# Patient Record
Sex: FEMALE | Race: WHITE | NOT HISPANIC OR LATINO | Employment: FULL TIME | URBAN - METROPOLITAN AREA
[De-identification: names, ages, dates, MRNs, and addresses within clinical notes are randomized per-mention and may not be internally consistent; named-entity substitution may affect disease eponyms.]

---

## 2021-07-26 NOTE — PROGRESS NOTES
"New Right Knee      Patient: Babs Felton        YOB: 1960    Medical Record Number: [id number]        Chief Complaints: right knee pain      History of Present Illness: This is a 60-year-old female who presents complaining of right knee pain she states she tripped in April landing right onto the anterior aspect of her right knee she had bruising initially that is better still has some pain anteriorly.  Symptoms are moderate intermittent aching worse with activity prolonged sitting stairs somewhat better with rest past medical history is unremarkable        Allergies: Not on File    Medications:   Home Medications:  No current outpatient medications on file prior to visit.     No current facility-administered medications on file prior to visit.     Current Medications:  Scheduled Meds:  Continuous Infusions:No current facility-administered medications for this visit.    PRN Meds:.    History reviewed. No pertinent past medical history.   No past surgical history on file.     Social History     Occupational History   • Not on file   Tobacco Use   • Smoking status: Not on file   Substance and Sexual Activity   • Alcohol use: Not on file   • Drug use: Not on file   • Sexual activity: Not on file      Social History     Social History Narrative   • Not on file      History reviewed. No pertinent family history.          Review of Systems: 14 point review of systems remarkable for the right knee pain only the remainder negative per the patient    Review of Systems      Physical Exam: 60 y.o. female  General Appearance:    Alert, cooperative, in no acute distress                   Vitals:    07/27/21 1347   Temp: 97.2 °F (36.2 °C)   TempSrc: Temporal   Weight: 104 kg (230 lb)   Height: 175.3 cm (69\")      Patient is alert and read ×3 no acute distress appears her above-listed at height weight and age.  Affect is normal respiratory rate is normal unlabored. Heart rate regular rate rhythm, sclera, dentition " and hearing are normal for the purpose of this exam.        Ortho Exam  Physical exam of the right knee reveals no effusion, no erythema.  The patient has no palpable tenderness along the medial joint line, no tenderness about the lateral joint line.  Patient does have crepitus with patellofemoral range of motion.  They also have subjective symptoms anteriorly during exam.  The patient has a negative bounce home, negative Koko and a stable ligamentous exam.  Quad tone is reasonable and symmetric.  There are no overlying skin changes no lymphedema no lymphadenopathy.  There is good hip range of motion which is full symmetric and asymptomatic and a normal ankle exam.  Hamstrings and IT band are tight bilaterally.      Large Joint Arthrocentesis: R knee  Date/Time: 7/27/2021 2:12 PM  Consent given by: patient  Site marked: site marked  Timeout: Immediately prior to procedure a time out was called to verify the correct patient, procedure, equipment, support staff and site/side marked as required   Supporting Documentation  Indications: pain   Procedure Details  Location: knee - R knee  Preparation: Patient was prepped and draped in the usual sterile fashion  Needle size: 22 G  Approach: anteromedial  Medications administered: 80 mg methylPREDNISolone acetate 80 MG/ML; 4 mL lidocaine PF 1% 1 %  Patient tolerance: patient tolerated the procedure well with no immediate complications                   Radiology:   AP, Lateral and merchant views of the right knee  were ordered/reviewed to evauateknee pain.  I have no comparative films these show some mild patellofemoral OA no obvious acute pathology no obvious fracture  Imaging Results (Most Recent)     Procedure Component Value Units Date/Time    XR Knee 3 View Right [[id number]] Resulted: 07/27/21 1344     Updated: 07/27/21 1344    Impression:      Ordering physician's impression is located in the Encounter Note dated 07/27/21. X-ray performed in the DR room.           Assessment/Plan:    Right knee pain following a fall seems to be more patellofemoral today.  Meniscal pathology has to remain in my differential plan is to proceed with an injection as a diagnostic and therapeutic tool she fails to improve we will pursue other means of testing such as an MRI.  I will also start her on some naproxen with strict precautions for short period of time of 1 to 2 weeks.  See her back in 4 weeks                        .

## 2021-07-27 ENCOUNTER — OFFICE VISIT (OUTPATIENT)
Dept: ORTHOPEDIC SURGERY | Facility: CLINIC | Age: 61
End: 2021-07-27

## 2021-07-27 VITALS — TEMPERATURE: 97.2 F | WEIGHT: 230 LBS | BODY MASS INDEX: 34.07 KG/M2 | HEIGHT: 69 IN

## 2021-07-27 DIAGNOSIS — M17.10 PATELLOFEMORAL ARTHRITIS: ICD-10-CM

## 2021-07-27 DIAGNOSIS — R52 PAIN: Primary | ICD-10-CM

## 2021-07-27 PROCEDURE — 73562 X-RAY EXAM OF KNEE 3: CPT | Performed by: ORTHOPAEDIC SURGERY

## 2021-07-27 PROCEDURE — 20610 DRAIN/INJ JOINT/BURSA W/O US: CPT | Performed by: ORTHOPAEDIC SURGERY

## 2021-07-27 PROCEDURE — 99204 OFFICE O/P NEW MOD 45 MIN: CPT | Performed by: ORTHOPAEDIC SURGERY

## 2021-07-27 RX ADMIN — LIDOCAINE HYDROCHLORIDE 4 ML: 10 INJECTION, SOLUTION EPIDURAL; INFILTRATION; INTRACAUDAL; PERINEURAL at 14:12

## 2021-07-27 RX ADMIN — METHYLPREDNISOLONE ACETATE 80 MG: 80 INJECTION, SUSPENSION INTRA-ARTICULAR; INTRALESIONAL; INTRAMUSCULAR; SOFT TISSUE at 14:12

## 2021-07-29 ENCOUNTER — TELEPHONE (OUTPATIENT)
Dept: ORTHOPEDIC SURGERY | Facility: CLINIC | Age: 61
End: 2021-07-29

## 2021-07-29 RX ORDER — NAPROXEN 500 MG/1
500 TABLET ORAL 2 TIMES DAILY
Qty: 60 TABLET | Refills: 0 | Status: SHIPPED | OUTPATIENT
Start: 2021-07-29 | End: 2022-05-27

## 2021-07-29 RX ORDER — METHYLPREDNISOLONE ACETATE 80 MG/ML
80 INJECTION, SUSPENSION INTRA-ARTICULAR; INTRALESIONAL; INTRAMUSCULAR; SOFT TISSUE
Status: COMPLETED | OUTPATIENT
Start: 2021-07-27 | End: 2021-07-27

## 2021-07-29 RX ORDER — LIDOCAINE HYDROCHLORIDE 10 MG/ML
4 INJECTION, SOLUTION EPIDURAL; INFILTRATION; INTRACAUDAL; PERINEURAL
Status: COMPLETED | OUTPATIENT
Start: 2021-07-27 | End: 2021-07-27

## 2021-08-04 ENCOUNTER — TELEPHONE (OUTPATIENT)
Dept: ORTHOPEDIC SURGERY | Facility: CLINIC | Age: 61
End: 2021-08-04

## 2021-08-04 RX ORDER — NAPROXEN 500 MG/1
500 TABLET ORAL 2 TIMES DAILY
Qty: 60 TABLET | Refills: 0 | Status: SHIPPED | OUTPATIENT
Start: 2021-08-04 | End: 2021-09-03

## 2021-08-04 NOTE — TELEPHONE ENCOUNTER
Caller: JENNIFER DIAZ    Relationship: SELF    Best call back number:     Medication needed:   Requested Prescriptions      No prescriptions requested or ordered in this encounter   NAPROXEN 500MG    When do you need the refill by:ASAP    What additional details did the patient provide when requesting the medication: THE PATIENT STATES THE PHARMACY DOES NOT HAVE HER PRESCRIPTION/ THEY NEVER RECEIVED IT.     TRIED TO WARM TRANSFER AND COULDN'T GET THROUGH      What is the patient's preferred pharmacy:    [facility] [address] AT [facility] - [phone] [facility] [phone]   [phone]

## 2021-08-04 NOTE — TELEPHONE ENCOUNTER
I did send it in and I went back and looked I do not know why they did not receive it.  I sent it in again

## 2021-09-02 ENCOUNTER — OFFICE VISIT (OUTPATIENT)
Dept: ORTHOPEDIC SURGERY | Facility: CLINIC | Age: 61
End: 2021-09-02

## 2021-09-02 VITALS — HEIGHT: 69 IN | TEMPERATURE: 96.8 F | WEIGHT: 232.2 LBS | BODY MASS INDEX: 34.39 KG/M2

## 2021-09-02 DIAGNOSIS — S83.241D TEAR OF MEDIAL MENISCUS OF RIGHT KNEE, CURRENT, UNSPECIFIED TEAR TYPE, SUBSEQUENT ENCOUNTER: Primary | ICD-10-CM

## 2021-09-02 PROCEDURE — 99213 OFFICE O/P EST LOW 20 MIN: CPT | Performed by: ORTHOPAEDIC SURGERY

## 2021-09-03 RX ORDER — NAPROXEN 500 MG/1
TABLET ORAL
Qty: 60 TABLET | Refills: 0 | Status: SHIPPED | OUTPATIENT
Start: 2021-09-03 | End: 2022-05-27

## 2021-09-10 ENCOUNTER — TELEPHONE (OUTPATIENT)
Dept: ORTHOPEDIC SURGERY | Facility: CLINIC | Age: 61
End: 2021-09-10

## 2021-09-10 NOTE — TELEPHONE ENCOUNTER
Caller: Babs Felton    Relationship: Self    Best call back number: [phone]    What orders are you requesting (i.e. lab or imaging): MRI  In what timeframe would the patient need to come in: HER APPT WAS LAST Thursday THEY STILL NEED THE ORDER    Where will you receive your lab/imaging services: [facility]    Additional notes: N/A - SHE WILL LIKE CONFIRMATION THAT IT HAS BEEN DONE

## 2021-09-20 ENCOUNTER — OFFICE VISIT (OUTPATIENT)
Dept: ORTHOPEDIC SURGERY | Facility: CLINIC | Age: 61
End: 2021-09-20

## 2021-09-20 VITALS — WEIGHT: 229 LBS | HEIGHT: 69 IN | TEMPERATURE: 97.1 F | BODY MASS INDEX: 33.92 KG/M2

## 2021-09-20 DIAGNOSIS — M17.10 PATELLOFEMORAL ARTHRITIS: Primary | ICD-10-CM

## 2021-09-20 PROCEDURE — 99213 OFFICE O/P EST LOW 20 MIN: CPT | Performed by: ORTHOPAEDIC SURGERY

## 2021-09-20 NOTE — PROGRESS NOTES
"Right Knee MRI Follow Up      Patient: Babs Felton        YOB: 1960            Chief Complaints: Right  Knee pain      History of Present Illness: The patient is here follow-up of an MRI of the knee MRI the right knee demonstrates degenerative changes at the patellofemoral joint and some subchondral cystic formation.  I do not see any meniscal pathology I think would be amenable to arthroscopy      Physical Exam: 60 y.o. female  General Appearance:    Alert, cooperative, in no acute distress                   Vitals:    09/20/21 1654   Temp: 97.1 °F (36.2 °C)   Weight: 104 kg (229 lb)   Height: 175.3 cm (69\")   PainSc:   3        Patient is alert and read ×3 no acute distress appears her above-listed at height weight and age.  Affect is normal respiratory rate is normal unlabored. Heart rate regular rate rhythm, sclera, dentition and hearing are normal for the purpose of this exam.      Ortho Exam Physical exam of the right knee reveals no effusion, no erythema.  The patient has no palpable tenderness along the medial joint line, no tenderness about the lateral joint line.  Patient does have crepitus with patellofemoral range of motion.  They also have subjective symptoms anteriorly during exam.  The patient has a negative bounce home, negative Koko and a stable ligamentous exam.  Quad tone is reasonable and symmetric.  There are no overlying skin changes no lymphedema no lymphadenopathy.  There is good hip range of motion which is full symmetric and asymptomatic and a normal ankle exam.  Hamstrings and IT band are tight bilaterally.          MRI Results: MRIs as above have reviewed the films myself and agree with the findings    Procedures      Assessment/Plan: Right knee pain I really do not think there is anything surgical to do here I think steroid injection she did that previously did not have a ton of relief we talked about options occluding gel injection platelet injections we will try " to get approval for gel in the future if she wants

## 2022-05-02 ENCOUNTER — TELEPHONE (OUTPATIENT)
Dept: PEDIATRICS | Facility: OTHER | Age: 62
End: 2022-05-02

## 2022-05-02 NOTE — TELEPHONE ENCOUNTER
Attempted to reach out to pt to get her scheduled. [facility] advising to give office a call so we could get her scheduled. Dates she has the options to do are 5/25 or 5/27 both being at 130pm.     Hub please advised what date pt would like.

## 2022-05-02 NOTE — TELEPHONE ENCOUNTER
Caller: Babs Felton    Relationship to patient: Self    Best call back number: [phone]    Chief complaint: WELLNESS VISIT    Type of visit: NEW PATIENT     Requested date: WHENEVER IS AVAILABLE     Additional notes: PATIENT STATED THAT DR JUDY SORIANO WITH [facility] IN [place] REFERRED PATIENT TO DR DAMIAN. PLEASE ADVISE IF PATIENT CAN BECOME ONE OF HER NEW PATIENTS.

## 2022-05-02 NOTE — TELEPHONE ENCOUNTER
Yes, I can see her as a new patient.  Please find a 1:30 pm appt on a Wednesday or Friday to work her in. thanks

## 2022-05-27 ENCOUNTER — OFFICE VISIT (OUTPATIENT)
Dept: FAMILY MEDICINE CLINIC | Facility: CLINIC | Age: 62
End: 2022-05-27

## 2022-05-27 VITALS
DIASTOLIC BLOOD PRESSURE: 80 MMHG | OXYGEN SATURATION: 97 % | WEIGHT: 245.9 LBS | SYSTOLIC BLOOD PRESSURE: 140 MMHG | TEMPERATURE: 97.1 F | HEART RATE: 69 BPM | BODY MASS INDEX: 36.42 KG/M2 | HEIGHT: 69 IN

## 2022-05-27 DIAGNOSIS — Z12.31 ENCOUNTER FOR SCREENING MAMMOGRAM FOR BREAST CANCER: ICD-10-CM

## 2022-05-27 DIAGNOSIS — E55.9 VITAMIN D DEFICIENCY: ICD-10-CM

## 2022-05-27 DIAGNOSIS — Z86.2 HISTORY OF ANEMIA: ICD-10-CM

## 2022-05-27 DIAGNOSIS — Z86.39 HISTORY OF HYPERLIPIDEMIA: ICD-10-CM

## 2022-05-27 DIAGNOSIS — R19.7 BLOODY DIARRHEA: ICD-10-CM

## 2022-05-27 DIAGNOSIS — R19.7 DIARRHEA, UNSPECIFIED TYPE: Primary | ICD-10-CM

## 2022-05-27 PROCEDURE — 99214 OFFICE O/P EST MOD 30 MIN: CPT | Performed by: INTERNAL MEDICINE

## 2022-05-27 RX ORDER — ONDANSETRON 4 MG/1
4 TABLET, FILM COATED ORAL
COMMUNITY
End: 2022-10-17

## 2022-05-27 RX ORDER — DICYCLOMINE HCL 20 MG
20 TABLET ORAL EVERY 6 HOURS PRN
COMMUNITY
End: 2023-03-22 | Stop reason: SDUPTHER

## 2022-05-27 NOTE — PROGRESS NOTES
"Chief Complaint  Establish Care (Pt would like to have a wellness check) and GI Problem (Pt is having digestive issues)    Subjective          Babs Felton presents to [facility]  History of Present Illness  Here as a new patient to me referred by Dr. Willie Bowman from the  in [place].  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  She is on staff at [employer].  Has been under a lot of stress at work and has noticed some issues with her colon.  Isn't exercising much b/c she hurt her right knee last year with a fall.  She has gained weight.    Sister with breast cancer history.    Pain, gas and diarrhea in colon for a few days and then will resolve.  Worse with stress and this has been the way she has been all her life.  In February, an episode started with diarrhea (water 4-6x/day) and some spasms related to it and has persisted for 3 months.  Eating makes it worse.  Stopped drinking coffee and maybe a little improvement.  Has noticed dark tarry stools which were constant in feb/march.  It has improved.  No weight loss.  Appetite is normal but eating flares her diarrhea, especially veggies.   + nocturnal sx's and has to have BM's. No epigastric pain .  Was taking advil prn for her knee but only for a day once a week.  No recent travel.  Has tried probiotic but not regularly.    Objective   Vital Signs:  /80   Pulse 69   Temp 97.1 °F (36.2 °C)   Ht 175.3 cm (69\")   Wt 112 kg (245 lb 14.4 oz)   SpO2 97%   BMI 36.31 kg/m²         Physical Exam  Vitals and nursing note reviewed.   Constitutional:       Appearance: Normal appearance. She is well-developed.   HENT:      Head: Normocephalic and atraumatic.      Right Ear: External ear normal.      Left Ear: External ear normal.   Eyes:      Extraocular Movements: Extraocular movements intact.      Conjunctiva/sclera: Conjunctivae normal.   Cardiovascular:      Rate and Rhythm: Normal rate and regular rhythm.      Heart sounds: " Normal heart sounds.      Comments: No bruits  Pulmonary:      Effort: Pulmonary effort is normal. No respiratory distress.      Breath sounds: Normal breath sounds. No wheezing or rales.   Abdominal:      General: Bowel sounds are normal. There is no distension.      Palpations: Abdomen is soft. There is no mass.      Tenderness: There is no abdominal tenderness.   Musculoskeletal:      Cervical back: Neck supple.   Lymphadenopathy:      Cervical: No cervical adenopathy.   Skin:     General: Skin is warm.   Neurological:      General: No focal deficit present.      Mental Status: She is alert and oriented to person, place, and time.   Psychiatric:         Behavior: Behavior normal.         Thought Content: Thought content normal.         Judgment: Judgment normal.        Result Review :                 Assessment and Plan    Diagnoses and all orders for this visit:    1. Diarrhea, unspecified type (Primary)  -     Ambulatory Referral to Gastroenterology  -     CBC & Differential  -     Comprehensive Metabolic Panel  -     T4, Free  -     TSH  -     Clostridium Difficile Toxin, PCR - Stool, Per Rectum  -     Ova & Parasite Examination - Stool, Per Rectum  -     Stool Culture With Yersinia - Stool, Per Rectum  -     Campylobacter Culture, Stool - Stool, Per Rectum    2. Bloody diarrhea  -     Ambulatory Referral to Gastroenterology  -     CBC & Differential  -     Comprehensive Metabolic Panel  -     Clostridium Difficile Toxin, PCR - Stool, Per Rectum  -     Ova & Parasite Examination - Stool, Per Rectum  -     Stool Culture With Yersinia - Stool, Per Rectum  -     Campylobacter Culture, Stool - Stool, Per Rectum    3. History of hyperlipidemia  -     Lipid Panel With LDL / HDL Ratio    4. Vitamin D deficiency  -     Vitamin D 25 Hydroxy    5. History of anemia  -     CBC & Differential  -     Folate  -     Ferritin  -     Iron  -     Vitamin B12    6. Encounter for screening mammogram for breast cancer  -     Mammo  Screening Bilateral With CAD; Future             Follow Up   No follow-ups on file.  Patient was given instructions and counseling regarding her condition or for health maintenance advice. Please see specific information pulled into the AVS if appropriate.     With melena and h/o blood in stool and diarrhea for a few months, she needs labs and stool studies and GI referral.  I have also asked her to start a daily probiotic and benefiber in small dosages and titrate upward as tolerates to try and bulk stools.  Concerning for colitis with the blood  No UGI sx's despite have what sounds like melena.  She is due for many preventative health studies and vaccinations.  She declines at present but will reschedule for a CPE and consider then.    H/o HL--check labs.  She prefers dietary restrictions prior to statin therapy  H/o VDD --check D3 level.   Screening MMG was ordered.  Pap recommended with her gyn.

## 2022-05-28 LAB
25(OH)D3+25(OH)D2 SERPL-MCNC: 21.2 NG/ML (ref 30–100)
ALBUMIN SERPL-MCNC: 4.6 G/DL (ref 3.8–4.8)
ALBUMIN/GLOB SERPL: 2.1 {RATIO} (ref 1.2–2.2)
ALP SERPL-CCNC: 86 IU/L (ref 44–121)
ALT SERPL-CCNC: 19 IU/L (ref 0–32)
AST SERPL-CCNC: 21 IU/L (ref 0–40)
BASOPHILS # BLD AUTO: 0 X10E3/UL (ref 0–0.2)
BASOPHILS NFR BLD AUTO: 1 %
BILIRUB SERPL-MCNC: 0.4 MG/DL (ref 0–1.2)
BUN SERPL-MCNC: 10 MG/DL (ref 8–27)
BUN/CREAT SERPL: 13 (ref 12–28)
CALCIUM SERPL-MCNC: 9.7 MG/DL (ref 8.7–10.3)
CHLORIDE SERPL-SCNC: 104 MMOL/L (ref 96–106)
CHOLEST SERPL-MCNC: 255 MG/DL (ref 100–199)
CO2 SERPL-SCNC: 22 MMOL/L (ref 20–29)
CREAT SERPL-MCNC: 0.78 MG/DL (ref 0.57–1)
EGFRCR SERPLBLD CKD-EPI 2021: 86 ML/MIN/1.73
EOSINOPHIL # BLD AUTO: 0.4 X10E3/UL (ref 0–0.4)
EOSINOPHIL NFR BLD AUTO: 5 %
ERYTHROCYTE [DISTWIDTH] IN BLOOD BY AUTOMATED COUNT: 13 % (ref 11.7–15.4)
FERRITIN SERPL-MCNC: 31 NG/ML (ref 15–150)
FOLATE SERPL-MCNC: 12.3 NG/ML
GLOBULIN SER CALC-MCNC: 2.2 G/DL (ref 1.5–4.5)
GLUCOSE SERPL-MCNC: 85 MG/DL (ref 65–99)
HCT VFR BLD AUTO: 40 % (ref 34–46.6)
HDLC SERPL-MCNC: 56 MG/DL
HGB BLD-MCNC: 13.4 G/DL (ref 11.1–15.9)
IMM GRANULOCYTES # BLD AUTO: 0 X10E3/UL (ref 0–0.1)
IMM GRANULOCYTES NFR BLD AUTO: 0 %
IRON SERPL-MCNC: 61 UG/DL (ref 27–139)
LDLC SERPL CALC-MCNC: 177 MG/DL (ref 0–99)
LDLC/HDLC SERPL: 3.2 RATIO (ref 0–3.2)
LYMPHOCYTES # BLD AUTO: 2.6 X10E3/UL (ref 0.7–3.1)
LYMPHOCYTES NFR BLD AUTO: 32 %
MCH RBC QN AUTO: 28.7 PG (ref 26.6–33)
MCHC RBC AUTO-ENTMCNC: 33.5 G/DL (ref 31.5–35.7)
MCV RBC AUTO: 86 FL (ref 79–97)
MONOCYTES # BLD AUTO: 0.4 X10E3/UL (ref 0.1–0.9)
MONOCYTES NFR BLD AUTO: 5 %
NEUTROPHILS # BLD AUTO: 4.6 X10E3/UL (ref 1.4–7)
NEUTROPHILS NFR BLD AUTO: 57 %
PLATELET # BLD AUTO: 303 X10E3/UL (ref 150–450)
POTASSIUM SERPL-SCNC: 4.4 MMOL/L (ref 3.5–5.2)
PROT SERPL-MCNC: 6.8 G/DL (ref 6–8.5)
RBC # BLD AUTO: 4.67 X10E6/UL (ref 3.77–5.28)
SODIUM SERPL-SCNC: 142 MMOL/L (ref 134–144)
T4 FREE SERPL-MCNC: 1.13 NG/DL (ref 0.82–1.77)
TRIGL SERPL-MCNC: 121 MG/DL (ref 0–149)
TSH SERPL DL<=0.005 MIU/L-ACNC: 2.23 UIU/ML (ref 0.45–4.5)
VIT B12 SERPL-MCNC: 389 PG/ML (ref 232–1245)
VLDLC SERPL CALC-MCNC: 22 MG/DL (ref 5–40)
WBC # BLD AUTO: 8 X10E3/UL (ref 3.4–10.8)

## 2022-06-14 ENCOUNTER — HOSPITAL ENCOUNTER (OUTPATIENT)
Dept: MAMMOGRAPHY | Facility: HOSPITAL | Age: 62
Discharge: HOME | End: 2022-06-14
Admitting: INTERNAL MEDICINE

## 2022-06-14 DIAGNOSIS — Z12.31 ENCOUNTER FOR SCREENING MAMMOGRAM FOR BREAST CANCER: ICD-10-CM

## 2022-06-14 PROCEDURE — 77067 SCR MAMMO BI INCL CAD: CPT

## 2022-06-14 PROCEDURE — 77063 BREAST TOMOSYNTHESIS BI: CPT

## 2022-06-30 ENCOUNTER — OFFICE VISIT (OUTPATIENT)
Dept: GASTROENTEROLOGY | Facility: CLINIC | Age: 62
End: 2022-06-30

## 2022-06-30 ENCOUNTER — PREP FOR SURGERY (OUTPATIENT)
Dept: SURGERY | Facility: SURGERY CENTER | Age: 62
End: 2022-06-30

## 2022-06-30 VITALS
OXYGEN SATURATION: 97 % | HEART RATE: 76 BPM | SYSTOLIC BLOOD PRESSURE: 142 MMHG | WEIGHT: 240 LBS | BODY MASS INDEX: 35.55 KG/M2 | DIASTOLIC BLOOD PRESSURE: 82 MMHG | HEIGHT: 69 IN | TEMPERATURE: 97.1 F

## 2022-06-30 DIAGNOSIS — R14.0 BLOATING: ICD-10-CM

## 2022-06-30 DIAGNOSIS — K21.9 GASTROESOPHAGEAL REFLUX DISEASE WITHOUT ESOPHAGITIS: ICD-10-CM

## 2022-06-30 DIAGNOSIS — R19.7 BLOODY DIARRHEA: ICD-10-CM

## 2022-06-30 DIAGNOSIS — R10.9 ABDOMINAL CRAMPING: ICD-10-CM

## 2022-06-30 DIAGNOSIS — R19.7 DIARRHEA, UNSPECIFIED TYPE: Primary | ICD-10-CM

## 2022-06-30 DIAGNOSIS — K58.0 IRRITABLE BOWEL SYNDROME WITH DIARRHEA: ICD-10-CM

## 2022-06-30 PROCEDURE — 99204 OFFICE O/P NEW MOD 45 MIN: CPT | Performed by: INTERNAL MEDICINE

## 2022-06-30 RX ORDER — SODIUM CHLORIDE, SODIUM LACTATE, POTASSIUM CHLORIDE, CALCIUM CHLORIDE 600; 310; 30; 20 MG/100ML; MG/100ML; MG/100ML; MG/100ML
30 INJECTION, SOLUTION INTRAVENOUS CONTINUOUS PRN
Status: CANCELLED | OUTPATIENT
Start: 2022-06-30

## 2022-06-30 RX ORDER — SODIUM CHLORIDE 0.9 % (FLUSH) 0.9 %
10 SYRINGE (ML) INJECTION AS NEEDED
Status: CANCELLED | OUTPATIENT
Start: 2022-06-30

## 2022-06-30 RX ORDER — SODIUM CHLORIDE 0.9 % (FLUSH) 0.9 %
3 SYRINGE (ML) INJECTION EVERY 12 HOURS SCHEDULED
Status: CANCELLED | OUTPATIENT
Start: 2022-06-30

## 2022-06-30 RX ORDER — VALACYCLOVIR HYDROCHLORIDE 1 G/1
TABLET, FILM COATED ORAL
COMMUNITY
Start: 2022-06-20 | End: 2022-10-12

## 2022-06-30 NOTE — PROGRESS NOTES
"Chief Complaint   Patient presents with   • Diarrhea           History of Present Illness  Patient today for consultation regarding new and unexplained diarrhea with abdominal cramping and gas and bloating.    She has a history of heartburn and nausea which she taking Zofran which has resolved nausea.     Reports prior history of environmental stress induced diarrhea with post-prandial fecal urgency most notably after consuming vegetables.     Approximately seven weeks ago, she had a subsequent episode of increased stool frequency that is watery in consistency and initially had visible blood in the stool without mucous.     Hematochezia resolved until over the past few days and it returned.  States that she was prescribed bentyl 10 mg po as needed with notable improvement in symptoms.     States that during periods of fasting diarrhea resolves until she consumes food.     Denies unintentional weight loss.     Family history of liver cancer in father.  Denies colon cancer or colon polyps      Result Review :       Vitamin B12 (05/27/2022 14:19)  Iron (05/27/2022 14:19)  Comprehensive Metabolic Panel (05/27/2022 14:19)  Progress Notes by Araceli Rose MD (05/27/2022 13:30)      Vital Signs:   /82   Pulse 76   Temp 97.1 °F (36.2 °C)   Ht 175.3 cm (69\")   Wt 109 kg (240 lb)   SpO2 97%   BMI 35.44 kg/m²     Body mass index is 35.44 kg/m².     Physical Exam  Vitals reviewed.   Constitutional:       Appearance: Normal appearance.   Abdominal:      General: Bowel sounds are normal. There is no distension.      Palpations: Abdomen is soft. Abdomen is not rigid. There is no mass or pulsatile mass.      Tenderness: There is no abdominal tenderness. There is no guarding or rebound.           Assessment and Plan    Diagnoses and all orders for this visit:    1. Diarrhea, unspecified type (Primary)    2. Bloating    3. Abdominal cramping    4. Irritable bowel syndrome with diarrhea  -     riFAXIMin (Xifaxan) 550 " MG tablet; Take 1 tablet by mouth 3 (Three) Times a Day for 14 days.  Dispense: 42 tablet; Refill: 2         BRIEF SUMMARY  Patient here for consultation regarding diarrhea with defecation urgency and bloating consistent with IBS-D.  She has had some relief with Bentyl.  We will add Xifaxan.  She is also due for colonoscopy for screening we will check this and the biopsies rule microscopic colitis.  We will also perform an EGD to evaluate reflux episodes and to biopsy her small bowel rule out celiac disease.    I have reviewed and confirmed the accuracy of the HPI and Assessment and Plan as documented by the YAMILEX Hu        Follow Up   Return if symptoms worsen or fail to improve, for at procedure.    Patient Instructions   Continue taking Bentyl for abdominal cramping as needed     1.We will prescribe you a course of Xifaxan for IBS-D. You will take 1 tab 3 x daily x 14 days.     For GERD:    Follow antireflux precautions:    1. Avoiding eating within 3 to 4 hours of bedtime.    2. Avoid foods that can trigger symptoms which may include:  a. citrus fruits  b. spicy foods,  c. Tomatoes  d. Red sauces  e.  Chocolate  f. coffee/tea  g. caffeinated or carbonated beverages  h. Alcohol    Schedule EGD and colonoscopy, orders placed     Further recommendations to be made pending results of the above work-up and clinical course.

## 2022-06-30 NOTE — PATIENT INSTRUCTIONS
Continue taking Bentyl for abdominal cramping as needed     1.We will prescribe you a course of Xifaxan for IBS-D. You will take 1 tab 3 x daily x 14 days.     For GERD:    Follow antireflux precautions:    Avoiding eating within 3 to 4 hours of bedtime.    Avoid foods that can trigger symptoms which may include:  citrus fruits  spicy foods,  Tomatoes  Red sauces   Chocolate  coffee/tea  caffeinated or carbonated beverages  Alcohol    Schedule EGD and colonoscopy, orders placed     Further recommendations to be made pending results of the above work-up and clinical course.

## 2022-07-01 PROBLEM — R10.9 ABDOMINAL CRAMPING: Status: ACTIVE | Noted: 2022-07-01

## 2022-07-01 PROBLEM — K21.9 GASTROESOPHAGEAL REFLUX DISEASE WITHOUT ESOPHAGITIS: Status: ACTIVE | Noted: 2022-07-01

## 2022-07-01 PROBLEM — R14.0 BLOATING: Status: ACTIVE | Noted: 2022-07-01

## 2022-07-01 PROBLEM — R19.7 BLOODY DIARRHEA: Status: ACTIVE | Noted: 2022-07-01

## 2022-07-01 PROBLEM — R19.7 DIARRHEA: Status: ACTIVE | Noted: 2022-07-01

## 2022-08-30 ENCOUNTER — APPOINTMENT (OUTPATIENT)
Dept: LAB | Facility: SURGERY CENTER | Age: 62
End: 2022-08-30

## 2022-09-01 ENCOUNTER — ANESTHESIA EVENT (OUTPATIENT)
Dept: SURGERY | Facility: SURGERY CENTER | Age: 62
End: 2022-09-01

## 2022-09-01 ENCOUNTER — ANESTHESIA (OUTPATIENT)
Dept: SURGERY | Facility: SURGERY CENTER | Age: 62
End: 2022-09-01

## 2022-09-01 ENCOUNTER — HOSPITAL ENCOUNTER (OUTPATIENT)
Facility: SURGERY CENTER | Age: 62
Setting detail: HOSPITAL OUTPATIENT SURGERY
Discharge: HOME | End: 2022-09-01
Attending: INTERNAL MEDICINE | Admitting: INTERNAL MEDICINE

## 2022-09-01 VITALS
HEART RATE: 76 BPM | WEIGHT: 244 LBS | SYSTOLIC BLOOD PRESSURE: 144 MMHG | HEIGHT: 70 IN | DIASTOLIC BLOOD PRESSURE: 90 MMHG | OXYGEN SATURATION: 96 % | RESPIRATION RATE: 20 BRPM | TEMPERATURE: 97.3 F | BODY MASS INDEX: 34.93 KG/M2

## 2022-09-01 DIAGNOSIS — R19.7 BLOODY DIARRHEA: ICD-10-CM

## 2022-09-01 DIAGNOSIS — R10.9 ABDOMINAL CRAMPING: ICD-10-CM

## 2022-09-01 DIAGNOSIS — K21.9 GASTROESOPHAGEAL REFLUX DISEASE WITHOUT ESOPHAGITIS: ICD-10-CM

## 2022-09-01 DIAGNOSIS — R19.7 DIARRHEA, UNSPECIFIED TYPE: ICD-10-CM

## 2022-09-01 DIAGNOSIS — R14.0 BLOATING: ICD-10-CM

## 2022-09-01 PROCEDURE — 43239 EGD BIOPSY SINGLE/MULTIPLE: CPT | Performed by: INTERNAL MEDICINE

## 2022-09-01 PROCEDURE — 88305 TISSUE EXAM BY PATHOLOGIST: CPT | Performed by: INTERNAL MEDICINE

## 2022-09-01 PROCEDURE — 45385 COLONOSCOPY W/LESION REMOVAL: CPT | Performed by: INTERNAL MEDICINE

## 2022-09-01 PROCEDURE — 25010000002 PROPOFOL 10 MG/ML EMULSION: Performed by: ANESTHESIOLOGY

## 2022-09-01 PROCEDURE — 45380 COLONOSCOPY AND BIOPSY: CPT | Performed by: INTERNAL MEDICINE

## 2022-09-01 RX ORDER — SODIUM CHLORIDE 0.9 % (FLUSH) 0.9 %
3 SYRINGE (ML) INJECTION EVERY 12 HOURS SCHEDULED
Status: DISCONTINUED | OUTPATIENT
Start: 2022-09-01 | End: 2022-09-01 | Stop reason: HOSPADM

## 2022-09-01 RX ORDER — PROPOFOL 10 MG/ML
VIAL (ML) INTRAVENOUS AS NEEDED
Status: DISCONTINUED | OUTPATIENT
Start: 2022-09-01 | End: 2022-09-01 | Stop reason: SURG

## 2022-09-01 RX ORDER — LIDOCAINE HYDROCHLORIDE 10 MG/ML
0.5 INJECTION, SOLUTION INFILTRATION; PERINEURAL ONCE AS NEEDED
Status: COMPLETED | OUTPATIENT
Start: 2022-09-01 | End: 2022-09-01

## 2022-09-01 RX ORDER — SODIUM CHLORIDE 0.9 % (FLUSH) 0.9 %
10 SYRINGE (ML) INJECTION AS NEEDED
Status: DISCONTINUED | OUTPATIENT
Start: 2022-09-01 | End: 2022-09-01 | Stop reason: HOSPADM

## 2022-09-01 RX ORDER — SODIUM CHLORIDE, SODIUM LACTATE, POTASSIUM CHLORIDE, CALCIUM CHLORIDE 600; 310; 30; 20 MG/100ML; MG/100ML; MG/100ML; MG/100ML
30 INJECTION, SOLUTION INTRAVENOUS CONTINUOUS PRN
Status: DISCONTINUED | OUTPATIENT
Start: 2022-09-01 | End: 2022-09-01 | Stop reason: HOSPADM

## 2022-09-01 RX ORDER — PROPOFOL 10 MG/ML
VIAL (ML) INTRAVENOUS CONTINUOUS PRN
Status: DISCONTINUED | OUTPATIENT
Start: 2022-09-01 | End: 2022-09-01 | Stop reason: SURG

## 2022-09-01 RX ORDER — MAGNESIUM HYDROXIDE 1200 MG/15ML
LIQUID ORAL AS NEEDED
Status: DISCONTINUED | OUTPATIENT
Start: 2022-09-01 | End: 2022-09-01 | Stop reason: HOSPADM

## 2022-09-01 RX ORDER — SODIUM CHLORIDE, SODIUM LACTATE, POTASSIUM CHLORIDE, CALCIUM CHLORIDE 600; 310; 30; 20 MG/100ML; MG/100ML; MG/100ML; MG/100ML
INJECTION, SOLUTION INTRAVENOUS CONTINUOUS PRN
Status: DISCONTINUED | OUTPATIENT
Start: 2022-09-01 | End: 2022-09-01 | Stop reason: SURG

## 2022-09-01 RX ADMIN — LIDOCAINE HYDROCHLORIDE 0.5 ML: 10 INJECTION, SOLUTION EPIDURAL; INFILTRATION; INTRACAUDAL; PERINEURAL at 08:26

## 2022-09-01 RX ADMIN — SODIUM CHLORIDE, SODIUM LACTATE, POTASSIUM CHLORIDE, AND CALCIUM CHLORIDE: .6; .31; .03; .02 INJECTION, SOLUTION INTRAVENOUS at 08:59

## 2022-09-01 RX ADMIN — PROPOFOL INJECTABLE EMULSION 150 MG: 10 INJECTION, EMULSION INTRAVENOUS at 08:59

## 2022-09-01 RX ADMIN — Medication 200 MCG/KG/MIN: at 08:59

## 2022-09-01 RX ADMIN — SODIUM CHLORIDE, POTASSIUM CHLORIDE, SODIUM LACTATE AND CALCIUM CHLORIDE 30 ML/HR: 600; 310; 30; 20 INJECTION, SOLUTION INTRAVENOUS at 08:26

## 2022-09-01 NOTE — H&P
No chief complaint on file.      HPI  Patient today for an EGD due to GERD and bloating and epigastric pain to rule out celiac disease and diarrhea and a colonoscopy for screening as well as history of diarrhea throughout microscopic colitis.         Problem List:    Patient Active Problem List   Diagnosis   • Diarrhea   • Bloody diarrhea   • Abdominal cramping   • Bloating   • Gastroesophageal reflux disease without esophagitis       Medical History:    Past Medical History:   Diagnosis Date   • Chronic back pain    • Headache    • Neck pain         Social History:    Social History     Socioeconomic History   • Marital status: Single   Tobacco Use   • Smoking status: Never Smoker   • Smokeless tobacco: Never Used   Vaping Use   • Vaping Use: Never used   Substance and Sexual Activity   • Alcohol use: Yes     Comment: rarely   • Drug use: Never   • Sexual activity: Never       Family History:   Family History   Problem Relation Age of Onset   • Colon polyps Mother    • Asthma Mother    • COPD Mother    • Lung disease Mother    • Migraines Mother    • Colon polyps Father    • Heart disease Father    • Diabetes Father    • Kidney disease Father    • Angina Father    • Liver cancer Father    • Hepatitis Father    • Colon polyps Sister    • Breast cancer Sister    • Colon polyps Sister    • Colon cancer Neg Hx    • Crohn's disease Neg Hx    • Irritable bowel syndrome Neg Hx    • Ulcerative colitis Neg Hx        Surgical History:   Past Surgical History:   Procedure Laterality Date   • COLONOSCOPY  1986   • EYE SURGERY Right    • KNEE SURGERY Left    • SHOULDER SURGERY Left    • WISDOM TOOTH EXTRACTION Bilateral        No current facility-administered medications for this encounter.    Allergies:   Allergies   Allergen Reactions   • Levofloxacin Swelling   • Sulfa Antibiotics Rash        The following portions of the patient's history were reviewed by me and updated as appropriate: review of systems, allergies, current  medications, past family history, past medical history, past social history, past surgical history and problem list.    There were no vitals filed for this visit.    PHYSICAL EXAM:    CONSTITUTIONAL:  today's vital signs reviewed by me  GASTROINTESTINAL: abdomen is soft nontender nondistended with normal active bowel sounds, no masses are appreciated    Assessment/ Plan  We will proceed with EGD and colonoscopy.    Risks and benefits as well as alternatives to endoscopic evaluation were explained to the patient and they voiced understanding and wish to proceed.  These risks include but are not limited to the risk of bleeding, perforation, adverse reaction to sedation, and missed lesions.  The patient was given the opportunity to ask questions prior to the endoscopic procedure.

## 2022-09-01 NOTE — ANESTHESIA PREPROCEDURE EVALUATION
Anesthesia Evaluation     NPO Solid Status: > 6 hours  NPO Liquid Status: > 6 hours           Airway   Mallampati: II  TM distance: >3 FB  Neck ROM: full  Dental - normal exam     Pulmonary - negative pulmonary ROS   Cardiovascular - negative cardio ROS        Neuro/Psych  (+) headaches,    GI/Hepatic/Renal/Endo    (+)  GERD well controlled,      Musculoskeletal     (+) neck pain,   Abdominal    Substance History      OB/GYN          Other   arthritis (C-spine limited rotation),                    Anesthesia Plan    ASA 2     MAC       Anesthetic plan, risks, benefits, and alternatives have been provided, discussed and informed consent has been obtained with: patient.        CODE STATUS:

## 2022-09-01 NOTE — ANESTHESIA POSTPROCEDURE EVALUATION
Patient: Babs Felton    Procedure Summary     Date: 09/01/22 Room / Location: [facility] / [facility]    Anesthesia Start: 0855 Anesthesia Stop: 0941    Procedures:       ESOPHAGOGASTRODUODENOSCOPY WITH BIOPSY (N/A )      COLONOSCOPY WITH POLYPECTOMY (N/A ) Diagnosis:       Diarrhea, unspecified type      Bloody diarrhea      Abdominal cramping      Bloating      Gastroesophageal reflux disease without esophagitis      (Diarrhea, unspecified type [R19.7])      (Bloody diarrhea [R19.7])      (Abdominal cramping [R10.9])      (Bloating [R14.0])      (Gastroesophageal reflux disease without esophagitis [K21.9])    Surgeons: Dinesh Pérez MD Provider: Sumit Lauren MD    Anesthesia Type: MAC ASA Status: 2          Anesthesia Type: MAC    Vitals  Vitals Value Taken Time   /90 09/01/22 0954   Temp 36.3 °C (97.3 °F) 09/01/22 0940   Pulse 76 09/01/22 0954   Resp 20 09/01/22 0954   SpO2 96 % 09/01/22 0954           Post Anesthesia Care and Evaluation    Level of consciousness: awake  Pain management: satisfactory to patient    Airway patency: patent  Anesthetic complications: No anesthetic complications  PONV Status: controlled  Cardiovascular status: acceptable  Respiratory status: acceptable  Hydration status: acceptable

## 2022-09-05 LAB
LAB AP CASE REPORT: NORMAL
LAB AP CLINICAL INFORMATION: NORMAL
LAB AP DIAGNOSIS COMMENT: NORMAL
LAB AP INTRADEPARTMENTAL CONSULT: NORMAL
PATH REPORT.FINAL DX SPEC: NORMAL
PATH REPORT.GROSS SPEC: NORMAL

## 2022-09-06 DIAGNOSIS — C80.1 ADENOCARCINOMA: Primary | ICD-10-CM

## 2022-09-06 DIAGNOSIS — D36.9 MULTIPLE ADENOMATOUS POLYPS: ICD-10-CM

## 2022-09-07 DIAGNOSIS — C80.1 ADENOCARCINOMA: Primary | ICD-10-CM

## 2022-09-08 ENCOUNTER — LAB (OUTPATIENT)
Dept: LAB | Facility: HOSPITAL | Age: 62
End: 2022-09-08

## 2022-09-08 PROCEDURE — 82378 CARCINOEMBRYONIC ANTIGEN: CPT | Performed by: INTERNAL MEDICINE

## 2022-09-13 ENCOUNTER — HOSPITAL ENCOUNTER (OUTPATIENT)
Dept: CT IMAGING | Facility: HOSPITAL | Age: 62
Discharge: HOME | End: 2022-09-13
Admitting: INTERNAL MEDICINE

## 2022-09-13 DIAGNOSIS — D36.9 MULTIPLE ADENOMATOUS POLYPS: ICD-10-CM

## 2022-09-13 DIAGNOSIS — C80.1 ADENOCARCINOMA: ICD-10-CM

## 2022-09-13 PROCEDURE — 0 DIATRIZOATE MEGLUMINE & SODIUM PER 1 ML: Performed by: INTERNAL MEDICINE

## 2022-09-13 PROCEDURE — 74177 CT ABD & PELVIS W/CONTRAST: CPT

## 2022-09-13 PROCEDURE — 71260 CT THORAX DX C+: CPT

## 2022-09-13 PROCEDURE — 25010000002 IOPAMIDOL 61 % SOLUTION: Performed by: INTERNAL MEDICINE

## 2022-09-13 PROCEDURE — 82565 ASSAY OF CREATININE: CPT

## 2022-09-13 RX ADMIN — IOPAMIDOL 85 ML: 612 INJECTION, SOLUTION INTRAVENOUS at 14:42

## 2022-09-13 RX ADMIN — DIATRIZOATE MEGLUMINE AND DIATRIZOATE SODIUM 30 ML: 660; 100 LIQUID ORAL; RECTAL at 13:30

## 2022-09-16 ENCOUNTER — OFFICE VISIT (OUTPATIENT)
Dept: SURGERY | Facility: CLINIC | Age: 62
End: 2022-09-16

## 2022-09-16 VITALS
HEART RATE: 79 BPM | OXYGEN SATURATION: 98 % | WEIGHT: 247 LBS | DIASTOLIC BLOOD PRESSURE: 96 MMHG | SYSTOLIC BLOOD PRESSURE: 150 MMHG | HEIGHT: 69 IN | BODY MASS INDEX: 36.58 KG/M2

## 2022-09-16 DIAGNOSIS — C20 RECTAL CANCER: Primary | ICD-10-CM

## 2022-09-16 PROCEDURE — 99204 OFFICE O/P NEW MOD 45 MIN: CPT | Performed by: COLON & RECTAL SURGERY

## 2022-09-16 NOTE — PROGRESS NOTES
Babs Felton is a 61 y.o. female who is seen as a consult at the request of Dinesh Mendez MD for Rectal Cancer.      HPI:    The patient states she is struggling with fatigue, although according to her last laboratory studies from 05/2022, she was not anemic at that time. She adds she has not researched treatment options as she wanted to understand her pathology reports and imaging first. The patient inquires about the high-grade dysplasia on her report as well as the calcified granulomas noted in the lung. She believes she previously had 2 tubular adenomas removed.     The patient is a  who sings frequently requiring deep inspiration and she also plays the keyboard. She wonders how surgical intervention and recovery will affect these activities.     The patient states, per doctor Vitaliy, she does not have a disc between L5 and S1 which causes issues when lying supine. She reports concern over this regarding surgery and recovery.     The patient reports a history of maternal lung resection of one-half of her lung secondary to possible empyema in 1964. She notes her mother continued with life-long pulmonary issues including COPD.     The patient inquires about obtaining an influenza vaccine.     Past Medical History:   Diagnosis Date   • Abdominal cramping 07/01/2022   • Anemia    • Bloating 07/01/2022   • Bloody diarrhea 07/01/2022   • Chronic back pain    • Colon polyps     FOLLOWED BY DR. DINESH MENDEZ   • Depression    • Gastric polyps     FOLLOWED BY DR. DINESH MENDEZ   • GERD (gastroesophageal reflux disease)    • Hearing loss    • Hiatal hernia 09/2021    3 CM, FOLLOWED BY DR. DINESH MENDEZ   • Hyperlipidemia    • IBS (irritable bowel syndrome)    • Migraines    • Patellofemoral arthritis 07/2021   • PNA (pneumonia)    • Rectal cancer (HCC) 09/2022    INVASIVE MODERATELY DIFFERENTIATED ADENOCARCINOMA ARISING FROM TRADITIONAL SERRATED ADENOMA WITH HGD   • Sciatica 09/2015   •  "Tear of medial meniscus of knee 07/2021    RIGHT KNEE   • Vitamin D deficiency        Past Surgical History:   Procedure Laterality Date   • COLONOSCOPY N/A 1986   • COLONOSCOPY W/ POLYPECTOMY N/A 09/01/2022    3 MM TUBULAR ADENOMA POLYP IN ASCENDING, 6 MM TUBULAR ADENOMA POLYP IN CECUM, 30 MM MASS IN RECTUM, PATH:INVASIVE MODERATELY DIFFERENTIATED ADENOCARCINOMA ARISING FROM TRADITIONAL SERRATED ADENOMA WITH HGD, HYPERTROPHIED ANAL PAPILLA, DR. BEVERLY MENDEZ AT [facility]   • ENDOSCOPY N/A 09/01/2022    MULTIPLE BENIGN GASTRIC POLYPS, 2 SQUAMOUS PAPILLAS IN ESOPHAGUS, 3 CM HIATAL HERNIA,   • EXTERNAL EAR SURGERY  1966    MULTIPLE \"LANCES\", DR. ACE IN [facility]   • EYE SURGERY Right 1967    DR. CASTANEDA IN [facility]   • KNEE ARTHRODESIS Right 07/27/2021    RIGHT KNEE ARTHROCENTESIS, DR. ALBERT GILMAN   • KNEE SURGERY Left 1985    DR. PEREZ AT [place]   • SHOULDER MANIPULATION Left 01/07/2013    FOR FROZEN SHOULDER, DR. ALBERT GILMAN AT [facility]   • WISDOM TOOTH EXTRACTION Bilateral        Social History:   reports that she has never smoked. She has never used smokeless tobacco. She reports current alcohol use. She reports that she does not use drugs.      Marriage status: Single    Family History   Problem Relation Age of Onset   • Colon polyps Mother    • Asthma Mother    • COPD Mother    • Lung disease Mother    • Migraines Mother    • Cancer Father    • Colon polyps Father    • Heart disease Father    • Diabetes Father    • Kidney disease Father    • Angina Father    • Liver cancer Father    • Hepatitis Father    • Cancer Sister    • Arthritis Sister    • Colon polyps Sister    • Breast cancer Sister    • Diabetes Sister    • Colon polyps Sister    • Colon cancer Neg Hx    • Crohn's disease Neg Hx    • Irritable bowel syndrome Neg Hx    • Ulcerative colitis Neg Hx          Current Outpatient Medications:   •  dicyclomine (BENTYL) 20 MG tablet, Take 20 mg by mouth Every 6 (Six) Hours., " Disp: , Rfl:   •  ELDERBERRY PO, Take 2 doses by mouth Daily., Disp: , Rfl:   •  ondansetron (ZOFRAN) 4 MG tablet, Take 4 mg by mouth., Disp: , Rfl:   •  valACYclovir (VALTREX) 1000 MG tablet, , Disp: , Rfl:     Allergy  Levofloxacin and Sulfa antibiotics    Review of Systems   Constitutional: Positive for malaise/fatigue. Negative for decreased appetite and weight gain.   HENT: Negative for congestion, hearing loss and hoarse voice.    Eyes: Negative for blurred vision, discharge and visual disturbance.   Cardiovascular: Negative for chest pain, cyanosis and leg swelling.   Respiratory: Negative for cough, shortness of breath, sleep disturbances due to breathing and snoring.    Endocrine: Negative for cold intolerance and heat intolerance.   Hematologic/Lymphatic: Bruises/bleeds easily.   Skin: Negative for itching, poor wound healing and skin cancer.   Musculoskeletal: Positive for back pain and stiffness. Negative for arthritis, joint pain and joint swelling.   Gastrointestinal: Positive for constipation, diarrhea, hematochezia and nausea. Negative for abdominal pain, change in bowel habit and bowel incontinence.   Genitourinary: Negative for bladder incontinence, dysuria and hematuria.   Neurological: Negative for brief paralysis, excessive daytime sleepiness, dizziness, focal weakness, headaches, light-headedness and weakness.   Psychiatric/Behavioral: Negative for altered mental status and hallucinations. The patient does not have insomnia.    Allergic/Immunologic: Negative for HIV exposure and persistent infections.   All other systems reviewed and are negative.      Vitals:    09/16/22 1324   BP: 150/96   Pulse: 79   SpO2: 98%     Body mass index is 36.48 kg/m².    Physical Exam  Exam conducted with a chaperone present.   Constitutional:       General: She is not in acute distress.     Appearance: She is well-developed.   HENT:      Head: Normocephalic and atraumatic.      Nose: Nose normal.   Eyes:       Conjunctiva/sclera: Conjunctivae normal.      Pupils: Pupils are equal, round, and reactive to light.   Neck:      Trachea: No tracheal deviation.   Pulmonary:      Effort: Pulmonary effort is normal. No respiratory distress.      Breath sounds: Normal breath sounds.   Abdominal:      General: Bowel sounds are normal. There is no distension.      Palpations: Abdomen is soft.   Musculoskeletal:         General: No deformity. Normal range of motion.      Cervical back: Normal range of motion.   Skin:     General: Skin is warm and dry.   Neurological:      Mental Status: She is alert and oriented to person, place, and time.      Cranial Nerves: No cranial nerve deficit.      Coordination: Coordination normal.      Gait: Gait normal.   Psychiatric:         Behavior: Behavior normal.         Judgment: Judgment normal.           Review of Medical Record:  The patient underwent a colonoscopy on 09/01/2022, and was found to have a polyp mass 13 cm from the anal verge. It was biopsied and came back adenocarcinoma. She underwent a CT scan on 09/13/2022. CT imaging was reviewed along with the report. The rectal mass is not visualized and there are very small perirectal lymph nodes.    Assessment:  1. Rectal cancer (HCC)        Plan:  Pathology and treatment options for her serrated adenoma with high-grade dysplasia was discussed with the patient. It was discussed that middle and lower rectal cancers can be treated with radiation if they have a few favorable characteristics. If the cancer is above this area, radiation is not indicated. There is less recurrence of cancer if it is treated with radiation. It was explained if the thickness of the cancer perforates the wall of the rectum, radiation and chemotherapy will be considered. Lymph nodes will also be considered if they are large. However, the patient's lymph nodes appear to be small. According to her CAT scan, her tumor has not metastasized. Staging was discussed and this  will be determined via flexible sigmoidoscopy and ultrasound. An MRI scan may be considered if necessary. A surgical procedure of a low anterior resection, performed laparoscopically, was discussed in detail with the patient including the risks, goals, and recovery. She is aware that this procedure requires a resection of 1 inch inferior and 5 inches superior to the adenoma, and this would include the lymph nodes as well which would result in approximately 8 inches being removed. A temporary ileostomy may be placed if necessary for approximately 6 weeks. She is assured that one can live without a colon. This procedure may increase the frequency and urgency of bowel movements secondary to a reduced holding station. Radiation and chemotherapy were discussed in the event that she would require these treatments. She is advised to undergo more frequent colonoscopies secondary to her history. Recovery will take approximately 4 weeks with restrictions on lifting over 20 pounds during that time. Driving restrictions will be in place for the first 2 weeks. Enhanced recovery system will be utilized consisting of Tylenol, Celebrex or ibuprofen, and gabapentin. At the time of surgery, a transabdominal muscular block will be utilized for local pain management which lasts in duration for approximately 2 to 3 days. All questions queried were answered in the office today. The patient is aware and in agreement with the plan, and she will schedule the flexible sigmoidoscopy and ultrasound. She is advised to perform 2 enemas prior to the procedure.     Transcribed from ambient dictation for Jennifer Amaya MD by ROSSY LYONS.  09/16/22   15:32 EDT    Patient verbalized consent to the visit recording.  I have personally performed the services described in this document as transcribed by the above individual, and it is both accurate and complete.  Jennifer Amaya MD  9/16/2022  16:25 EDT

## 2022-09-16 NOTE — H&P (VIEW-ONLY)
Babs Felton is a 61 y.o. female who is seen as a consult at the request of Dinesh Mendez MD for Rectal Cancer.      HPI:    The patient states she is struggling with fatigue, although according to her last laboratory studies from 05/2022, she was not anemic at that time. She adds she has not researched treatment options as she wanted to understand her pathology reports and imaging first. The patient inquires about the high-grade dysplasia on her report as well as the calcified granulomas noted in the lung. She believes she previously had 2 tubular adenomas removed.     The patient is a  who sings frequently requiring deep inspiration and she also plays the keyboard. She wonders how surgical intervention and recovery will affect these activities.     The patient states, per doctor Vitaliy, she does not have a disc between L5 and S1 which causes issues when lying supine. She reports concern over this regarding surgery and recovery.     The patient reports a history of maternal lung resection of one-half of her lung secondary to possible empyema in 1964. She notes her mother continued with life-long pulmonary issues including COPD.     The patient inquires about obtaining an influenza vaccine.     Past Medical History:   Diagnosis Date   • Abdominal cramping 07/01/2022   • Anemia    • Bloating 07/01/2022   • Bloody diarrhea 07/01/2022   • Chronic back pain    • Colon polyps     FOLLOWED BY DR. DINESH MENDEZ   • Depression    • Gastric polyps     FOLLOWED BY DR. DINESH MENDEZ   • GERD (gastroesophageal reflux disease)    • Hearing loss    • Hiatal hernia 09/2021    3 CM, FOLLOWED BY DR. DINESH MENDEZ   • Hyperlipidemia    • IBS (irritable bowel syndrome)    • Migraines    • Patellofemoral arthritis 07/2021   • PNA (pneumonia)    • Rectal cancer (HCC) 09/2022    INVASIVE MODERATELY DIFFERENTIATED ADENOCARCINOMA ARISING FROM TRADITIONAL SERRATED ADENOMA WITH HGD   • Sciatica 09/2015   •  "Tear of medial meniscus of knee 07/2021    RIGHT KNEE   • Vitamin D deficiency        Past Surgical History:   Procedure Laterality Date   • COLONOSCOPY N/A 1986   • COLONOSCOPY W/ POLYPECTOMY N/A 09/01/2022    3 MM TUBULAR ADENOMA POLYP IN ASCENDING, 6 MM TUBULAR ADENOMA POLYP IN CECUM, 30 MM MASS IN RECTUM, PATH:INVASIVE MODERATELY DIFFERENTIATED ADENOCARCINOMA ARISING FROM TRADITIONAL SERRATED ADENOMA WITH HGD, HYPERTROPHIED ANAL PAPILLA, DR. BEVERLY MENDEZ AT [facility]   • ENDOSCOPY N/A 09/01/2022    MULTIPLE BENIGN GASTRIC POLYPS, 2 SQUAMOUS PAPILLAS IN ESOPHAGUS, 3 CM HIATAL HERNIA,   • EXTERNAL EAR SURGERY  1966    MULTIPLE \"LANCES\", DR. ACE IN [facility]   • EYE SURGERY Right 1967    DR. CASTANEDA IN [facility]   • KNEE ARTHRODESIS Right 07/27/2021    RIGHT KNEE ARTHROCENTESIS, DR. ALBERT GILMAN   • KNEE SURGERY Left 1985    DR. PEREZ AT [place]   • SHOULDER MANIPULATION Left 01/07/2013    FOR FROZEN SHOULDER, DR. ALBERT GILMAN AT [facility]   • WISDOM TOOTH EXTRACTION Bilateral        Social History:   reports that she has never smoked. She has never used smokeless tobacco. She reports current alcohol use. She reports that she does not use drugs.      Marriage status: Single    Family History   Problem Relation Age of Onset   • Colon polyps Mother    • Asthma Mother    • COPD Mother    • Lung disease Mother    • Migraines Mother    • Cancer Father    • Colon polyps Father    • Heart disease Father    • Diabetes Father    • Kidney disease Father    • Angina Father    • Liver cancer Father    • Hepatitis Father    • Cancer Sister    • Arthritis Sister    • Colon polyps Sister    • Breast cancer Sister    • Diabetes Sister    • Colon polyps Sister    • Colon cancer Neg Hx    • Crohn's disease Neg Hx    • Irritable bowel syndrome Neg Hx    • Ulcerative colitis Neg Hx          Current Outpatient Medications:   •  dicyclomine (BENTYL) 20 MG tablet, Take 20 mg by mouth Every 6 (Six) Hours., " Disp: , Rfl:   •  ELDERBERRY PO, Take 2 doses by mouth Daily., Disp: , Rfl:   •  ondansetron (ZOFRAN) 4 MG tablet, Take 4 mg by mouth., Disp: , Rfl:   •  valACYclovir (VALTREX) 1000 MG tablet, , Disp: , Rfl:     Allergy  Levofloxacin and Sulfa antibiotics    Review of Systems   Constitutional: Positive for malaise/fatigue. Negative for decreased appetite and weight gain.   HENT: Negative for congestion, hearing loss and hoarse voice.    Eyes: Negative for blurred vision, discharge and visual disturbance.   Cardiovascular: Negative for chest pain, cyanosis and leg swelling.   Respiratory: Negative for cough, shortness of breath, sleep disturbances due to breathing and snoring.    Endocrine: Negative for cold intolerance and heat intolerance.   Hematologic/Lymphatic: Bruises/bleeds easily.   Skin: Negative for itching, poor wound healing and skin cancer.   Musculoskeletal: Positive for back pain and stiffness. Negative for arthritis, joint pain and joint swelling.   Gastrointestinal: Positive for constipation, diarrhea, hematochezia and nausea. Negative for abdominal pain, change in bowel habit and bowel incontinence.   Genitourinary: Negative for bladder incontinence, dysuria and hematuria.   Neurological: Negative for brief paralysis, excessive daytime sleepiness, dizziness, focal weakness, headaches, light-headedness and weakness.   Psychiatric/Behavioral: Negative for altered mental status and hallucinations. The patient does not have insomnia.    Allergic/Immunologic: Negative for HIV exposure and persistent infections.   All other systems reviewed and are negative.      Vitals:    09/16/22 1324   BP: 150/96   Pulse: 79   SpO2: 98%     Body mass index is 36.48 kg/m².    Physical Exam  Exam conducted with a chaperone present.   Constitutional:       General: She is not in acute distress.     Appearance: She is well-developed.   HENT:      Head: Normocephalic and atraumatic.      Nose: Nose normal.   Eyes:       Conjunctiva/sclera: Conjunctivae normal.      Pupils: Pupils are equal, round, and reactive to light.   Neck:      Trachea: No tracheal deviation.   Pulmonary:      Effort: Pulmonary effort is normal. No respiratory distress.      Breath sounds: Normal breath sounds.   Abdominal:      General: Bowel sounds are normal. There is no distension.      Palpations: Abdomen is soft.   Musculoskeletal:         General: No deformity. Normal range of motion.      Cervical back: Normal range of motion.   Skin:     General: Skin is warm and dry.   Neurological:      Mental Status: She is alert and oriented to person, place, and time.      Cranial Nerves: No cranial nerve deficit.      Coordination: Coordination normal.      Gait: Gait normal.   Psychiatric:         Behavior: Behavior normal.         Judgment: Judgment normal.           Review of Medical Record:  The patient underwent a colonoscopy on 09/01/2022, and was found to have a polyp mass 13 cm from the anal verge. It was biopsied and came back adenocarcinoma. She underwent a CT scan on 09/13/2022. CT imaging was reviewed along with the report. The rectal mass is not visualized and there are very small perirectal lymph nodes.    Assessment:  1. Rectal cancer (HCC)        Plan:  Pathology and treatment options for her serrated adenoma with high-grade dysplasia was discussed with the patient. It was discussed that middle and lower rectal cancers can be treated with radiation if they have a few favorable characteristics. If the cancer is above this area, radiation is not indicated. There is less recurrence of cancer if it is treated with radiation. It was explained if the thickness of the cancer perforates the wall of the rectum, radiation and chemotherapy will be considered. Lymph nodes will also be considered if they are large. However, the patient's lymph nodes appear to be small. According to her CAT scan, her tumor has not metastasized. Staging was discussed and this  will be determined via flexible sigmoidoscopy and ultrasound. An MRI scan may be considered if necessary. A surgical procedure of a low anterior resection, performed laparoscopically, was discussed in detail with the patient including the risks, goals, and recovery. She is aware that this procedure requires a resection of 1 inch inferior and 5 inches superior to the adenoma, and this would include the lymph nodes as well which would result in approximately 8 inches being removed. A temporary ileostomy may be placed if necessary for approximately 6 weeks. She is assured that one can live without a colon. This procedure may increase the frequency and urgency of bowel movements secondary to a reduced holding station. Radiation and chemotherapy were discussed in the event that she would require these treatments. She is advised to undergo more frequent colonoscopies secondary to her history. Recovery will take approximately 4 weeks with restrictions on lifting over 20 pounds during that time. Driving restrictions will be in place for the first 2 weeks. Enhanced recovery system will be utilized consisting of Tylenol, Celebrex or ibuprofen, and gabapentin. At the time of surgery, a transabdominal muscular block will be utilized for local pain management which lasts in duration for approximately 2 to 3 days. All questions queried were answered in the office today. The patient is aware and in agreement with the plan, and she will schedule the flexible sigmoidoscopy and ultrasound. She is advised to perform 2 enemas prior to the procedure.     Transcribed from ambient dictation for Jennifer Amaya MD by ROSSY LYONS.  09/16/22   15:32 EDT    Patient verbalized consent to the visit recording.  I have personally performed the services described in this document as transcribed by the above individual, and it is both accurate and complete.  Jennifer Amaya MD  9/16/2022  16:25 EDT

## 2022-09-20 LAB — CREAT BLDA-MCNC: 0.6 MG/DL (ref 0.6–1.3)

## 2022-09-21 ENCOUNTER — ANESTHESIA EVENT (OUTPATIENT)
Dept: GASTROENTEROLOGY | Facility: HOSPITAL | Age: 62
End: 2022-09-21

## 2022-09-21 ENCOUNTER — ANESTHESIA (OUTPATIENT)
Dept: GASTROENTEROLOGY | Facility: HOSPITAL | Age: 62
End: 2022-09-21

## 2022-09-21 ENCOUNTER — HOSPITAL ENCOUNTER (OUTPATIENT)
Facility: HOSPITAL | Age: 62
Setting detail: HOSPITAL OUTPATIENT SURGERY
Discharge: HOME | End: 2022-09-21
Attending: COLON & RECTAL SURGERY | Admitting: COLON & RECTAL SURGERY

## 2022-09-21 VITALS
BODY MASS INDEX: 36.02 KG/M2 | RESPIRATION RATE: 16 BRPM | DIASTOLIC BLOOD PRESSURE: 79 MMHG | OXYGEN SATURATION: 96 % | WEIGHT: 243.2 LBS | TEMPERATURE: 97.8 F | SYSTOLIC BLOOD PRESSURE: 126 MMHG | HEIGHT: 69 IN | HEART RATE: 65 BPM

## 2022-09-21 DIAGNOSIS — C20 RECTAL CANCER: Primary | ICD-10-CM

## 2022-09-21 PROCEDURE — 76872 US TRANSRECTAL: CPT | Performed by: COLON & RECTAL SURGERY

## 2022-09-21 PROCEDURE — 45335 SIGMOIDOSCOPY W/SUBMUC INJ: CPT | Performed by: COLON & RECTAL SURGERY

## 2022-09-21 PROCEDURE — 25010000002 PROPOFOL 10 MG/ML EMULSION: Performed by: ANESTHESIOLOGY

## 2022-09-21 RX ORDER — SODIUM CHLORIDE, SODIUM LACTATE, POTASSIUM CHLORIDE, CALCIUM CHLORIDE 600; 310; 30; 20 MG/100ML; MG/100ML; MG/100ML; MG/100ML
30 INJECTION, SOLUTION INTRAVENOUS CONTINUOUS PRN
Status: DISCONTINUED | OUTPATIENT
Start: 2022-09-21 | End: 2022-09-21 | Stop reason: HOSPADM

## 2022-09-21 RX ORDER — PROPOFOL 10 MG/ML
VIAL (ML) INTRAVENOUS AS NEEDED
Status: DISCONTINUED | OUTPATIENT
Start: 2022-09-21 | End: 2022-09-21 | Stop reason: SURG

## 2022-09-21 RX ORDER — LIDOCAINE HYDROCHLORIDE 20 MG/ML
INJECTION, SOLUTION INFILTRATION; PERINEURAL AS NEEDED
Status: DISCONTINUED | OUTPATIENT
Start: 2022-09-21 | End: 2022-09-21 | Stop reason: SURG

## 2022-09-21 RX ADMIN — LIDOCAINE HYDROCHLORIDE 50 MG: 20 INJECTION, SOLUTION INFILTRATION; PERINEURAL at 07:07

## 2022-09-21 RX ADMIN — SODIUM CHLORIDE, POTASSIUM CHLORIDE, SODIUM LACTATE AND CALCIUM CHLORIDE 30 ML/HR: 600; 310; 30; 20 INJECTION, SOLUTION INTRAVENOUS at 06:48

## 2022-09-21 RX ADMIN — PROPOFOL 200 MCG/KG/MIN: 10 INJECTION, EMULSION INTRAVENOUS at 07:07

## 2022-09-21 RX ADMIN — PROPOFOL 200 MG: 10 INJECTION, EMULSION INTRAVENOUS at 07:07

## 2022-09-21 NOTE — DISCHARGE INSTRUCTIONS
For the next 24 hours patient needs to be with a responsible adult.    For 24 hours DO NOT drive, operate machinery, appliances, drink alcohol, make important decisions or sign legal documents.    Start with a light or bland diet if you are feeling sick to your stomach otherwise advance to regular diet as tolerated.    Follow recommendations on procedure report if provided by your doctor.    Call Dr Amaya for problems [phone].    Problems may include but not limited to: large amounts of bleeding, trouble breathing, repeated vomiting, severe unrelieved pain, fever or chills.

## 2022-09-21 NOTE — ANESTHESIA PREPROCEDURE EVALUATION
Anesthesia Evaluation     Patient summary reviewed and Nursing notes reviewed   NPO Solid Status: > 8 hours  NPO Liquid Status: > 6 hours           Airway   Mallampati: II  TM distance: >3 FB  Neck ROM: full  No difficulty expected  Dental - normal exam     Pulmonary - normal exam   Cardiovascular - normal exam    (+) hyperlipidemia,       Neuro/Psych  (+) headaches, numbness, psychiatric history Depression,      ROS Comment: Migraines  GI/Hepatic/Renal/Endo    (+) obesity,  hiatal hernia, GERD,      Musculoskeletal     (+) back pain, chronic pain,   Abdominal   (+) obese,    Substance History      OB/GYN          Other   arthritis,    history of cancer      Other Comment: Hx rectal CA                Anesthesia Plan    ASA 2     MAC     intravenous induction     Anesthetic plan, risks, benefits, and alternatives have been provided, discussed and informed consent has been obtained with: patient.        CODE STATUS:

## 2022-09-21 NOTE — OP NOTE
09/21/22      Pre-and postoperative diagnosis: rectal cancer    Surgeon: Jennifer Amaya MD    Indication:  Patient is a 61 y.o. female who comes for rectal cancer staging.     Procedure: Endorectal ultrasound    Patient was in the left lateral decubitus position with copious K-Y jelly endorectal ultrasound wand was placed in the anal canal.Tumor identified in left posterior position at 11 cm Balloon was inflated . Multiple images were obtained.  Tumor is into but not through the muscularis propria making it a T2.  Lymph nodes were identified but subcentimeter .      T 2 N 0 Rectal cancer at  11 cm

## 2022-09-21 NOTE — ANESTHESIA POSTPROCEDURE EVALUATION
Patient: Babs Felton    Procedure Summary     Date: 09/21/22 Room / Location: [facility] ENDOSCOPY 9 /  [facility]    Anesthesia Start: 0702 Anesthesia Stop: 0731    Procedures:       ULTRASOUND TRANSRECTAL (N/A Rectum)      FLEXIBLE SIGMOIDOSCOPY WITH 10 ml TATTOO INK INJECTION (N/A ) Diagnosis:       Rectal cancer (HCC)      (Rectal cancer (HCC) [C20])    Surgeons: Jennifer Amaya MD Provider: Gabe Brand MD    Anesthesia Type: MAC ASA Status: 2          Anesthesia Type: MAC    Vitals  No vitals data found for the desired time range.          Post Anesthesia Care and Evaluation    Patient location during evaluation: PHASE II  Patient participation: complete - patient participated  Level of consciousness: awake and alert  Pain management: adequate    Airway patency: patent  Anesthetic complications: No anesthetic complications  PONV Status: none  Cardiovascular status: acceptable and hemodynamically stable  Respiratory status: acceptable, nonlabored ventilation and spontaneous ventilation  Hydration status: acceptable

## 2022-09-26 ENCOUNTER — HOSPITAL ENCOUNTER (OUTPATIENT)
Dept: MRI IMAGING | Facility: HOSPITAL | Age: 62
Discharge: HOME | End: 2022-09-26
Admitting: PHYSICIAN ASSISTANT

## 2022-09-26 ENCOUNTER — APPOINTMENT (OUTPATIENT)
Dept: MRI IMAGING | Facility: HOSPITAL | Age: 62
End: 2022-09-26

## 2022-09-26 DIAGNOSIS — C20 RECTAL CANCER: ICD-10-CM

## 2022-09-26 PROCEDURE — 72195 MRI PELVIS W/O DYE: CPT

## 2022-09-29 ENCOUNTER — TELEPHONE (OUTPATIENT)
Dept: SURGERY | Facility: CLINIC | Age: 62
End: 2022-09-29

## 2022-09-29 NOTE — TELEPHONE ENCOUNTER
Patient requesting a call back with MRI result. Told her Dr. Amaya is out of office today returning tomorrow Friday.

## 2022-09-29 NOTE — TELEPHONE ENCOUNTER
Caller: JENNIFER DIAZ    Relationship to patient: SELF    Best call back number: [phone]    Patient is needing: STATUS UPDATE ON MRI 9.26.22. SHE IS LOOKING FOR A PHONE CALL TO GET FURTHER REPORT ON FINDINGS, NEXT STEPS FOR DX, AND SHE WANTS TO GET THINGS IN ORDER WITH WORK AND SUCH.

## 2022-10-03 DIAGNOSIS — C20 RECTAL CANCER: Primary | ICD-10-CM

## 2022-10-04 NOTE — PROGRESS NOTES
[facility] Radiation Oncology   Consult    Chief Complaint  Rectal cancer      Diagnosis: Adenocarcinoma of the rectum    Overall Stage IIA    cT3: Per rectal MRI  cN 0: Per rectal MRI  cM0: Per CT chest abdomen pelvis      Pacemaker: no  Prior History of Radiation: no  Contraindications to Radiation: no  Patient Requires Pregnancy Test: No, patient is female and >55 years and/or has undergone hysterectomy    HPI:    Babs Felton underwent imaging work-up and EGD and colonoscopy for GERD and epigastric pain as a rule out for celiac disease.  She was ultimately found to have a high rectal mass which is biopsy-proven invasive moderately differentiated adenocarcinoma.  MRI pelvis demonstrated an approximately 4 cm mass 9 cm proximal to the anorectal junction.  No pathologic lymph nodes were noted on MRI.  CT chest abdomen pelvis did not demonstrate findings concerning for metastatic disease.  She has been referred by Dr. Amaya to discuss the role of radiation therapy.  She is also been referred to medical oncology, however this has not been scheduled yet    Imaging:    CT chest abdomen pelvis 9/13/2022    IMPRESSION:  1.  Rectal neoplasm not well visualized on this exam, please refer to  the prior colonoscopy for further detail and further evaluation with MRI  or PET/CT could be considered if clinically indicated.  2.  Calcified and noncalcified sub-6 mm pulmonary nodules likely the  sequela of prior granulomatous disease however, given history recommend  6 month surveillance.  3.  Scattered colonic diverticulosis.  4.  Mildly prominent but not pathologically enlarged pelvic nodes  measuring up to 4 mm.  5.  Please see above for additional findings.      MRI pelvis 9/26/2022    IMPRESSION:  High rectal mass with effacement of the muscularis propria  along the right wall and extension beyond the muscularis propria at the  posterior wall, T3 lesion. There is no evidence for involvement of the  mesorectal fascia. There  are no pathologically enlarged nodes, but the  high mesorectal nodes are indeterminate.      Pathology:      Colonoscopy pathology 9/1/2022    Final Diagnosis   1. Small Bowel, Biopsy:                A. Small bowel mucosa with a normal villous architecture and no significant histopathologic changes.     2. Stomach, Polyp, Biopsy:                A. Fundic gland polyps.               B. Negative for dysplasia.     3. Esophagus, Mid, Biopsy:                A. Squamous papilloma.     4. Esophagus, Proximal, Biopsy:                A. Squamous papilloma with increased intraepithelial eosinophils (see comment).               B. Separate fragment of gastric oxyntic mucosa with no significant histopathologic changes.     5. Cecum, Polyp, Polypectomy:               A. Tubular adenoma.     6. Ascending Colon, Polyp, Polypectomy:               A. Tubular adenoma.     7. Right Colon, Random, Biopsy:   A. Colonic mucosa with no significant histopathologic changes.               B. No histologic evidence of microscopic colitis.      8. Left Colon, Random, Biopsy:   A. Colonic mucosa with no significant histopathologic changes.               B. No histologic evidence of microscopic colitis.      9. Rectum, Polyp, Polypectomy:               A. INVASIVE MODERATELY DIFFERENTIATED ADENOCARCINOMA ARISING OUT OF A       TRADITIONAL SERRATED ADENOMA WITH HIGH GRADE DYSPLASIA.         Labs:    Lab Results   Component Value Date    CREATININE 0.60 09/13/2022       Lab Results   Component Value Date    CEA 3.51 09/08/2022               Problem List:  Patient Active Problem List   Diagnosis   • Diarrhea   • Bloody diarrhea   • Abdominal cramping   • Bloating   • Gastroesophageal reflux disease without esophagitis   • Rectal cancer (HCC)          Medications:  Current Outpatient Medications on File Prior to Visit   Medication Sig Dispense Refill   • dicyclomine (BENTYL) 20 MG tablet Take 20 mg by mouth Every 6 (Six) Hours.     • ELDERBERRY PO  "Take 2 doses by mouth Daily.     • ondansetron (ZOFRAN) 4 MG tablet Take 4 mg by mouth.     • valACYclovir (VALTREX) 1000 MG tablet        No current facility-administered medications on file prior to visit.          Allergies:  Allergies   Allergen Reactions   • Levofloxacin Swelling   • Sulfa Antibiotics Rash         Family History:  The patient has no family history of conditions which would be contraindications to radiation therapy      Social History:  Never Smoker    Distance From Clinic: <30 minutes    Patient has someone who can assist with transportation: yes      Review of Systems:    Review of Systems   Constitutional: Positive for fatigue.   Gastrointestinal: Positive for blood in stool, diarrhea and nausea.   Musculoskeletal: Positive for arthralgias and back pain.   All other systems reviewed and are negative.        Vital Signs:  /80   Pulse 76   Resp 20   Wt 111 kg (244 lb 6.4 oz)   SpO2 96%   BMI 36.09 kg/m²   Estimated body mass index is 36.09 kg/m² as calculated from the following:    Height as of 9/21/22: 175.3 cm (69\").    Weight as of this encounter: 111 kg (244 lb 6.4 oz).  Pain Score    10/05/22 0925   PainSc: 0-No pain         ECOG: Fully active, able to carry on all pre-disease performance without restriction = 0    Physical Exam  Vitals reviewed.   Constitutional:       General: She is not in acute distress.     Appearance: Normal appearance.   HENT:      Head: Normocephalic and atraumatic.   Eyes:      Extraocular Movements: Extraocular movements intact.      Pupils: Pupils are equal, round, and reactive to light.   Pulmonary:      Effort: Pulmonary effort is normal.      Breath sounds: Normal breath sounds.   Abdominal:      General: Abdomen is flat.      Palpations: Abdomen is soft.      Comments: SAUL: Patient deferred   Musculoskeletal:      Cervical back: Normal range of motion and neck supple.   Skin:     General: Skin is warm and dry.   Neurological:      General: No " focal deficit present.      Mental Status: She is alert and oriented to person, place, and time.   Psychiatric:         Mood and Affect: Mood normal.         Behavior: Behavior normal.            Result Review :  The following data was reviewed by: Jacques Freeman MD on 10/05/2022:  Labs: Last Creatinine   Data reviewed: Radiologic studies CT CAP, MRI Pelvis            Diagnoses and all orders for this visit:    1. Rectal cancer (HCC)        Assessment:    The patient is a 61-year-old female with recently diagnosed adenocarcinoma of the rectum.  MRI has demonstrated T3N0 disease.  She has been referred by Dr. Amaya to discuss the role of neoadjuvant radiation therapy.  She has also been referred to medical oncology, however she does not have an appointment scheduled at this time.    I discussed the risks, benefits, side effects, and logistics of radiation treatment planning and delivery for neoadjuvant therapy to the rectum.  I anticipate that she will be a good candidate for short course radiation therapy, but will finalize radiation decisions once I have discussed with her surgeon and medical oncologist.  We have given her the number for the medical oncology group so she can schedule her appointment    I answered all the patient's questions to her satisfaction.  At the end of our discussion the patient wished to proceed with radiation treatment planning.  Consents were signed.  Plan for CT simulation tomorrow morning.    Plan:    -Plan for neoadjuvant radiation therapy for T3N0 rectal adenocarcinoma  - CT simulation tomorrow morning  - We will coordinate with her surgeon and medical oncologist with regards to final decisions for her neoadjuvant therapy regimen       I spent 70 minutes caring for Babs on this date of service. This time includes time spent by me in the following activities:preparing for the visit, reviewing tests, obtaining and/or reviewing a separately obtained history, documenting information in  the medical record, independently interpreting results and communicating that information with the patient/family/caregiver and care coordination  Follow Up   No follow-ups on file.  Patient was given instructions and counseling regarding her condition or for health maintenance advice. Please see specific information pulled into the AVS if appropriate.     Jacques Freeman MD

## 2022-10-05 ENCOUNTER — APPOINTMENT (OUTPATIENT)
Dept: RADIATION ONCOLOGY | Facility: HOSPITAL | Age: 62
End: 2022-10-05

## 2022-10-05 ENCOUNTER — CONSULT (OUTPATIENT)
Dept: RADIATION ONCOLOGY | Facility: HOSPITAL | Age: 62
End: 2022-10-05

## 2022-10-05 VITALS
WEIGHT: 244.4 LBS | RESPIRATION RATE: 20 BRPM | BODY MASS INDEX: 36.09 KG/M2 | OXYGEN SATURATION: 96 % | DIASTOLIC BLOOD PRESSURE: 80 MMHG | HEART RATE: 76 BPM | SYSTOLIC BLOOD PRESSURE: 125 MMHG

## 2022-10-05 DIAGNOSIS — C20 RECTAL CANCER: ICD-10-CM

## 2022-10-05 PROCEDURE — G0463 HOSPITAL OUTPT CLINIC VISIT: HCPCS | Performed by: STUDENT IN AN ORGANIZED HEALTH CARE EDUCATION/TRAINING PROGRAM

## 2022-10-05 PROCEDURE — 99205 OFFICE O/P NEW HI 60 MIN: CPT | Performed by: STUDENT IN AN ORGANIZED HEALTH CARE EDUCATION/TRAINING PROGRAM

## 2022-10-06 PROCEDURE — 77334 RADIATION TREATMENT AID(S): CPT | Performed by: STUDENT IN AN ORGANIZED HEALTH CARE EDUCATION/TRAINING PROGRAM

## 2022-10-06 PROCEDURE — 77263 THER RADIOLOGY TX PLNG CPLX: CPT | Performed by: STUDENT IN AN ORGANIZED HEALTH CARE EDUCATION/TRAINING PROGRAM

## 2022-10-11 PROCEDURE — 77301 RADIOTHERAPY DOSE PLAN IMRT: CPT | Performed by: STUDENT IN AN ORGANIZED HEALTH CARE EDUCATION/TRAINING PROGRAM

## 2022-10-11 PROCEDURE — 77300 RADIATION THERAPY DOSE PLAN: CPT | Performed by: STUDENT IN AN ORGANIZED HEALTH CARE EDUCATION/TRAINING PROGRAM

## 2022-10-11 PROCEDURE — 77338 DESIGN MLC DEVICE FOR IMRT: CPT | Performed by: STUDENT IN AN ORGANIZED HEALTH CARE EDUCATION/TRAINING PROGRAM

## 2022-10-12 ENCOUNTER — LAB (OUTPATIENT)
Dept: LAB | Facility: HOSPITAL | Age: 62
End: 2022-10-12

## 2022-10-12 ENCOUNTER — CONSULT (OUTPATIENT)
Dept: ONCOLOGY | Facility: CLINIC | Age: 62
End: 2022-10-12

## 2022-10-12 VITALS
TEMPERATURE: 96.9 F | BODY MASS INDEX: 36.66 KG/M2 | WEIGHT: 247.5 LBS | HEART RATE: 77 BPM | DIASTOLIC BLOOD PRESSURE: 90 MMHG | OXYGEN SATURATION: 96 % | RESPIRATION RATE: 16 BRPM | SYSTOLIC BLOOD PRESSURE: 166 MMHG | HEIGHT: 69 IN

## 2022-10-12 DIAGNOSIS — C20 RECTAL CANCER: Primary | ICD-10-CM

## 2022-10-12 LAB
BASOPHILS # BLD AUTO: 0.04 10*3/MM3 (ref 0–0.2)
BASOPHILS NFR BLD AUTO: 0.5 % (ref 0–1.5)
DEPRECATED RDW RBC AUTO: 39.8 FL (ref 37–54)
EOSINOPHIL # BLD AUTO: 0.34 10*3/MM3 (ref 0–0.4)
EOSINOPHIL NFR BLD AUTO: 4.2 % (ref 0.3–6.2)
ERYTHROCYTE [DISTWIDTH] IN BLOOD BY AUTOMATED COUNT: 13.3 % (ref 12.3–15.4)
HCT VFR BLD AUTO: 36.2 % (ref 34–46.6)
HGB BLD-MCNC: 12.2 G/DL (ref 12–15.9)
IMM GRANULOCYTES # BLD AUTO: 0.03 10*3/MM3 (ref 0–0.05)
IMM GRANULOCYTES NFR BLD AUTO: 0.4 % (ref 0–0.5)
LYMPHOCYTES # BLD AUTO: 2.14 10*3/MM3 (ref 0.7–3.1)
LYMPHOCYTES NFR BLD AUTO: 26.4 % (ref 19.6–45.3)
MCH RBC QN AUTO: 27.8 PG (ref 26.6–33)
MCHC RBC AUTO-ENTMCNC: 33.7 G/DL (ref 31.5–35.7)
MCV RBC AUTO: 82.5 FL (ref 79–97)
MONOCYTES # BLD AUTO: 0.39 10*3/MM3 (ref 0.1–0.9)
MONOCYTES NFR BLD AUTO: 4.8 % (ref 5–12)
NEUTROPHILS NFR BLD AUTO: 5.16 10*3/MM3 (ref 1.7–7)
NEUTROPHILS NFR BLD AUTO: 63.7 % (ref 42.7–76)
NRBC BLD AUTO-RTO: 0 /100 WBC (ref 0–0.2)
PLATELET # BLD AUTO: 260 10*3/MM3 (ref 140–450)
PMV BLD AUTO: 9.9 FL (ref 6–12)
RBC # BLD AUTO: 4.39 10*6/MM3 (ref 3.77–5.28)
WBC NRBC COR # BLD: 8.1 10*3/MM3 (ref 3.4–10.8)

## 2022-10-12 PROCEDURE — 85025 COMPLETE CBC W/AUTO DIFF WBC: CPT

## 2022-10-12 PROCEDURE — 99205 OFFICE O/P NEW HI 60 MIN: CPT | Performed by: INTERNAL MEDICINE

## 2022-10-12 PROCEDURE — 36415 COLL VENOUS BLD VENIPUNCTURE: CPT

## 2022-10-12 NOTE — PROGRESS NOTES
[facility]    CONSULTING IN BLOOD DISORDERS & CANCER      REASON FOR CONSULTATION/CHIEF COMPLAINT:     Evaluation and management for rectal adenocarcinoma                             REQUESTING PHYSICIAN: Jennifer Amaya MD  RECORDS OBTAINED:  Records of the patients history including those from the electronic medical record were reviewed and summarized in detail.    HISTORY OF PRESENT ILLNESS:    The patient is a 61 y.o. year old female with medical history significant for migraines, depression/anxiety, and IBS had presented with epigastric discomfort in September 2022.      An EGD and colonoscopy performed by SHOAIB Esteves on 9/1/2022 demonstrated a 3 cm mass in the proximal rectum around 13 cm from the anal verge.  The polyp was multilobulated and somewhat fixed to the underlying tissue.  Friable with contact bleeding.  Other smaller polyps were found in the cecum and ascending colon which were removed.  The EGD revealed mucosal nodule in the esophagus and multiple gastric polyps.  No evidence of bleeding.     The pathology from the rectal mass came back as invasive moderately differentiated adenocarcinoma arising out of traditional serrated adenoma with high-grade dysplasia.    Patient was subsequently referred to Dr. Amaya, colorectal surgery who performed a flexible sigmoidoscopy on 9/21/2022, revealing an infiltrative nonobstructing mass in the mid rectum.  The mass was partially circumferential involving one third of the lumen circumference.     9/13/2022: CT C/A/P with contrast noted rectal neoplasm not well visualized on this exam.  Calcified and noncalcified sub-6mm pulmonary nodules likely the sequela of prior granulomatous disease.  Scattered colonic diverticulosis.  Mildly prominent, but not pathologically enlarged pelvic nodes measuring up to 4 mm.    9/26/2022: MRI pelvis showed high rectal mass with effacement of the muscularis propria along the right wall and extended beyond the muscularis  "propria at the posterior wall, T3 lesion. There is no evidence for involvement of the mesorectal fascia. There are no pathologically enlarged nodes.     Patient has been seen by , rad-onc & being planned for neoadjuvant radiation. Patient has come to this clinic to discuss systemic therapy options.       Past Medical History:   Diagnosis Date   • Abdominal cramping 07/01/2022   • Anemia    • Bloating 07/01/2022   • Bloody diarrhea 07/01/2022   • Chronic back pain    • Colon polyps     FOLLOWED BY DR. BEVERLY MENDEZ   • Depression    • Gastric polyps     FOLLOWED BY DR. BEVERLY MENDEZ   • GERD (gastroesophageal reflux disease)    • Hearing loss    • Hiatal hernia 09/2021    3 CM, FOLLOWED BY DR. BEVERLY MENDEZ   • Hyperlipidemia    • IBS (irritable bowel syndrome)    • Migraines    • Patellofemoral arthritis 07/2021   • PNA (pneumonia)    • Rectal cancer (HCC) 09/2022    INVASIVE MODERATELY DIFFERENTIATED ADENOCARCINOMA ARISING FROM TRADITIONAL SERRATED ADENOMA WITH HGD   • Sciatica 09/2015   • Tear of medial meniscus of knee 07/2021    RIGHT KNEE   • Vitamin D deficiency      Past Surgical History:   Procedure Laterality Date   • COLONOSCOPY N/A 1986   • COLONOSCOPY W/ POLYPECTOMY N/A 09/01/2022    3 MM TUBULAR ADENOMA POLYP IN ASCENDING, 6 MM TUBULAR ADENOMA POLYP IN CECUM, 30 MM MASS IN RECTUM, PATH:INVASIVE MODERATELY DIFFERENTIATED ADENOCARCINOMA ARISING FROM TRADITIONAL SERRATED ADENOMA WITH HGD, HYPERTROPHIED ANAL PAPILLA, DR. BEVERLY MENDEZ AT [facility]   • ENDOSCOPY N/A 09/01/2022    MULTIPLE BENIGN GASTRIC POLYPS, 2 SQUAMOUS PAPILLAS IN ESOPHAGUS, 3 CM HIATAL HERNIA,   • EXTERNAL EAR SURGERY  1966    MULTIPLE \"LANCES\", DR. ACE IN [facility]   • EYE SURGERY Right 1967    DR. CASTANEDA IN [facility]   • KNEE ARTHRODESIS Right 07/27/2021    RIGHT KNEE ARTHROCENTESIS, DR. ALBERT GILMAN   • KNEE SURGERY Left 1985    DR. PEREZ AT [place]   • SHOULDER MANIPULATION Left " 01/07/2013    FOR FROZEN SHOULDER, DR. ALBERT GILMAN AT [facility]   • SIGMOIDOSCOPY N/A 09/21/2022    AN INFILTRATIVE NON OBSTRUCTING MASS IN MID RECTUM, AREA TATTOOED, DR. JENNIFER OJEDA AT [facility]   • TRANSRECTAL ULTRASOUND N/A 09/21/2022    Procedure: ULTRASOUND TRANSRECTAL;  Surgeon: Jennifer Ojeda MD;  Location: [facility] ENDOS[facility];  Service: General;  Laterality: N/A;   • WISDOM TOOTH EXTRACTION Bilateral        MEDICATIONS    Current Outpatient Medications:   •  dicyclomine (BENTYL) 20 MG tablet, Take 20 mg by mouth Every 6 (Six) Hours., Disp: , Rfl:   •  ELDERBERRY PO, Take 2 doses by mouth Daily., Disp: , Rfl:   •  ondansetron (ZOFRAN) 4 MG tablet, Take 4 mg by mouth., Disp: , Rfl:     ALLERGIES:     Allergies   Allergen Reactions   • Levofloxacin Swelling   • Sulfa Antibiotics Rash       SOCIAL HISTORY:       Social History     Socioeconomic History   • Marital status: Single   Tobacco Use   • Smoking status: Never   • Smokeless tobacco: Never   Vaping Use   • Vaping Use: Never used   Substance and Sexual Activity   • Alcohol use: Yes     Comment: rarely   • Drug use: Never   • Sexual activity: Never         FAMILY HISTORY:  Family History   Problem Relation Age of Onset   • Colon polyps Mother    • Asthma Mother    • COPD Mother    • Lung disease Mother    • Migraines Mother    • Cancer Father    • Colon polyps Father    • Heart disease Father    • Diabetes Father    • Kidney disease Father    • Angina Father    • Liver cancer Father    • Hepatitis Father    • Cancer Sister    • Arthritis Sister    • Colon polyps Sister    • Breast cancer Sister    • Diabetes Sister    • Colon polyps Sister    • Colon cancer Neg Hx    • Crohn's disease Neg Hx    • Irritable bowel syndrome Neg Hx    • Ulcerative colitis Neg Hx        REVIEW OF SYSTEMS:  Constitutional: [No fevers, chills, sweats]  Eye: [No recent visual problems]  ENMT: [No ear pain, nasal congestion, sore throat]  Respiratory: [No shortness of breath,  "cough]  Cardiovascular: [No Chest pain, palpitations, syncope]  Gastrointestinal: [No nausea, vomiting, diarrhea]  Genitourinary: [No hematuria]  Hema/Lymph: [Negative for bruising tendency, swollen lymph glands]  Endocrine: [Negative for excessive thirst, excessive hunger]  Musculoskeletal: [Denies any musculoskeletal pain or swelling]  Integumentary: [No rash, pruritus, abrasions]  Neurologic: [ No weakness or numbness, Alert & oriented X 4]         Vitals:    10/12/22 1400   BP: 166/90   Pulse: 77   Resp: 16   Temp: 96.9 °F (36.1 °C)   TempSrc: Infrared   SpO2: 96%   Weight: 112 kg (247 lb 8 oz)   Height: 175 cm (68.9\")  Comment: New ht   PainSc: 0-No pain     Current Status 10/12/2022   ECOG score 0      PHYSICAL EXAM:    CONSTITUTIONAL:  Vital signs reviewed.  No distress, looks comfortable.  EYES:  Conjunctiva and lids unremarkable.   EARS,NOSE,MOUTH,THROAT:  Ears and nose appear unremarkable.  Lips, teeth, gums appear unremarkable.  RESPIRATORY:  Normal respiratory effort.  Lungs clear to auscultation bilaterally.  CARDIOVASCULAR:  Normal S1, S2.  No murmurs rubs or gallops.  No significant lower extremity edema.  GASTROINTESTINAL: Abdomen appears unremarkable.  Nontender.  No hepatomegaly.  No splenomegaly.  LYMPHATIC:  No cervical, supraclavicular lymphadenopathy.  NEURO: cranial nerves 2-12 grossly intact.  No focal deficits.  Appears to have equal strength all 4 extremities.  MUSCULOSKELETAL:  Unremarkable digits/nails.  No cyanosis or clubbing.  No apparent joint deformities.  SKIN:  Warm.  No rashes.  PSYCHIATRIC:  Normal judgment and insight.  Normal mood and affect.     RECENT LABS:        Lab on 10/12/2022   Component Date Value Ref Range Status   • WBC 10/12/2022 8.10  3.40 - 10.80 10*3/mm3 Final   • RBC 10/12/2022 4.39  3.77 - 5.28 10*6/mm3 Final   • Hemoglobin 10/12/2022 12.2  12.0 - 15.9 g/dL Final   • Hematocrit 10/12/2022 36.2  34.0 - 46.6 % Final   • MCV 10/12/2022 82.5  79.0 - 97.0 fL Final "   • MCH 10/12/2022 27.8  26.6 - 33.0 pg Final   • MCHC 10/12/2022 33.7  31.5 - 35.7 g/dL Final   • RDW 10/12/2022 13.3  12.3 - 15.4 % Final   • RDW-SD 10/12/2022 39.8  37.0 - 54.0 fl Final   • MPV 10/12/2022 9.9  6.0 - 12.0 fL Final   • Platelets 10/12/2022 260  140 - 450 10*3/mm3 Final   • Neutrophil % 10/12/2022 63.7  42.7 - 76.0 % Final   • Lymphocyte % 10/12/2022 26.4  19.6 - 45.3 % Final   • Monocyte % 10/12/2022 4.8 (L)  5.0 - 12.0 % Final   • Eosinophil % 10/12/2022 4.2  0.3 - 6.2 % Final   • Basophil % 10/12/2022 0.5  0.0 - 1.5 % Final   • Immature Grans % 10/12/2022 0.4  0.0 - 0.5 % Final   • Neutrophils, Absolute 10/12/2022 5.16  1.70 - 7.00 10*3/mm3 Final   • Lymphocytes, Absolute 10/12/2022 2.14  0.70 - 3.10 10*3/mm3 Final   • Monocytes, Absolute 10/12/2022 0.39  0.10 - 0.90 10*3/mm3 Final   • Eosinophils, Absolute 10/12/2022 0.34  0.00 - 0.40 10*3/mm3 Final   • Basophils, Absolute 10/12/2022 0.04  0.00 - 0.20 10*3/mm3 Final   • Immature Grans, Absolute 10/12/2022 0.03  0.00 - 0.05 10*3/mm3 Final   • nRBC 10/12/2022 0.0  0.0 - 0.2 /100 WBC Final         ASSESSMENT:   is a pleasant 60 y/o WF with medical history significant for migraines, depression/anxiety, and IBS comes for rectal adenocarcinoma evaluation & management.     # Rectal adenocarcinoma, T3N0M0, Stage IIA:  · Diagnosed on a c-scope performed for gastric discomfort in September 2022.   · The flex sig noted a an infiltrative nonobstructing mass in the mid rectum.  The mass was partially circumferential involving one third of the lumen circumference. CT c/a/p negative for mets. MRI pelvis most consistent with T3N0 disease, with no evidence of involvement of mesorectal fascia.  · I reviewed various management strategies for localized rectal cancer with chemotherapy, radiation and surgical resection.  Patient met with Dr. Amaya, colorectal surgery and is being tentatively planned for low anterior resection after neoadjuvant chemotherapy  and radiation.  She is being planned for short course RT by Dr. Freeman, radiation oncology.   · I reviewed the role and rationale of systemic therapy and localized rectal adenocarcinoma.  Informed patient that the SOC for such patients is 4-months of chemotherapy with FOLFOX/CAPOX. Total neoadjuvant therapy is preferred in these cases.   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 She expressed interest in alternative treatment options. I reviewed the recent data about IOs in dMMR patients, although informed her this is not the current SOC.   · Patient is agreeable to proceed with FOLFOX with tentative plan to dose-reduce or stop oxaliplatin if develops neuropathy.    · Patient is planned to start RT next week. Will tentatively plan to start FOLFOX , 2 weeks (11/7/22) after completion of RT.   · Patient is agreeable.      PLAN:   - Localized rectal adenoCA. Plan for FOLFOX x 4 months. Start 2 weeks after RT  - Refer to surgery for port.  - Check Guardant 360.    - F/u in 2 weeks for labs & cycle 1 FOLFOX in 2 weeks or sooner if needed    Orders Placed This Encounter   Procedures   • Ambulatory Referral to General Surgery     Referral Priority:   Routine     Referral Type:   Consultation     Referral Reason:   Specialty Services Required     Requested Specialty:   General Surgery     Number of Visits Requested:   1   Total time spent during this patient encounter is 65 minutes. The total time spent with the patient includes at least one or more of the following: preparing to see the patient by reviewing of tests, prior notes or other relevant information, performing appropriate independent examination & evaluation, counseling, ordering of medications, tests or procedures, communicating with other healthcare professionals, when appropriate to coordinate care, documenting clinic information in the electronic medical records or other health records,  independently interpreting results of tests and communicating the results to the patient/family or caregiver.

## 2022-10-14 NOTE — PROGRESS NOTES
[facility] Hematology/Oncology Treatment Plan Summary    Name: Babs Felton  [facility]# [id number]  MD: Dr. Weber    Diagnosis:     ICD-10-CM ICD-9-CM   1. Rectal cancer (HCC)  C20 154.1     Goal of treatment: disease control    Treatment Medication(s):   1. 5-FU  2. Leucovorin  3. Oxaliplatin    Frequency: every 14 days    Number of cycles: 12    Starting on: 11/2/2022     Repeat after TBD cycles: CT Scan    Items for home use: Senokot-S (for constipation), Imodium AD (for diarrhea) and Thermometer    Rx written for: [x] Nausea    [] Pre-Treatment   ondansetron 8 mg by mouth every 8 hours as needed for nausea    Notes:     Next Steps: PORT     Completing Provider: YAMILEX Renteria           Date/time: 10/17/2022      Please note: You will be seen by a provider frequently with your treatment plan. This plan may change depending on many factors, if so, this will be discussed with you by your physician.  Last update 03/2022.

## 2022-10-17 ENCOUNTER — TREATMENT (OUTPATIENT)
Dept: RADIATION ONCOLOGY | Facility: HOSPITAL | Age: 62
End: 2022-10-17

## 2022-10-17 ENCOUNTER — APPOINTMENT (OUTPATIENT)
Dept: LAB | Facility: HOSPITAL | Age: 62
End: 2022-10-17

## 2022-10-17 ENCOUNTER — OFFICE VISIT (OUTPATIENT)
Dept: ONCOLOGY | Facility: CLINIC | Age: 62
End: 2022-10-17

## 2022-10-17 ENCOUNTER — RADIATION ONCOLOGY WEEKLY ASSESSMENT (OUTPATIENT)
Dept: RADIATION ONCOLOGY | Facility: HOSPITAL | Age: 62
End: 2022-10-17

## 2022-10-17 VITALS — WEIGHT: 247.6 LBS | BODY MASS INDEX: 36.67 KG/M2

## 2022-10-17 VITALS
HEART RATE: 91 BPM | HEIGHT: 69 IN | WEIGHT: 245.7 LBS | OXYGEN SATURATION: 96 % | DIASTOLIC BLOOD PRESSURE: 84 MMHG | BODY MASS INDEX: 36.39 KG/M2 | TEMPERATURE: 97.3 F | SYSTOLIC BLOOD PRESSURE: 129 MMHG

## 2022-10-17 DIAGNOSIS — C20 RECTAL CANCER: Primary | ICD-10-CM

## 2022-10-17 LAB
RAD ONC ARIA COURSE ID: NORMAL
RAD ONC ARIA COURSE INTENT: NORMAL
RAD ONC ARIA COURSE LAST TREATMENT DATE: NORMAL
RAD ONC ARIA COURSE START DATE: NORMAL
RAD ONC ARIA COURSE TREATMENT ELAPSED DAYS: 0
RAD ONC ARIA FIRST TREATMENT DATE: NORMAL
RAD ONC ARIA PLAN FRACTIONS TREATED TO DATE: 1
RAD ONC ARIA PLAN ID: NORMAL
RAD ONC ARIA PLAN PRESCRIBED DOSE PER FRACTION: 5 GY
RAD ONC ARIA PLAN PRIMARY REFERENCE POINT: NORMAL
RAD ONC ARIA PLAN TOTAL FRACTIONS PRESCRIBED: 5
RAD ONC ARIA PLAN TOTAL PRESCRIBED DOSE: 2500 CGY
RAD ONC ARIA REFERENCE POINT DOSAGE GIVEN TO DATE: 5 GY
RAD ONC ARIA REFERENCE POINT DOSAGE GIVEN TO DATE: 5.09 GY
RAD ONC ARIA REFERENCE POINT ID: NORMAL
RAD ONC ARIA REFERENCE POINT ID: NORMAL
RAD ONC ARIA REFERENCE POINT SESSION DOSAGE GIVEN: 5 GY
RAD ONC ARIA REFERENCE POINT SESSION DOSAGE GIVEN: 5.09 GY

## 2022-10-17 PROCEDURE — 99215 OFFICE O/P EST HI 40 MIN: CPT | Performed by: NURSE PRACTITIONER

## 2022-10-17 PROCEDURE — 77386: CPT | Performed by: STUDENT IN AN ORGANIZED HEALTH CARE EDUCATION/TRAINING PROGRAM

## 2022-10-17 PROCEDURE — 77427 RADIATION TX MANAGEMENT X5: CPT | Performed by: STUDENT IN AN ORGANIZED HEALTH CARE EDUCATION/TRAINING PROGRAM

## 2022-10-17 PROCEDURE — 77014 CHG CT GUIDANCE RADIATION THERAPY FLDS PLACEMENT: CPT | Performed by: STUDENT IN AN ORGANIZED HEALTH CARE EDUCATION/TRAINING PROGRAM

## 2022-10-17 RX ORDER — LIDOCAINE AND PRILOCAINE 25; 25 MG/G; MG/G
1 CREAM TOPICAL AS NEEDED
Qty: 5 G | Refills: 1 | Status: SHIPPED | OUTPATIENT
Start: 2022-10-17 | End: 2023-03-07

## 2022-10-17 RX ORDER — ONDANSETRON HYDROCHLORIDE 8 MG/1
8 TABLET, FILM COATED ORAL 3 TIMES DAILY PRN
Qty: 30 TABLET | Refills: 5 | Status: SHIPPED | OUTPATIENT
Start: 2022-10-17

## 2022-10-17 NOTE — PROGRESS NOTES
TREATMENT  PREPARATION    Babs Felton  [id number]  1960    Chief Complaint: Treatment preparation and needs assessment    History of present illness:  Babs Felton is a 61 y.o. year old female who is here today for treatment preparation and needs assessment.  The patient has been diagnosed with   Encounter Diagnosis   Name Primary?   • Rectal cancer (HCC) Yes    and is scheduled to begin treatment with:     Oncology History:    Oncology/Hematology History   Rectal cancer (HCC)   9/16/2022 Initial Diagnosis    Rectal cancer (HCC)     10/24/2022 -  Chemotherapy    OP COLON mFOLFOX6 OXALIplatin / Leucovorin / Fluorouracil         The current medication list and allergy list were reviewed and reconciled.     Past Medical History, Past Surgical History, Social History, Family History have been reviewed and are without significant changes except as mentioned.    Physical Exam:    Vitals:    10/17/22 1424   BP: 129/84   Pulse: 91   Temp: 97.3 °F (36.3 °C)   SpO2: 96%     Vitals:    10/17/22 1424   PainSc:   1   PainLoc: Generalized        ECOG score: 0         Physical Exam  Vitals reviewed.   HENT:      Head: Normocephalic.      Nose: Nose normal.      Mouth/Throat:      Mouth: Mucous membranes are moist.      Pharynx: Oropharynx is clear.   Eyes:      Conjunctiva/sclera: Conjunctivae normal.      Pupils: Pupils are equal, round, and reactive to light.   Cardiovascular:      Heart sounds: Normal heart sounds.   Pulmonary:      Effort: Pulmonary effort is normal.      Breath sounds: Normal breath sounds.   Abdominal:      General: Bowel sounds are normal.   Musculoskeletal:      Cervical back: Normal range of motion.   Skin:     General: Skin is warm and dry.      Findings: No rash.   Neurological:      General: No focal deficit present.      Mental Status: She is alert and oriented to person, place, and time. Mental status is at baseline.      Motor: No weakness.   Psychiatric:         Mood and Affect: Mood  normal.         Behavior: Behavior normal.           NEEDS ASSESSMENTS    Genetics  The patient's new diagnosis and family history have been reviewed for genetic counseling needs. A genetic referral is not recommended.     Psychosocial and Barriers to care  The patient has completed a PHQ-9 Depression Screening and the Distress Thermometer (DT) today.  PHQ-9 results show PHQ-2 Total Score: 0 PHQ-9 Total Score: PHQ-9 Total Score: 0     The patient scored their distress today as Distress Level: 1 on a scale of 0-10 with 0 being no distress and 10 being extreme distress. Problems marked by the patient as being an issue for them within the last week include   .      Results were reviewed along with psychosocial resources offered by our cancer center.  Our Supportive Oncology team will be flagged for a score of 4 or above, and a same day call will be made for a score of 9 or 10.  A mental health referral is offered at that time. Patients who score less than 4 have been educated on our support services and can be referred to our  upon request.  The patient will not be referred to our .       Nutrition  The patient has completed the malnutrition screening today. They scored Malnutrition Screening Tool  Have you recently lost weight without trying?  If yes, how much weight have you lost?: 0--> No  Have you been eating poorly because of a decreased appetite?: 0--> No  MST score: 0   with a score of 0-1 meaning not at risk in a score of 2 or greater meaning at risk.  Patients with a score of 3 or higher will be referred to our oncology dietitian for support. Patients beginning at risk treatment regimens or who have dietary concerns will also be referred to our oncology dietitian. The patient has already been referred.    Functional Assessment  Persons who are age 70 or greater will be screened for qualification of a comprehensive geriatric assessment by our survivorship nurse practitioner.  Older  adults with cancer face unique challenges. These may include an increased risk of drug reactions, financial burdens, and caregiver stress. The patient scored G8 Questionnaire  Has food intake declined over the past 3 months due to loss of appetite, digestive problems, chewing or swallowing difficulties?: Moderate decrease in food intake  Weight loss during the last 3 months: No weight loss  Mobility: Goes out  Neuropsychological Problems: No psychological problems  Body Mass Index (BMI (weight in kg) / (height in m2)): BMI 23 and > 23  Takes more than 3 medications a day: Yes, takes 1 or 2 prescription drugs per day  In comparison with other people of the same age, how does the patient consider his/her health status?: Better  Age: < 80  Total Score: 16 . Patients scoring 14 or lower will referred for an older adult functional assessment with the survivorship advanced practice registered nurse to ensure all needed support is provided as patients plan for their treatments. NOT APPLICABLE    Intravenous Access Assessment  The patient and I discussed planned intravenous chemo/biotherapy as well as other IV treatments that are often needed throughout the course of treatment. These may include, but are not limited to blood transfusions, antibiotics, and IV hydration. Discussed that depending on selected treatment and vein assessment, patient may require venous access device (VAD) which could include but not limited to a Mediport or PICC line. Risks and benefits of VADs reviewed. The patient will be treated via Port.    Reproductive/Sexual Activity   People should avoid becoming pregnant and should not get a partner pregnant while undergoing chemo/biotherapy.  People of childbearing age should use effective contraception during active therapy. The best recommendation for all people is to use a barrier method for a minimum of 1 week after the last infusion of chemo/biotherapy to prevent your partner being exposed to  "byproducts from treatment medications in bodily fluids. Effective contraception should be discussed with your oncology team to make sure it is safe to take based on your diagnosis. Possible options include oral contraceptives, barrier methods. Chemo/biotherapy can change your ability to reproduce children in the future.  There are options for fertility preservation. The patient will not be referred.    Advanced Care Planning  Advance Care Planning   The patient and I discussed advanced care planning, \"Conversations that Matter\".   This service is offered for development of advance directives with a certified ACP facilitator.  The patient does have an up-to-date advanced directive. This document is on file with our office. The patient is not interested in an appointment with one of our facilitators to create or update their advanced directives.     Smoking cessation  Tobacco Use: Low Risk    • Smoking Tobacco Use: Never   • Smokeless Tobacco Use: Never   • Passive Exposure: Not on file       Patient and I discussed their tobacco use history. Referral will not be made for smoking cessation.      Palliative Care  When appropriate, the patient and I discussed the availability palliative care services and when appropriate Hospice care. Palliative care is not the same as Hospice care which was explained to the patient.  The patient is not interested in additional information from our  on these services.     Survivorship   When appropriate, we discussed that we will refer the patient to survivorship clinic to discuss next steps following completion of planned treatment.  Reviewed this visit will include assessment of your physical, psychological, functional, and spiritual needs as a survivor and the need at attend this visit when scheduled.    TREATMENT EDUCATION    Today I met with the patient to discuss the chemo/biotherapy regimen recommended for treatment of Rectal cancer (HCC)  - ondansetron (ZOFRAN) 8 MG " tablet  .  The patient was given explanation of treatment premed side effects including office policy that prohibits patients to drive if sedating medications are administered, MD explanation given regarding benefits, side effects, toxicities and goals of treatment.  The patient received a Chemotherapy/Biotherapy Plan Summary including diagnosis and explanation of specific treatment plan.    SIDE EFFECTS:  Common side effects were discussed with the patient and/or significant other.  Discussion included where applicable hair loss/discoloration, anemia/fatigue, infection/chills/fever, appetite, bleeding risk/precautions, constipation, diarrhea, mouth sores, taste alteration, loss of appetite, nausea/vomiting, peripheral neuropathy, skin/nail changes, rash, muscle aches/weakness, photosensitivity, weight gain/loss, hearing loss, dizziness, menopausal symptoms, menstrual irregularity, sterility, high blood pressure, heart damage, liver damage, lung damage, kidney damage, DVT/PE risk, fluid retention, pleural/pericardial effusion, somnolence, electrolyte/LFT imbalance, vein exercises and/or the possible need for vascular access/port placement.  The patient was advised that although uncommon, leakage of an infused medication from the vein or venous access device may lead to skin breakdown and/or other tissue damage.  The patient was advised that he/she may have pain, bleeding, and/or bruising from the insertion of a needle in their vein or venous access device (port).  The patient was further advised that, in spite of proper technique, infection with redness and irritation may rarely occur at the site where the needle was inserted.  The patient was advised that if complications occur, additional medical treatment is available.  Finally, where applicable we have reviewed rare but potential immune mediated side effects including shortness of breath, cough, chest pain (pneumonitis), abdominal pain, diarrhea (colitis),  thyroiditis (hypothyroid or hyperthyroid), hepatitis and liver dysfunction, nephritis and renal dysfunction.    Discussion also included side effects specific to drugs in the treatment plan, specifically:    Treatment Plans     Name Type Plan Dates Plan Provider         Active    OP COLON mFOLFOX6 OXALIplatin / Leucovorin / Fluorouracil ONCOLOGY TREATMENT  10/23/2022 - Present Tristan Weber MD                    Questions answered and additional information discussed on topics including:  Anemia, Thrombocytopenia, Neutropenia, Nutrition and appetite changes, Constipation, Diarrhea, Nausea & vomiting and Mouth sores       Assessment and Plan:    Diagnoses and all orders for this visit:    1. Rectal cancer (HCC) (Primary)  -     ondansetron (ZOFRAN) 8 MG tablet; Take 1 tablet by mouth 3 (Three) Times a Day As Needed for Nausea or Vomiting.  Dispense: 30 tablet; Refill: 5    Other orders  -     lidocaine-prilocaine (EMLA) 2.5-2.5 % cream; Apply 1 application topically to the appropriate area as directed As Needed for Mild Pain. Apply 30 minutes prior to port access  Dispense: 5 g; Refill: 1      No orders of the defined types were placed in this encounter.        1. The patient and I have reviewed their diagnosis and scheduled treatment plan. Needs assessment was completed where applicable including genetics, psychosocial needs, barriers to care, VAD evaluation, advanced care planning, survivorship, and palliative care services where indicated. Referrals have been ordered as appropriate based upon evaluation today and patient desires.   2. Chemo/biotherapy teaching was completed today and consent obtained. See separate documentation for further details.  3. Adequate time was given to answer questions.  Patient made aware of their care team members and contact information if they have questions or problems throughout the treatment course.  4. Discussion held and written information provided describing frequency of  office visits and ongoing monitoring throughout the treatment plan.     5. Reviewed with patient any prescribed medication sent to pharmacy.  Education provided regarding proper storage, safe handling, and proper disposal of unused medication.  6. Proper handling of body fluids and waste discussed and written information provided.  7. If appropriate, patient had pretreatment labs drawn today.    Learning assessment completed at initial patient encounter. See separate flowsheet. Chemo/biotherapy education comprehension assessed at today's visit.    I spent 45 minutes caring for Babs on this date of service. This time includes time spent by me in the following activities: preparing for the visit, reviewing tests, obtaining and/or reviewing a separately obtained history, performing a medically appropriate examination and/or evaluation, counseling and educating the patient/family/caregiver, documenting information in the medical record and care coordination.     Nora Shah, APRN   10/17/22

## 2022-10-18 ENCOUNTER — TREATMENT (OUTPATIENT)
Dept: RADIATION ONCOLOGY | Facility: HOSPITAL | Age: 62
End: 2022-10-18

## 2022-10-18 DIAGNOSIS — C20 RECTAL CANCER: Primary | ICD-10-CM

## 2022-10-18 LAB
RAD ONC ARIA COURSE ID: NORMAL
RAD ONC ARIA COURSE INTENT: NORMAL
RAD ONC ARIA COURSE LAST TREATMENT DATE: NORMAL
RAD ONC ARIA COURSE START DATE: NORMAL
RAD ONC ARIA COURSE TREATMENT ELAPSED DAYS: 1
RAD ONC ARIA FIRST TREATMENT DATE: NORMAL
RAD ONC ARIA PLAN FRACTIONS TREATED TO DATE: 2
RAD ONC ARIA PLAN ID: NORMAL
RAD ONC ARIA PLAN PRESCRIBED DOSE PER FRACTION: 5 GY
RAD ONC ARIA PLAN PRIMARY REFERENCE POINT: NORMAL
RAD ONC ARIA PLAN TOTAL FRACTIONS PRESCRIBED: 5
RAD ONC ARIA PLAN TOTAL PRESCRIBED DOSE: 2500 CGY
RAD ONC ARIA REFERENCE POINT DOSAGE GIVEN TO DATE: 10 GY
RAD ONC ARIA REFERENCE POINT DOSAGE GIVEN TO DATE: 10.19 GY
RAD ONC ARIA REFERENCE POINT ID: NORMAL
RAD ONC ARIA REFERENCE POINT ID: NORMAL
RAD ONC ARIA REFERENCE POINT SESSION DOSAGE GIVEN: 5 GY
RAD ONC ARIA REFERENCE POINT SESSION DOSAGE GIVEN: 5.09 GY

## 2022-10-18 PROCEDURE — 77014 CHG CT GUIDANCE RADIATION THERAPY FLDS PLACEMENT: CPT | Performed by: STUDENT IN AN ORGANIZED HEALTH CARE EDUCATION/TRAINING PROGRAM

## 2022-10-18 PROCEDURE — 77386: CPT | Performed by: STUDENT IN AN ORGANIZED HEALTH CARE EDUCATION/TRAINING PROGRAM

## 2022-10-19 ENCOUNTER — TREATMENT (OUTPATIENT)
Dept: RADIATION ONCOLOGY | Facility: HOSPITAL | Age: 62
End: 2022-10-19

## 2022-10-19 LAB
RAD ONC ARIA COURSE ID: NORMAL
RAD ONC ARIA COURSE INTENT: NORMAL
RAD ONC ARIA COURSE LAST TREATMENT DATE: NORMAL
RAD ONC ARIA COURSE START DATE: NORMAL
RAD ONC ARIA COURSE TREATMENT ELAPSED DAYS: 2
RAD ONC ARIA FIRST TREATMENT DATE: NORMAL
RAD ONC ARIA PLAN FRACTIONS TREATED TO DATE: 3
RAD ONC ARIA PLAN ID: NORMAL
RAD ONC ARIA PLAN PRESCRIBED DOSE PER FRACTION: 5 GY
RAD ONC ARIA PLAN PRIMARY REFERENCE POINT: NORMAL
RAD ONC ARIA PLAN TOTAL FRACTIONS PRESCRIBED: 5
RAD ONC ARIA PLAN TOTAL PRESCRIBED DOSE: 2500 CGY
RAD ONC ARIA REFERENCE POINT DOSAGE GIVEN TO DATE: 15 GY
RAD ONC ARIA REFERENCE POINT DOSAGE GIVEN TO DATE: 15.28 GY
RAD ONC ARIA REFERENCE POINT ID: NORMAL
RAD ONC ARIA REFERENCE POINT ID: NORMAL
RAD ONC ARIA REFERENCE POINT SESSION DOSAGE GIVEN: 5 GY
RAD ONC ARIA REFERENCE POINT SESSION DOSAGE GIVEN: 5.09 GY

## 2022-10-19 PROCEDURE — 77336 RADIATION PHYSICS CONSULT: CPT | Performed by: STUDENT IN AN ORGANIZED HEALTH CARE EDUCATION/TRAINING PROGRAM

## 2022-10-19 PROCEDURE — 77386: CPT | Performed by: STUDENT IN AN ORGANIZED HEALTH CARE EDUCATION/TRAINING PROGRAM

## 2022-10-19 PROCEDURE — 77014 CHG CT GUIDANCE RADIATION THERAPY FLDS PLACEMENT: CPT | Performed by: STUDENT IN AN ORGANIZED HEALTH CARE EDUCATION/TRAINING PROGRAM

## 2022-10-20 ENCOUNTER — RADIATION ONCOLOGY WEEKLY ASSESSMENT (OUTPATIENT)
Dept: RADIATION ONCOLOGY | Facility: HOSPITAL | Age: 62
End: 2022-10-20

## 2022-10-20 ENCOUNTER — TREATMENT (OUTPATIENT)
Dept: RADIATION ONCOLOGY | Facility: HOSPITAL | Age: 62
End: 2022-10-20

## 2022-10-20 VITALS
SYSTOLIC BLOOD PRESSURE: 149 MMHG | OXYGEN SATURATION: 98 % | WEIGHT: 244 LBS | BODY MASS INDEX: 36.14 KG/M2 | DIASTOLIC BLOOD PRESSURE: 88 MMHG | HEART RATE: 78 BPM

## 2022-10-20 DIAGNOSIS — C20 RECTAL CANCER: Primary | ICD-10-CM

## 2022-10-20 LAB
RAD ONC ARIA COURSE ID: NORMAL
RAD ONC ARIA COURSE INTENT: NORMAL
RAD ONC ARIA COURSE LAST TREATMENT DATE: NORMAL
RAD ONC ARIA COURSE START DATE: NORMAL
RAD ONC ARIA COURSE TREATMENT ELAPSED DAYS: 3
RAD ONC ARIA FIRST TREATMENT DATE: NORMAL
RAD ONC ARIA PLAN FRACTIONS TREATED TO DATE: 4
RAD ONC ARIA PLAN ID: NORMAL
RAD ONC ARIA PLAN PRESCRIBED DOSE PER FRACTION: 5 GY
RAD ONC ARIA PLAN PRIMARY REFERENCE POINT: NORMAL
RAD ONC ARIA PLAN TOTAL FRACTIONS PRESCRIBED: 5
RAD ONC ARIA PLAN TOTAL PRESCRIBED DOSE: 2500 CGY
RAD ONC ARIA REFERENCE POINT DOSAGE GIVEN TO DATE: 20 GY
RAD ONC ARIA REFERENCE POINT DOSAGE GIVEN TO DATE: 20.37 GY
RAD ONC ARIA REFERENCE POINT ID: NORMAL
RAD ONC ARIA REFERENCE POINT ID: NORMAL
RAD ONC ARIA REFERENCE POINT SESSION DOSAGE GIVEN: 5 GY
RAD ONC ARIA REFERENCE POINT SESSION DOSAGE GIVEN: 5.09 GY

## 2022-10-20 PROCEDURE — FACE2FACE: Performed by: STUDENT IN AN ORGANIZED HEALTH CARE EDUCATION/TRAINING PROGRAM

## 2022-10-20 PROCEDURE — 77014 CHG CT GUIDANCE RADIATION THERAPY FLDS PLACEMENT: CPT | Performed by: STUDENT IN AN ORGANIZED HEALTH CARE EDUCATION/TRAINING PROGRAM

## 2022-10-20 PROCEDURE — 77386: CPT | Performed by: STUDENT IN AN ORGANIZED HEALTH CARE EDUCATION/TRAINING PROGRAM

## 2022-10-20 NOTE — PROGRESS NOTES
Radiation Oncology  On-Treatment Note      Patient: Babs Felton    MRN: [id number]    Attending Physician: Jacques Freeman MD    Diagnosis:     ICD-10-CM ICD-9-CM   1. Rectal cancer (HCC)  C20 154.1       Radiation Therapy Visit:  Continue radiation therapy, Dosimetry plan remains acceptable, Films reviewed and remains acceptable, Pain assessed, Pain management planned, Radiation dose schedule reviewed and remains acceptable, Radiation technique remains acceptable and Symptoms within expected range    Radiation Treatments     Active   Plans   Rectum   Most recent treatment: Dose planned: 500 cGy (fraction 4 on 10/20/2022)   Total: Dose planned: 2,500 cGy (5 fractions)   Elapsed Days: 3      Reference Points   RXPT Rectum   Most recent treatment: Dose given: 500 cGy (on 10/20/2022)   Total: Dose given: 2,000 cGy   Elapsed Days: 3      Rectum calc   Most recent treatment: Dose given: 509 cGy (on 10/20/2022)   Total: Dose given: 2,037 cGy   Elapsed Days: 3                    Physical Examination:  Vitals: Blood pressure 149/88, pulse 78, weight 111 kg (244 lb), SpO2 98 %, not currently breastfeeding.  Vitals:    10/20/22 0924   BP: 149/88   Pulse: 78   SpO2: 98%   Weight: 111 kg (244 lb)     Pain Score    10/20/22 0924   PainSc:   5       Ambulatory and capable of all selfcare but unable to carry out any work activities; up and about more than 50% of waking hours = 2    We examined the relevant areas: yes  Findings are within the expected range for this stage of treatment: yes  -------------------------------------------------------------------------------------------------------------------    ACTION ITEMS:  Patient tolerating treatment well and as expected for this stage in their treatment and Continue radiation therapy as planned    The patient presented after treatment today to discuss recent development of substantial bilateral sacroiliac pain.  The patient has a history of back injury in the past, however she  reports that this pain is different.  She has taken some Tylenol with no relief.    I discussed the symptoms in detail with the patient.  At this time I do not think that the symptoms are explained by the radiation itself, however it may be possible that positioning for radiation therapy is aggravated her back.  Regardless, I recommended using anti-inflammatory NSAIDs with Tylenol.  The patient has muscle relaxants at home, which is fine for her to take as well as long as she has a .  We will continue to monitor.  The patient expressed understanding of this treatment plan.    Estimated Completion Date: 10/21/2022      Jacques Freeman MD  Radiation Oncology

## 2022-10-21 ENCOUNTER — TREATMENT (OUTPATIENT)
Dept: RADIATION ONCOLOGY | Facility: HOSPITAL | Age: 62
End: 2022-10-21

## 2022-10-21 DIAGNOSIS — C20 RECTAL CANCER: Primary | ICD-10-CM

## 2022-10-21 LAB
RAD ONC ARIA COURSE ID: NORMAL
RAD ONC ARIA COURSE INTENT: NORMAL
RAD ONC ARIA COURSE LAST TREATMENT DATE: NORMAL
RAD ONC ARIA COURSE START DATE: NORMAL
RAD ONC ARIA COURSE TREATMENT ELAPSED DAYS: 4
RAD ONC ARIA FIRST TREATMENT DATE: NORMAL
RAD ONC ARIA PLAN FRACTIONS TREATED TO DATE: 5
RAD ONC ARIA PLAN ID: NORMAL
RAD ONC ARIA PLAN PRESCRIBED DOSE PER FRACTION: 5 GY
RAD ONC ARIA PLAN PRIMARY REFERENCE POINT: NORMAL
RAD ONC ARIA PLAN TOTAL FRACTIONS PRESCRIBED: 5
RAD ONC ARIA PLAN TOTAL PRESCRIBED DOSE: 2500 CGY
RAD ONC ARIA REFERENCE POINT DOSAGE GIVEN TO DATE: 25 GY
RAD ONC ARIA REFERENCE POINT DOSAGE GIVEN TO DATE: 25.47 GY
RAD ONC ARIA REFERENCE POINT ID: NORMAL
RAD ONC ARIA REFERENCE POINT ID: NORMAL
RAD ONC ARIA REFERENCE POINT SESSION DOSAGE GIVEN: 5 GY
RAD ONC ARIA REFERENCE POINT SESSION DOSAGE GIVEN: 5.09 GY

## 2022-10-21 PROCEDURE — 77386: CPT | Performed by: STUDENT IN AN ORGANIZED HEALTH CARE EDUCATION/TRAINING PROGRAM

## 2022-10-21 PROCEDURE — 77014 CHG CT GUIDANCE RADIATION THERAPY FLDS PLACEMENT: CPT | Performed by: STUDENT IN AN ORGANIZED HEALTH CARE EDUCATION/TRAINING PROGRAM

## 2022-10-24 ENCOUNTER — OFFICE VISIT (OUTPATIENT)
Dept: SURGERY | Facility: CLINIC | Age: 62
End: 2022-10-24

## 2022-10-24 VITALS — HEIGHT: 70 IN | BODY MASS INDEX: 34.65 KG/M2 | WEIGHT: 242 LBS

## 2022-10-24 DIAGNOSIS — C20 RECTAL CANCER: Primary | ICD-10-CM

## 2022-10-24 LAB — REF LAB TEST METHOD: NORMAL

## 2022-10-24 PROCEDURE — 99203 OFFICE O/P NEW LOW 30 MIN: CPT | Performed by: SURGERY

## 2022-10-24 RX ORDER — SODIUM CHLORIDE 0.9 % (FLUSH) 0.9 %
10 SYRINGE (ML) INJECTION AS NEEDED
Status: CANCELLED | OUTPATIENT
Start: 2022-10-24

## 2022-10-24 RX ORDER — SODIUM CHLORIDE 0.9 % (FLUSH) 0.9 %
10 SYRINGE (ML) INJECTION EVERY 12 HOURS SCHEDULED
Status: CANCELLED | OUTPATIENT
Start: 2022-10-24

## 2022-10-24 NOTE — PROGRESS NOTES
Cc: Rectal cancer    History of presenting illness:   This is a very nice 61-year-old female, patient of Dr. Amaya's recently diagnosed with rectal cancer.  She has been undergoing radiation there are plans for chemotherapy and as such she was referred for port placement.  She is overall feeling well these days.  She denies any significant rectal bleeding at this time.  She has never had a port before and denies any prior history of DVT.  She is right-handed.  She is living plan the [facility].    Past Medical History: Rectal cancer,-year-old bowel disease, gastroesophageal reflux disease    Past Surgical History: Significant for recent colonoscopy with biopsy followed by transrectal ultrasound.  She denies any other abdominal surgical history.  She has had a few orthopedic procedures over the years.    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    Family History: Negative for known colorectal cancer    Review of Systems:  Constitutional: Negative for fever, chills, change in weight  Neck: no swollen glands or dysphagia or odynophagia  Respiratory: negative for SOB, cough, hemoptysis or wheezing  Cardiovascular: negative for chest pain, palpitations or peripheral edema  Gastrointestinal: Positive for rectal bleeding, currently improved, denies constipation, nausea or vomiting      Physical Exam:  BMI: 35.2  General: alert and oriented, appropriate, no acute distress  Eyes: No scleral icterus, extraocular movements are intact  Neck: Supple without lymphadenopathy or thyromegaly, trachea is in the midline  Respiratory: There is good bilateral chest expansion, no use of accessory muscles is noted  Cardiovascular: No jugular venous distention or peripheral edema is seen  Gastrointestinal: Soft and benign, no mass or hernia    Laboratory data: Recent CEA in the normal range at 3.5    Imaging data: Recent MRI of  the pelvis reviewed and demonstrates a probable T3 lesion without pathologically enlarged lymph node      Assessment and plan:   -Rectal cancer, undergoing radiation and plans for neoadjuvant chemotherapy prior to eventual low anterior resection.  -Risks associated with port placement discussed with patient, noted to include, but not be limited to, bleeding, infection, pneumothorax, increased risk of DVT and possible need for replacement or device failure.  Patient expressed understanding and willingness to proceed.      Leonel Bridges MD, FACS  General, Minimally Invasive and Endoscopic Surgery  [facility] [address], [address]  [place], [place], [address]  P: [phone]  F: [phone]

## 2022-10-26 ENCOUNTER — APPOINTMENT (OUTPATIENT)
Dept: GENERAL RADIOLOGY | Facility: HOSPITAL | Age: 62
End: 2022-10-26

## 2022-10-26 ENCOUNTER — ANESTHESIA (OUTPATIENT)
Dept: PERIOP | Facility: HOSPITAL | Age: 62
End: 2022-10-26

## 2022-10-26 ENCOUNTER — ANESTHESIA EVENT (OUTPATIENT)
Dept: PERIOP | Facility: HOSPITAL | Age: 62
End: 2022-10-26

## 2022-10-26 ENCOUNTER — HOSPITAL ENCOUNTER (OUTPATIENT)
Facility: HOSPITAL | Age: 62
Setting detail: HOSPITAL OUTPATIENT SURGERY
Discharge: HOME | End: 2022-10-26
Attending: SURGERY | Admitting: SURGERY

## 2022-10-26 VITALS
HEART RATE: 74 BPM | BODY MASS INDEX: 35.74 KG/M2 | SYSTOLIC BLOOD PRESSURE: 142 MMHG | RESPIRATION RATE: 16 BRPM | HEIGHT: 69 IN | DIASTOLIC BLOOD PRESSURE: 90 MMHG | TEMPERATURE: 97.9 F | OXYGEN SATURATION: 100 %

## 2022-10-26 DIAGNOSIS — C20 RECTAL CANCER: ICD-10-CM

## 2022-10-26 LAB
ANION GAP SERPL CALCULATED.3IONS-SCNC: 10.5 MMOL/L (ref 5–15)
BUN SERPL-MCNC: 8 MG/DL (ref 8–23)
BUN/CREAT SERPL: 13.3 (ref 7–25)
CALCIUM SPEC-SCNC: 9 MG/DL (ref 8.6–10.5)
CHLORIDE SERPL-SCNC: 103 MMOL/L (ref 98–107)
CO2 SERPL-SCNC: 23.5 MMOL/L (ref 22–29)
CREAT SERPL-MCNC: 0.6 MG/DL (ref 0.57–1)
EGFRCR SERPLBLD CKD-EPI 2021: 101.6 ML/MIN/1.73
GLUCOSE SERPL-MCNC: 89 MG/DL (ref 65–99)
POTASSIUM SERPL-SCNC: 3.7 MMOL/L (ref 3.5–5.2)
SODIUM SERPL-SCNC: 137 MMOL/L (ref 136–145)

## 2022-10-26 PROCEDURE — 25010000002 CEFAZOLIN IN DEXTROSE 2-4 GM/100ML-% SOLUTION: Performed by: SURGERY

## 2022-10-26 PROCEDURE — 25010000002 PROPOFOL 10 MG/ML EMULSION: Performed by: ANESTHESIOLOGY

## 2022-10-26 PROCEDURE — 25010000002 CEFOXITIN PER 1 G: Performed by: ANESTHESIOLOGY

## 2022-10-26 PROCEDURE — 77001 FLUOROGUIDE FOR VEIN DEVICE: CPT | Performed by: SURGERY

## 2022-10-26 PROCEDURE — 76000 FLUOROSCOPY <1 HR PHYS/QHP: CPT

## 2022-10-26 PROCEDURE — 25010000002 FENTANYL CITRATE (PF) 100 MCG/2ML SOLUTION: Performed by: ANESTHESIOLOGY

## 2022-10-26 PROCEDURE — 36561 INSERT TUNNELED CV CATH: CPT | Performed by: SURGERY

## 2022-10-26 PROCEDURE — 80048 BASIC METABOLIC PNL TOTAL CA: CPT | Performed by: SURGERY

## 2022-10-26 PROCEDURE — 25010000002 PROPOFOL 500 MG/50ML EMULSION: Performed by: ANESTHESIOLOGY

## 2022-10-26 PROCEDURE — 25010000002 MIDAZOLAM PER 1 MG: Performed by: ANESTHESIOLOGY

## 2022-10-26 PROCEDURE — C1788 PORT, INDWELLING, IMP: HCPCS | Performed by: SURGERY

## 2022-10-26 DEVICE — PRT INTRO VASC/INTERV VORTEX FILL/HL DETACH/POLYURET/CATH 8F: Type: IMPLANTABLE DEVICE | Site: INTERNAL JUGULAR | Status: FUNCTIONAL

## 2022-10-26 RX ORDER — SODIUM CHLORIDE 0.9 % (FLUSH) 0.9 %
10 SYRINGE (ML) INJECTION AS NEEDED
Status: DISCONTINUED | OUTPATIENT
Start: 2022-10-26 | End: 2022-10-26 | Stop reason: HOSPADM

## 2022-10-26 RX ORDER — ACETAMINOPHEN 325 MG/1
650 TABLET ORAL ONCE AS NEEDED
Status: DISCONTINUED | OUTPATIENT
Start: 2022-10-26 | End: 2022-10-26 | Stop reason: HOSPADM

## 2022-10-26 RX ORDER — TRAMADOL HYDROCHLORIDE 50 MG/1
50 TABLET ORAL EVERY 6 HOURS PRN
Qty: 15 TABLET | Refills: 0 | Status: SHIPPED | OUTPATIENT
Start: 2022-10-26 | End: 2023-01-31 | Stop reason: SDUPTHER

## 2022-10-26 RX ORDER — HYDRALAZINE HYDROCHLORIDE 20 MG/ML
5 INJECTION INTRAMUSCULAR; INTRAVENOUS
Status: DISCONTINUED | OUTPATIENT
Start: 2022-10-26 | End: 2022-10-26 | Stop reason: HOSPADM

## 2022-10-26 RX ORDER — SODIUM CHLORIDE 0.9 % (FLUSH) 0.9 %
3 SYRINGE (ML) INJECTION EVERY 12 HOURS SCHEDULED
Status: DISCONTINUED | OUTPATIENT
Start: 2022-10-26 | End: 2022-10-26 | Stop reason: HOSPADM

## 2022-10-26 RX ORDER — HYDROMORPHONE HYDROCHLORIDE 1 MG/ML
0.25 INJECTION, SOLUTION INTRAMUSCULAR; INTRAVENOUS; SUBCUTANEOUS
Status: DISCONTINUED | OUTPATIENT
Start: 2022-10-26 | End: 2022-10-26 | Stop reason: HOSPADM

## 2022-10-26 RX ORDER — ONDANSETRON 2 MG/ML
4 INJECTION INTRAMUSCULAR; INTRAVENOUS ONCE AS NEEDED
Status: DISCONTINUED | OUTPATIENT
Start: 2022-10-26 | End: 2022-10-26 | Stop reason: HOSPADM

## 2022-10-26 RX ORDER — CEFOXITIN 2 G/1
INJECTION, POWDER, FOR SOLUTION INTRAVENOUS AS NEEDED
Status: DISCONTINUED | OUTPATIENT
Start: 2022-10-26 | End: 2022-10-26 | Stop reason: SURG

## 2022-10-26 RX ORDER — SODIUM CHLORIDE 0.9 % (FLUSH) 0.9 %
3-10 SYRINGE (ML) INJECTION AS NEEDED
Status: DISCONTINUED | OUTPATIENT
Start: 2022-10-26 | End: 2022-10-26 | Stop reason: HOSPADM

## 2022-10-26 RX ORDER — FENTANYL CITRATE 50 UG/ML
50 INJECTION, SOLUTION INTRAMUSCULAR; INTRAVENOUS
Status: DISCONTINUED | OUTPATIENT
Start: 2022-10-26 | End: 2022-10-26 | Stop reason: HOSPADM

## 2022-10-26 RX ORDER — SODIUM CHLORIDE 0.9 % (FLUSH) 0.9 %
10 SYRINGE (ML) INJECTION EVERY 12 HOURS SCHEDULED
Status: DISCONTINUED | OUTPATIENT
Start: 2022-10-26 | End: 2022-10-26 | Stop reason: HOSPADM

## 2022-10-26 RX ORDER — SODIUM CHLORIDE, SODIUM LACTATE, POTASSIUM CHLORIDE, CALCIUM CHLORIDE 600; 310; 30; 20 MG/100ML; MG/100ML; MG/100ML; MG/100ML
9 INJECTION, SOLUTION INTRAVENOUS CONTINUOUS
Status: DISCONTINUED | OUTPATIENT
Start: 2022-10-26 | End: 2022-10-26 | Stop reason: HOSPADM

## 2022-10-26 RX ORDER — ACETAMINOPHEN 650 MG/1
650 SUPPOSITORY RECTAL ONCE AS NEEDED
Status: DISCONTINUED | OUTPATIENT
Start: 2022-10-26 | End: 2022-10-26 | Stop reason: HOSPADM

## 2022-10-26 RX ORDER — HYDROCODONE BITARTRATE AND ACETAMINOPHEN 5; 325 MG/1; MG/1
1 TABLET ORAL ONCE AS NEEDED
Status: DISCONTINUED | OUTPATIENT
Start: 2022-10-26 | End: 2022-10-26 | Stop reason: HOSPADM

## 2022-10-26 RX ORDER — LABETALOL HYDROCHLORIDE 5 MG/ML
5 INJECTION, SOLUTION INTRAVENOUS
Status: DISCONTINUED | OUTPATIENT
Start: 2022-10-26 | End: 2022-10-26 | Stop reason: HOSPADM

## 2022-10-26 RX ORDER — SODIUM CHLORIDE, SODIUM LACTATE, POTASSIUM CHLORIDE, CALCIUM CHLORIDE 600; 310; 30; 20 MG/100ML; MG/100ML; MG/100ML; MG/100ML
INJECTION, SOLUTION INTRAVENOUS CONTINUOUS PRN
Status: DISCONTINUED | OUTPATIENT
Start: 2022-10-26 | End: 2022-10-26 | Stop reason: SURG

## 2022-10-26 RX ORDER — PROPOFOL 10 MG/ML
INJECTION, EMULSION INTRAVENOUS CONTINUOUS PRN
Status: DISCONTINUED | OUTPATIENT
Start: 2022-10-26 | End: 2022-10-26 | Stop reason: SURG

## 2022-10-26 RX ORDER — LIDOCAINE HYDROCHLORIDE 10 MG/ML
0.5 INJECTION, SOLUTION EPIDURAL; INFILTRATION; INTRACAUDAL; PERINEURAL ONCE AS NEEDED
Status: DISCONTINUED | OUTPATIENT
Start: 2022-10-26 | End: 2022-10-26 | Stop reason: HOSPADM

## 2022-10-26 RX ORDER — PROPOFOL 10 MG/ML
VIAL (ML) INTRAVENOUS AS NEEDED
Status: DISCONTINUED | OUTPATIENT
Start: 2022-10-26 | End: 2022-10-26 | Stop reason: SURG

## 2022-10-26 RX ORDER — IBUPROFEN 600 MG/1
600 TABLET ORAL ONCE AS NEEDED
Status: DISCONTINUED | OUTPATIENT
Start: 2022-10-26 | End: 2022-10-26 | Stop reason: HOSPADM

## 2022-10-26 RX ORDER — MIDAZOLAM HYDROCHLORIDE 1 MG/ML
1 INJECTION INTRAMUSCULAR; INTRAVENOUS
Status: DISCONTINUED | OUTPATIENT
Start: 2022-10-26 | End: 2022-10-26 | Stop reason: HOSPADM

## 2022-10-26 RX ORDER — FAMOTIDINE 10 MG/ML
20 INJECTION, SOLUTION INTRAVENOUS ONCE
Status: COMPLETED | OUTPATIENT
Start: 2022-10-26 | End: 2022-10-26

## 2022-10-26 RX ORDER — MAGNESIUM HYDROXIDE 1200 MG/15ML
LIQUID ORAL AS NEEDED
Status: DISCONTINUED | OUTPATIENT
Start: 2022-10-26 | End: 2022-10-26 | Stop reason: HOSPADM

## 2022-10-26 RX ORDER — CEFAZOLIN SODIUM 2 G/100ML
2 INJECTION, SOLUTION INTRAVENOUS ONCE
Status: COMPLETED | OUTPATIENT
Start: 2022-10-26 | End: 2022-10-26

## 2022-10-26 RX ORDER — FENTANYL CITRATE 50 UG/ML
INJECTION, SOLUTION INTRAMUSCULAR; INTRAVENOUS AS NEEDED
Status: DISCONTINUED | OUTPATIENT
Start: 2022-10-26 | End: 2022-10-26 | Stop reason: SURG

## 2022-10-26 RX ADMIN — SODIUM CHLORIDE, POTASSIUM CHLORIDE, SODIUM LACTATE AND CALCIUM CHLORIDE: 600; 310; 30; 20 INJECTION, SOLUTION INTRAVENOUS at 13:12

## 2022-10-26 RX ADMIN — PROPOFOL 120 MCG/KG/MIN: 10 INJECTION, EMULSION INTRAVENOUS at 13:15

## 2022-10-26 RX ADMIN — CEFAZOLIN SODIUM 2 G: 2 INJECTION, SOLUTION INTRAVENOUS at 12:57

## 2022-10-26 RX ADMIN — FAMOTIDINE 20 MG: 10 INJECTION INTRAVENOUS at 12:09

## 2022-10-26 RX ADMIN — CEFOXITIN SODIUM 2 G: 2 POWDER, FOR SOLUTION INTRAVENOUS at 13:13

## 2022-10-26 RX ADMIN — SODIUM CHLORIDE, POTASSIUM CHLORIDE, SODIUM LACTATE AND CALCIUM CHLORIDE 9 ML/HR: 600; 310; 30; 20 INJECTION, SOLUTION INTRAVENOUS at 12:09

## 2022-10-26 RX ADMIN — MIDAZOLAM 1 MG: 1 INJECTION, SOLUTION INTRAMUSCULAR; INTRAVENOUS at 12:09

## 2022-10-26 RX ADMIN — PROPOFOL 50 MG: 10 INJECTION, EMULSION INTRAVENOUS at 13:25

## 2022-10-26 RX ADMIN — FENTANYL CITRATE 25 MCG: 50 INJECTION, SOLUTION INTRAMUSCULAR; INTRAVENOUS at 13:17

## 2022-10-26 RX ADMIN — PROPOFOL 50 MG: 10 INJECTION, EMULSION INTRAVENOUS at 13:36

## 2022-10-26 NOTE — ANESTHESIA POSTPROCEDURE EVALUATION
Patient: Babs Felton    Procedure Summary     Date: 10/26/22 Room / Location: [facility] OR  / [facility] MAIN OR    Anesthesia Start: 1306 Anesthesia Stop: 1359    Procedure: INSERTION OF PORTACATH (Left) Diagnosis:       Rectal cancer (HCC)      (Rectal cancer (HCC) [C20])    Surgeons: Leonel Bridges MD Provider: Sumit Lauren MD    Anesthesia Type: general ASA Status: 2          Anesthesia Type: general    Vitals  Vitals Value Taken Time   /79 10/26/22 1415   Temp 36.6 °C (97.9 °F) 10/26/22 1355   Pulse 73 10/26/22 1426   Resp 16 10/26/22 1415   SpO2 98 % 10/26/22 1426   Vitals shown include unvalidated device data.        Post Anesthesia Care and Evaluation    Patient location during evaluation: PACU  Patient participation: complete - patient participated  Level of consciousness: awake and alert  Pain management: adequate    Airway patency: patent  Anesthetic complications: No anesthetic complications    Cardiovascular status: acceptable  Respiratory status: acceptable  Hydration status: acceptable    Comments: --------------------            10/26/22               1415     --------------------   BP:       137/79     Pulse:      72       Resp:       16       Temp:                SpO2:      98%      --------------------

## 2022-10-26 NOTE — ANESTHESIA PREPROCEDURE EVALUATION
Anesthesia Evaluation     Patient summary reviewed and Nursing notes reviewed   history of anesthetic complications: PONV  NPO Solid Status: > 8 hours  NPO Liquid Status: > 2 hours           Airway   Mallampati: II  TM distance: >3 FB  Neck ROM: full  Dental - normal exam     Pulmonary - normal exam   Cardiovascular - normal exam    (+) hyperlipidemia,       Neuro/Psych  (+) headaches, numbness, psychiatric history Depression,      ROS Comment: Migraines  GI/Hepatic/Renal/Endo    (+) obesity,  hiatal hernia, GERD,      Musculoskeletal     (+) back pain, chronic pain,   Abdominal   (+) obese,    Substance History      OB/GYN          Other   arthritis,    history of cancer active      Other Comment: Hx rectal CA                    Anesthesia Plan    ASA 2     MAC     (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        CODE STATUS:

## 2022-10-27 NOTE — PROGRESS NOTES
Radiation Treatment Summary Note      Patient Name: Babs Felton  : 1960    Attending Provider: Jacques Freeman MD    Diagnosis:     ICD-10-CM ICD-9-CM   1. Rectal cancer (HCC)  C20 154.1       Radiation Start Date: 10/17/2022    Radiation Completion Date: 10/22/2022      Prescription:     Site: Rectum  Laterality: N/A  Total Dose: 2500cGy  Dose per Fraction: 500cGy  Total Fractions: 5  Daily or BID: Daily  Modality: Photon  Technique: IMRT/VMAT/Rapid Arc  Bolus: No      Final Delivered Dose Deviated From Initially Prescribed Dose: No    Concurrent Chemotherapy: No    Patient Tolerated Treatment Without Unexpected Side Effects/Complications: Yes    ECOG: Fully active, able to carry on all pre-disease performance without restriction = 0    Pain Management Plan: None Indicated/PRN OTC    Follow-Up Plan: 6 months    Imaging Ordered for Follow-Up: None/NA        Jacques Freeman MD

## 2022-10-28 LAB
RAD ONC ARIA COURSE END DATE: NORMAL
RAD ONC ARIA COURSE ID: NORMAL
RAD ONC ARIA COURSE INTENT: NORMAL
RAD ONC ARIA COURSE LAST TREATMENT DATE: NORMAL
RAD ONC ARIA COURSE START DATE: NORMAL
RAD ONC ARIA COURSE TREATMENT ELAPSED DAYS: 4
RAD ONC ARIA FIRST TREATMENT DATE: NORMAL
RAD ONC ARIA PLAN FRACTIONS TREATED TO DATE: 5
RAD ONC ARIA PLAN ID: NORMAL
RAD ONC ARIA PLAN NAME: NORMAL
RAD ONC ARIA PLAN PRESCRIBED DOSE PER FRACTION: 5 GY
RAD ONC ARIA PLAN PRIMARY REFERENCE POINT: NORMAL
RAD ONC ARIA PLAN TOTAL FRACTIONS PRESCRIBED: 5
RAD ONC ARIA PLAN TOTAL PRESCRIBED DOSE: 2500 CGY
RAD ONC ARIA REFERENCE POINT DOSAGE GIVEN TO DATE: 25 GY
RAD ONC ARIA REFERENCE POINT DOSAGE GIVEN TO DATE: 25.47 GY
RAD ONC ARIA REFERENCE POINT ID: NORMAL
RAD ONC ARIA REFERENCE POINT ID: NORMAL

## 2022-10-31 ENCOUNTER — NURSE NAVIGATOR (OUTPATIENT)
Dept: OTHER | Facility: HOSPITAL | Age: 62
End: 2022-10-31

## 2022-10-31 NOTE — PROGRESS NOTES
Nurse Initial Navigator Assessment: Received referral from Dr. Freeman to see patient for education and resources. Call placed to patient for initial telephone visit. Introduced self and explained role. Patient has rectal adenocarcinoma. She was seen by Dr. Amaya/colorectal surgery who performed a flexible sigmoidoscopy on 9/21/2022 revealing a rectal mass. Patient has completed neoadjuvant XRT (x5 fractions). She has met with Dr. Weber to discuss treatment options. Plan for neoadjuvant FOLFOX x 4 months (every 2 weeks x12 cycles) with eventual low anterior resection. States her first dose of chemotherapy is this Wednesday. Patient able to verbalize understanding of diagnosis and treatment. No additional concerns voiced at this time.    Patient is single. Says she has a good support system consisting of a friend who is an oncology nurse. Denies difficulty affording medications or transportation issues. States she is independent with ADL’s. Patient is alert & oriented x4.  Mood and affect congruent. Says appetite is fair. Complains of “massive” diarrhea. Was taking Bentyl with no relief. Says she will try Immodium. C/O sleep disturbance related to diarrhea. States she is not feeling well at this time and requested assessment to be completed at later date. No psychosocial concerns or additional supportive oncology needs identified. Will continue to follow and make referrals if needed.    Medical/Psychiatric History   Cancer Diagnosis: rectal adenocarcinoma  Current treatment: neoadjuvant FOLFOX x 4 months (every 2 weeks x12 cycles) starting 11/2/22. Completed XRT x5 fractions.  Psychiatric History: N/A  Current Medication and Prescriber: N/A  Past Medications: N/A  Currently seeing Mental Health Professional: No  Medication/Counseling Interest? No  Social History  Support Community: friends  Substance Use: Drinks alcohol occasionally. Denies drug and tobacco use.   Family History:  Psychiatric: Unknown  Impactful cancer  experience: N/A    Plan of Care:  [facility] Patient states she does have an advanced directive and copy on file. Mailed navigator letter and online cancer resources. No additional needs noted at this time. Encouraged patient to call with any questions or concerns. Will continue to follow.....Katie Sanchez RN, Oncology Nurse Navigator     ACUITY SCALE: 1

## 2022-11-01 PROBLEM — Z45.2 ENCOUNTER FOR FITTING AND ADJUSTMENT OF VASCULAR CATHETER: Status: ACTIVE | Noted: 2022-11-01

## 2022-11-01 RX ORDER — SODIUM CHLORIDE 0.9 % (FLUSH) 0.9 %
10 SYRINGE (ML) INJECTION AS NEEDED
Status: CANCELLED | OUTPATIENT
Start: 2022-11-01

## 2022-11-01 RX ORDER — HEPARIN SODIUM (PORCINE) LOCK FLUSH IV SOLN 100 UNIT/ML 100 UNIT/ML
500 SOLUTION INTRAVENOUS AS NEEDED
Status: CANCELLED | OUTPATIENT
Start: 2022-11-01

## 2022-11-02 ENCOUNTER — CLINICAL SUPPORT (OUTPATIENT)
Dept: OTHER | Facility: HOSPITAL | Age: 62
End: 2022-11-02

## 2022-11-02 ENCOUNTER — INFUSION (OUTPATIENT)
Dept: ONCOLOGY | Facility: HOSPITAL | Age: 62
End: 2022-11-02

## 2022-11-02 ENCOUNTER — OFFICE VISIT (OUTPATIENT)
Dept: ONCOLOGY | Facility: CLINIC | Age: 62
End: 2022-11-02

## 2022-11-02 VITALS — HEIGHT: 69 IN | BODY MASS INDEX: 35.34 KG/M2

## 2022-11-02 VITALS
TEMPERATURE: 97.1 F | RESPIRATION RATE: 16 BRPM | HEIGHT: 69 IN | SYSTOLIC BLOOD PRESSURE: 133 MMHG | HEART RATE: 73 BPM | OXYGEN SATURATION: 98 % | BODY MASS INDEX: 35.46 KG/M2 | DIASTOLIC BLOOD PRESSURE: 85 MMHG | WEIGHT: 239.4 LBS

## 2022-11-02 DIAGNOSIS — C20 RECTAL CANCER: Primary | ICD-10-CM

## 2022-11-02 DIAGNOSIS — C20 RECTAL CANCER: ICD-10-CM

## 2022-11-02 LAB
ALBUMIN SERPL-MCNC: 4.2 G/DL (ref 3.5–5.2)
ALBUMIN/GLOB SERPL: 1.5 G/DL (ref 1.1–2.4)
ALP SERPL-CCNC: 67 U/L (ref 38–116)
ALT SERPL W P-5'-P-CCNC: 15 U/L (ref 0–33)
ANION GAP SERPL CALCULATED.3IONS-SCNC: 11.2 MMOL/L (ref 5–15)
AST SERPL-CCNC: 17 U/L (ref 0–32)
BASOPHILS # BLD AUTO: 0.02 10*3/MM3 (ref 0–0.2)
BASOPHILS NFR BLD AUTO: 0.4 % (ref 0–1.5)
BILIRUB SERPL-MCNC: 0.4 MG/DL (ref 0.2–1.2)
BUN SERPL-MCNC: 8 MG/DL (ref 6–20)
BUN/CREAT SERPL: 12.7 (ref 7.3–30)
CALCIUM SPEC-SCNC: 9.5 MG/DL (ref 8.5–10.2)
CHLORIDE SERPL-SCNC: 106 MMOL/L (ref 98–107)
CO2 SERPL-SCNC: 23.8 MMOL/L (ref 22–29)
CREAT SERPL-MCNC: 0.63 MG/DL (ref 0.6–1.1)
DEPRECATED RDW RBC AUTO: 42.5 FL (ref 37–54)
EGFRCR SERPLBLD CKD-EPI 2021: 100.4 ML/MIN/1.73
EOSINOPHIL # BLD AUTO: 0.34 10*3/MM3 (ref 0–0.4)
EOSINOPHIL NFR BLD AUTO: 7.5 % (ref 0.3–6.2)
ERYTHROCYTE [DISTWIDTH] IN BLOOD BY AUTOMATED COUNT: 13.8 % (ref 12.3–15.4)
GLOBULIN UR ELPH-MCNC: 2.8 GM/DL (ref 1.8–3.5)
GLUCOSE SERPL-MCNC: 90 MG/DL (ref 74–124)
HCT VFR BLD AUTO: 36.5 % (ref 34–46.6)
HGB BLD-MCNC: 11.7 G/DL (ref 12–15.9)
IMM GRANULOCYTES # BLD AUTO: 0.01 10*3/MM3 (ref 0–0.05)
IMM GRANULOCYTES NFR BLD AUTO: 0.2 % (ref 0–0.5)
LYMPHOCYTES # BLD AUTO: 0.94 10*3/MM3 (ref 0.7–3.1)
LYMPHOCYTES NFR BLD AUTO: 20.8 % (ref 19.6–45.3)
MCH RBC QN AUTO: 27.5 PG (ref 26.6–33)
MCHC RBC AUTO-ENTMCNC: 32.1 G/DL (ref 31.5–35.7)
MCV RBC AUTO: 85.9 FL (ref 79–97)
MONOCYTES # BLD AUTO: 0.33 10*3/MM3 (ref 0.1–0.9)
MONOCYTES NFR BLD AUTO: 7.3 % (ref 5–12)
NEUTROPHILS NFR BLD AUTO: 2.89 10*3/MM3 (ref 1.7–7)
NEUTROPHILS NFR BLD AUTO: 63.8 % (ref 42.7–76)
NRBC BLD AUTO-RTO: 0 /100 WBC (ref 0–0.2)
PLATELET # BLD AUTO: 163 10*3/MM3 (ref 140–450)
PMV BLD AUTO: 9.3 FL (ref 6–12)
POTASSIUM SERPL-SCNC: 4 MMOL/L (ref 3.5–4.7)
PROT SERPL-MCNC: 7 G/DL (ref 6.3–8)
RBC # BLD AUTO: 4.25 10*6/MM3 (ref 3.77–5.28)
SODIUM SERPL-SCNC: 141 MMOL/L (ref 134–145)
WBC NRBC COR # BLD: 4.53 10*3/MM3 (ref 3.4–10.8)

## 2022-11-02 PROCEDURE — 96416 CHEMO PROLONG INFUSE W/PUMP: CPT

## 2022-11-02 PROCEDURE — G0498 CHEMO EXTEND IV INFUS W/PUMP: HCPCS

## 2022-11-02 PROCEDURE — 99215 OFFICE O/P EST HI 40 MIN: CPT | Performed by: INTERNAL MEDICINE

## 2022-11-02 PROCEDURE — 80053 COMPREHEN METABOLIC PANEL: CPT

## 2022-11-02 PROCEDURE — 96375 TX/PRO/DX INJ NEW DRUG ADDON: CPT

## 2022-11-02 PROCEDURE — 85025 COMPLETE CBC W/AUTO DIFF WBC: CPT

## 2022-11-02 PROCEDURE — 25010000002 FLUOROURACIL PER 500 MG: Performed by: INTERNAL MEDICINE

## 2022-11-02 PROCEDURE — 25010000002 OXALIPLATIN PER 0.5 MG: Performed by: INTERNAL MEDICINE

## 2022-11-02 PROCEDURE — 25010000002 LEUCOVORIN CALCIUM PER 50 MG: Performed by: INTERNAL MEDICINE

## 2022-11-02 PROCEDURE — 96368 THER/DIAG CONCURRENT INF: CPT

## 2022-11-02 PROCEDURE — 96367 TX/PROPH/DG ADDL SEQ IV INF: CPT

## 2022-11-02 PROCEDURE — 96413 CHEMO IV INFUSION 1 HR: CPT

## 2022-11-02 PROCEDURE — 96411 CHEMO IV PUSH ADDL DRUG: CPT

## 2022-11-02 PROCEDURE — 25010000002 FOSAPREPITANT PER 1 MG: Performed by: INTERNAL MEDICINE

## 2022-11-02 PROCEDURE — 25010000002 DEXAMETHASONE SODIUM PHOSPHATE 100 MG/10ML SOLUTION: Performed by: INTERNAL MEDICINE

## 2022-11-02 PROCEDURE — 96415 CHEMO IV INFUSION ADDL HR: CPT

## 2022-11-02 PROCEDURE — 25010000002 PALONOSETRON PER 25 MCG: Performed by: INTERNAL MEDICINE

## 2022-11-02 RX ORDER — DEXTROSE MONOHYDRATE 50 MG/ML
250 INJECTION, SOLUTION INTRAVENOUS ONCE
Status: CANCELLED | OUTPATIENT
Start: 2022-11-02

## 2022-11-02 RX ORDER — PALONOSETRON 0.05 MG/ML
0.25 INJECTION, SOLUTION INTRAVENOUS ONCE
Status: COMPLETED | OUTPATIENT
Start: 2022-11-02 | End: 2022-11-02

## 2022-11-02 RX ORDER — FLUOROURACIL 50 MG/ML
400 INJECTION, SOLUTION INTRAVENOUS ONCE
Status: CANCELLED | OUTPATIENT
Start: 2022-11-02

## 2022-11-02 RX ORDER — DEXTROSE MONOHYDRATE 50 MG/ML
250 INJECTION, SOLUTION INTRAVENOUS ONCE
Status: COMPLETED | OUTPATIENT
Start: 2022-11-02 | End: 2022-11-02

## 2022-11-02 RX ORDER — PALONOSETRON 0.05 MG/ML
0.25 INJECTION, SOLUTION INTRAVENOUS ONCE
Status: CANCELLED | OUTPATIENT
Start: 2022-11-02

## 2022-11-02 RX ORDER — LOPERAMIDE HYDROCHLORIDE 2 MG/1
2 CAPSULE ORAL AS NEEDED
COMMUNITY

## 2022-11-02 RX ORDER — DIPHENHYDRAMINE HYDROCHLORIDE 50 MG/ML
50 INJECTION INTRAMUSCULAR; INTRAVENOUS AS NEEDED
Status: CANCELLED | OUTPATIENT
Start: 2022-11-02

## 2022-11-02 RX ORDER — FLUOROURACIL 50 MG/ML
400 INJECTION, SOLUTION INTRAVENOUS ONCE
Status: COMPLETED | OUTPATIENT
Start: 2022-11-02 | End: 2022-11-02

## 2022-11-02 RX ORDER — FAMOTIDINE 10 MG/ML
20 INJECTION, SOLUTION INTRAVENOUS AS NEEDED
Status: CANCELLED | OUTPATIENT
Start: 2022-11-02

## 2022-11-02 RX ADMIN — LEUCOVORIN CALCIUM 900 MG: 350 INJECTION, POWDER, LYOPHILIZED, FOR SUSPENSION INTRAMUSCULAR; INTRAVENOUS at 12:25

## 2022-11-02 RX ADMIN — SODIUM CHLORIDE 100 ML: 9 INJECTION, SOLUTION INTRAVENOUS at 11:30

## 2022-11-02 RX ADMIN — PALONOSETRON 0.25 MG: 0.05 INJECTION, SOLUTION INTRAVENOUS at 11:27

## 2022-11-02 RX ADMIN — FLUOROURACIL 900 MG: 50 INJECTION, SOLUTION INTRAVENOUS at 14:27

## 2022-11-02 RX ADMIN — FLUOROURACIL 5420 MG: 50 INJECTION, SOLUTION INTRAVENOUS at 14:28

## 2022-11-02 RX ADMIN — DEXTROSE MONOHYDRATE 190 MG: 5 INJECTION, SOLUTION INTRAVENOUS at 12:25

## 2022-11-02 RX ADMIN — DEXAMETHASONE SODIUM PHOSPHATE 12 MG: 10 INJECTION, SOLUTION INTRAMUSCULAR; INTRAVENOUS at 12:03

## 2022-11-02 RX ADMIN — DEXTROSE MONOHYDRATE 250 ML: 50 INJECTION, SOLUTION INTRAVENOUS at 11:27

## 2022-11-02 NOTE — PROGRESS NOTES
"Outpatient Oncology Nutrition  Assessment/PES    Patient Name:  Babs Felton  YOB: 1960  MRN: [id number]    Assessment Date:  11/2/2022    Comments:  Met patient today in infusion. Beginning FOLFOX for rectal ca. Completed radiation treatment x 5 10/17-10/22. Having diarrhea which is impacting po intake.   Utilizing imodium. Plans to increase imodium and add lomotil.   Reviewed tips for diarrhea including foods too include and foods to avoid.   The patient is interested in participating in the Enterade study and signed consent.   Will follow up next cycle and provide enterade if indicated. Let patient know that if diarrhea worsens with chemotherapy we can provide her with the Enterade earlier.   Encouraged intake and hydration and gave a copy of the ACS nutrition booklet with other handouts on diarrhea and oxaliplatin.      General Info     Row Name 11/02/22 1355       Today's Session    Person(s) attending today's session Patient       General Information    Oncology patient? yes  rectal cancer       Oncology Specific Assessment    Type of treatment Chemotherapy;Radiation    Frequency of treatment completed radiation x 5 treatments. Starts FOLFOX today, every two weeks x 12 , then surgery    Reported symptoms Diarrhea                   Home Nutrition Report     Row Name 11/02/22 1350          Home Nutrition Report    Typical Intake (Food/Fluid/EN/PN) has not been eating much due to diarrhea-- trying to follow a low residue diet     Factors Affecting Nutritional Intake altered gastrointestinal function                Estimated/Assessed Needs - Anthropometrics     Row Name 11/02/22 1356          Anthropometrics    Height 175.3 cm (69.02\")     Weight for Calculation 108 kg (239 lb)        Estimated/Assessed Needs    Additional Documentation KCAL/KG (Group);Protein Requirements (Group);Fluid Requirements (Group)        KCAL/KG    KCAL/KG 20 Kcal/Kg (kcal)     20 Kcal/Kg (kcal) 2168.2        Protein " Requirements    Weight Used For Protein Calculations 108 kg (239 lb)     Est Protein Requirement Amount (gms/kg) 0.8 gm protein     Estimated Protein Requirements (gms/day) 86.73        Fluid Requirements    Fluid Requirements (mL/day) 2100     Estimated Fluid Requirement Method RDA Method     RDA Method (mL) 2100                Labs/Tests/Procedures/Meds     Row Name 11/02/22 1350          Labs/Procedures/Meds    Lab Results Reviewed reviewed        Diagnostic Tests/Procedures    Diagnostic Test/Procedure Reviewed reviewed        Medications    Pertinent Medications Reviewed reviewed     Pertinent Medications Comments bentyl, elderberry, imodium, zofran                   Problem/Interventions:   Problem 1     Row Name 11/02/22 1359          Nutrition Diagnoses Problem 1    Problem 1 Altered GI Function     Etiology (related to) Medical Diagnosis;Factors Affecting Nutrition     Oncology Rectal cancer     Reported/Observed By Patient     Appetite Poor Due to GI Factor     Reported GI Symptoms Diarrhea     Signs/Symptoms (evidenced by) Demonstrated Information Deficit                        Intervention Goal     Row Name 11/02/22 1351          Intervention Goal    General Maintain nutrition;Provide information regarding MNT for treatment/condition;Reduce/improve symptoms;Meet nutritional needs for age/condition;Disease management/therapy     PO Tolerate PO;Increase intake     Weight No significant weight loss                    Education/Evaluation     Row Name 11/02/22 3963          Education    Education Provided education regarding;Education topics;Advised regarding habits/behavior     Provided education regarding Avoidance of associated complications;Avoidance/improvement of symptoms     Education Topics --  nutrition during cancer treatment and tips for diarrhea, and oxaliplatin     Advised Regarding Habits/Behavior Food choices;Eating pattern        Monitor/Evaluation    Education Follow-up Other (comment)                  Electronically signed by:  Yanni Ferguson RD, LD  11/02/22 13:58 EDT

## 2022-11-02 NOTE — PATIENT INSTRUCTIONS
Call for any fever of 100.5 or above or any signs of infection. Call for any other problems or concerns.

## 2022-11-02 NOTE — NURSING NOTE
Printed and verbal instructions provided on care of chemo ball. Yellow bucket provided for home use. Patient instructed to call for any problems or concerns. Patient verbalized understanding to return on Friday at noon for chemo disconnect.

## 2022-11-02 NOTE — PROGRESS NOTES
[facility]    CONSULTING IN BLOOD DISORDERS & CANCER      REASON FOR CONSULTATION/CHIEF COMPLAINT:     Evaluation and management for rectal adenocarcinoma                             REQUESTING PHYSICIAN: No ref. provider found  RECORDS OBTAINED:  Records of the patients history including those from the electronic medical record were reviewed and summarized in detail.    HISTORY OF PRESENT ILLNESS:    The patient is a 61 y.o. year old female with medical history significant for migraines, depression/anxiety, and IBS had presented with epigastric discomfort in September 2022.      An EGD and colonoscopy performed by SHOAIB Esteves on 9/1/2022 demonstrated a 3 cm mass in the proximal rectum around 13 cm from the anal verge.  The polyp was multilobulated and somewhat fixed to the underlying tissue.  Friable with contact bleeding.  Other smaller polyps were found in the cecum and ascending colon which were removed.  The EGD revealed mucosal nodule in the esophagus and multiple gastric polyps.  No evidence of bleeding.     The pathology from the rectal mass came back as invasive moderately differentiated adenocarcinoma arising out of traditional serrated adenoma with high-grade dysplasia.    Patient was subsequently referred to Dr. Amaya, colorectal surgery who performed a flexible sigmoidoscopy on 9/21/2022, revealing an infiltrative nonobstructing mass in the mid rectum.  The mass was partially circumferential involving one third of the lumen circumference.     9/13/2022: CT C/A/P with contrast noted rectal neoplasm not well visualized on this exam.  Calcified and noncalcified sub-6mm pulmonary nodules likely the sequela of prior granulomatous disease.  Scattered colonic diverticulosis.  Mildly prominent, but not pathologically enlarged pelvic nodes measuring up to 4 mm.    9/26/2022: MRI pelvis showed high rectal mass with effacement of the muscularis propria along the right wall and extended beyond the muscularis  propria at the posterior wall, T3 lesion. There is no evidence for involvement of the mesorectal fascia. There are no pathologically enlarged nodes.     Patient has been seen by , rad-onc & being planned for neoadjuvant radiation. Patient has come to this clinic to discuss systemic therapy options.    Guardant 360: MSI stable.  Low TMB.    10/17- 10/22/2022: Short course of radiotherapy to the rectal mass.    11/2/2022: Cycle 1 FOLFOX    INTERIM HISTORY:  Patient returns to the clinic for a follow-up visit.  She tolerated radiation treatment fairly well.  She did have an episode of lower back pain during radiation.  Says she had prior injuries to her lower back.  Symptoms improved after few days.  Patient also reports of having loose stools 3-4 times per day.  She has been using Imodium as needed.  Denies any fever, chills, chest pain, respiratory issues.  No other GI or  issues.    Past Medical History:   Diagnosis Date   • Abdominal cramping 07/01/2022   • Anemia    • Bloating 07/01/2022   • Bloody diarrhea 07/01/2022   • Chronic back pain    • Colon polyps     FOLLOWED BY DR. BEVERLY MENDEZ   • Depression    • Gastric polyps     FOLLOWED BY DR. BEVERLY MENDEZ   • GERD (gastroesophageal reflux disease)    • Hearing loss    • Hiatal hernia 09/2021    3 CM, FOLLOWED BY DR. BEVERLY MENDEZ   • Hyperlipidemia    • IBS (irritable bowel syndrome)    • Migraines    • Patellofemoral arthritis 07/2021   • PNA (pneumonia)    • PONV (postoperative nausea and vomiting) 1965    Numerous procedures with nausea   • Rectal bleeding 1983    Off and on for years   • Rectal cancer (HCC) 09/2022    INVASIVE MODERATELY DIFFERENTIATED ADENOCARCINOMA ARISING FROM TRADITIONAL SERRATED ADENOMA WITH HGD   • Sciatica 09/2015   • Tear of medial meniscus of knee 07/2021    RIGHT KNEE   • Vitamin D deficiency      Past Surgical History:   Procedure Laterality Date   • COLONOSCOPY N/A 1986   • COLONOSCOPY W/ POLYPECTOMY N/A  "09/01/2022    3 MM TUBULAR ADENOMA POLYP IN ASCENDING, 6 MM TUBULAR ADENOMA POLYP IN CECUM, 30 MM MASS IN RECTUM, PATH:INVASIVE MODERATELY DIFFERENTIATED ADENOCARCINOMA ARISING FROM TRADITIONAL SERRATED ADENOMA WITH HGD, HYPERTROPHIED ANAL PAPILLA, DR. BEVERLY MENDEZ AT [facility]   • ENDOSCOPY N/A 09/01/2022    MULTIPLE BENIGN GASTRIC POLYPS, 2 SQUAMOUS PAPILLAS IN ESOPHAGUS, 3 CM HIATAL HERNIA,   • EXTERNAL EAR SURGERY  1966    MULTIPLE \"LANCES\", DR. ACE IN [facility]   • EYE SURGERY Right 1967    DR. CASTANEDA IN [facility]   • KNEE ARTHRODESIS Right 07/27/2021    RIGHT KNEE ARTHROCENTESIS, DR. ALBERT GILMAN   • KNEE SURGERY Left 1985    DR. PEREZ AT [place]   • SHOULDER MANIPULATION Left 01/07/2013    FOR FROZEN SHOULDER, DR. ALBERT GILMAN AT [facility]   • SIGMOIDOSCOPY N/A 09/21/2022    AN INFILTRATIVE NON OBSTRUCTING MASS IN MID RECTUM, AREA TATTOOED, DR. TEMO AMAYA AT [facility]   • TRANSRECTAL ULTRASOUND N/A 09/21/2022    Procedure: ULTRASOUND TRANSRECTAL;  Surgeon: Temo Amaya MD;  Location: [facility] ENDOSCOPY;  Service: General;  Laterality: N/A;   • VENOUS ACCESS DEVICE (PORT) INSERTION Left 10/26/2022    Procedure: INSERTION OF PORTACATH;  Surgeon: Leonel Bridges MD;  Location: [facility] OR;  Service: General;  Laterality: Left;   • WISDOM TOOTH EXTRACTION Bilateral        MEDICATIONS    Current Outpatient Medications:   •  dicyclomine (BENTYL) 20 MG tablet, Take 1 tablet by mouth Every 6 (Six) Hours As Needed., Disp: , Rfl:   •  ELDERBERRY PO, Take 2 doses by mouth Daily., Disp: , Rfl:   •  lidocaine-prilocaine (EMLA) 2.5-2.5 % cream, Apply 1 application topically to the appropriate area as directed As Needed for Mild Pain. Apply 30 minutes prior to port access, Disp: 5 g, Rfl: 1  •  loperamide (IMODIUM) 2 MG capsule, Take 1 capsule by mouth As Needed for Diarrhea., Disp: , Rfl:   •  ondansetron (ZOFRAN) 8 MG tablet, Take 1 tablet by mouth 3 (Three) Times a Day As Needed for " Nausea or Vomiting., Disp: 30 tablet, Rfl: 5  •  traMADol (ULTRAM) 50 MG tablet, Take 1 tablet by mouth Every 6 (Six) Hours As Needed for Moderate Pain., Disp: 15 tablet, Rfl: 0  No current facility-administered medications for this visit.    Facility-Administered Medications Ordered in Other Visits:   •  dexamethasone (DECADRON) IVPB 12 mg, 12 mg, Intravenous, Once, Tristan Weber MD, Last Rate: 200 mL/hr at 11/02/22 1203, 12 mg at 11/02/22 1203  •  fluorouracil (ADRUCIL) 5,420 mg in sodium chloride 0.9 % 240 mL chemo infusion - FOR HOME USE, 2,400 mg/m2 (Treatment Plan Recorded), Intravenous, Once, Tristan Weber MD  •  fluorouracil (ADRUCIL) chemo injection 900 mg, 400 mg/m2 (Treatment Plan Recorded), Intravenous, Once, Tristan Weber MD  •  leucovorin 900 mg in dextrose (D5W) 5 % 295 mL IVPB, 400 mg/m2 (Treatment Plan Recorded), Intravenous, Once, Tristan Weber MD  •  OXALIplatin (ELOXATIN) 190 mg in dextrose (D5W) 5 % 288 mL chemo IVPB, 85 mg/m2 (Treatment Plan Recorded), Intravenous, Once, Tristan Weber MD    ALLERGIES:     Allergies   Allergen Reactions   • Adhesive Tape Rash     Patient has sensitive skin.    • Levofloxacin Swelling   • Sulfa Antibiotics Rash       SOCIAL HISTORY:       Social History     Socioeconomic History   • Marital status: Single   Tobacco Use   • Smoking status: Never   • Smokeless tobacco: Never   Vaping Use   • Vaping Use: Never used   Substance and Sexual Activity   • Alcohol use: Yes     Alcohol/week: 1.0 standard drink     Types: 1 Shots of liquor per week     Comment: Per month   • Drug use: Never   • Sexual activity: Not Currently         FAMILY HISTORY:  Family History   Problem Relation Age of Onset   • Colon polyps Mother    • Asthma Mother    • COPD Mother    • Lung disease Mother    • Migraines Mother    • Cancer Father         liver cancer   • Colon polyps Father    • Heart disease Father    • Diabetes Father    • Kidney disease Father    •  "Angina Father    • Liver cancer Father    • Hepatitis Father    • Hearing loss Father    • Cancer Sister         Breast cancer   • Arthritis Sister    • Colon polyps Sister    • Breast cancer Sister    • Diabetes Sister    • Colon polyps Sister    • Alcohol abuse Maternal Uncle    • Colon cancer Neg Hx    • Crohn's disease Neg Hx    • Irritable bowel syndrome Neg Hx    • Ulcerative colitis Neg Hx    • Malig Hyperthermia Neg Hx        REVIEW OF SYSTEMS:  Constitutional: [No fevers, chills, sweats]  Eye: [No recent visual problems]  ENMT: [No ear pain, nasal congestion, sore throat]  Respiratory: [No shortness of breath, cough]  Cardiovascular: [No Chest pain, palpitations, syncope]  Gastrointestinal: [No nausea, vomiting, diarrhea]  Genitourinary: [No hematuria]  Hema/Lymph: [Negative for bruising tendency, swollen lymph glands]  Endocrine: [Negative for excessive thirst, excessive hunger]  Musculoskeletal: [Denies any musculoskeletal pain or swelling]  Integumentary: [No rash, pruritus, abrasions]  Neurologic: [ No weakness or numbness, Alert & oriented X 4]         Vitals:    11/02/22 1039   BP: 133/85   Pulse: 73   Resp: 16   Temp: 97.1 °F (36.2 °C)   TempSrc: Temporal   SpO2: 98%   Weight: 109 kg (239 lb 6.4 oz)   Height: 175.3 cm (69.02\")   PainSc:   1   PainLoc: Chest  Comment: patient has a port that is only a week old     Current Status 11/2/2022   ECOG score 0      PHYSICAL EXAM:    CONSTITUTIONAL:  Vital signs reviewed.  No distress, looks comfortable.  EYES:  Conjunctiva and lids unremarkable.   EARS,NOSE,MOUTH,THROAT:  Ears and nose appear unremarkable.  Lips, teeth, gums appear unremarkable.  RESPIRATORY:  Normal respiratory effort.  Lungs clear to auscultation bilaterally.  CARDIOVASCULAR:  Normal S1, S2.  No murmurs rubs or gallops.  No significant lower extremity edema.  GASTROINTESTINAL: Abdomen appears unremarkable.  Nontender.  No hepatomegaly.  No splenomegaly.  LYMPHATIC:  No cervical, " supraclavicular lymphadenopathy.  NEURO: AAOx3, no focal deficits.  Appears to have equal strength all 4 extremities.  MUSCULOSKELETAL:  Unremarkable digits/nails.  No cyanosis or clubbing.  No apparent joint deformities.  SKIN:  Warm.  No rashes.  PSYCHIATRIC:  Normal judgment and insight.  Normal mood and affect.     RECENT LABS:  Reviewed    ASSESSMENT:   is a pleasant 60 y/o WF with medical history significant for migraines, depression/anxiety, and IBS comes for rectal adenocarcinoma evaluation & management.     # Rectal adenocarcinoma, T3N0M0, Stage IIA:  · Diagnosed on a c-scope performed for gastric discomfort in September 2022.   · The flex sig noted a an infiltrative nonobstructing mass in the mid rectum.  The mass was partially circumferential involving one third of the lumen circumference. CT c/a/p negative for mets. MRI pelvis most consistent with T3N0 disease, with no evidence of involvement of mesorectal fascia.  · I reviewed various management strategies for localized rectal cancer with chemotherapy, radiation and surgical resection.  Patient met with Dr. Amaya, colorectal surgery and is being tentatively planned for low anterior resection after neoadjuvant chemotherapy and radiation.  She received neoadjuvant short course RT by Dr. Freeman, radiation oncology from 10/17- 10/22/2022.   · I reviewed the role and rationale of systemic therapy and localized rectal adenocarcinoma.  Informed patient that the SOC for such patients is 4-months of chemotherapy with FOLFOX/CAPOX. Total neoadjuvant therapy is preferred in these cases.   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 She expressed interest in alternative treatment options. I reviewed the recent data about IOs in dMMR patients, although informed her this is not the current SOC.  A guardant 360 NGS was obtained which did not show MSI high.  Low TMB.  · Patient is agreeable to  proceed with FOLFOX with tentative plan to dose-reduce or stop oxaliplatin if develops neuropathy.    · 11/2/2022: Is clinically stable.  Labs stable.  Proceed with cycle 1 FOLFOX.  Follow-up in 2 weeks with NP for port, labs and cycle 2 FOLFOX.    PLAN:   - Localized rectal adenoCA. Plan for FOLFOX x 4 months.   -Proceed with cycle 1 FOLFOX  -Advised to take Imodium as needed for diarrhea.  Will send Lomotil if still not controlled.  - F/u in 2 weeks for labs & cycle 2 FOLFOX in 2 weeks or sooner if needed    Orders Placed This Encounter   Procedures   • Comprehensive metabolic panel     Standing Status:   Future     Number of Occurrences:   1     Standing Expiration Date:   11/2/2023     Order Specific Question:   Release to patient     Answer:   Routine Release   • CBC and Differential     Standing Status:   Future     Number of Occurrences:   1     Standing Expiration Date:   11/2/2023     Order Specific Question:   Manual Differential     Answer:   No     Order Specific Question:   Release to patient     Answer:   Routine Release   Total time spent during this patient encounter is 45 minutes. The total time spent with the patient includes at least one or more of the following: preparing to see the patient by reviewing of tests, prior notes or other relevant information, performing appropriate independent examination & evaluation, counseling, ordering of medications, tests or procedures, communicating with other healthcare professionals, when appropriate to coordinate care, documenting clinic information in the electronic medical records or other health records, independently interpreting results of tests and communicating the results to the patient/family or caregiver.

## 2022-11-04 ENCOUNTER — INFUSION (OUTPATIENT)
Dept: ONCOLOGY | Facility: HOSPITAL | Age: 62
End: 2022-11-04

## 2022-11-04 DIAGNOSIS — C20 RECTAL CANCER: Primary | ICD-10-CM

## 2022-11-04 DIAGNOSIS — Z45.2 ENCOUNTER FOR FITTING AND ADJUSTMENT OF VASCULAR CATHETER: ICD-10-CM

## 2022-11-04 PROCEDURE — 25010000002 HEPARIN LOCK FLUSH PER 10 UNITS: Performed by: INTERNAL MEDICINE

## 2022-11-04 RX ORDER — HEPARIN SODIUM (PORCINE) LOCK FLUSH IV SOLN 100 UNIT/ML 100 UNIT/ML
500 SOLUTION INTRAVENOUS AS NEEDED
Status: DISCONTINUED | OUTPATIENT
Start: 2022-11-04 | End: 2022-11-04 | Stop reason: HOSPADM

## 2022-11-04 RX ORDER — SODIUM CHLORIDE 0.9 % (FLUSH) 0.9 %
10 SYRINGE (ML) INJECTION AS NEEDED
Status: CANCELLED | OUTPATIENT
Start: 2022-11-04

## 2022-11-04 RX ORDER — HEPARIN SODIUM (PORCINE) LOCK FLUSH IV SOLN 100 UNIT/ML 100 UNIT/ML
500 SOLUTION INTRAVENOUS AS NEEDED
Status: CANCELLED | OUTPATIENT
Start: 2022-11-04

## 2022-11-04 RX ORDER — SODIUM CHLORIDE 0.9 % (FLUSH) 0.9 %
10 SYRINGE (ML) INJECTION AS NEEDED
Status: DISCONTINUED | OUTPATIENT
Start: 2022-11-04 | End: 2022-11-04 | Stop reason: HOSPADM

## 2022-11-04 RX ADMIN — Medication 500 UNITS: at 12:26

## 2022-11-04 RX ADMIN — Medication 10 ML: at 12:26

## 2022-11-07 ENCOUNTER — TELEMEDICINE - AUDIO (OUTPATIENT)
Dept: NUTRITION | Facility: HOSPITAL | Age: 62
End: 2022-11-07

## 2022-11-07 RX ORDER — DIPHENOXYLATE HYDROCHLORIDE AND ATROPINE SULFATE 2.5; .025 MG/1; MG/1
1 TABLET ORAL 4 TIMES DAILY PRN
Qty: 30 TABLET | Refills: 2 | Status: SHIPPED | OUTPATIENT
Start: 2022-11-07

## 2022-11-07 NOTE — PROGRESS NOTES
Outpatient Oncology Nutrition      Patient Name:  Babs Felton  YOB: 1960  MRN: [id number]    Assessment Date:  11/7/2022    Comments: Received message that the patient was experiencing diarrhea. Taking imodium and is going to begin lomotil. The patient is already enrolled in the Enterade study. Spoke to patient on the phone and arranged for one of her friends to  the Enterade for her to begin today. Provided patient with enterade to begin two a day x seven days and one a day for the two days prior to her next cycle 11/16.    Provided instructions as well. Will follow.     Electronically signed by:  Yanni Ferguson RD, SHYANNE  11/07/22 12:52 EST

## 2022-11-07 NOTE — TELEPHONE ENCOUNTER
Returned call to patient who is reporting that she has had 8 loose stools in 24 hour period of time.  She is taking Imodium after each loose stool.  She does not have Lomotil and she has not started on the Enterade study yet, but would like to.  I am adding a lomotil script to this for signature and will send dietician a message as well.

## 2022-11-16 ENCOUNTER — CLINICAL SUPPORT (OUTPATIENT)
Dept: OTHER | Facility: HOSPITAL | Age: 62
End: 2022-11-16

## 2022-11-16 ENCOUNTER — OFFICE VISIT (OUTPATIENT)
Dept: ONCOLOGY | Facility: CLINIC | Age: 62
End: 2022-11-16

## 2022-11-16 ENCOUNTER — INFUSION (OUTPATIENT)
Dept: ONCOLOGY | Facility: HOSPITAL | Age: 62
End: 2022-11-16

## 2022-11-16 VITALS
HEART RATE: 80 BPM | TEMPERATURE: 97.5 F | RESPIRATION RATE: 16 BRPM | OXYGEN SATURATION: 97 % | DIASTOLIC BLOOD PRESSURE: 73 MMHG | WEIGHT: 237.9 LBS | BODY MASS INDEX: 35.24 KG/M2 | SYSTOLIC BLOOD PRESSURE: 146 MMHG | HEIGHT: 69 IN

## 2022-11-16 DIAGNOSIS — C20 RECTAL CANCER: Primary | ICD-10-CM

## 2022-11-16 DIAGNOSIS — K21.9 GASTROESOPHAGEAL REFLUX DISEASE, UNSPECIFIED WHETHER ESOPHAGITIS PRESENT: ICD-10-CM

## 2022-11-16 DIAGNOSIS — Z79.899 HIGH RISK MEDICATION USE: ICD-10-CM

## 2022-11-16 DIAGNOSIS — K52.1 CHEMOTHERAPY INDUCED DIARRHEA: ICD-10-CM

## 2022-11-16 DIAGNOSIS — T45.1X5A CHEMOTHERAPY INDUCED DIARRHEA: ICD-10-CM

## 2022-11-16 LAB
ALBUMIN SERPL-MCNC: 4.2 G/DL (ref 3.5–5.2)
ALBUMIN/GLOB SERPL: 1.7 G/DL (ref 1.1–2.4)
ALP SERPL-CCNC: 70 U/L (ref 38–116)
ALT SERPL W P-5'-P-CCNC: 18 U/L (ref 0–33)
ANION GAP SERPL CALCULATED.3IONS-SCNC: 12.6 MMOL/L (ref 5–15)
AST SERPL-CCNC: 20 U/L (ref 0–32)
BASOPHILS # BLD AUTO: 0.01 10*3/MM3 (ref 0–0.2)
BASOPHILS NFR BLD AUTO: 0.4 % (ref 0–1.5)
BILIRUB SERPL-MCNC: 0.5 MG/DL (ref 0.2–1.2)
BUN SERPL-MCNC: 12 MG/DL (ref 6–20)
BUN/CREAT SERPL: 13.5 (ref 7.3–30)
CALCIUM SPEC-SCNC: 9.2 MG/DL (ref 8.5–10.2)
CHLORIDE SERPL-SCNC: 106 MMOL/L (ref 98–107)
CO2 SERPL-SCNC: 22.4 MMOL/L (ref 22–29)
CREAT SERPL-MCNC: 0.89 MG/DL (ref 0.6–1.1)
DEPRECATED RDW RBC AUTO: 41.9 FL (ref 37–54)
EGFRCR SERPLBLD CKD-EPI 2021: 73.4 ML/MIN/1.73
EOSINOPHIL # BLD AUTO: 0.12 10*3/MM3 (ref 0–0.4)
EOSINOPHIL NFR BLD AUTO: 4.2 % (ref 0.3–6.2)
ERYTHROCYTE [DISTWIDTH] IN BLOOD BY AUTOMATED COUNT: 14.1 % (ref 12.3–15.4)
GLOBULIN UR ELPH-MCNC: 2.5 GM/DL (ref 1.8–3.5)
GLUCOSE SERPL-MCNC: 93 MG/DL (ref 74–124)
HCT VFR BLD AUTO: 33.1 % (ref 34–46.6)
HGB BLD-MCNC: 11 G/DL (ref 12–15.9)
IMM GRANULOCYTES # BLD AUTO: 0.01 10*3/MM3 (ref 0–0.05)
IMM GRANULOCYTES NFR BLD AUTO: 0.4 % (ref 0–0.5)
LYMPHOCYTES # BLD AUTO: 0.81 10*3/MM3 (ref 0.7–3.1)
LYMPHOCYTES NFR BLD AUTO: 28.6 % (ref 19.6–45.3)
MCH RBC QN AUTO: 27.8 PG (ref 26.6–33)
MCHC RBC AUTO-ENTMCNC: 33.2 G/DL (ref 31.5–35.7)
MCV RBC AUTO: 83.6 FL (ref 79–97)
MONOCYTES # BLD AUTO: 0.25 10*3/MM3 (ref 0.1–0.9)
MONOCYTES NFR BLD AUTO: 8.8 % (ref 5–12)
NEUTROPHILS NFR BLD AUTO: 1.63 10*3/MM3 (ref 1.7–7)
NEUTROPHILS NFR BLD AUTO: 57.6 % (ref 42.7–76)
NRBC BLD AUTO-RTO: 0 /100 WBC (ref 0–0.2)
PLATELET # BLD AUTO: 156 10*3/MM3 (ref 140–450)
PMV BLD AUTO: 9.3 FL (ref 6–12)
POTASSIUM SERPL-SCNC: 4 MMOL/L (ref 3.5–4.7)
PROT SERPL-MCNC: 6.7 G/DL (ref 6.3–8)
RBC # BLD AUTO: 3.96 10*6/MM3 (ref 3.77–5.28)
SODIUM SERPL-SCNC: 141 MMOL/L (ref 134–145)
WBC NRBC COR # BLD: 2.83 10*3/MM3 (ref 3.4–10.8)

## 2022-11-16 PROCEDURE — 25010000002 LEUCOVORIN CALCIUM PER 50 MG: Performed by: NURSE PRACTITIONER

## 2022-11-16 PROCEDURE — 80053 COMPREHEN METABOLIC PANEL: CPT

## 2022-11-16 PROCEDURE — 25010000002 OXALIPLATIN PER 0.5 MG: Performed by: NURSE PRACTITIONER

## 2022-11-16 PROCEDURE — 85025 COMPLETE CBC W/AUTO DIFF WBC: CPT

## 2022-11-16 PROCEDURE — 25010000002 DEXAMETHASONE SODIUM PHOSPHATE 100 MG/10ML SOLUTION: Performed by: NURSE PRACTITIONER

## 2022-11-16 PROCEDURE — 96411 CHEMO IV PUSH ADDL DRUG: CPT

## 2022-11-16 PROCEDURE — 96413 CHEMO IV INFUSION 1 HR: CPT

## 2022-11-16 PROCEDURE — 96415 CHEMO IV INFUSION ADDL HR: CPT

## 2022-11-16 PROCEDURE — G0498 CHEMO EXTEND IV INFUS W/PUMP: HCPCS

## 2022-11-16 PROCEDURE — 96375 TX/PRO/DX INJ NEW DRUG ADDON: CPT

## 2022-11-16 PROCEDURE — 25010000002 FLUOROURACIL PER 500 MG: Performed by: NURSE PRACTITIONER

## 2022-11-16 PROCEDURE — 99214 OFFICE O/P EST MOD 30 MIN: CPT | Performed by: NURSE PRACTITIONER

## 2022-11-16 PROCEDURE — 25010000002 PALONOSETRON PER 25 MCG: Performed by: NURSE PRACTITIONER

## 2022-11-16 PROCEDURE — 25010000002 FOSAPREPITANT PER 1 MG: Performed by: NURSE PRACTITIONER

## 2022-11-16 PROCEDURE — 96368 THER/DIAG CONCURRENT INF: CPT

## 2022-11-16 PROCEDURE — 96367 TX/PROPH/DG ADDL SEQ IV INF: CPT

## 2022-11-16 PROCEDURE — 96416 CHEMO PROLONG INFUSE W/PUMP: CPT

## 2022-11-16 RX ORDER — PANTOPRAZOLE SODIUM 40 MG/1
40 TABLET, DELAYED RELEASE ORAL DAILY
Qty: 30 TABLET | Refills: 3 | Status: SHIPPED | OUTPATIENT
Start: 2022-11-16

## 2022-11-16 RX ORDER — PALONOSETRON 0.05 MG/ML
0.25 INJECTION, SOLUTION INTRAVENOUS ONCE
Status: COMPLETED | OUTPATIENT
Start: 2022-11-16 | End: 2022-11-16

## 2022-11-16 RX ORDER — PALONOSETRON 0.05 MG/ML
0.25 INJECTION, SOLUTION INTRAVENOUS ONCE
Status: CANCELLED | OUTPATIENT
Start: 2022-11-16

## 2022-11-16 RX ORDER — DEXTROSE MONOHYDRATE 50 MG/ML
250 INJECTION, SOLUTION INTRAVENOUS ONCE
Status: CANCELLED | OUTPATIENT
Start: 2022-11-16

## 2022-11-16 RX ORDER — DIPHENHYDRAMINE HYDROCHLORIDE 50 MG/ML
50 INJECTION INTRAMUSCULAR; INTRAVENOUS AS NEEDED
Status: CANCELLED | OUTPATIENT
Start: 2022-11-16

## 2022-11-16 RX ORDER — FAMOTIDINE 10 MG/ML
20 INJECTION, SOLUTION INTRAVENOUS AS NEEDED
Status: CANCELLED | OUTPATIENT
Start: 2022-11-16

## 2022-11-16 RX ORDER — FLUOROURACIL 50 MG/ML
400 INJECTION, SOLUTION INTRAVENOUS ONCE
Status: COMPLETED | OUTPATIENT
Start: 2022-11-16 | End: 2022-11-16

## 2022-11-16 RX ORDER — FLUOROURACIL 50 MG/ML
400 INJECTION, SOLUTION INTRAVENOUS ONCE
Status: CANCELLED | OUTPATIENT
Start: 2022-11-16

## 2022-11-16 RX ORDER — DEXTROSE MONOHYDRATE 50 MG/ML
250 INJECTION, SOLUTION INTRAVENOUS ONCE
Status: COMPLETED | OUTPATIENT
Start: 2022-11-16 | End: 2022-11-16

## 2022-11-16 RX ADMIN — PALONOSETRON 0.25 MG: 0.05 INJECTION, SOLUTION INTRAVENOUS at 10:00

## 2022-11-16 RX ADMIN — FLUOROURACIL 5420 MG: 50 INJECTION, SOLUTION INTRAVENOUS at 13:08

## 2022-11-16 RX ADMIN — SODIUM CHLORIDE 100 ML: 9 INJECTION, SOLUTION INTRAVENOUS at 10:19

## 2022-11-16 RX ADMIN — DEXAMETHASONE SODIUM PHOSPHATE 12 MG: 10 INJECTION, SOLUTION INTRAMUSCULAR; INTRAVENOUS at 10:01

## 2022-11-16 RX ADMIN — DEXTROSE MONOHYDRATE 250 ML: 5 INJECTION, SOLUTION INTRAVENOUS at 09:59

## 2022-11-16 RX ADMIN — LEUCOVORIN CALCIUM 900 MG: 350 INJECTION, POWDER, LYOPHILIZED, FOR SUSPENSION INTRAMUSCULAR; INTRAVENOUS at 10:52

## 2022-11-16 RX ADMIN — OXALIPLATIN 190 MG: 100 INJECTION, SOLUTION, CONCENTRATE INTRAVENOUS at 10:52

## 2022-11-16 RX ADMIN — FLUOROURACIL 900 MG: 50 INJECTION, SOLUTION INTRAVENOUS at 13:08

## 2022-11-16 NOTE — PROGRESS NOTES
[facility]    CONSULTING IN BLOOD DISORDERS & CANCER      REASON FOR CONSULTATION/CHIEF COMPLAINT:     Evaluation and management for rectal adenocarcinoma                             REQUESTING PHYSICIAN: No ref. provider found  RECORDS OBTAINED:  Records of the patients history including those from the electronic medical record were reviewed and summarized in detail.    HISTORY OF PRESENT ILLNESS:    The patient is a 61 y.o. year old female with medical history significant for migraines, depression/anxiety, and IBS had presented with epigastric discomfort in September 2022.      An EGD and colonoscopy performed by SHOAIB Esteves on 9/1/2022 demonstrated a 3 cm mass in the proximal rectum around 13 cm from the anal verge.  The polyp was multilobulated and somewhat fixed to the underlying tissue.  Friable with contact bleeding.  Other smaller polyps were found in the cecum and ascending colon which were removed.  The EGD revealed mucosal nodule in the esophagus and multiple gastric polyps.  No evidence of bleeding.     The pathology from the rectal mass came back as invasive moderately differentiated adenocarcinoma arising out of traditional serrated adenoma with high-grade dysplasia.    Patient was subsequently referred to Dr. Amaya, colorectal surgery who performed a flexible sigmoidoscopy on 9/21/2022, revealing an infiltrative nonobstructing mass in the mid rectum.  The mass was partially circumferential involving one third of the lumen circumference.     9/13/2022: CT C/A/P with contrast noted rectal neoplasm not well visualized on this exam.  Calcified and noncalcified sub-6mm pulmonary nodules likely the sequela of prior granulomatous disease.  Scattered colonic diverticulosis.  Mildly prominent, but not pathologically enlarged pelvic nodes measuring up to 4 mm.    9/26/2022: MRI pelvis showed high rectal mass with effacement of the muscularis propria along the right wall and extended beyond the muscularis  propria at the posterior wall, T3 lesion. There is no evidence for involvement of the mesorectal fascia. There are no pathologically enlarged nodes.     Patient has been seen by , rad-onc & being planned for neoadjuvant radiation. Patient has come to this clinic to discuss systemic therapy options.    Guardant 360: MSI stable.  Low TMB.    10/17- 10/22/2022: Short course of radiotherapy to the rectal mass.    11/2/2022: Cycle 1 FOLFOX    INTERIM HISTORY:  The patient returns the office today, 11/16/2022 in anticipation of her second cycle of neoadjuvant chemotherapy with FOLFOX.  She reports she tolerated treatment fairly well.  She did have diarrhea and called the office receiving a prescription for Lomotil.  She reports Lomotil was helpful for her bowels though she continued to have fairly significant abdominal cramping.  She does have Bentyl to utilize as needed.  She also reports an increase in epigastric discomfort which she correlated with the Lomotil.  She historically has not struggled with GERD.  She denies significant nausea.  She did have a decline in her energy though was able to accomplish activities that she desires.  She did have cold sensitivity secondary to oxaliplatin though this improved after 1 week.    Past Medical History:   Diagnosis Date   • Abdominal cramping 07/01/2022   • Anemia    • Bloating 07/01/2022   • Bloody diarrhea 07/01/2022   • Chronic back pain    • Colon polyps     FOLLOWED BY DR. BEVERLY MENDEZ   • Depression    • Gastric polyps     FOLLOWED BY DR. BEVERLY MENDEZ   • GERD (gastroesophageal reflux disease)    • Hearing loss    • Hiatal hernia 09/2021    3 CM, FOLLOWED BY DR. BEVERLY MENDEZ   • Hyperlipidemia    • IBS (irritable bowel syndrome)    • Migraines    • Patellofemoral arthritis 07/2021   • PNA (pneumonia)    • PONV (postoperative nausea and vomiting) 1965    Numerous procedures with nausea   • Rectal bleeding 1983    Off and on for years   • Rectal cancer  "(HCC) 09/2022    INVASIVE MODERATELY DIFFERENTIATED ADENOCARCINOMA ARISING FROM TRADITIONAL SERRATED ADENOMA WITH HGD   • Sciatica 09/2015   • Tear of medial meniscus of knee 07/2021    RIGHT KNEE   • Vitamin D deficiency      Past Surgical History:   Procedure Laterality Date   • COLONOSCOPY N/A 1986   • COLONOSCOPY W/ POLYPECTOMY N/A 09/01/2022    3 MM TUBULAR ADENOMA POLYP IN ASCENDING, 6 MM TUBULAR ADENOMA POLYP IN CECUM, 30 MM MASS IN RECTUM, PATH:INVASIVE MODERATELY DIFFERENTIATED ADENOCARCINOMA ARISING FROM TRADITIONAL SERRATED ADENOMA WITH HGD, HYPERTROPHIED ANAL PAPILLA, DR. BEVERLY MENDEZ AT [facility]   • ENDOSCOPY N/A 09/01/2022    MULTIPLE BENIGN GASTRIC POLYPS, 2 SQUAMOUS PAPILLAS IN ESOPHAGUS, 3 CM HIATAL HERNIA,   • EXTERNAL EAR SURGERY  1966    MULTIPLE \"LANCES\", DR. ACE IN [facility]   • EYE SURGERY Right 1967    DR. CASTANEDA IN [facility]   • KNEE ARTHRODESIS Right 07/27/2021    RIGHT KNEE ARTHROCENTESIS, DR. ALBERT GILMAN   • KNEE SURGERY Left 1985    DR. PEREZ AT [facility]   • SHOULDER MANIPULATION Left 01/07/2013    FOR FROZEN SHOULDER, DR. ALBERT GILMAN AT [facility]   • SIGMOIDOSCOPY N/A 09/21/2022    AN INFILTRATIVE NON OBSTRUCTING MASS IN MID RECTUM, AREA TATTOOED, DR. TEMO AMAYA AT [facility]   • TRANSRECTAL ULTRASOUND N/A 09/21/2022    Procedure: ULTRASOUND TRANSRECTAL;  Surgeon: Temo Amaya MD;  Location: [facility] ENDOSCOPY;  Service: General;  Laterality: N/A;   • VENOUS ACCESS DEVICE (PORT) INSERTION Left 10/26/2022    Procedure: INSERTION OF PORTACATH;  Surgeon: Leonel Bridges MD;  Location: [facility] MAIN OR;  Service: General;  Laterality: Left;   • WISDOM TOOTH EXTRACTION Bilateral        MEDICATIONS    Current Outpatient Medications:   •  dicyclomine (BENTYL) 20 MG tablet, Take 1 tablet by mouth Every 6 (Six) Hours As Needed., Disp: , Rfl:   •  diphenoxylate-atropine (LOMOTIL) 2.5-0.025 MG per tablet, Take 1 tablet by mouth 4 (Four) Times a Day As Needed for " Diarrhea., Disp: 30 tablet, Rfl: 2  •  ELDERBERRY PO, Take 2 doses by mouth Daily., Disp: , Rfl:   •  lidocaine-prilocaine (EMLA) 2.5-2.5 % cream, Apply 1 application topically to the appropriate area as directed As Needed for Mild Pain. Apply 30 minutes prior to port access, Disp: 5 g, Rfl: 1  •  loperamide (IMODIUM) 2 MG capsule, Take 1 capsule by mouth As Needed for Diarrhea., Disp: , Rfl:   •  ondansetron (ZOFRAN) 8 MG tablet, Take 1 tablet by mouth 3 (Three) Times a Day As Needed for Nausea or Vomiting., Disp: 30 tablet, Rfl: 5  •  traMADol (ULTRAM) 50 MG tablet, Take 1 tablet by mouth Every 6 (Six) Hours As Needed for Moderate Pain., Disp: 15 tablet, Rfl: 0  •  pantoprazole (PROTONIX) 40 MG EC tablet, Take 1 tablet by mouth Daily., Disp: 30 tablet, Rfl: 3    ALLERGIES:     Allergies   Allergen Reactions   • Adhesive Tape Rash     Patient has sensitive skin.    • Levofloxacin Swelling   • Sulfa Antibiotics Rash       SOCIAL HISTORY:       Social History     Socioeconomic History   • Marital status: Single   Tobacco Use   • Smoking status: Never   • Smokeless tobacco: Never   Vaping Use   • Vaping Use: Never used   Substance and Sexual Activity   • Alcohol use: Yes     Alcohol/week: 1.0 standard drink     Types: 1 Shots of liquor per week     Comment: Per month   • Drug use: Never   • Sexual activity: Not Currently         FAMILY HISTORY:  Family History   Problem Relation Age of Onset   • Colon polyps Mother    • Asthma Mother    • COPD Mother    • Lung disease Mother    • Migraines Mother    • Cancer Father         liver cancer   • Colon polyps Father    • Heart disease Father    • Diabetes Father    • Kidney disease Father    • Angina Father    • Liver cancer Father    • Hepatitis Father    • Hearing loss Father    • Cancer Sister         Breast cancer   • Arthritis Sister    • Colon polyps Sister    • Breast cancer Sister    • Diabetes Sister    • Colon polyps Sister    • Alcohol abuse Maternal Uncle    •  "Colon cancer Neg Hx    • Crohn's disease Neg Hx    • Irritable bowel syndrome Neg Hx    • Ulcerative colitis Neg Hx    • Malig Hyperthermia Neg Hx        REVIEW OF SYSTEMS:  Constitutional: [No fevers, chills, sweats]  Eye: [No recent visual problems]  ENMT: [No ear pain, nasal congestion, sore throat]  Respiratory: [No shortness of breath, cough]  Cardiovascular: [No Chest pain, palpitations, syncope]  Gastrointestinal: [No nausea, vomiting, diarrhea]  Genitourinary: [No hematuria]  Hema/Lymph: [Negative for bruising tendency, swollen lymph glands]  Endocrine: [Negative for excessive thirst, excessive hunger]  Musculoskeletal: [Denies any musculoskeletal pain or swelling]  Integumentary: [No rash, pruritus, abrasions]  Neurologic: [ No weakness or numbness, Alert & oriented X 4]         Vitals:    11/16/22 0910   BP: 146/73   Pulse: 80   Resp: 16   Temp: 97.5 °F (36.4 °C)   TempSrc: Temporal   SpO2: 97%   Weight: 108 kg (237 lb 14.4 oz)   Height: 175.3 cm (69.02\")   PainSc: 0-No pain     Current Status 11/16/2022   ECOG score 0      PHYSICAL EXAM:    CONSTITUTIONAL:  Vital signs reviewed.  No distress, looks comfortable.  EYES:  Conjunctiva and lids unremarkable.   EARS,NOSE,MOUTH,THROAT:  Ears and nose appear unremarkable.  Lips, teeth, gums appear unremarkable.  RESPIRATORY:  Normal respiratory effort.  Lungs clear to auscultation bilaterally.  CARDIOVASCULAR:  Normal S1, S2.  No murmurs rubs or gallops.  No significant lower extremity edema.  GASTROINTESTINAL: Abdomen appears unremarkable.  Nontender.  Bowel sounds present  LYMPHATIC:  No cervical, supraclavicular lymphadenopathy.  NEURO: AAOx3, no focal deficits.  Appears to have equal strength all 4 extremities.  MUSCULOSKELETAL:  Unremarkable digits/nails.  No cyanosis or clubbing.  No apparent joint deformities.  SKIN:  Warm.  No rashes.  PSYCHIATRIC:  Normal judgment and insight.  Normal mood and affect.    I have reexamined the patient and the results are " consistent with the previously documented exam. YAMILEX Hill       RECENT LABS:  Results from last 7 days   Lab Units 11/16/22  0837   WBC 10*3/mm3 2.83*   NEUTROS ABS 10*3/mm3 1.63*   HEMOGLOBIN g/dL 11.0*   HEMATOCRIT % 33.1*   PLATELETS 10*3/mm3 156     Results from last 7 days   Lab Units 11/16/22  0837   SODIUM mmol/L 141   POTASSIUM mmol/L 4.0   CHLORIDE mmol/L 106   CO2 mmol/L 22.4   BUN mg/dL 12   CREATININE mg/dL 0.89   CALCIUM mg/dL 9.2   ALBUMIN g/dL 4.20   BILIRUBIN mg/dL 0.5   ALK PHOS U/L 70   ALT (SGPT) U/L 18   AST (SGOT) U/L 20   GLUCOSE mg/dL 93             ASSESSMENT:   is a pleasant 60 y/o WF with medical history significant for migraines, depression/anxiety, and IBS comes for rectal adenocarcinoma evaluation & management.     # Rectal adenocarcinoma, T3N0M0, Stage IIA:  · Diagnosed on a c-scope performed for gastric discomfort in September 2022.   · The flex sig noted a an infiltrative nonobstructing mass in the mid rectum.  The mass was partially circumferential involving one third of the lumen circumference. CT c/a/p negative for mets. MRI pelvis most consistent with T3N0 disease, with no evidence of involvement of mesorectal fascia.  · Reviewed various management strategies for localized rectal cancer with chemotherapy, radiation and surgical resection.  Patient met with Dr. Amaya, colorectal surgery and is being tentatively planned for low anterior resection after neoadjuvant chemotherapy and radiation.  She received neoadjuvant short course RT by Dr. Freeman, radiation oncology from 10/17- 10/22/2022.   · Reviewed the role and rationale of systemic therapy and localized rectal adenocarcinoma.  Informed patient that the SOC for such patients is 4-months of chemotherapy with FOLFOX/CAPOX. Total neoadjuvant therapy is preferred in these cases.   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 She expressed  interest in alternative treatment options. I reviewed the recent data about IOs in dMMR patients, although informed her this is not the current SOC.  A guardant 360 NGS was obtained which did not show MSI high.  Low TMB.  · Patient is agreeable to proceed with FOLFOX with tentative plan to dose-reduce or stop oxaliplatin if develops neuropathy.    · 11/2/2022: Is clinically stable.  Labs stable.  Proceed with cycle 1 FOLFOX.    · Proceed today, 11/16/2022 with cycle 2 FOLFOX    #GERD  · Significant epigastric discomfort and reflux with initiation of chemo  · BeginProtonix 40 mg nightly    #Diarrhea  · Initially began following radiation and was exacerbated with chemotherapy  · Utilizing Imodium, Lomotil was added  · We discussed today alternating Imodium and Lomotil as needed    PLAN:   - Localized rectal adenoCA. Plan for FOLFOX x 4 months.   -Proceed today with cycle 2 FOLFOX  -Begin Protonix 40 mg nightly  -Alternate Imodium and Lomotil as needed following chemotherapy  -Return every 2 weeks for CBC, CMP, MD or NP follow-up and continuation of FOLFOX    Patient is on high risk medication requiring close monitoring for toxicity    No orders of the defined types were placed in this encounter.    Thea Latham, APRN  11/16/2022

## 2022-11-17 NOTE — PROGRESS NOTES
Outpatient Oncology Nutrition      Patient Name:  Babs Felton  YOB: 1960  MRN: [id number]    Assessment Date:  11/16/22    Comments: Follow up with patient in infusion. FOLFOX cycle two.  Weight 237,   Wt Readings from Last 3 Encounters:   11/16/22 108 kg (237 lb 14.4 oz)   11/02/22 109 kg (239 lb 6.4 oz)   10/24/22 110 kg (242 lb)   Labs reviewed.     The patient has been using Enterade since 11/7 after cycle one due to complaints of diarrhea.   She has also been using Lomotil. Complains of epigastric discomfort, fatigue, no nausea. She would like to continue using the Enterade and is a participant in the study. Also has bentyl and imodium to alternate with lomotil if needed, zofran and protonix was added today.     Provided her with 16 bottles today and is to begin two bottles per day x 7 days, and one bottle a day for the two days prior to cycle 3.    Will continue to follow.       Electronically signed by:  Yanni Ferguson RD, LD  11/17/22 09:11 EST

## 2022-11-18 ENCOUNTER — INFUSION (OUTPATIENT)
Dept: ONCOLOGY | Facility: HOSPITAL | Age: 62
End: 2022-11-18

## 2022-11-18 VITALS
WEIGHT: 241.2 LBS | BODY MASS INDEX: 35.6 KG/M2 | RESPIRATION RATE: 16 BRPM | HEART RATE: 72 BPM | DIASTOLIC BLOOD PRESSURE: 78 MMHG | TEMPERATURE: 97.8 F | OXYGEN SATURATION: 96 % | SYSTOLIC BLOOD PRESSURE: 156 MMHG

## 2022-11-18 DIAGNOSIS — Z45.2 ENCOUNTER FOR FITTING AND ADJUSTMENT OF VASCULAR CATHETER: ICD-10-CM

## 2022-11-18 DIAGNOSIS — C20 RECTAL CANCER: Primary | ICD-10-CM

## 2022-11-18 PROCEDURE — 96360 HYDRATION IV INFUSION INIT: CPT

## 2022-11-18 PROCEDURE — 25010000002 HEPARIN LOCK FLUSH PER 10 UNITS: Performed by: INTERNAL MEDICINE

## 2022-11-18 RX ORDER — HEPARIN SODIUM (PORCINE) LOCK FLUSH IV SOLN 100 UNIT/ML 100 UNIT/ML
500 SOLUTION INTRAVENOUS AS NEEDED
Status: DISCONTINUED | OUTPATIENT
Start: 2022-11-18 | End: 2022-11-18 | Stop reason: HOSPADM

## 2022-11-18 RX ORDER — HEPARIN SODIUM (PORCINE) LOCK FLUSH IV SOLN 100 UNIT/ML 100 UNIT/ML
500 SOLUTION INTRAVENOUS AS NEEDED
Status: CANCELLED | OUTPATIENT
Start: 2022-11-18

## 2022-11-18 RX ORDER — SODIUM CHLORIDE 9 MG/ML
1000 INJECTION, SOLUTION INTRAVENOUS ONCE
Status: COMPLETED | OUTPATIENT
Start: 2022-11-18 | End: 2022-11-18

## 2022-11-18 RX ORDER — SODIUM CHLORIDE 0.9 % (FLUSH) 0.9 %
10 SYRINGE (ML) INJECTION AS NEEDED
Status: CANCELLED | OUTPATIENT
Start: 2022-11-18

## 2022-11-18 RX ORDER — SODIUM CHLORIDE 0.9 % (FLUSH) 0.9 %
10 SYRINGE (ML) INJECTION AS NEEDED
Status: DISCONTINUED | OUTPATIENT
Start: 2022-11-18 | End: 2022-11-18 | Stop reason: HOSPADM

## 2022-11-18 RX ADMIN — Medication 10 ML: at 12:16

## 2022-11-18 RX ADMIN — Medication 500 UNITS: at 12:16

## 2022-11-18 RX ADMIN — SODIUM CHLORIDE 1000 ML: 9 INJECTION, SOLUTION INTRAVENOUS at 11:13

## 2022-11-21 ENCOUNTER — OFFICE VISIT (OUTPATIENT)
Dept: RADIATION ONCOLOGY | Facility: HOSPITAL | Age: 62
End: 2022-11-21
Payer: COMMERCIAL

## 2022-11-21 ENCOUNTER — APPOINTMENT (OUTPATIENT)
Dept: RADIATION ONCOLOGY | Facility: HOSPITAL | Age: 62
End: 2022-11-21
Payer: COMMERCIAL

## 2022-11-21 VITALS
OXYGEN SATURATION: 96 % | HEART RATE: 84 BPM | BODY MASS INDEX: 34.69 KG/M2 | WEIGHT: 235 LBS | SYSTOLIC BLOOD PRESSURE: 150 MMHG | DIASTOLIC BLOOD PRESSURE: 91 MMHG

## 2022-11-21 DIAGNOSIS — C20 RECTAL CANCER: ICD-10-CM

## 2022-11-21 PROCEDURE — G0463 HOSPITAL OUTPT CLINIC VISIT: HCPCS | Performed by: STUDENT IN AN ORGANIZED HEALTH CARE EDUCATION/TRAINING PROGRAM

## 2022-11-23 ENCOUNTER — TELEPHONE (OUTPATIENT)
Dept: ONCOLOGY | Facility: CLINIC | Age: 62
End: 2022-11-23

## 2022-11-23 DIAGNOSIS — R19.7 DIARRHEA, UNSPECIFIED TYPE: ICD-10-CM

## 2022-11-23 DIAGNOSIS — R10.9 STOMACH PAIN: Primary | ICD-10-CM

## 2022-11-23 NOTE — TELEPHONE ENCOUNTER
Spoke with Dr. Francisco and he would like for patient to test for c. Diff. Informed patient of this message. She will have a family member come  the materials.

## 2022-11-23 NOTE — TELEPHONE ENCOUNTER
Patient reports she has experienced this pain before, but last time they were able to put her on Protonix with relief. This time, that has not helped. She has also taken Lomotil and Bentyl without relief. The Ultram on her medication list was from when she had her port placed, so she rarely uses that. She describes the pain as intermittent, sharp, stabbing in the center of her stomach and when it comes it's a 9/10 on the pain scale. It's been going on since about 2 am this morning. She does admit she has diarrhea right now about 2 hours after eating and then 4-6 hours later. She's also tried drinking some flat ginger ale and a couple of chewable Tums without relief. She would like to know if she may try some topical CBD oil. I told her it is possible that we may advise her to proceed to the ER, but she is hoping that won't be the case. I will route to MD # 2 and try to catch him in person between patients and discuss this further. Patient verbalized understanding of plan.

## 2022-11-28 ENCOUNTER — TELEPHONE (OUTPATIENT)
Dept: ONCOLOGY | Facility: CLINIC | Age: 62
End: 2022-11-28

## 2022-11-28 NOTE — TELEPHONE ENCOUNTER
Caller: Babs Felton    Relationship: Self    Best call back number: [phone]    What is the best time to reach you: ANYTIME    Who are you requesting to speak with (clinical staff, provider,  specific staff member): SCHEDULING    What was the call regarding: PT WANTED TO KNOW IF HER 11/30 NUTRITION APPT WITH NAVNE[facility] GIRON WOULD BE AT  AS WELL SINCE ALL OF HER OTHER APPTS ARE THERE. IT DOES NOTE [facility] AS LOCATION.    PLEASE CALL ASAP TO ADVISE.     Do you require a callback: YES

## 2022-11-30 ENCOUNTER — OFFICE VISIT (OUTPATIENT)
Dept: ONCOLOGY | Facility: CLINIC | Age: 62
End: 2022-11-30

## 2022-11-30 ENCOUNTER — APPOINTMENT (OUTPATIENT)
Dept: LAB | Facility: HOSPITAL | Age: 62
End: 2022-11-30

## 2022-11-30 ENCOUNTER — INFUSION (OUTPATIENT)
Dept: ONCOLOGY | Facility: HOSPITAL | Age: 62
End: 2022-11-30

## 2022-11-30 VITALS
OXYGEN SATURATION: 97 % | WEIGHT: 237.6 LBS | HEART RATE: 84 BPM | HEIGHT: 69 IN | RESPIRATION RATE: 16 BRPM | TEMPERATURE: 96.8 F | DIASTOLIC BLOOD PRESSURE: 86 MMHG | SYSTOLIC BLOOD PRESSURE: 141 MMHG | BODY MASS INDEX: 35.19 KG/M2

## 2022-11-30 DIAGNOSIS — D70.1 CHEMOTHERAPY INDUCED NEUTROPENIA: ICD-10-CM

## 2022-11-30 DIAGNOSIS — T45.1X5A CHEMOTHERAPY INDUCED NEUTROPENIA: ICD-10-CM

## 2022-11-30 DIAGNOSIS — C20 RECTAL CANCER: Primary | ICD-10-CM

## 2022-11-30 LAB
ALBUMIN SERPL-MCNC: 4 G/DL (ref 3.5–5.2)
ALBUMIN/GLOB SERPL: 1.5 G/DL
ALP SERPL-CCNC: 79 U/L (ref 39–117)
ALT SERPL W P-5'-P-CCNC: 15 U/L (ref 1–33)
ANION GAP SERPL CALCULATED.3IONS-SCNC: 10.9 MMOL/L (ref 5–15)
AST SERPL-CCNC: 18 U/L (ref 1–32)
BASOPHILS # BLD AUTO: 0.01 10*3/MM3 (ref 0–0.2)
BASOPHILS NFR BLD AUTO: 0.4 % (ref 0–1.5)
BILIRUB SERPL-MCNC: 0.4 MG/DL (ref 0–1.2)
BUN SERPL-MCNC: 11 MG/DL (ref 8–23)
BUN/CREAT SERPL: 15.5 (ref 7–25)
CALCIUM SPEC-SCNC: 9.5 MG/DL (ref 8.6–10.5)
CHLORIDE SERPL-SCNC: 107 MMOL/L (ref 98–107)
CO2 SERPL-SCNC: 25.1 MMOL/L (ref 22–29)
CREAT SERPL-MCNC: 0.71 MG/DL (ref 0.57–1)
DEPRECATED RDW RBC AUTO: 41.5 FL (ref 37–54)
EGFRCR SERPLBLD CKD-EPI 2021: 96.3 ML/MIN/1.73
EOSINOPHIL # BLD AUTO: 0.04 10*3/MM3 (ref 0–0.4)
EOSINOPHIL NFR BLD AUTO: 1.8 % (ref 0.3–6.2)
ERYTHROCYTE [DISTWIDTH] IN BLOOD BY AUTOMATED COUNT: 14.6 % (ref 12.3–15.4)
GLOBULIN UR ELPH-MCNC: 2.7 GM/DL
GLUCOSE SERPL-MCNC: 95 MG/DL (ref 65–99)
HCT VFR BLD AUTO: 31.4 % (ref 34–46.6)
HGB BLD-MCNC: 10.5 G/DL (ref 12–15.9)
IMM GRANULOCYTES # BLD AUTO: 0.01 10*3/MM3 (ref 0–0.05)
IMM GRANULOCYTES NFR BLD AUTO: 0.4 % (ref 0–0.5)
LYMPHOCYTES # BLD AUTO: 0.83 10*3/MM3 (ref 0.7–3.1)
LYMPHOCYTES NFR BLD AUTO: 36.4 % (ref 19.6–45.3)
MCH RBC QN AUTO: 27.6 PG (ref 26.6–33)
MCHC RBC AUTO-ENTMCNC: 33.4 G/DL (ref 31.5–35.7)
MCV RBC AUTO: 82.4 FL (ref 79–97)
MONOCYTES # BLD AUTO: 0.35 10*3/MM3 (ref 0.1–0.9)
MONOCYTES NFR BLD AUTO: 15.4 % (ref 5–12)
NEUTROPHILS NFR BLD AUTO: 1.04 10*3/MM3 (ref 1.7–7)
NEUTROPHILS NFR BLD AUTO: 45.6 % (ref 42.7–76)
NRBC BLD AUTO-RTO: 0 /100 WBC (ref 0–0.2)
PLATELET # BLD AUTO: 165 10*3/MM3 (ref 140–450)
PMV BLD AUTO: 9.5 FL (ref 6–12)
POTASSIUM SERPL-SCNC: 3.8 MMOL/L (ref 3.5–5.2)
PROT SERPL-MCNC: 6.7 G/DL (ref 6–8.5)
RBC # BLD AUTO: 3.81 10*6/MM3 (ref 3.77–5.28)
SODIUM SERPL-SCNC: 143 MMOL/L (ref 136–145)
WBC NRBC COR # BLD: 2.28 10*3/MM3 (ref 3.4–10.8)

## 2022-11-30 PROCEDURE — 96413 CHEMO IV INFUSION 1 HR: CPT

## 2022-11-30 PROCEDURE — 25010000002 OXALIPLATIN PER 0.5 MG: Performed by: NURSE PRACTITIONER

## 2022-11-30 PROCEDURE — G0498 CHEMO EXTEND IV INFUS W/PUMP: HCPCS

## 2022-11-30 PROCEDURE — 96416 CHEMO PROLONG INFUSE W/PUMP: CPT

## 2022-11-30 PROCEDURE — 96411 CHEMO IV PUSH ADDL DRUG: CPT

## 2022-11-30 PROCEDURE — 85025 COMPLETE CBC W/AUTO DIFF WBC: CPT | Performed by: NURSE PRACTITIONER

## 2022-11-30 PROCEDURE — 96367 TX/PROPH/DG ADDL SEQ IV INF: CPT

## 2022-11-30 PROCEDURE — 99214 OFFICE O/P EST MOD 30 MIN: CPT | Performed by: NURSE PRACTITIONER

## 2022-11-30 PROCEDURE — 25010000002 LEUCOVORIN CALCIUM PER 50 MG: Performed by: NURSE PRACTITIONER

## 2022-11-30 PROCEDURE — 80053 COMPREHEN METABOLIC PANEL: CPT | Performed by: NURSE PRACTITIONER

## 2022-11-30 PROCEDURE — 96368 THER/DIAG CONCURRENT INF: CPT

## 2022-11-30 PROCEDURE — 25010000002 DEXAMETHASONE SODIUM PHOSPHATE 100 MG/10ML SOLUTION: Performed by: NURSE PRACTITIONER

## 2022-11-30 PROCEDURE — 25010000002 FOSAPREPITANT PER 1 MG: Performed by: NURSE PRACTITIONER

## 2022-11-30 PROCEDURE — 96375 TX/PRO/DX INJ NEW DRUG ADDON: CPT

## 2022-11-30 PROCEDURE — 25010000002 PALONOSETRON PER 25 MCG: Performed by: NURSE PRACTITIONER

## 2022-11-30 PROCEDURE — 25010000002 FLUOROURACIL PER 500 MG: Performed by: NURSE PRACTITIONER

## 2022-11-30 PROCEDURE — 96415 CHEMO IV INFUSION ADDL HR: CPT

## 2022-11-30 RX ORDER — DEXTROSE MONOHYDRATE 50 MG/ML
250 INJECTION, SOLUTION INTRAVENOUS ONCE
Status: COMPLETED | OUTPATIENT
Start: 2022-11-30 | End: 2022-11-30

## 2022-11-30 RX ORDER — DEXTROSE MONOHYDRATE 50 MG/ML
250 INJECTION, SOLUTION INTRAVENOUS ONCE
Status: CANCELLED | OUTPATIENT
Start: 2022-11-30

## 2022-11-30 RX ORDER — FLUOROURACIL 50 MG/ML
400 INJECTION, SOLUTION INTRAVENOUS ONCE
Status: COMPLETED | OUTPATIENT
Start: 2022-11-30 | End: 2022-11-30

## 2022-11-30 RX ORDER — PALONOSETRON 0.05 MG/ML
0.25 INJECTION, SOLUTION INTRAVENOUS ONCE
Status: CANCELLED | OUTPATIENT
Start: 2022-11-30

## 2022-11-30 RX ORDER — FLUOROURACIL 50 MG/ML
400 INJECTION, SOLUTION INTRAVENOUS ONCE
Status: CANCELLED | OUTPATIENT
Start: 2022-11-30

## 2022-11-30 RX ORDER — PALONOSETRON 0.05 MG/ML
0.25 INJECTION, SOLUTION INTRAVENOUS ONCE
Status: COMPLETED | OUTPATIENT
Start: 2022-11-30 | End: 2022-11-30

## 2022-11-30 RX ORDER — DIPHENHYDRAMINE HYDROCHLORIDE 50 MG/ML
50 INJECTION INTRAMUSCULAR; INTRAVENOUS AS NEEDED
Status: CANCELLED | OUTPATIENT
Start: 2022-11-30

## 2022-11-30 RX ORDER — FAMOTIDINE 10 MG/ML
20 INJECTION, SOLUTION INTRAVENOUS AS NEEDED
Status: CANCELLED | OUTPATIENT
Start: 2022-11-30

## 2022-11-30 RX ADMIN — SODIUM CHLORIDE 100 ML: 9 INJECTION, SOLUTION INTRAVENOUS at 12:41

## 2022-11-30 RX ADMIN — FLUOROURACIL 5420 MG: 50 INJECTION, SOLUTION INTRAVENOUS at 15:21

## 2022-11-30 RX ADMIN — OXALIPLATIN 190 MG: 5 INJECTION, SOLUTION, CONCENTRATE INTRAVENOUS at 13:15

## 2022-11-30 RX ADMIN — DEXTROSE MONOHYDRATE 250 ML: 50 INJECTION, SOLUTION INTRAVENOUS at 12:21

## 2022-11-30 RX ADMIN — DEXAMETHASONE SODIUM PHOSPHATE 12 MG: 10 INJECTION, SOLUTION INTRAMUSCULAR; INTRAVENOUS at 12:22

## 2022-11-30 RX ADMIN — FLUOROURACIL 900 MG: 50 INJECTION, SOLUTION INTRAVENOUS at 15:21

## 2022-11-30 RX ADMIN — LEUCOVORIN CALCIUM 900 MG: 350 INJECTION, POWDER, LYOPHILIZED, FOR SUSPENSION INTRAMUSCULAR; INTRAVENOUS at 13:15

## 2022-11-30 RX ADMIN — PALONOSETRON 0.25 MG: 0.05 INJECTION, SOLUTION INTRAVENOUS at 12:21

## 2022-11-30 NOTE — NURSING NOTE
Pt seen per YAMILEX Pradhan with review of CBC including pt's ANC. Per Laxmi will begin approval for neulasta support but pt is okay to treat today as planned.

## 2022-11-30 NOTE — PROGRESS NOTES
[facility]    CONSULTING IN BLOOD DISORDERS & CANCER      REASON FOR CONSULTATION/CHIEF COMPLAINT:     Evaluation and management for rectal adenocarcinoma                             REQUESTING PHYSICIAN: No ref. provider found  RECORDS OBTAINED:  Records of the patients history including those from the electronic medical record were reviewed and summarized in detail.    HISTORY OF PRESENT ILLNESS:    The patient is a 61 y.o. year old female with medical history significant for migraines, depression/anxiety, and IBS had presented with epigastric discomfort in September 2022.      An EGD and colonoscopy performed by SHOAIB Esteves on 9/1/2022 demonstrated a 3 cm mass in the proximal rectum around 13 cm from the anal verge.  The polyp was multilobulated and somewhat fixed to the underlying tissue.  Friable with contact bleeding.  Other smaller polyps were found in the cecum and ascending colon which were removed.  The EGD revealed mucosal nodule in the esophagus and multiple gastric polyps.  No evidence of bleeding.     The pathology from the rectal mass came back as invasive moderately differentiated adenocarcinoma arising out of traditional serrated adenoma with high-grade dysplasia.    Patient was subsequently referred to Dr. Amaya, colorectal surgery who performed a flexible sigmoidoscopy on 9/21/2022, revealing an infiltrative nonobstructing mass in the mid rectum.  The mass was partially circumferential involving one third of the lumen circumference.     9/13/2022: CT C/A/P with contrast noted rectal neoplasm not well visualized on this exam.  Calcified and noncalcified sub-6mm pulmonary nodules likely the sequela of prior granulomatous disease.  Scattered colonic diverticulosis.  Mildly prominent, but not pathologically enlarged pelvic nodes measuring up to 4 mm.    9/26/2022: MRI pelvis showed high rectal mass with effacement of the muscularis propria along the right wall and extended beyond the muscularis  propria at the posterior wall, T3 lesion. There is no evidence for involvement of the mesorectal fascia. There are no pathologically enlarged nodes.     Patient has been seen by , rad-onc & being planned for neoadjuvant radiation. Patient has come to this clinic to discuss systemic therapy options.    Guardant 360: MSI stable.  Low TMB.    10/17- 10/22/2022: Short course of radiotherapy to the rectal mass.    11/2/2022: Cycle 1 FOLFOX    INTERIM HISTORY:  Patient is a 61-year-old female with the above-mentioned history who is here today for lab review and evaluation prior to her third cycle of neoadjuvant chemotherapy with FOLFOX.  She states that with her last cycle of chemotherapy she has had a little bit more issues with nausea.  She is also struggled with some epigastric discomfort.  She continues on Protonix, which she has been on for some time.  She states that she finally got relief by using a CBD ointments.  She has noticed an increase in the cold sensitivity, which is particularly bothersome for her as she is a .  She also had an issue for the fever blister and was questioning if it was okay for her to take Valtrex.    Past Medical History:   Diagnosis Date   • Abdominal cramping 07/01/2022   • Anemia    • Bloating 07/01/2022   • Bloody diarrhea 07/01/2022   • Chronic back pain    • Colon polyps     FOLLOWED BY DR. BEVERLY MENDEZ   • Depression    • Gastric polyps     FOLLOWED BY DR. BEVERLY MENDEZ   • GERD (gastroesophageal reflux disease)    • Hearing loss    • Hiatal hernia 09/2021    3 CM, FOLLOWED BY DR. BEVERLY MENDEZ   • Hyperlipidemia    • IBS (irritable bowel syndrome)    • Migraines    • Patellofemoral arthritis 07/2021   • PNA (pneumonia)    • PONV (postoperative nausea and vomiting) 1965    Numerous procedures with nausea   • Rectal bleeding 1983    Off and on for years   • Rectal cancer (HCC) 09/2022    INVASIVE MODERATELY DIFFERENTIATED ADENOCARCINOMA ARISING FROM  "TRADITIONAL SERRATED ADENOMA WITH HGD   • Sciatica 09/2015   • Tear of medial meniscus of knee 07/2021    RIGHT KNEE   • Vitamin D deficiency      Past Surgical History:   Procedure Laterality Date   • COLONOSCOPY N/A 1986   • COLONOSCOPY W/ POLYPECTOMY N/A 09/01/2022    3 MM TUBULAR ADENOMA POLYP IN ASCENDING, 6 MM TUBULAR ADENOMA POLYP IN CECUM, 30 MM MASS IN RECTUM, PATH:INVASIVE MODERATELY DIFFERENTIATED ADENOCARCINOMA ARISING FROM TRADITIONAL SERRATED ADENOMA WITH HGD, HYPERTROPHIED ANAL PAPILLA, DR. BEVERLY MENDEZ AT [facility]   • ENDOSCOPY N/A 09/01/2022    MULTIPLE BENIGN GASTRIC POLYPS, 2 SQUAMOUS PAPILLAS IN ESOPHAGUS, 3 CM HIATAL HERNIA,   • EXTERNAL EAR SURGERY  1966    MULTIPLE \"LANCES\", DR. ACE IN [facility]   • EYE SURGERY Right 1967    DR. CASTANEDA IN [facility]   • KNEE ARTHRODESIS Right 07/27/2021    RIGHT KNEE ARTHROCENTESIS, DR. ALBERT GILMAN   • KNEE SURGERY Left 1985    DR. PEREZ AT [place]   • SHOULDER MANIPULATION Left 01/07/2013    FOR FROZEN SHOULDER, DR. ALBERT GILMAN AT [facility]   • SIGMOIDOSCOPY N/A 09/21/2022    AN INFILTRATIVE NON OBSTRUCTING MASS IN MID RECTUM, AREA TATTOOED, DR. TEMO AMAYA AT [facility]   • TRANSRECTAL ULTRASOUND N/A 09/21/2022    Procedure: ULTRASOUND TRANSRECTAL;  Surgeon: Temo Amaya MD;  Location: [facility] ENDOSCOPY;  Service: General;  Laterality: N/A;   • VENOUS ACCESS DEVICE (PORT) INSERTION Left 10/26/2022    Procedure: INSERTION OF PORTACATH;  Surgeon: Leonel Bridges MD;  Location: [facility] MAIN OR;  Service: General;  Laterality: Left;   • WISDOM TOOTH EXTRACTION Bilateral        MEDICATIONS    Current Outpatient Medications:   •  dicyclomine (BENTYL) 20 MG tablet, Take 1 tablet by mouth Every 6 (Six) Hours As Needed., Disp: , Rfl:   •  diphenoxylate-atropine (LOMOTIL) 2.5-0.025 MG per tablet, Take 1 tablet by mouth 4 (Four) Times a Day As Needed for Diarrhea., Disp: 30 tablet, Rfl: 2  •  ELDERBERRY PO, Take 2 doses by mouth Daily., " Disp: , Rfl:   •  lidocaine-prilocaine (EMLA) 2.5-2.5 % cream, Apply 1 application topically to the appropriate area as directed As Needed for Mild Pain. Apply 30 minutes prior to port access, Disp: 5 g, Rfl: 1  •  loperamide (IMODIUM) 2 MG capsule, Take 1 capsule by mouth As Needed for Diarrhea., Disp: , Rfl:   •  ondansetron (ZOFRAN) 8 MG tablet, Take 1 tablet by mouth 3 (Three) Times a Day As Needed for Nausea or Vomiting., Disp: 30 tablet, Rfl: 5  •  pantoprazole (PROTONIX) 40 MG EC tablet, Take 1 tablet by mouth Daily., Disp: 30 tablet, Rfl: 3  •  traMADol (ULTRAM) 50 MG tablet, Take 1 tablet by mouth Every 6 (Six) Hours As Needed for Moderate Pain., Disp: 15 tablet, Rfl: 0  No current facility-administered medications for this visit.    Facility-Administered Medications Ordered in Other Visits:   •  fluorouracil (ADRUCIL) 5,420 mg in sodium chloride 0.9 % 240 mL chemo infusion - FOR HOME USE, 2,400 mg/m2 (Treatment Plan Recorded), Intravenous, Once, Laxmi Greenberg APRN, 5,420 mg at 11/30/22 1521    ALLERGIES:     Allergies   Allergen Reactions   • Adhesive Tape Rash     Patient has sensitive skin.    • Levofloxacin Swelling   • Sulfa Antibiotics Rash       SOCIAL HISTORY:       Social History     Socioeconomic History   • Marital status: Single   Tobacco Use   • Smoking status: Never   • Smokeless tobacco: Never   Vaping Use   • Vaping Use: Never used   Substance and Sexual Activity   • Alcohol use: Yes     Alcohol/week: 1.0 standard drink     Types: 1 Shots of liquor per week     Comment: Per month   • Drug use: Never   • Sexual activity: Not Currently         FAMILY HISTORY:  Family History   Problem Relation Age of Onset   • Colon polyps Mother    • Asthma Mother    • COPD Mother    • Lung disease Mother    • Migraines Mother    • Cancer Father         liver cancer   • Colon polyps Father    • Heart disease Father    • Diabetes Father    • Kidney disease Father    • Angina Father    • Liver cancer  "Father    • Hepatitis Father    • Hearing loss Father    • Cancer Sister         Breast cancer   • Arthritis Sister    • Colon polyps Sister    • Breast cancer Sister    • Diabetes Sister    • Colon polyps Sister    • Alcohol abuse Maternal Uncle    • Colon cancer Neg Hx    • Crohn's disease Neg Hx    • Irritable bowel syndrome Neg Hx    • Ulcerative colitis Neg Hx    • Malig Hyperthermia Neg Hx        REVIEW OF SYSTEMS:  As per HPI         Vitals:    11/30/22 1109   BP: 141/86   Pulse: 84   Resp: 16   Temp: 96.8 °F (36 °C)   TempSrc: Temporal   SpO2: 97%   Weight: 108 kg (237 lb 9.6 oz)   Height: 175.3 cm (69.02\")   PainSc: 0-No pain     Current Status 11/30/2022   ECOG score 0     Physical Exam  Vitals reviewed.   Constitutional:       General: She is not in acute distress.     Appearance: Normal appearance. She is well-developed.   HENT:      Head: Normocephalic and atraumatic.      Mouth/Throat:      Pharynx: No oropharyngeal exudate.   Eyes:      Pupils: Pupils are equal, round, and reactive to light.   Cardiovascular:      Rate and Rhythm: Normal rate and regular rhythm.      Heart sounds: Normal heart sounds. No murmur heard.  Pulmonary:      Effort: Pulmonary effort is normal. No respiratory distress.      Breath sounds: Normal breath sounds. No wheezing, rhonchi or rales.   Abdominal:      General: Bowel sounds are normal. There is no distension.      Palpations: Abdomen is soft.   Musculoskeletal:         General: Normal range of motion.      Cervical back: Normal range of motion.   Skin:     General: Skin is warm and dry.      Findings: No rash.   Neurological:      Mental Status: She is alert and oriented to person, place, and time.       RECENT LABS:  Results from last 7 days   Lab Units 11/30/22  1040   WBC 10*3/mm3 2.28*   NEUTROS ABS 10*3/mm3 1.04*   HEMOGLOBIN g/dL 10.5*   HEMATOCRIT % 31.4*   PLATELETS 10*3/mm3 165     Results from last 7 days   Lab Units 11/30/22  1040   SODIUM mmol/L 143 "   POTASSIUM mmol/L 3.8   CHLORIDE mmol/L 107   CO2 mmol/L 25.1   BUN mg/dL 11   CREATININE mg/dL 0.71   CALCIUM mg/dL 9.5   ALBUMIN g/dL 4.00   BILIRUBIN mg/dL 0.4   ALK PHOS U/L 79   ALT (SGPT) U/L 15   AST (SGOT) U/L 18   GLUCOSE mg/dL 95             ASSESSMENT:   is a pleasant 62 y/o WF with medical history significant for migraines, depression/anxiety, and IBS comes for rectal adenocarcinoma evaluation & management.     # Rectal adenocarcinoma, T3N0M0, Stage IIA:  · Diagnosed on a c-scope performed for gastric discomfort in September 2022.   · The flex sig noted a an infiltrative nonobstructing mass in the mid rectum.  The mass was partially circumferential involving one third of the lumen circumference. CT c/a/p negative for mets. MRI pelvis most consistent with T3N0 disease, with no evidence of involvement of mesorectal fascia.  · Reviewed various management strategies for localized rectal cancer with chemotherapy, radiation and surgical resection.  Patient met with Dr. Amaya, colorectal surgery and is being tentatively planned for low anterior resection after neoadjuvant chemotherapy and radiation.  She received neoadjuvant short course RT by Dr. Freeman, radiation oncology from 10/17- 10/22/2022.   · Reviewed the role and rationale of systemic therapy and localized rectal adenocarcinoma.  Informed patient that the SOC for such patients is 4-months of chemotherapy with FOLFOX/CAPOX. Total neoadjuvant therapy is preferred in these cases.   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 She expressed interest in alternative treatment options. I reviewed the recent data about IOs in dMMR patients, although informed her this is not the current SOC.  A guardant 360 NGS was obtained which did not show MSI high.  Low TMB.  · Patient is agreeable to proceed with FOLFOX with tentative plan to dose-reduce or stop oxaliplatin if develops neuropathy.     · 11/2/2022: Is clinically stable.  Labs stable.  Proceed with cycle 1 FOLFOX.    · 11/16/2022 cycle 2 FOLFOX  · 11/30/2022 due for cycle 3 FOLFOX.  Today WBC 2.28, ANC is 1.04.  I have reviewed with Dr. Weber and we will go ahead and proceed with treatment today.  We will try to get Neulasta on body injector approved to use.    #GERD  · Significant epigastric discomfort and reflux with initiation of chemo  · Continue Protonix 40 mg nightly    #Diarrhea  · Initially began following radiation and was exacerbated with chemotherapy  · Utilizing Imodium, Lomotil was added  · We discussed today alternating Imodium and Lomotil as needed    PLAN:   -Proceed with cycle 3 FOLFOX today.  -We will submit to try to get Neulasta on body injector approved, as the patient is neutropenic today with only her third cycle.  -Continue supportive care with Protonix, antiemetics, Imodium/Lomotil if needed.  -Return in 2 weeks for follow-up with l MD or NP with repeat CBC, CMP, and continuation of FOLFOX.    -Call/ return sooner should he develop any new concerns or problems.  -Planning 4 months neoadjuvant FOLFOX    Patient is on high risk medication requiring close monitoring for toxicity      Laxmi Greenberg, APRN  11/30/2022

## 2022-12-02 ENCOUNTER — PATIENT OUTREACH (OUTPATIENT)
Dept: OTHER | Facility: HOSPITAL | Age: 62
End: 2022-12-02

## 2022-12-02 ENCOUNTER — INFUSION (OUTPATIENT)
Dept: ONCOLOGY | Facility: HOSPITAL | Age: 62
End: 2022-12-02
Payer: COMMERCIAL

## 2022-12-02 VITALS
SYSTOLIC BLOOD PRESSURE: 161 MMHG | TEMPERATURE: 95.8 F | BODY MASS INDEX: 33.86 KG/M2 | HEIGHT: 70 IN | WEIGHT: 236.5 LBS | OXYGEN SATURATION: 97 % | RESPIRATION RATE: 18 BRPM | DIASTOLIC BLOOD PRESSURE: 99 MMHG | HEART RATE: 73 BPM

## 2022-12-02 DIAGNOSIS — Z45.2 ENCOUNTER FOR FITTING AND ADJUSTMENT OF VASCULAR CATHETER: Primary | ICD-10-CM

## 2022-12-02 DIAGNOSIS — C20 RECTAL CANCER: ICD-10-CM

## 2022-12-02 PROCEDURE — 96360 HYDRATION IV INFUSION INIT: CPT

## 2022-12-02 PROCEDURE — 25010000002 HEPARIN LOCK FLUSH PER 10 UNITS: Performed by: INTERNAL MEDICINE

## 2022-12-02 PROCEDURE — 96377 APPLICATON ON-BODY INJECTOR: CPT

## 2022-12-02 PROCEDURE — 25010000002 PEGFILGRASTIM 6 MG/0.6ML PREFILLED SYRINGE KIT: Performed by: NURSE PRACTITIONER

## 2022-12-02 RX ORDER — HEPARIN SODIUM (PORCINE) LOCK FLUSH IV SOLN 100 UNIT/ML 100 UNIT/ML
500 SOLUTION INTRAVENOUS AS NEEDED
Status: CANCELLED | OUTPATIENT
Start: 2022-12-02

## 2022-12-02 RX ORDER — HEPARIN SODIUM (PORCINE) LOCK FLUSH IV SOLN 100 UNIT/ML 100 UNIT/ML
500 SOLUTION INTRAVENOUS AS NEEDED
Status: DISCONTINUED | OUTPATIENT
Start: 2022-12-02 | End: 2022-12-02 | Stop reason: HOSPADM

## 2022-12-02 RX ORDER — SODIUM CHLORIDE 0.9 % (FLUSH) 0.9 %
10 SYRINGE (ML) INJECTION AS NEEDED
Status: CANCELLED | OUTPATIENT
Start: 2022-12-02

## 2022-12-02 RX ORDER — SODIUM CHLORIDE 9 MG/ML
1000 INJECTION, SOLUTION INTRAVENOUS ONCE
Status: COMPLETED | OUTPATIENT
Start: 2022-12-02 | End: 2022-12-02

## 2022-12-02 RX ORDER — SODIUM CHLORIDE 0.9 % (FLUSH) 0.9 %
10 SYRINGE (ML) INJECTION AS NEEDED
Status: DISCONTINUED | OUTPATIENT
Start: 2022-12-02 | End: 2022-12-02 | Stop reason: HOSPADM

## 2022-12-02 RX ADMIN — SODIUM CHLORIDE 1000 ML: 9 INJECTION, SOLUTION INTRAVENOUS at 11:54

## 2022-12-02 RX ADMIN — Medication 10 ML: at 13:23

## 2022-12-02 RX ADMIN — PEGFILGRASTIM 6 MG: KIT SUBCUTANEOUS at 13:32

## 2022-12-02 RX ADMIN — Medication 500 UNITS: at 13:23

## 2022-12-07 ENCOUNTER — TELEPHONE (OUTPATIENT)
Dept: ONCOLOGY | Facility: CLINIC | Age: 62
End: 2022-12-07

## 2022-12-07 NOTE — TELEPHONE ENCOUNTER
Caller: Babs Felton    Relationship: Self    Best call back number: [phone]    What was the call regarding: PT CALLED STATED HER APPOINTMENTS FOR 12-14-22 ALL SHOULD BE AT THE [facility] OFFICE. PLEASE UPDATE SCHEDULE       Do you require a callback: YES IF ANY QUESTIONS

## 2022-12-08 RX ORDER — OXYCODONE HYDROCHLORIDE AND ACETAMINOPHEN 5; 325 MG/1; MG/1
1 TABLET ORAL EVERY 8 HOURS PRN
Qty: 60 TABLET | Refills: 0 | Status: SHIPPED | OUTPATIENT
Start: 2022-12-08

## 2022-12-08 NOTE — TELEPHONE ENCOUNTER
Returned call to patient who is crying.  She is reporting that she is having pain in her spine, ribs, knees, back, basically everywhere.  She is taking the Claritin, but is not helping.  Tramadol is not helping.  Wanting to know what to take.  Reviewed with Dr. Weber and Oxycodone 5 mg every 8 hours as needed for pain.  I will notify patient that she can take two tablets every 8 hours if no relief from one tablet.  If no relief from that she can increase to one tablet every 4 hours.

## 2022-12-14 ENCOUNTER — APPOINTMENT (OUTPATIENT)
Dept: ONCOLOGY | Facility: HOSPITAL | Age: 62
End: 2022-12-14
Payer: COMMERCIAL

## 2022-12-14 ENCOUNTER — TELEMEDICINE (OUTPATIENT)
Dept: ONCOLOGY | Facility: CLINIC | Age: 62
End: 2022-12-14

## 2022-12-14 ENCOUNTER — APPOINTMENT (OUTPATIENT)
Dept: OTHER | Facility: HOSPITAL | Age: 62
End: 2022-12-14

## 2022-12-14 ENCOUNTER — HOSPITAL ENCOUNTER (OUTPATIENT)
Dept: GENERAL RADIOLOGY | Facility: HOSPITAL | Age: 62
Discharge: HOME | End: 2022-12-14
Admitting: INTERNAL MEDICINE

## 2022-12-14 ENCOUNTER — INFUSION (OUTPATIENT)
Dept: ONCOLOGY | Facility: HOSPITAL | Age: 62
End: 2022-12-14
Payer: COMMERCIAL

## 2022-12-14 VITALS
RESPIRATION RATE: 16 BRPM | SYSTOLIC BLOOD PRESSURE: 142 MMHG | DIASTOLIC BLOOD PRESSURE: 89 MMHG | HEART RATE: 88 BPM | HEIGHT: 69 IN | BODY MASS INDEX: 34.76 KG/M2 | WEIGHT: 234.7 LBS | OXYGEN SATURATION: 98 % | TEMPERATURE: 98 F

## 2022-12-14 DIAGNOSIS — Z45.2 ENCOUNTER FOR FITTING AND ADJUSTMENT OF VASCULAR CATHETER: ICD-10-CM

## 2022-12-14 DIAGNOSIS — C20 RECTAL CANCER: Primary | ICD-10-CM

## 2022-12-14 DIAGNOSIS — Z45.2 ENCOUNTER FOR FITTING AND ADJUSTMENT OF VASCULAR CATHETER: Primary | ICD-10-CM

## 2022-12-14 LAB
ALBUMIN SERPL-MCNC: 4.1 G/DL (ref 3.5–5.2)
ALBUMIN/GLOB SERPL: 1.7 G/DL
ALP SERPL-CCNC: 100 U/L (ref 39–117)
ALT SERPL W P-5'-P-CCNC: 19 U/L (ref 1–33)
ANION GAP SERPL CALCULATED.3IONS-SCNC: 9.1 MMOL/L (ref 5–15)
ANISOCYTOSIS BLD QL: ABNORMAL
AST SERPL-CCNC: 23 U/L (ref 1–32)
BASOPHILS # BLD AUTO: 0.02 10*3/MM3 (ref 0–0.2)
BASOPHILS # BLD MANUAL: 0.07 10*3/MM3 (ref 0–0.2)
BASOPHILS NFR BLD AUTO: 0.3 % (ref 0–1.5)
BASOPHILS NFR BLD MANUAL: 1 % (ref 0–1.5)
BILIRUB SERPL-MCNC: 0.4 MG/DL (ref 0–1.2)
BUN SERPL-MCNC: 9 MG/DL (ref 8–23)
BUN/CREAT SERPL: 13 (ref 7–25)
CALCIUM SPEC-SCNC: 9.8 MG/DL (ref 8.6–10.5)
CHLORIDE SERPL-SCNC: 107 MMOL/L (ref 98–107)
CO2 SERPL-SCNC: 25.9 MMOL/L (ref 22–29)
CREAT SERPL-MCNC: 0.69 MG/DL (ref 0.57–1)
DEPRECATED RDW RBC AUTO: 47 FL (ref 37–54)
EGFRCR SERPLBLD CKD-EPI 2021: 98.3 ML/MIN/1.73
EOSINOPHIL # BLD AUTO: 0.05 10*3/MM3 (ref 0–0.4)
EOSINOPHIL # BLD MANUAL: 0.07 10*3/MM3 (ref 0–0.4)
EOSINOPHIL NFR BLD AUTO: 0.8 % (ref 0.3–6.2)
EOSINOPHIL NFR BLD MANUAL: 1 % (ref 0.3–6.2)
ERYTHROCYTE [DISTWIDTH] IN BLOOD BY AUTOMATED COUNT: 16.4 % (ref 12.3–15.4)
GLOBULIN UR ELPH-MCNC: 2.4 GM/DL
GLUCOSE SERPL-MCNC: 98 MG/DL (ref 65–99)
HCT VFR BLD AUTO: 32.2 % (ref 34–46.6)
HGB BLD-MCNC: 10.4 G/DL (ref 12–15.9)
IMM GRANULOCYTES # BLD AUTO: 1.24 10*3/MM3 (ref 0–0.05)
IMM GRANULOCYTES NFR BLD AUTO: 19 % (ref 0–0.5)
LYMPHOCYTES # BLD AUTO: 1.18 10*3/MM3 (ref 0.7–3.1)
LYMPHOCYTES # BLD MANUAL: 1.05 10*3/MM3 (ref 0.7–3.1)
LYMPHOCYTES NFR BLD AUTO: 18 % (ref 19.6–45.3)
LYMPHOCYTES NFR BLD MANUAL: 11 % (ref 5–12)
MCH RBC QN AUTO: 27.2 PG (ref 26.6–33)
MCHC RBC AUTO-ENTMCNC: 32.3 G/DL (ref 31.5–35.7)
MCV RBC AUTO: 84.3 FL (ref 79–97)
METAMYELOCYTES NFR BLD MANUAL: 5 % (ref 0–0)
MONOCYTES # BLD AUTO: 0.5 10*3/MM3 (ref 0.1–0.9)
MONOCYTES # BLD: 0.72 10*3/MM3 (ref 0.1–0.9)
MONOCYTES NFR BLD AUTO: 7.6 % (ref 5–12)
MYELOCYTES NFR BLD MANUAL: 4 % (ref 0–0)
NEUTROPHILS # BLD AUTO: 4.05 10*3/MM3 (ref 1.7–7)
NEUTROPHILS NFR BLD AUTO: 3.55 10*3/MM3 (ref 1.7–7)
NEUTROPHILS NFR BLD AUTO: 54.3 % (ref 42.7–76)
NEUTROPHILS NFR BLD MANUAL: 62 % (ref 42.7–76)
NRBC BLD AUTO-RTO: 0.6 /100 WBC (ref 0–0.2)
PLAT MORPH BLD: NORMAL
PLATELET # BLD AUTO: 131 10*3/MM3 (ref 140–450)
PMV BLD AUTO: 10.4 FL (ref 6–12)
POTASSIUM SERPL-SCNC: 3.5 MMOL/L (ref 3.5–5.2)
PROT SERPL-MCNC: 6.5 G/DL (ref 6–8.5)
RBC # BLD AUTO: 3.82 10*6/MM3 (ref 3.77–5.28)
SODIUM SERPL-SCNC: 142 MMOL/L (ref 136–145)
VARIANT LYMPHS NFR BLD MANUAL: 16 % (ref 19.6–45.3)
WBC MORPH BLD: NORMAL
WBC NRBC COR # BLD: 6.54 10*3/MM3 (ref 3.4–10.8)

## 2022-12-14 PROCEDURE — 96411 CHEMO IV PUSH ADDL DRUG: CPT

## 2022-12-14 PROCEDURE — 25010000002 LEUCOVORIN CALCIUM PER 50 MG: Performed by: INTERNAL MEDICINE

## 2022-12-14 PROCEDURE — 96413 CHEMO IV INFUSION 1 HR: CPT

## 2022-12-14 PROCEDURE — 96368 THER/DIAG CONCURRENT INF: CPT

## 2022-12-14 PROCEDURE — 96375 TX/PRO/DX INJ NEW DRUG ADDON: CPT

## 2022-12-14 PROCEDURE — 25010000002 OXALIPLATIN PER 0.5 MG: Performed by: INTERNAL MEDICINE

## 2022-12-14 PROCEDURE — 71045 X-RAY EXAM CHEST 1 VIEW: CPT

## 2022-12-14 PROCEDURE — 96416 CHEMO PROLONG INFUSE W/PUMP: CPT

## 2022-12-14 PROCEDURE — 85007 BL SMEAR W/DIFF WBC COUNT: CPT | Performed by: INTERNAL MEDICINE

## 2022-12-14 PROCEDURE — 25010000002 FOSAPREPITANT PER 1 MG: Performed by: INTERNAL MEDICINE

## 2022-12-14 PROCEDURE — 85025 COMPLETE CBC W/AUTO DIFF WBC: CPT | Performed by: INTERNAL MEDICINE

## 2022-12-14 PROCEDURE — 25010000002 DEXAMETHASONE SODIUM PHOSPHATE 100 MG/10ML SOLUTION: Performed by: INTERNAL MEDICINE

## 2022-12-14 PROCEDURE — 25010000002 FLUOROURACIL PER 500 MG: Performed by: INTERNAL MEDICINE

## 2022-12-14 PROCEDURE — 80053 COMPREHEN METABOLIC PANEL: CPT | Performed by: INTERNAL MEDICINE

## 2022-12-14 PROCEDURE — G0498 CHEMO EXTEND IV INFUS W/PUMP: HCPCS

## 2022-12-14 PROCEDURE — 99215 OFFICE O/P EST HI 40 MIN: CPT | Performed by: INTERNAL MEDICINE

## 2022-12-14 PROCEDURE — 96367 TX/PROPH/DG ADDL SEQ IV INF: CPT

## 2022-12-14 PROCEDURE — 25010000002 PALONOSETRON PER 25 MCG: Performed by: INTERNAL MEDICINE

## 2022-12-14 PROCEDURE — 96415 CHEMO IV INFUSION ADDL HR: CPT

## 2022-12-14 RX ORDER — DIPHENHYDRAMINE HYDROCHLORIDE 50 MG/ML
50 INJECTION INTRAMUSCULAR; INTRAVENOUS AS NEEDED
Status: CANCELLED | OUTPATIENT
Start: 2022-12-14

## 2022-12-14 RX ORDER — DEXTROSE MONOHYDRATE 50 MG/ML
250 INJECTION, SOLUTION INTRAVENOUS ONCE
Status: COMPLETED | OUTPATIENT
Start: 2022-12-14 | End: 2022-12-14

## 2022-12-14 RX ORDER — PALONOSETRON 0.05 MG/ML
0.25 INJECTION, SOLUTION INTRAVENOUS ONCE
Status: COMPLETED | OUTPATIENT
Start: 2022-12-14 | End: 2022-12-14

## 2022-12-14 RX ORDER — FLUOROURACIL 50 MG/ML
400 INJECTION, SOLUTION INTRAVENOUS ONCE
Status: CANCELLED | OUTPATIENT
Start: 2022-12-14

## 2022-12-14 RX ORDER — FLUOROURACIL 50 MG/ML
400 INJECTION, SOLUTION INTRAVENOUS ONCE
Status: COMPLETED | OUTPATIENT
Start: 2022-12-14 | End: 2022-12-14

## 2022-12-14 RX ORDER — PALONOSETRON 0.05 MG/ML
0.25 INJECTION, SOLUTION INTRAVENOUS ONCE
Status: CANCELLED | OUTPATIENT
Start: 2022-12-14

## 2022-12-14 RX ORDER — DEXTROSE MONOHYDRATE 50 MG/ML
250 INJECTION, SOLUTION INTRAVENOUS ONCE
Status: CANCELLED | OUTPATIENT
Start: 2022-12-14

## 2022-12-14 RX ORDER — SODIUM CHLORIDE 9 MG/ML
1000 INJECTION, SOLUTION INTRAVENOUS ONCE
Status: CANCELLED
Start: 2022-12-16 | End: 2022-12-16

## 2022-12-14 RX ORDER — FAMOTIDINE 10 MG/ML
20 INJECTION, SOLUTION INTRAVENOUS AS NEEDED
Status: CANCELLED | OUTPATIENT
Start: 2022-12-14

## 2022-12-14 RX ADMIN — FLUOROURACIL 900 MG: 50 INJECTION, SOLUTION INTRAVENOUS at 13:25

## 2022-12-14 RX ADMIN — DEXTROSE MONOHYDRATE 250 ML: 50 INJECTION, SOLUTION INTRAVENOUS at 10:20

## 2022-12-14 RX ADMIN — LEUCOVORIN CALCIUM 900 MG: 350 INJECTION, POWDER, LYOPHILIZED, FOR SUSPENSION INTRAMUSCULAR; INTRAVENOUS at 11:16

## 2022-12-14 RX ADMIN — DEXAMETHASONE SODIUM PHOSPHATE 12 MG: 10 INJECTION, SOLUTION INTRAMUSCULAR; INTRAVENOUS at 10:21

## 2022-12-14 RX ADMIN — OXALIPLATIN 145 MG: 5 INJECTION, SOLUTION, CONCENTRATE INTRAVENOUS at 11:16

## 2022-12-14 RX ADMIN — FLUOROURACIL 5420 MG: 50 INJECTION, SOLUTION INTRAVENOUS at 13:25

## 2022-12-14 RX ADMIN — SODIUM CHLORIDE 100 ML: 9 INJECTION, SOLUTION INTRAVENOUS at 10:39

## 2022-12-14 RX ADMIN — PALONOSETRON 0.25 MG: 0.05 INJECTION, SOLUTION INTRAVENOUS at 10:20

## 2022-12-14 NOTE — NURSING NOTE
Pt arrived for folfox tx. Pt states port is sore and has sharp pain when moving. Port is not red or swollen but feels flipped. Pt is to be seen by md today, advised Dr Weber prior to appt.

## 2022-12-14 NOTE — PROGRESS NOTES
[facility]    CONSULTING IN BLOOD DISORDERS & CANCER      REASON FOR CONSULTATION/CHIEF COMPLAINT:     Evaluation and management for rectal adenocarcinoma                             REQUESTING PHYSICIAN: Jennifer Amaya MD  RECORDS OBTAINED:  Records of the patients history including those from the electronic medical record were reviewed and summarized in detail.    HISTORY OF PRESENT ILLNESS:    The patient is a 61 y.o. year old female with medical history significant for migraines, depression/anxiety, and IBS had presented with epigastric discomfort in September 2022.      An EGD and colonoscopy performed by SHOAIB Esteves on 9/1/2022 demonstrated a 3 cm mass in the proximal rectum around 13 cm from the anal verge.  The polyp was multilobulated and somewhat fixed to the underlying tissue.  Friable with contact bleeding.  Other smaller polyps were found in the cecum and ascending colon which were removed.  The EGD revealed mucosal nodule in the esophagus and multiple gastric polyps.  No evidence of bleeding.     The pathology from the rectal mass came back as invasive moderately differentiated adenocarcinoma arising out of traditional serrated adenoma with high-grade dysplasia.    Patient was subsequently referred to Dr. Amaya, colorectal surgery who performed a flexible sigmoidoscopy on 9/21/2022, revealing an infiltrative nonobstructing mass in the mid rectum.  The mass was partially circumferential involving one third of the lumen circumference.     9/13/2022: CT C/A/P with contrast noted rectal neoplasm not well visualized on this exam.  Calcified and noncalcified sub-6mm pulmonary nodules likely the sequela of prior granulomatous disease.  Scattered colonic diverticulosis.  Mildly prominent, but not pathologically enlarged pelvic nodes measuring up to 4 mm.    9/26/2022: MRI pelvis showed high rectal mass with effacement of the muscularis propria along the right wall and extended beyond the muscularis  propria at the posterior wall, T3 lesion. There is no evidence for involvement of the mesorectal fascia. There are no pathologically enlarged nodes.     Patient has been seen by , rad-onc & being planned for neoadjuvant radiation. Patient has come to this clinic to discuss systemic therapy options.    Guardant 360: MSI stable.  Low TMB.    10/17- 10/22/2022: Short course of radiotherapy to the rectal mass.    11/2/2022: Cycle 1 FOLFOX  12/14/2022: Cycle 4 FOLFOX    INTERIM HISTORY:  Patient is a 61-year-old female with the above-mentioned history who is here today for lab review and evaluation prior to her fourth cycle of neoadjuvant chemotherapy with FOLFOX.  Patient was located at [place] while I was located at the [facility] location.   She states that with her last cycle of chemotherapy she has had a more peripheral neuropathy and cold sensitivity in her fingers.  She continues to have significant fatigue.  Patient also reports of having significant arthralgias despite using Claritin after Neulasta with last treatment.  She continues on Protonix, which she has been on for some time.      Today, prior to her infusion the port was noted to be flipped by the infusion nurses.  Patient reports of significant discomfort in her port area with movement of her left arm and shoulder.      Past Medical History:   Diagnosis Date   • Abdominal cramping 07/01/2022   • Anemia    • Bloating 07/01/2022   • Bloody diarrhea 07/01/2022   • Chronic back pain    • Colon polyps     FOLLOWED BY DR. BEVERLY MENDEZ   • Depression    • Gastric polyps     FOLLOWED BY DR. BEVERLY MENDEZ   • GERD (gastroesophageal reflux disease)    • Hearing loss    • Hiatal hernia 09/2021    3 CM, FOLLOWED BY DR. BEVERLY MENDEZ   • Hyperlipidemia    • IBS (irritable bowel syndrome)    • Migraines    • Patellofemoral arthritis 07/2021   • PNA (pneumonia)    • PONV (postoperative nausea and vomiting) 1965    Numerous procedures with nausea   •  "Rectal bleeding 1983    Off and on for years   • Rectal cancer (HCC) 09/2022    INVASIVE MODERATELY DIFFERENTIATED ADENOCARCINOMA ARISING FROM TRADITIONAL SERRATED ADENOMA WITH HGD   • Sciatica 09/2015   • Tear of medial meniscus of knee 07/2021    RIGHT KNEE   • Vitamin D deficiency      Past Surgical History:   Procedure Laterality Date   • COLONOSCOPY N/A 1986   • COLONOSCOPY W/ POLYPECTOMY N/A 09/01/2022    3 MM TUBULAR ADENOMA POLYP IN ASCENDING, 6 MM TUBULAR ADENOMA POLYP IN CECUM, 30 MM MASS IN RECTUM, PATH:INVASIVE MODERATELY DIFFERENTIATED ADENOCARCINOMA ARISING FROM TRADITIONAL SERRATED ADENOMA WITH HGD, HYPERTROPHIED ANAL PAPILLA, DR. BEVERLY MENDEZ AT [facility]   • ENDOSCOPY N/A 09/01/2022    MULTIPLE BENIGN GASTRIC POLYPS, 2 SQUAMOUS PAPILLAS IN ESOPHAGUS, 3 CM HIATAL HERNIA,   • EXTERNAL EAR SURGERY  1966    MULTIPLE \"LANCES\", DR. ACE IN [facility]   • EYE SURGERY Right 1967    DR. CASTANEDA IN [facility]   • KNEE ARTHRODESIS Right 07/27/2021    RIGHT KNEE ARTHROCENTESIS, DR. ALBERT GILMAN   • KNEE SURGERY Left 1985    DR. PEREZ AT [facility]   • SHOULDER MANIPULATION Left 01/07/2013    FOR FROZEN SHOULDER, DR. ALBERT GILMAN AT [facility]   • SIGMOIDOSCOPY N/A 09/21/2022    AN INFILTRATIVE NON OBSTRUCTING MASS IN MID RECTUM, AREA TATTOOED, DR. TEMO AMAYA AT [facility]   • TRANSRECTAL ULTRASOUND N/A 09/21/2022    Procedure: ULTRASOUND TRANSRECTAL;  Surgeon: Temo Amaya MD;  Location: [facility] ENDOSCOPY;  Service: General;  Laterality: N/A;   • VENOUS ACCESS DEVICE (PORT) INSERTION Left 10/26/2022    Procedure: INSERTION OF PORTACATH;  Surgeon: Leonel Bridges MD;  Location: [facility] MAIN OR;  Service: General;  Laterality: Left;   • WISDOM TOOTH EXTRACTION Bilateral        MEDICATIONS    Current Outpatient Medications:   •  dicyclomine (BENTYL) 20 MG tablet, Take 1 tablet by mouth Every 6 (Six) Hours As Needed., Disp: , Rfl:   •  diphenoxylate-atropine (LOMOTIL) 2.5-0.025 MG per tablet, " Take 1 tablet by mouth 4 (Four) Times a Day As Needed for Diarrhea., Disp: 30 tablet, Rfl: 2  •  ELDERBERRY PO, Take 2 doses by mouth Daily., Disp: , Rfl:   •  lidocaine-prilocaine (EMLA) 2.5-2.5 % cream, Apply 1 application topically to the appropriate area as directed As Needed for Mild Pain. Apply 30 minutes prior to port access, Disp: 5 g, Rfl: 1  •  loperamide (IMODIUM) 2 MG capsule, Take 1 capsule by mouth As Needed for Diarrhea., Disp: , Rfl:   •  ondansetron (ZOFRAN) 8 MG tablet, Take 1 tablet by mouth 3 (Three) Times a Day As Needed for Nausea or Vomiting., Disp: 30 tablet, Rfl: 5  •  oxyCODONE-acetaminophen (PERCOCET) 5-325 MG per tablet, Take 1 tablet by mouth Every 8 (Eight) Hours As Needed for Moderate Pain or Severe Pain. Please fill asap and notify patient when ready to .  Thanks a, Disp: 60 tablet, Rfl: 0  •  pantoprazole (PROTONIX) 40 MG EC tablet, Take 1 tablet by mouth Daily., Disp: 30 tablet, Rfl: 3  •  traMADol (ULTRAM) 50 MG tablet, Take 1 tablet by mouth Every 6 (Six) Hours As Needed for Moderate Pain., Disp: 15 tablet, Rfl: 0    ALLERGIES:     Allergies   Allergen Reactions   • Adhesive Tape Rash     Patient has sensitive skin.    • Levofloxacin Swelling   • Sulfa Antibiotics Rash       SOCIAL HISTORY:       Social History     Socioeconomic History   • Marital status: Single   Tobacco Use   • Smoking status: Never   • Smokeless tobacco: Never   Vaping Use   • Vaping Use: Never used   Substance and Sexual Activity   • Alcohol use: Yes     Alcohol/week: 1.0 standard drink     Types: 1 Shots of liquor per week     Comment: Per month   • Drug use: Never   • Sexual activity: Not Currently         FAMILY HISTORY:  Family History   Problem Relation Age of Onset   • Colon polyps Mother    • Asthma Mother    • COPD Mother    • Lung disease Mother    • Migraines Mother    • Cancer Father         liver cancer   • Colon polyps Father    • Heart disease Father    • Diabetes Father    • Kidney  "disease Father    • Angina Father    • Liver cancer Father    • Hepatitis Father    • Hearing loss Father    • Cancer Sister         Breast cancer   • Arthritis Sister    • Colon polyps Sister    • Breast cancer Sister    • Diabetes Sister    • Colon polyps Sister    • Alcohol abuse Maternal Uncle    • Colon cancer Neg Hx    • Crohn's disease Neg Hx    • Irritable bowel syndrome Neg Hx    • Ulcerative colitis Neg Hx    • Malig Hyperthermia Neg Hx        REVIEW OF SYSTEMS:  As per HPI         Vitals:    12/14/22 0840   BP: 142/89   Pulse: 88   Resp: 16   Temp: 98 °F (36.7 °C)   TempSrc: Temporal   SpO2: 98%   Weight: 106 kg (234 lb 11.2 oz)   Height: 176 cm (69.29\")   PainSc: 5  Comment: WITH MOVEMENT OF ARM   PainLoc: Chest  Comment: PORT AREA     Current Status 12/2/2022   ECOG score 0     EXAM:  Exam deferred.  Telehealth visit.    RECENT LABS:  Results from last 7 days   Lab Units 12/14/22  0817   WBC 10*3/mm3 6.54   NEUTROS ABS 10*3/mm3 3.55   HEMOGLOBIN g/dL 10.4*   HEMATOCRIT % 32.2*   PLATELETS 10*3/mm3 131*       ASSESSMENT:   is a pleasant 62 y/o WF with medical history significant for migraines, depression/anxiety, and IBS comes for rectal adenocarcinoma evaluation & management.     # Rectal adenocarcinoma, T3N0M0, Stage IIA:  · Diagnosed on a c-scope performed for gastric discomfort in September 2022.   · The flex sig noted a an infiltrative nonobstructing mass in the mid rectum.  The mass was partially circumferential involving one third of the lumen circumference. CT c/a/p negative for mets. MRI pelvis most consistent with T3N0 disease, with no evidence of involvement of mesorectal fascia.  · Reviewed various management strategies for localized rectal cancer with chemotherapy, radiation and surgical resection.  Patient met with Dr. Amaya, colorectal surgery and is being tentatively planned for low anterior resection after neoadjuvant chemotherapy and radiation.  She received neoadjuvant short " course RT by Dr. Freeman, radiation oncology from 10/17- 10/22/2022.   · Reviewed the role and rationale of systemic therapy and localized rectal adenocarcinoma.  Informed patient that the SOC for such patients is 4-months of chemotherapy with FOLFOX/CAPOX. Total neoadjuvant therapy is preferred in these cases.   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 She expressed interest in alternative treatment options. I reviewed the recent data about IOs in dMMR patients, although informed her this is not the current SOC.  A guardant 360 NGS was obtained which did not show MSI high.  Low TMB.  · Patient is agreeable to proceed with FOLFOX with tentative plan to dose-reduce or stop oxaliplatin if develops neuropathy.    · 11/2/2022: Is clinically stable.  Labs stable.  Proceed with cycle 1 FOLFOX.    · 11/16/2022 cycle 2 FOLFOX.  Received cycle 3 with Neulasta due to neutropenia.  · 12/14/2022: Labs show improved WBC/ANC.  Given significant peripheral neuropathy, will reduce oxaliplatin dose by 25%.  With significant arthralgias despite Claritin, we will not administer Neulasta with this cycle.  Proceed with cycle 4 FOLFOX.  Follow-up in 2 weeks with NP for port, labs and cycle 5 FOLFOX.    #GERD  · Significant epigastric discomfort and reflux with initiation of chemo  · Continue Protonix 40 mg nightly    #Diarrhea  · Initially began following radiation and was exacerbated with chemotherapy  · Utilizing Imodium, Lomotil was added  · We discussed today alternating Imodium and Lomotil as needed    PLAN:   -Proceed with cycle 4 FOLFOX today.  25% oxaliplatin dose reduction.  -Stop Neulasta due to significant arthralgia and oxaliplatin dose reduction.  -Obtain chest x-ray to visualize the port site.  -Continue supportive care with Protonix, antiemetics, Imodium/Lomotil if needed.  -Return in 2 weeks for follow-up with NP with repeat CBC, CMP, and continuation of  FOLFOX.    -Call/ return sooner should he develop any new concerns or problems.  -Planning 4 months neoadjuvant FOLFOX  Orders Placed This Encounter   Procedures   • XR Chest 1 View     Standing Status:   Future     Number of Occurrences:   1     Standing Expiration Date:   12/14/2023     Order Specific Question:   Reason for Exam:     Answer:   port assessment     Order Specific Question:   Release to patient     Answer:   Routine Release   You have chosen to receive care through a telephone visit. Do you consent to use a telephone visit for your medical care today? Yes    Total time spent: 45 minutes.

## 2022-12-14 NOTE — NURSING NOTE
Pt sent for Xray to confirm port location with review of image by Dr. Weber. Port does not appear to be flipped and okay to use per Dr. Weber if able to access and obtain blood return. Port successfully accessed and blood return noted.

## 2022-12-16 ENCOUNTER — INFUSION (OUTPATIENT)
Dept: ONCOLOGY | Facility: HOSPITAL | Age: 62
End: 2022-12-16
Payer: COMMERCIAL

## 2022-12-16 DIAGNOSIS — C20 RECTAL CANCER: Primary | ICD-10-CM

## 2022-12-16 DIAGNOSIS — Z45.2 ENCOUNTER FOR FITTING AND ADJUSTMENT OF VASCULAR CATHETER: ICD-10-CM

## 2022-12-16 PROCEDURE — 25010000002 HEPARIN LOCK FLUSH PER 10 UNITS: Performed by: INTERNAL MEDICINE

## 2022-12-16 PROCEDURE — 96374 THER/PROPH/DIAG INJ IV PUSH: CPT

## 2022-12-16 PROCEDURE — 96360 HYDRATION IV INFUSION INIT: CPT

## 2022-12-16 PROCEDURE — 25010000002 PROCHLORPERAZINE 10 MG/2ML SOLUTION: Performed by: NURSE PRACTITIONER

## 2022-12-16 RX ORDER — HEPARIN SODIUM (PORCINE) LOCK FLUSH IV SOLN 100 UNIT/ML 100 UNIT/ML
500 SOLUTION INTRAVENOUS AS NEEDED
Status: DISCONTINUED | OUTPATIENT
Start: 2022-12-16 | End: 2022-12-16 | Stop reason: HOSPADM

## 2022-12-16 RX ORDER — SODIUM CHLORIDE 0.9 % (FLUSH) 0.9 %
10 SYRINGE (ML) INJECTION AS NEEDED
Status: CANCELLED | OUTPATIENT
Start: 2022-12-16

## 2022-12-16 RX ORDER — HEPARIN SODIUM (PORCINE) LOCK FLUSH IV SOLN 100 UNIT/ML 100 UNIT/ML
500 SOLUTION INTRAVENOUS AS NEEDED
Status: CANCELLED | OUTPATIENT
Start: 2022-12-16

## 2022-12-16 RX ORDER — PROCHLORPERAZINE EDISYLATE 5 MG/ML
10 INJECTION INTRAMUSCULAR; INTRAVENOUS ONCE
Status: COMPLETED | OUTPATIENT
Start: 2022-12-16 | End: 2022-12-16

## 2022-12-16 RX ORDER — SODIUM CHLORIDE 9 MG/ML
1000 INJECTION, SOLUTION INTRAVENOUS ONCE
Status: COMPLETED | OUTPATIENT
Start: 2022-12-16 | End: 2022-12-16

## 2022-12-16 RX ORDER — PROCHLORPERAZINE MALEATE 10 MG
10 TABLET ORAL EVERY 6 HOURS PRN
Qty: 30 TABLET | Refills: 3 | Status: SHIPPED | OUTPATIENT
Start: 2022-12-16

## 2022-12-16 RX ORDER — SODIUM CHLORIDE 0.9 % (FLUSH) 0.9 %
10 SYRINGE (ML) INJECTION AS NEEDED
Status: DISCONTINUED | OUTPATIENT
Start: 2022-12-16 | End: 2022-12-16 | Stop reason: HOSPADM

## 2022-12-16 RX ADMIN — Medication 500 UNITS: at 13:42

## 2022-12-16 RX ADMIN — SODIUM CHLORIDE 1000 ML: 9 INJECTION, SOLUTION INTRAVENOUS at 12:19

## 2022-12-16 RX ADMIN — PROCHLORPERAZINE EDISYLATE 10 MG: 5 INJECTION INTRAMUSCULAR; INTRAVENOUS at 12:39

## 2022-12-16 NOTE — NURSING NOTE
Called to [facility] to report pt. c/o nausea. Order received for compazine 10mg IV x 1. Inform pt that a script will be submitted to her pharmacy for compazine 10mg po that she can take every 6 hrs and the zofran that she has at home is to be alternated with the compazine as needed.

## 2022-12-20 ENCOUNTER — TELEPHONE (OUTPATIENT)
Dept: ONCOLOGY | Facility: CLINIC | Age: 62
End: 2022-12-20

## 2022-12-20 DIAGNOSIS — C20 RECTAL CANCER: Primary | ICD-10-CM

## 2022-12-20 NOTE — TELEPHONE ENCOUNTER
Patient is still having pain to her port area and wants to know if she should call the surgeon about it

## 2022-12-20 NOTE — TELEPHONE ENCOUNTER
"Patient returned my call and she is reporting that she is having pain in her port area, which is not improving.  This is a burning sensation and tugging feeling when she moves her arm, lifts her arm or with any motion.  The port was accessed last week and the staff thought the port was \"flipped\".  X-ray determined that the port was not turned and the staff have since been able to access the port and obtain blood return.  She stated that this had not been happening till after her 3rd chemo.  She is concerned and is wondering if the surgeon should evaluate the port. Advised to contact the surgeon, Dr. Latif, and let our office know the outcome.  She v/u.  "

## 2022-12-21 ENCOUNTER — TELEPHONE (OUTPATIENT)
Dept: SURGERY | Facility: CLINIC | Age: 62
End: 2022-12-21

## 2022-12-21 NOTE — TELEPHONE ENCOUNTER
SPOKE TO PT REGARDING PORT STUDY SCHEDULED ON 12/22 @ 2 PM, ARRIVAL 1 PM FOR [facility]. NO PREP FOR THIS TEST.

## 2022-12-22 ENCOUNTER — HOSPITAL ENCOUNTER (OUTPATIENT)
Dept: GENERAL RADIOLOGY | Facility: HOSPITAL | Age: 62
Discharge: HOME | End: 2022-12-22
Admitting: SURGERY

## 2022-12-22 DIAGNOSIS — C20 RECTAL CANCER: Primary | ICD-10-CM

## 2022-12-22 DIAGNOSIS — Z45.2 ENCOUNTER FOR FITTING AND ADJUSTMENT OF VASCULAR CATHETER: ICD-10-CM

## 2022-12-22 PROCEDURE — 0 IOPAMIDOL PER 1 ML: Performed by: SURGERY

## 2022-12-22 PROCEDURE — 25010000002 HEPARIN LOCK FLUSH PER 10 UNITS: Performed by: INTERNAL MEDICINE

## 2022-12-22 PROCEDURE — 36598 INJ W/FLUOR EVAL CV DEVICE: CPT

## 2022-12-22 RX ORDER — SODIUM CHLORIDE 0.9 % (FLUSH) 0.9 %
10 SYRINGE (ML) INJECTION AS NEEDED
Status: CANCELLED | OUTPATIENT
Start: 2022-12-22

## 2022-12-22 RX ORDER — SODIUM CHLORIDE 0.9 % (FLUSH) 0.9 %
10 SYRINGE (ML) INJECTION AS NEEDED
Status: DISCONTINUED | OUTPATIENT
Start: 2022-12-22 | End: 2022-12-23 | Stop reason: HOSPADM

## 2022-12-22 RX ORDER — HEPARIN SODIUM (PORCINE) LOCK FLUSH IV SOLN 100 UNIT/ML 100 UNIT/ML
500 SOLUTION INTRAVENOUS AS NEEDED
Status: DISCONTINUED | OUTPATIENT
Start: 2022-12-22 | End: 2022-12-23 | Stop reason: HOSPADM

## 2022-12-22 RX ORDER — HEPARIN SODIUM (PORCINE) LOCK FLUSH IV SOLN 100 UNIT/ML 100 UNIT/ML
500 SOLUTION INTRAVENOUS AS NEEDED
Status: CANCELLED | OUTPATIENT
Start: 2022-12-22

## 2022-12-22 RX ADMIN — Medication 10 ML: at 14:40

## 2022-12-22 RX ADMIN — HEPARIN 500 UNITS: 100 SYRINGE at 14:40

## 2022-12-22 RX ADMIN — IOPAMIDOL 5 ML: 755 INJECTION, SOLUTION INTRAVENOUS at 14:34

## 2022-12-22 NOTE — PROGRESS NOTES
Would you please let her know that her port study was normal.  If she is still in trouble she can come see me in the office.

## 2022-12-27 ENCOUNTER — TELEMEDICINE - AUDIO (OUTPATIENT)
Dept: NUTRITION | Facility: HOSPITAL | Age: 62
End: 2022-12-27

## 2022-12-27 NOTE — PROGRESS NOTES
Outpatient Oncology Nutrition    Patient Name:  Babs Felton  YOB: 1960  MRN: [id number]    Assessment Date:  12/27/2022    Comments:  Spoke to patient on the phone today. Scheduled for FOLFOX tomorrow at [place].   The patient plans to use Enterade this cycle and has some at home. She did not end up using it last cycle and would like to see if it makes a difference drinking it twice a day as directed.   Will follow up with patient and arrange to get more samples to her if needed.     Electronically signed by:  Yanni Ferguson RD, LD  12/27/22 15:20 EST

## 2022-12-28 ENCOUNTER — OFFICE VISIT (OUTPATIENT)
Dept: ONCOLOGY | Facility: CLINIC | Age: 62
End: 2022-12-28

## 2022-12-28 ENCOUNTER — INFUSION (OUTPATIENT)
Dept: ONCOLOGY | Facility: HOSPITAL | Age: 62
End: 2022-12-28
Payer: COMMERCIAL

## 2022-12-28 ENCOUNTER — APPOINTMENT (OUTPATIENT)
Dept: OTHER | Facility: HOSPITAL | Age: 62
End: 2022-12-28

## 2022-12-28 VITALS
WEIGHT: 236.5 LBS | TEMPERATURE: 97.5 F | HEART RATE: 89 BPM | HEIGHT: 69 IN | BODY MASS INDEX: 35.03 KG/M2 | SYSTOLIC BLOOD PRESSURE: 137 MMHG | DIASTOLIC BLOOD PRESSURE: 82 MMHG | RESPIRATION RATE: 16 BRPM | OXYGEN SATURATION: 98 %

## 2022-12-28 DIAGNOSIS — C20 RECTAL CANCER: Primary | ICD-10-CM

## 2022-12-28 DIAGNOSIS — C20 RECTAL CANCER: ICD-10-CM

## 2022-12-28 LAB
ALBUMIN SERPL-MCNC: 3.9 G/DL (ref 3.5–5.2)
ALBUMIN/GLOB SERPL: 1.6 G/DL
ALP SERPL-CCNC: 93 U/L (ref 39–117)
ALT SERPL W P-5'-P-CCNC: 41 U/L (ref 1–33)
ANION GAP SERPL CALCULATED.3IONS-SCNC: 10.4 MMOL/L (ref 5–15)
AST SERPL-CCNC: 38 U/L (ref 1–32)
BASOPHILS # BLD AUTO: 0.01 10*3/MM3 (ref 0–0.2)
BASOPHILS NFR BLD AUTO: 0.3 % (ref 0–1.5)
BILIRUB SERPL-MCNC: 0.5 MG/DL (ref 0–1.2)
BUN SERPL-MCNC: 9 MG/DL (ref 8–23)
BUN/CREAT SERPL: 13.8 (ref 7–25)
CALCIUM SPEC-SCNC: 9.7 MG/DL (ref 8.6–10.5)
CHLORIDE SERPL-SCNC: 106 MMOL/L (ref 98–107)
CO2 SERPL-SCNC: 23.6 MMOL/L (ref 22–29)
CREAT SERPL-MCNC: 0.65 MG/DL (ref 0.57–1)
DEPRECATED RDW RBC AUTO: 53.1 FL (ref 37–54)
EGFRCR SERPLBLD CKD-EPI 2021: 99.1 ML/MIN/1.73
EOSINOPHIL # BLD AUTO: 0.06 10*3/MM3 (ref 0–0.4)
EOSINOPHIL NFR BLD AUTO: 2.1 % (ref 0.3–6.2)
ERYTHROCYTE [DISTWIDTH] IN BLOOD BY AUTOMATED COUNT: 18.4 % (ref 12.3–15.4)
GLOBULIN UR ELPH-MCNC: 2.5 GM/DL
GLUCOSE SERPL-MCNC: 106 MG/DL (ref 65–99)
HCT VFR BLD AUTO: 28.6 % (ref 34–46.6)
HGB BLD-MCNC: 9.4 G/DL (ref 12–15.9)
IMM GRANULOCYTES # BLD AUTO: 0.01 10*3/MM3 (ref 0–0.05)
IMM GRANULOCYTES NFR BLD AUTO: 0.3 % (ref 0–0.5)
LYMPHOCYTES # BLD AUTO: 0.84 10*3/MM3 (ref 0.7–3.1)
LYMPHOCYTES NFR BLD AUTO: 28.9 % (ref 19.6–45.3)
MCH RBC QN AUTO: 28.5 PG (ref 26.6–33)
MCHC RBC AUTO-ENTMCNC: 32.9 G/DL (ref 31.5–35.7)
MCV RBC AUTO: 86.7 FL (ref 79–97)
MONOCYTES # BLD AUTO: 0.3 10*3/MM3 (ref 0.1–0.9)
MONOCYTES NFR BLD AUTO: 10.3 % (ref 5–12)
NEUTROPHILS NFR BLD AUTO: 1.69 10*3/MM3 (ref 1.7–7)
NEUTROPHILS NFR BLD AUTO: 58.1 % (ref 42.7–76)
NRBC BLD AUTO-RTO: 0 /100 WBC (ref 0–0.2)
PLATELET # BLD AUTO: 159 10*3/MM3 (ref 140–450)
PMV BLD AUTO: 9.7 FL (ref 6–12)
POTASSIUM SERPL-SCNC: 3.9 MMOL/L (ref 3.5–5.2)
PROT SERPL-MCNC: 6.4 G/DL (ref 6–8.5)
RBC # BLD AUTO: 3.3 10*6/MM3 (ref 3.77–5.28)
SODIUM SERPL-SCNC: 140 MMOL/L (ref 136–145)
WBC NRBC COR # BLD: 2.91 10*3/MM3 (ref 3.4–10.8)

## 2022-12-28 PROCEDURE — 99214 OFFICE O/P EST MOD 30 MIN: CPT | Performed by: NURSE PRACTITIONER

## 2022-12-28 PROCEDURE — 96416 CHEMO PROLONG INFUSE W/PUMP: CPT

## 2022-12-28 PROCEDURE — 85025 COMPLETE CBC W/AUTO DIFF WBC: CPT | Performed by: INTERNAL MEDICINE

## 2022-12-28 PROCEDURE — 96368 THER/DIAG CONCURRENT INF: CPT

## 2022-12-28 PROCEDURE — 96413 CHEMO IV INFUSION 1 HR: CPT

## 2022-12-28 PROCEDURE — 25010000002 OXALIPLATIN PER 0.5 MG: Performed by: NURSE PRACTITIONER

## 2022-12-28 PROCEDURE — 25010000002 PALONOSETRON PER 25 MCG: Performed by: NURSE PRACTITIONER

## 2022-12-28 PROCEDURE — 25010000002 FLUOROURACIL PER 500 MG: Performed by: NURSE PRACTITIONER

## 2022-12-28 PROCEDURE — 96415 CHEMO IV INFUSION ADDL HR: CPT

## 2022-12-28 PROCEDURE — 25010000002 FOSAPREPITANT PER 1 MG: Performed by: NURSE PRACTITIONER

## 2022-12-28 PROCEDURE — G0498 CHEMO EXTEND IV INFUS W/PUMP: HCPCS

## 2022-12-28 PROCEDURE — 25010000002 DEXAMETHASONE SODIUM PHOSPHATE 100 MG/10ML SOLUTION: Performed by: NURSE PRACTITIONER

## 2022-12-28 PROCEDURE — 96367 TX/PROPH/DG ADDL SEQ IV INF: CPT

## 2022-12-28 PROCEDURE — 80053 COMPREHEN METABOLIC PANEL: CPT | Performed by: INTERNAL MEDICINE

## 2022-12-28 PROCEDURE — 96411 CHEMO IV PUSH ADDL DRUG: CPT

## 2022-12-28 PROCEDURE — 25010000002 LEUCOVORIN CALCIUM PER 50 MG: Performed by: NURSE PRACTITIONER

## 2022-12-28 PROCEDURE — 96375 TX/PRO/DX INJ NEW DRUG ADDON: CPT

## 2022-12-28 RX ORDER — DIPHENHYDRAMINE HYDROCHLORIDE 50 MG/ML
50 INJECTION INTRAMUSCULAR; INTRAVENOUS AS NEEDED
Status: CANCELLED | OUTPATIENT
Start: 2022-12-28

## 2022-12-28 RX ORDER — PALONOSETRON 0.05 MG/ML
0.25 INJECTION, SOLUTION INTRAVENOUS ONCE
Status: COMPLETED | OUTPATIENT
Start: 2022-12-28 | End: 2022-12-28

## 2022-12-28 RX ORDER — FLUOROURACIL 50 MG/ML
400 INJECTION, SOLUTION INTRAVENOUS ONCE
Status: CANCELLED | OUTPATIENT
Start: 2022-12-28

## 2022-12-28 RX ORDER — DEXTROSE MONOHYDRATE 50 MG/ML
250 INJECTION, SOLUTION INTRAVENOUS ONCE
Status: COMPLETED | OUTPATIENT
Start: 2022-12-28 | End: 2022-12-28

## 2022-12-28 RX ORDER — SODIUM CHLORIDE 9 MG/ML
1000 INJECTION, SOLUTION INTRAVENOUS ONCE
Status: CANCELLED
Start: 2022-12-30 | End: 2022-12-30

## 2022-12-28 RX ORDER — PALONOSETRON 0.05 MG/ML
0.25 INJECTION, SOLUTION INTRAVENOUS ONCE
Status: CANCELLED | OUTPATIENT
Start: 2022-12-28

## 2022-12-28 RX ORDER — FLUOROURACIL 50 MG/ML
400 INJECTION, SOLUTION INTRAVENOUS ONCE
Status: COMPLETED | OUTPATIENT
Start: 2022-12-28 | End: 2022-12-28

## 2022-12-28 RX ORDER — DEXTROSE MONOHYDRATE 50 MG/ML
250 INJECTION, SOLUTION INTRAVENOUS ONCE
Status: CANCELLED | OUTPATIENT
Start: 2022-12-28

## 2022-12-28 RX ORDER — FAMOTIDINE 10 MG/ML
20 INJECTION, SOLUTION INTRAVENOUS AS NEEDED
Status: CANCELLED | OUTPATIENT
Start: 2022-12-28

## 2022-12-28 RX ADMIN — LEUCOVORIN CALCIUM 900 MG: 350 INJECTION, POWDER, LYOPHILIZED, FOR SUSPENSION INTRAMUSCULAR; INTRAVENOUS at 11:49

## 2022-12-28 RX ADMIN — DEXTROSE MONOHYDRATE 250 ML: 50 INJECTION, SOLUTION INTRAVENOUS at 10:50

## 2022-12-28 RX ADMIN — FLUOROURACIL 5420 MG: 50 INJECTION, SOLUTION INTRAVENOUS at 14:05

## 2022-12-28 RX ADMIN — DEXAMETHASONE SODIUM PHOSPHATE 12 MG: 100 INJECTION INTRAMUSCULAR; INTRAVENOUS at 10:52

## 2022-12-28 RX ADMIN — FLUOROURACIL 900 MG: 50 INJECTION, SOLUTION INTRAVENOUS at 14:04

## 2022-12-28 RX ADMIN — OXALIPLATIN 145 MG: 5 INJECTION, SOLUTION, CONCENTRATE INTRAVENOUS at 11:50

## 2022-12-28 RX ADMIN — SODIUM CHLORIDE 100 ML: 9 INJECTION, SOLUTION INTRAVENOUS at 11:15

## 2022-12-28 RX ADMIN — PALONOSETRON 0.25 MG: 0.05 INJECTION, SOLUTION INTRAVENOUS at 10:51

## 2022-12-28 NOTE — PROGRESS NOTES
[facility]    CONSULTING IN BLOOD DISORDERS & CANCER      REASON FOR CONSULTATION/CHIEF COMPLAINT:     Evaluation and management for rectal adenocarcinoma                             REQUESTING PHYSICIAN: No ref. provider found  RECORDS OBTAINED:  Records of the patients history including those from the electronic medical record were reviewed and summarized in detail.    HISTORY OF PRESENT ILLNESS:    The patient is a 62 y.o. year old female with medical history significant for migraines, depression/anxiety, and IBS had presented with epigastric discomfort in September 2022.      An EGD and colonoscopy performed by SHOAIB Esteves on 9/1/2022 demonstrated a 3 cm mass in the proximal rectum around 13 cm from the anal verge.  The polyp was multilobulated and somewhat fixed to the underlying tissue.  Friable with contact bleeding.  Other smaller polyps were found in the cecum and ascending colon which were removed.  The EGD revealed mucosal nodule in the esophagus and multiple gastric polyps.  No evidence of bleeding.     The pathology from the rectal mass came back as invasive moderately differentiated adenocarcinoma arising out of traditional serrated adenoma with high-grade dysplasia.    Patient was subsequently referred to Dr. Amaya, colorectal surgery who performed a flexible sigmoidoscopy on 9/21/2022, revealing an infiltrative nonobstructing mass in the mid rectum.  The mass was partially circumferential involving one third of the lumen circumference.     9/13/2022: CT C/A/P with contrast noted rectal neoplasm not well visualized on this exam.  Calcified and noncalcified sub-6mm pulmonary nodules likely the sequela of prior granulomatous disease.  Scattered colonic diverticulosis.  Mildly prominent, but not pathologically enlarged pelvic nodes measuring up to 4 mm.    9/26/2022: MRI pelvis showed high rectal mass with effacement of the muscularis propria along the right wall and extended beyond the muscularis  propria at the posterior wall, T3 lesion. There is no evidence for involvement of the mesorectal fascia. There are no pathologically enlarged nodes.     Patient has been seen by , rad-onc & being planned for neoadjuvant radiation. Patient has come to this clinic to discuss systemic therapy options.    Guardant 360: MSI stable.  Low TMB.    10/17- 10/22/2022: Short course of radiotherapy to the rectal mass.    11/2/2022: Cycle 1 FOLFOX  12/14/2022: Cycle 4 FOLFOX    INTERIM HISTORY:  Ms. Felton is a 62-year-old female with the above-mentioned history is here today for lab review and evaluation prior to cycle 5 FOLFOX.  With her fourth cycle of FOLFOX we dose reduce the oxaliplatin by 25% due to issues with worsening cold sensitivity.  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  She is willing to try 1 more cycle of FOLFOX to see how she does, but if it worsens any further then we will likely have to omit the oxaliplatin.  She did notice with this past cycle she had to use her Zofran more regularly.  She is struggled more with constipation, in particular the week following treatment.  She is also noticed spells where she feels like her teeth are extremely cold as well as her cheeks, however the back of her head will be sweating.    We did not use Neulasta with her last treatment due to issues with significant bone pain.  Reviewed labs today that her WBC is 2.91, ANC is 1.69, and I would recommend using Neulasta with this treatment otherwise she will face delays.  Patient is agreeable and will start taking Claritin today in preparation.    Past Medical History:   Diagnosis Date   • Abdominal cramping 07/01/2022   • Anemia    • Bloating 07/01/2022   • Bloody diarrhea 07/01/2022   • Chronic back pain    • Colon polyps     FOLLOWED BY   "BEVERLY MENDEZ   • Depression    • Gastric polyps     FOLLOWED BY DR. BEVERLY MENDEZ   • GERD (gastroesophageal reflux disease)    • Hearing loss    • Hiatal hernia 09/2021    3 CM, FOLLOWED BY DR. BEVERLY MENDEZ   • Hyperlipidemia    • IBS (irritable bowel syndrome)    • Migraines    • Patellofemoral arthritis 07/2021   • PNA (pneumonia)    • PONV (postoperative nausea and vomiting) 1965    Numerous procedures with nausea   • Rectal bleeding 1983    Off and on for years   • Rectal cancer (HCC) 09/2022    INVASIVE MODERATELY DIFFERENTIATED ADENOCARCINOMA ARISING FROM TRADITIONAL SERRATED ADENOMA WITH HGD   • Sciatica 09/2015   • Tear of medial meniscus of knee 07/2021    RIGHT KNEE   • Vitamin D deficiency      Past Surgical History:   Procedure Laterality Date   • COLONOSCOPY N/A 1986   • COLONOSCOPY W/ POLYPECTOMY N/A 09/01/2022    3 MM TUBULAR ADENOMA POLYP IN ASCENDING, 6 MM TUBULAR ADENOMA POLYP IN CECUM, 30 MM MASS IN RECTUM, PATH:INVASIVE MODERATELY DIFFERENTIATED ADENOCARCINOMA ARISING FROM TRADITIONAL SERRATED ADENOMA WITH HGD, HYPERTROPHIED ANAL PAPILLA, DR. BEVERLY MENDEZ AT [facility]   • ENDOSCOPY N/A 09/01/2022    MULTIPLE BENIGN GASTRIC POLYPS, 2 SQUAMOUS PAPILLAS IN ESOPHAGUS, 3 CM HIATAL HERNIA,   • EXTERNAL EAR SURGERY  1966    MULTIPLE \"LANCES\", DR. ACE IN [facility]   • EYE SURGERY Right 1967    DR. CASTANEDA IN [facility]   • KNEE ARTHRODESIS Right 07/27/2021    RIGHT KNEE ARTHROCENTESIS, DR. ALBERT GILMAN   • KNEE SURGERY Left 1985    DR. PEREZ AT [place]   • SHOULDER MANIPULATION Left 01/07/2013    FOR FROZEN SHOULDER, DR. ALBERT GILMAN AT [facility]   • SIGMOIDOSCOPY N/A 09/21/2022    AN INFILTRATIVE NON OBSTRUCTING MASS IN MID RECTUM, AREA TATTOOED, DR. JENNIFER AMAYA AT [facility]   • TRANSRECTAL ULTRASOUND N/A 09/21/2022    Procedure: ULTRASOUND TRANSRECTAL;  Surgeon: Jennifer Amaya MD;  Location: [facility] ENDOSCOPY;  Service: General;  Laterality: N/A;   • VENOUS ACCESS " DEVICE (PORT) INSERTION Left 10/26/2022    Procedure: INSERTION OF PORTACATH;  Surgeon: Leonel Bridges MD;  Location: [facility] MAIN OR;  Service: General;  Laterality: Left;   • WISDOM TOOTH EXTRACTION Bilateral        MEDICATIONS    Current Outpatient Medications:   •  dicyclomine (BENTYL) 20 MG tablet, Take 1 tablet by mouth Every 6 (Six) Hours As Needed., Disp: , Rfl:   •  diphenoxylate-atropine (LOMOTIL) 2.5-0.025 MG per tablet, Take 1 tablet by mouth 4 (Four) Times a Day As Needed for Diarrhea., Disp: 30 tablet, Rfl: 2  •  ELDERBERRY PO, Take 2 doses by mouth Daily., Disp: , Rfl:   •  lidocaine-prilocaine (EMLA) 2.5-2.5 % cream, Apply 1 application topically to the appropriate area as directed As Needed for Mild Pain. Apply 30 minutes prior to port access, Disp: 5 g, Rfl: 1  •  loperamide (IMODIUM) 2 MG capsule, Take 1 capsule by mouth As Needed for Diarrhea., Disp: , Rfl:   •  ondansetron (ZOFRAN) 8 MG tablet, Take 1 tablet by mouth 3 (Three) Times a Day As Needed for Nausea or Vomiting., Disp: 30 tablet, Rfl: 5  •  oxyCODONE-acetaminophen (PERCOCET) 5-325 MG per tablet, Take 1 tablet by mouth Every 8 (Eight) Hours As Needed for Moderate Pain or Severe Pain. Please fill asap and notify patient when ready to .  Thanks a, Disp: 60 tablet, Rfl: 0  •  pantoprazole (PROTONIX) 40 MG EC tablet, Take 1 tablet by mouth Daily., Disp: 30 tablet, Rfl: 3  •  prochlorperazine (COMPAZINE) 10 MG tablet, Take 1 tablet by mouth Every 6 (Six) Hours As Needed for Nausea or Vomiting., Disp: 30 tablet, Rfl: 3  •  traMADol (ULTRAM) 50 MG tablet, Take 1 tablet by mouth Every 6 (Six) Hours As Needed for Moderate Pain., Disp: 15 tablet, Rfl: 0  No current facility-administered medications for this visit.    ALLERGIES:     Allergies   Allergen Reactions   • Adhesive Tape Rash     Patient has sensitive skin.    • Levofloxacin Swelling   • Sulfa Antibiotics Rash       SOCIAL HISTORY:       Social History     Socioeconomic History  "  • Marital status: Single   Tobacco Use   • Smoking status: Never   • Smokeless tobacco: Never   Vaping Use   • Vaping Use: Never used   Substance and Sexual Activity   • Alcohol use: Yes     Alcohol/week: 1.0 standard drink     Types: 1 Shots of liquor per week     Comment: Per month   • Drug use: Never   • Sexual activity: Not Currently         FAMILY HISTORY:  Family History   Problem Relation Age of Onset   • Colon polyps Mother    • Asthma Mother    • COPD Mother    • Lung disease Mother    • Migraines Mother    • Cancer Father         liver cancer   • Colon polyps Father    • Heart disease Father    • Diabetes Father    • Kidney disease Father    • Angina Father    • Liver cancer Father    • Hepatitis Father    • Hearing loss Father    • Cancer Sister         Breast cancer   • Arthritis Sister    • Colon polyps Sister    • Breast cancer Sister    • Diabetes Sister    • Colon polyps Sister    • Alcohol abuse Maternal Uncle    • Colon cancer Neg Hx    • Crohn's disease Neg Hx    • Irritable bowel syndrome Neg Hx    • Ulcerative colitis Neg Hx    • Malig Hyperthermia Neg Hx        REVIEW OF SYSTEMS:  As per HPI         Vitals:    12/28/22 0945   BP: 137/82   Pulse: 89   Resp: 16   Temp: 97.5 °F (36.4 °C)   TempSrc: Temporal   SpO2: 98%   Weight: 107 kg (236 lb 8 oz)   Height: 176 cm (69.29\")   PainSc: 0-No pain     Current Status 12/28/2022   ECOG score 0     Physical Exam  Constitutional:       General: She is not in acute distress.     Appearance: She is well-developed.   Pulmonary:      Effort: Pulmonary effort is normal. No respiratory distress.   Skin:     General: Skin is warm and dry.   Neurological:      Mental Status: She is alert and oriented to person, place, and time.           RECENT LABS:  Results from last 7 days   Lab Units 12/28/22  0945   WBC 10*3/mm3 2.91*   NEUTROS ABS 10*3/mm3 1.69*   HEMOGLOBIN g/dL 9.4*   HEMATOCRIT % 28.6*   PLATELETS 10*3/mm3 159       ASSESSMENT:   is a pleasant " 62 y/o WF with medical history significant for migraines, depression/anxiety, and IBS comes for rectal adenocarcinoma evaluation & management.     # Rectal adenocarcinoma, T3N0M0, Stage IIA:  · Diagnosed on a c-scope performed for gastric discomfort in September 2022.   · The flex sig noted a an infiltrative nonobstructing mass in the mid rectum.  The mass was partially circumferential involving one third of the lumen circumference. CT c/a/p negative for mets. MRI pelvis most consistent with T3N0 disease, with no evidence of involvement of mesorectal fascia.  · Reviewed various management strategies for localized rectal cancer with chemotherapy, radiation and surgical resection.  Patient met with Dr. Amaya, colorectal surgery and is being tentatively planned for low anterior resection after neoadjuvant chemotherapy and radiation.  She received neoadjuvant short course RT by Dr. Freeman, radiation oncology from 10/17- 10/22/2022.   · Reviewed the role and rationale of systemic therapy and localized rectal adenocarcinoma.  Informed patient that the SOC for such patients is 4-months of chemotherapy with FOLFOX/CAPOX. Total neoadjuvant therapy is preferred in these cases.   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 She expressed interest in alternative treatment options. I reviewed the recent data about IOs in dMMR patients, although informed her this is not the current SOC.  A guardant 360 NGS was obtained which did not show MSI high.  Low TMB.  · Patient is agreeable to proceed with FOLFOX with tentative plan to dose-reduce or stop oxaliplatin if develops neuropathy.    · 11/2/2022: Is clinically stable.  Labs stable.  Proceed with cycle 1 FOLFOX.    · 11/16/2022 cycle 2 FOLFOX.  Received cycle 3 with Neulasta due to neutropenia.  · 12/14/2022: Labs show improved WBC/ANC.  Given significant peripheral neuropathy, will reduce oxaliplatin dose by 25%.  With  significant arthralgias despite Claritin, we will not administer Neulasta with this cycle.  Proceed with cycle 4 FOLFOX.  Follow-up in 2 weeks with NP for port, labs and cycle 5 FOLFOX.  · 12/28/2022 cycle 5 FOLFOX we will proceed with continued 25% dose reduction in oxaliplatin.  Today WBC 2.91, ANC 1.69.  Recommended we use Neulasta with his treatment otherwise we will have treatment delays going forward.  The patient is agreeable and will start taking Claritin today in preparation.  We will have to closely monitor her neuropathy as if she has any worsening of her neuropathy we will likely have to omit oxaliplatin.    #GERD  · Significant epigastric discomfort and reflux with initiation of chemo  · Continue Protonix 40 mg nightly    #Diarrhea  · Initially began following radiation and was exacerbated with chemotherapy  · Utilizing Imodium, Lomotil was added  · We discussed today alternating Imodium and Lomotil as needed    PLAN:   -Proceed with cycle 5 FOLFOX today with continued 25% dose reduction in oxaliplatin.  -We will use Neulasta on body injector to be placed on disconnect day.  Patient will start taking Claritin today in preparation.  -Continue supportive care with Protonix, antiemetics,   -2 weeks follow-up with MD or NP with repeat CBC, CMP and continued FOLFOX.    -? Need to do repeat imaging vs scope with Dr. Amaya to assess response to treatment so far.  -Planning 4 months neoadjuvant FOLFOX.        No orders of the defined types were placed in this encounter.      YAMILEX Otto  12/28/2022      ADDENDUM:   Dr. Amaya says no need to do scope and proceed with imaging.  Will schedule patient for MRI pelvis to further evaluate her treatment response, as her original rectal cancer was not well visualized on CT scan.  -YAMILEX Neville

## 2022-12-30 ENCOUNTER — INFUSION (OUTPATIENT)
Dept: ONCOLOGY | Facility: HOSPITAL | Age: 62
End: 2022-12-30
Payer: COMMERCIAL

## 2022-12-30 ENCOUNTER — PATIENT OUTREACH (OUTPATIENT)
Dept: OTHER | Facility: HOSPITAL | Age: 62
End: 2022-12-30

## 2022-12-30 VITALS
HEART RATE: 79 BPM | BODY MASS INDEX: 34.79 KG/M2 | SYSTOLIC BLOOD PRESSURE: 138 MMHG | TEMPERATURE: 97 F | OXYGEN SATURATION: 96 % | DIASTOLIC BLOOD PRESSURE: 82 MMHG | RESPIRATION RATE: 16 BRPM | WEIGHT: 237.6 LBS

## 2022-12-30 DIAGNOSIS — Z45.2 ENCOUNTER FOR FITTING AND ADJUSTMENT OF VASCULAR CATHETER: ICD-10-CM

## 2022-12-30 DIAGNOSIS — C20 RECTAL CANCER: Primary | ICD-10-CM

## 2022-12-30 PROCEDURE — 96361 HYDRATE IV INFUSION ADD-ON: CPT

## 2022-12-30 PROCEDURE — 96360 HYDRATION IV INFUSION INIT: CPT

## 2022-12-30 PROCEDURE — 96377 APPLICATON ON-BODY INJECTOR: CPT

## 2022-12-30 PROCEDURE — 25010000002 HEPARIN LOCK FLUSH PER 10 UNITS: Performed by: INTERNAL MEDICINE

## 2022-12-30 PROCEDURE — 25010000002 PEGFILGRASTIM 6 MG/0.6ML PREFILLED SYRINGE KIT: Performed by: NURSE PRACTITIONER

## 2022-12-30 RX ORDER — SODIUM CHLORIDE 0.9 % (FLUSH) 0.9 %
10 SYRINGE (ML) INJECTION AS NEEDED
Status: DISCONTINUED | OUTPATIENT
Start: 2022-12-30 | End: 2022-12-30 | Stop reason: HOSPADM

## 2022-12-30 RX ORDER — LORATADINE 10 MG/1
10 TABLET ORAL AS NEEDED
COMMUNITY

## 2022-12-30 RX ORDER — HEPARIN SODIUM (PORCINE) LOCK FLUSH IV SOLN 100 UNIT/ML 100 UNIT/ML
500 SOLUTION INTRAVENOUS AS NEEDED
Status: CANCELLED | OUTPATIENT
Start: 2022-12-30

## 2022-12-30 RX ORDER — SODIUM CHLORIDE 0.9 % (FLUSH) 0.9 %
10 SYRINGE (ML) INJECTION AS NEEDED
Status: CANCELLED | OUTPATIENT
Start: 2022-12-30

## 2022-12-30 RX ORDER — HEPARIN SODIUM (PORCINE) LOCK FLUSH IV SOLN 100 UNIT/ML 100 UNIT/ML
500 SOLUTION INTRAVENOUS AS NEEDED
Status: DISCONTINUED | OUTPATIENT
Start: 2022-12-30 | End: 2022-12-30 | Stop reason: HOSPADM

## 2022-12-30 RX ORDER — SODIUM CHLORIDE 9 MG/ML
1000 INJECTION, SOLUTION INTRAVENOUS ONCE
Status: COMPLETED | OUTPATIENT
Start: 2022-12-30 | End: 2022-12-30

## 2022-12-30 RX ADMIN — PEGFILGRASTIM 6 MG: KIT SUBCUTANEOUS at 12:55

## 2022-12-30 RX ADMIN — Medication 10 ML: at 13:56

## 2022-12-30 RX ADMIN — Medication 500 UNITS: at 13:56

## 2022-12-30 RX ADMIN — SODIUM CHLORIDE 1000 ML: 9 INJECTION, SOLUTION INTRAVENOUS at 12:23

## 2022-12-30 NOTE — NURSING NOTE
"Pt here for unhook IVfs and Neulasta OBI application. She c/o not feelling well. Nausea started Day 1 of treatment, actually while receiving the Oxaliplatin, Pt states Zofran usually helps. She is eating little but knows she needs to eat. She tooK Zofran last about 11am today. She also has had 2 loose stools, one at home this am and once upon arriving to the office today.The pt stated that she will start Imodium today when she gets home and alternate it like she usually does with Lomotil to control the diarrhea. The pt mentioned also that her throat again \"feels like she swallowed glass\". The pt said this happens after each chemo treatment, lasts about 5 days, and seems to be worsening with each treatment. The pt explained that this throat discomfort is worse when her mouth is dry (ex., upon awakening in the morning). Sipping water and sucking on mints helps. Nurse s/w YAMILEX Woodward, who states this is due to the Oxaliplatin. The pt should focus on keeping her mouth moist. She can try otc Biotene for dry mouth to see if this helps her symptoms. The pt was informed and v/u.  "

## 2022-12-30 NOTE — PROGRESS NOTES
Nurse Navigator F/U Visit: Call placed to patient for f/u telephone visit on 12/30/22. Patient has h/o rectal cancer. Patient states she doesn’t feel well. Says the chemotherapy is “kicking her butt”. Complains of sleeping more, decreased appetite and nausea but no vomiting. States she has to force herself to eat. Using Zofran for nausea more frequently and she is taking Protonix for reflux. Declined nutrition consult at this time. Reports she is anemic and is due to receive IVFs and Neulasta today at her appointment. States she is in good spirits and has great support system in place. Says she will probably be needing resources in the future when she is ready for surgery. Will provide to patient at that time. No additional complaints or concerns voiced. Patient able to verbalize understanding regarding plan of care. No needs noted at this time. Encouraged patient to call with questions or concerns. Will continue to follow…..Katie Sanchez RN, Oncology Nurse Navigator

## 2023-01-04 DIAGNOSIS — C20 RECTAL CANCER: Primary | ICD-10-CM

## 2023-01-07 ENCOUNTER — HOSPITAL ENCOUNTER (OUTPATIENT)
Dept: MRI IMAGING | Facility: HOSPITAL | Age: 63
Discharge: HOME | End: 2023-01-07
Admitting: INTERNAL MEDICINE
Payer: COMMERCIAL

## 2023-01-07 DIAGNOSIS — C20 RECTAL CANCER: ICD-10-CM

## 2023-01-07 PROCEDURE — 72195 MRI PELVIS W/O DYE: CPT

## 2023-01-11 ENCOUNTER — TELEMEDICINE (OUTPATIENT)
Dept: ONCOLOGY | Facility: CLINIC | Age: 63
End: 2023-01-11
Payer: COMMERCIAL

## 2023-01-11 ENCOUNTER — INFUSION (OUTPATIENT)
Dept: ONCOLOGY | Facility: HOSPITAL | Age: 63
End: 2023-01-11
Payer: COMMERCIAL

## 2023-01-11 VITALS
DIASTOLIC BLOOD PRESSURE: 81 MMHG | SYSTOLIC BLOOD PRESSURE: 131 MMHG | OXYGEN SATURATION: 97 % | TEMPERATURE: 96.9 F | HEART RATE: 87 BPM | HEIGHT: 69 IN | BODY MASS INDEX: 34.24 KG/M2 | RESPIRATION RATE: 16 BRPM | WEIGHT: 231.2 LBS

## 2023-01-11 DIAGNOSIS — R11.0 CHEMOTHERAPY-INDUCED NAUSEA: ICD-10-CM

## 2023-01-11 DIAGNOSIS — G60.8 COLD INDUCED NEUROPATHY: ICD-10-CM

## 2023-01-11 DIAGNOSIS — Z79.899 HIGH RISK MEDICATION USE: ICD-10-CM

## 2023-01-11 DIAGNOSIS — C20 RECTAL CANCER: ICD-10-CM

## 2023-01-11 DIAGNOSIS — C20 RECTAL CANCER: Primary | ICD-10-CM

## 2023-01-11 DIAGNOSIS — T45.1X5A CHEMOTHERAPY-INDUCED NAUSEA: ICD-10-CM

## 2023-01-11 LAB
ALBUMIN SERPL-MCNC: 4.3 G/DL (ref 3.5–5.2)
ALBUMIN/GLOB SERPL: 1.7 G/DL
ALP SERPL-CCNC: 92 U/L (ref 39–117)
ALT SERPL W P-5'-P-CCNC: 23 U/L (ref 1–33)
ANION GAP SERPL CALCULATED.3IONS-SCNC: 10 MMOL/L (ref 5–15)
ANISOCYTOSIS BLD QL: ABNORMAL
AST SERPL-CCNC: 26 U/L (ref 1–32)
BILIRUB SERPL-MCNC: 0.5 MG/DL (ref 0–1.2)
BUN SERPL-MCNC: 9 MG/DL (ref 8–23)
BUN/CREAT SERPL: 12.5 (ref 7–25)
CALCIUM SPEC-SCNC: 9.7 MG/DL (ref 8.6–10.5)
CHLORIDE SERPL-SCNC: 106 MMOL/L (ref 98–107)
CO2 SERPL-SCNC: 26 MMOL/L (ref 22–29)
CREAT SERPL-MCNC: 0.72 MG/DL (ref 0.57–1)
DACRYOCYTES BLD QL SMEAR: ABNORMAL
DEPRECATED RDW RBC AUTO: 62.9 FL (ref 37–54)
EGFRCR SERPLBLD CKD-EPI 2021: 94.1 ML/MIN/1.73
EOSINOPHIL # BLD MANUAL: 0.1 10*3/MM3 (ref 0–0.4)
EOSINOPHIL NFR BLD MANUAL: 2 % (ref 0.3–6.2)
ERYTHROCYTE [DISTWIDTH] IN BLOOD BY AUTOMATED COUNT: 20 % (ref 12.3–15.4)
GLOBULIN UR ELPH-MCNC: 2.5 GM/DL
GLUCOSE SERPL-MCNC: 101 MG/DL (ref 65–99)
HCT VFR BLD AUTO: 30 % (ref 34–46.6)
HGB BLD-MCNC: 9.7 G/DL (ref 12–15.9)
LYMPHOCYTES # BLD MANUAL: 0.94 10*3/MM3 (ref 0.7–3.1)
LYMPHOCYTES NFR BLD MANUAL: 13 % (ref 5–12)
MCH RBC QN AUTO: 28.9 PG (ref 26.6–33)
MCHC RBC AUTO-ENTMCNC: 32.3 G/DL (ref 31.5–35.7)
MCV RBC AUTO: 89.3 FL (ref 79–97)
MONOCYTES # BLD: 0.68 10*3/MM3 (ref 0.1–0.9)
NEUTROPHILS # BLD AUTO: 3.48 10*3/MM3 (ref 1.7–7)
NEUTROPHILS NFR BLD MANUAL: 67 % (ref 42.7–76)
NRBC SPEC MANUAL: 2 /100 WBC (ref 0–0.2)
OVALOCYTES BLD QL SMEAR: ABNORMAL
PLAT MORPH BLD: NORMAL
PLATELET # BLD AUTO: 130 10*3/MM3 (ref 140–450)
PMV BLD AUTO: 9.9 FL (ref 6–12)
POTASSIUM SERPL-SCNC: 3.8 MMOL/L (ref 3.5–5.2)
PROT SERPL-MCNC: 6.8 G/DL (ref 6–8.5)
RBC # BLD AUTO: 3.36 10*6/MM3 (ref 3.77–5.28)
SODIUM SERPL-SCNC: 142 MMOL/L (ref 136–145)
VARIANT LYMPHS NFR BLD MANUAL: 18 % (ref 19.6–45.3)
WBC MORPH BLD: NORMAL
WBC NRBC COR # BLD: 5.2 10*3/MM3 (ref 3.4–10.8)

## 2023-01-11 PROCEDURE — 25010000002 FLUOROURACIL PER 500 MG: Performed by: NURSE PRACTITIONER

## 2023-01-11 PROCEDURE — 96375 TX/PRO/DX INJ NEW DRUG ADDON: CPT

## 2023-01-11 PROCEDURE — 25010000002 FOSAPREPITANT PER 1 MG: Performed by: NURSE PRACTITIONER

## 2023-01-11 PROCEDURE — 96416 CHEMO PROLONG INFUSE W/PUMP: CPT

## 2023-01-11 PROCEDURE — 96366 THER/PROPH/DIAG IV INF ADDON: CPT

## 2023-01-11 PROCEDURE — 25010000002 DEXAMETHASONE SODIUM PHOSPHATE 100 MG/10ML SOLUTION: Performed by: NURSE PRACTITIONER

## 2023-01-11 PROCEDURE — 96367 TX/PROPH/DG ADDL SEQ IV INF: CPT

## 2023-01-11 PROCEDURE — 80053 COMPREHEN METABOLIC PANEL: CPT | Performed by: INTERNAL MEDICINE

## 2023-01-11 PROCEDURE — G0498 CHEMO EXTEND IV INFUS W/PUMP: HCPCS

## 2023-01-11 PROCEDURE — 99214 OFFICE O/P EST MOD 30 MIN: CPT | Performed by: NURSE PRACTITIONER

## 2023-01-11 PROCEDURE — 85007 BL SMEAR W/DIFF WBC COUNT: CPT | Performed by: INTERNAL MEDICINE

## 2023-01-11 PROCEDURE — 96409 CHEMO IV PUSH SNGL DRUG: CPT

## 2023-01-11 PROCEDURE — 85025 COMPLETE CBC W/AUTO DIFF WBC: CPT | Performed by: INTERNAL MEDICINE

## 2023-01-11 PROCEDURE — 25010000002 PALONOSETRON PER 25 MCG: Performed by: NURSE PRACTITIONER

## 2023-01-11 PROCEDURE — 25010000002 LEUCOVORIN CALCIUM PER 50 MG: Performed by: NURSE PRACTITIONER

## 2023-01-11 RX ORDER — PALONOSETRON 0.05 MG/ML
0.25 INJECTION, SOLUTION INTRAVENOUS ONCE
Status: COMPLETED | OUTPATIENT
Start: 2023-01-11 | End: 2023-01-11

## 2023-01-11 RX ORDER — FLUOROURACIL 50 MG/ML
400 INJECTION, SOLUTION INTRAVENOUS ONCE
Status: COMPLETED | OUTPATIENT
Start: 2023-01-11 | End: 2023-01-11

## 2023-01-11 RX ORDER — PALONOSETRON 0.05 MG/ML
0.25 INJECTION, SOLUTION INTRAVENOUS ONCE
Status: CANCELLED | OUTPATIENT
Start: 2023-01-11

## 2023-01-11 RX ORDER — FAMOTIDINE 10 MG/ML
20 INJECTION, SOLUTION INTRAVENOUS AS NEEDED
Status: CANCELLED | OUTPATIENT
Start: 2023-01-11

## 2023-01-11 RX ORDER — DEXTROSE MONOHYDRATE 50 MG/ML
250 INJECTION, SOLUTION INTRAVENOUS ONCE
Status: COMPLETED | OUTPATIENT
Start: 2023-01-11 | End: 2023-01-11

## 2023-01-11 RX ORDER — DEXTROSE MONOHYDRATE 50 MG/ML
250 INJECTION, SOLUTION INTRAVENOUS ONCE
Status: CANCELLED | OUTPATIENT
Start: 2023-01-11

## 2023-01-11 RX ORDER — DIPHENHYDRAMINE HYDROCHLORIDE 50 MG/ML
50 INJECTION INTRAMUSCULAR; INTRAVENOUS AS NEEDED
Status: CANCELLED | OUTPATIENT
Start: 2023-01-11

## 2023-01-11 RX ORDER — FLUOROURACIL 50 MG/ML
400 INJECTION, SOLUTION INTRAVENOUS ONCE
Status: CANCELLED | OUTPATIENT
Start: 2023-01-11

## 2023-01-11 RX ADMIN — DEXAMETHASONE SODIUM PHOSPHATE 12 MG: 100 INJECTION INTRAMUSCULAR; INTRAVENOUS at 10:04

## 2023-01-11 RX ADMIN — FLUOROURACIL 5420 MG: 50 INJECTION, SOLUTION INTRAVENOUS at 11:44

## 2023-01-11 RX ADMIN — SODIUM CHLORIDE 100 ML: 9 INJECTION, SOLUTION INTRAVENOUS at 10:24

## 2023-01-11 RX ADMIN — FLUOROURACIL 900 MG: 50 INJECTION, SOLUTION INTRAVENOUS at 11:44

## 2023-01-11 RX ADMIN — PALONOSETRON 0.25 MG: 0.05 INJECTION, SOLUTION INTRAVENOUS at 10:04

## 2023-01-11 RX ADMIN — DEXTROSE MONOHYDRATE 250 ML: 50 INJECTION, SOLUTION INTRAVENOUS at 10:04

## 2023-01-11 RX ADMIN — LEUCOVORIN CALCIUM 900 MG: 350 INJECTION, POWDER, LYOPHILIZED, FOR SOLUTION INTRAMUSCULAR; INTRAVENOUS at 11:03

## 2023-01-11 NOTE — PROGRESS NOTES
[facility]    CONSULTING IN BLOOD DISORDERS & CANCER    This was an audio and video enabled telemedicine encounter.      REASON FOR CONSULTATION/CHIEF COMPLAINT:     Evaluation and management for rectal adenocarcinoma                             REQUESTING PHYSICIAN: No ref. provider found  RECORDS OBTAINED:  Records of the patients history including those from the electronic medical record were reviewed and summarized in detail.    HISTORY OF PRESENT ILLNESS:    The patient is a 62 y.o. year old female with medical history significant for migraines, depression/anxiety, and IBS had presented with epigastric discomfort in September 2022.      An EGD and colonoscopy performed by SHOAIB Esteves on 9/1/2022 demonstrated a 3 cm mass in the proximal rectum around 13 cm from the anal verge.  The polyp was multilobulated and somewhat fixed to the underlying tissue.  Friable with contact bleeding.  Other smaller polyps were found in the cecum and ascending colon which were removed.  The EGD revealed mucosal nodule in the esophagus and multiple gastric polyps.  No evidence of bleeding.     The pathology from the rectal mass came back as invasive moderately differentiated adenocarcinoma arising out of traditional serrated adenoma with high-grade dysplasia.    Patient was subsequently referred to Dr. Amaya, colorectal surgery who performed a flexible sigmoidoscopy on 9/21/2022, revealing an infiltrative nonobstructing mass in the mid rectum.  The mass was partially circumferential involving one third of the lumen circumference.     9/13/2022: CT C/A/P with contrast noted rectal neoplasm not well visualized on this exam.  Calcified and noncalcified sub-6mm pulmonary nodules likely the sequela of prior granulomatous disease.  Scattered colonic diverticulosis.  Mildly prominent, but not pathologically enlarged pelvic nodes measuring up to 4 mm.    9/26/2022: MRI pelvis showed high rectal mass with effacement of the muscularis  propria along the right wall and extended beyond the muscularis propria at the posterior wall, T3 lesion. There is no evidence for involvement of the mesorectal fascia. There are no pathologically enlarged nodes.     Patient has been seen by , rad-onc & being planned for neoadjuvant radiation. Patient has come to this clinic to discuss systemic therapy options.    Guardant 360: MSI stable.  Low TMB.    10/17- 10/22/2022: Short course of radiotherapy to the rectal mass.    11/2/2022: Cycle 1 FOLFOX  12/14/2022: Cycle 4 FOLFOX    INTERIM HISTORY:  Ms. Felton is a 62-year-old female with the above-mentioned history who was seen today for lab review and evaluation prior to cycle 6 FOLFOX.  She reports that this past cycle of FOLFOX has been her absolute worst cycle.  She has had worsening issues with cold sensitivity/neuropathy.  She also has struggled with nausea and diarrhea.  She has noticed that her heart rate has been elevated intermittently, and she is having palpitations related to this.  She also reports that she had about a 30-minute nosebleed on Monday.  She admits she is likely not drinking enough fluid because everything has to be warm.      Past Medical History:   Diagnosis Date   • Abdominal cramping 07/01/2022   • Anemia    • Bloating 07/01/2022   • Bloody diarrhea 07/01/2022   • Chronic back pain    • Colon polyps     FOLLOWED BY DR. BEVERLY MENDEZ   • Depression    • Gastric polyps     FOLLOWED BY DR. BEVERLY MENDEZ   • GERD (gastroesophageal reflux disease)    • Hearing loss    • Hiatal hernia 09/2021    3 CM, FOLLOWED BY DR. BEVERLY MENDEZ   • Hyperlipidemia    • IBS (irritable bowel syndrome)    • Migraines    • Patellofemoral arthritis 07/2021   • PNA (pneumonia)    • PONV (postoperative nausea and vomiting) 1965    Numerous procedures with nausea   • Rectal bleeding 1983    Off and on for years   • Rectal cancer (HCC) 09/2022    INVASIVE MODERATELY DIFFERENTIATED ADENOCARCINOMA ARISING  "FROM TRADITIONAL SERRATED ADENOMA WITH HGD   • Sciatica 09/2015   • Tear of medial meniscus of knee 07/2021    RIGHT KNEE   • Vitamin D deficiency      Past Surgical History:   Procedure Laterality Date   • COLONOSCOPY N/A 1986   • COLONOSCOPY W/ POLYPECTOMY N/A 09/01/2022    3 MM TUBULAR ADENOMA POLYP IN ASCENDING, 6 MM TUBULAR ADENOMA POLYP IN CECUM, 30 MM MASS IN RECTUM, PATH:INVASIVE MODERATELY DIFFERENTIATED ADENOCARCINOMA ARISING FROM TRADITIONAL SERRATED ADENOMA WITH HGD, HYPERTROPHIED ANAL PAPILLA, DR. BEVERLY MENDEZ AT [facility]   • ENDOSCOPY N/A 09/01/2022    MULTIPLE BENIGN GASTRIC POLYPS, 2 SQUAMOUS PAPILLAS IN ESOPHAGUS, 3 CM HIATAL HERNIA,   • EXTERNAL EAR SURGERY  1966    MULTIPLE \"LANCES\", DR. ACE IN [facility]   • EYE SURGERY Right 1967    DR. CASTANEDA IN [facility]   • KNEE ARTHRODESIS Right 07/27/2021    RIGHT KNEE ARTHROCENTESIS, DR. ALBERT GILMAN   • KNEE SURGERY Left 1985    DR. PEREZ AT [place]   • SHOULDER MANIPULATION Left 01/07/2013    FOR FROZEN SHOULDER, DR. ALBERT GILMAN AT [facility]   • SIGMOIDOSCOPY N/A 09/21/2022    AN INFILTRATIVE NON OBSTRUCTING MASS IN MID RECTUM, AREA TATTOOED, DR. TEMO AMAYA AT [facility]   • TRANSRECTAL ULTRASOUND N/A 09/21/2022    Procedure: ULTRASOUND TRANSRECTAL;  Surgeon: Temo Amaya MD;  Location: [facility] ENDOSCOPY;  Service: General;  Laterality: N/A;   • VENOUS ACCESS DEVICE (PORT) INSERTION Left 10/26/2022    Procedure: INSERTION OF PORTACATH;  Surgeon: Leonel Bridges MD;  Location: [facility] MAIN OR;  Service: General;  Laterality: Left;   • WISDOM TOOTH EXTRACTION Bilateral        MEDICATIONS    Current Outpatient Medications:   •  dicyclomine (BENTYL) 20 MG tablet, Take 1 tablet by mouth Every 6 (Six) Hours As Needed., Disp: , Rfl:   •  diphenoxylate-atropine (LOMOTIL) 2.5-0.025 MG per tablet, Take 1 tablet by mouth 4 (Four) Times a Day As Needed for Diarrhea., Disp: 30 tablet, Rfl: 2  •  ELDERBERRY PO, Take 2 doses by mouth " Daily., Disp: , Rfl:   •  lidocaine-prilocaine (EMLA) 2.5-2.5 % cream, Apply 1 application topically to the appropriate area as directed As Needed for Mild Pain. Apply 30 minutes prior to port access, Disp: 5 g, Rfl: 1  •  loperamide (IMODIUM) 2 MG capsule, Take 1 capsule by mouth As Needed for Diarrhea., Disp: , Rfl:   •  loratadine (CLARITIN) 10 MG tablet, Take 10 mg by mouth Daily. 1 daily around time of Neulasta OBI, Disp: , Rfl:   •  ondansetron (ZOFRAN) 8 MG tablet, Take 1 tablet by mouth 3 (Three) Times a Day As Needed for Nausea or Vomiting., Disp: 30 tablet, Rfl: 5  •  oxyCODONE-acetaminophen (PERCOCET) 5-325 MG per tablet, Take 1 tablet by mouth Every 8 (Eight) Hours As Needed for Moderate Pain or Severe Pain. Please fill asap and notify patient when ready to .  Thanks a, Disp: 60 tablet, Rfl: 0  •  pantoprazole (PROTONIX) 40 MG EC tablet, Take 1 tablet by mouth Daily., Disp: 30 tablet, Rfl: 3  •  prochlorperazine (COMPAZINE) 10 MG tablet, Take 1 tablet by mouth Every 6 (Six) Hours As Needed for Nausea or Vomiting., Disp: 30 tablet, Rfl: 3  •  traMADol (ULTRAM) 50 MG tablet, Take 1 tablet by mouth Every 6 (Six) Hours As Needed for Moderate Pain., Disp: 15 tablet, Rfl: 0  No current facility-administered medications for this visit.    Facility-Administered Medications Ordered in Other Visits:   •  fluorouracil (ADRUCIL) 5,420 mg in sodium chloride 0.9 % 240 mL chemo infusion - FOR HOME USE, 2,400 mg/m2 (Treatment Plan Recorded), Intravenous, Once, Laxmi Greenberg APRN, 5,420 mg at 01/11/23 1144    ALLERGIES:     Allergies   Allergen Reactions   • Adhesive Tape Rash     Patient has sensitive skin.    • Levofloxacin Swelling   • Sulfa Antibiotics Rash       SOCIAL HISTORY:       Social History     Socioeconomic History   • Marital status: Single   Tobacco Use   • Smoking status: Never   • Smokeless tobacco: Never   Vaping Use   • Vaping Use: Never used   Substance and Sexual Activity   • Alcohol  "use: Yes     Alcohol/week: 1.0 standard drink     Types: 1 Shots of liquor per week     Comment: Per month   • Drug use: Never   • Sexual activity: Not Currently         FAMILY HISTORY:  Family History   Problem Relation Age of Onset   • Colon polyps Mother    • Asthma Mother    • COPD Mother    • Lung disease Mother    • Migraines Mother    • Cancer Father         liver cancer   • Colon polyps Father    • Heart disease Father    • Diabetes Father    • Kidney disease Father    • Angina Father    • Liver cancer Father    • Hepatitis Father    • Hearing loss Father    • Cancer Sister         Breast cancer   • Arthritis Sister    • Colon polyps Sister    • Breast cancer Sister    • Diabetes Sister    • Colon polyps Sister    • Alcohol abuse Maternal Uncle    • Colon cancer Neg Hx    • Crohn's disease Neg Hx    • Irritable bowel syndrome Neg Hx    • Ulcerative colitis Neg Hx    • Malig Hyperthermia Neg Hx        REVIEW OF SYSTEMS:  As per HPI         Vitals:    01/11/23 0913   BP: 131/81   Pulse: 87   Resp: 16   Temp: 96.9 °F (36.1 °C)   TempSrc: Temporal   SpO2: 97%   Weight: 105 kg (231 lb 3.2 oz)   Height: 176 cm (69.29\")   PainSc: 0-No pain     Current Status 1/11/2023   ECOG score 0     Physical Exam  Constitutional:       General: She is not in acute distress.     Appearance: She is well-developed.   Pulmonary:      Effort: Pulmonary effort is normal. No respiratory distress.   Skin:     General: Skin is warm and dry.   Neurological:      Mental Status: She is alert and oriented to person, place, and time.     Physical exam limited due to telehealth visit      RECENT LABS:  Results from last 7 days   Lab Units 01/11/23  0903   WBC 10*3/mm3 5.20   NEUTROS ABS 10*3/mm3 3.48   LYMPHS ABS 10*3/mm3 0.94   HEMOGLOBIN g/dL 9.7*   HEMATOCRIT % 30.0*   PLATELETS 10*3/mm3 130*       ASSESSMENT:   is a pleasant 60 y/o WF with medical history significant for migraines, depression/anxiety, and IBS comes for rectal " adenocarcinoma evaluation & management.     # Rectal adenocarcinoma, T3N0M0, Stage IIA:  · Diagnosed on a c-scope performed for gastric discomfort in September 2022.   · The flex sig noted a an infiltrative nonobstructing mass in the mid rectum.  The mass was partially circumferential involving one third of the lumen circumference. CT c/a/p negative for mets. MRI pelvis most consistent with T3N0 disease, with no evidence of involvement of mesorectal fascia.  · Reviewed various management strategies for localized rectal cancer with chemotherapy, radiation and surgical resection.  Patient met with Dr. Amaya, colorectal surgery and is being tentatively planned for low anterior resection after neoadjuvant chemotherapy and radiation.  She received neoadjuvant short course RT by Dr. Freeman, radiation oncology from 10/17- 10/22/2022.   · Reviewed the role and rationale of systemic therapy and localized rectal adenocarcinoma.  Informed patient that the SOC for such patients is 4-months of chemotherapy with FOLFOX/CAPOX. Total neoadjuvant therapy is preferred in these cases.   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 She expressed interest in alternative treatment options. I reviewed the recent data about IOs in dMMR patients, although informed her this is not the current SOC.  A guardant 360 NGS was obtained which did not show MSI high.  Low TMB.  · Patient is agreeable to proceed with FOLFOX with tentative plan to dose-reduce or stop oxaliplatin if develops neuropathy.    · 11/2/2022: Is clinically stable.  Labs stable.  Proceed with cycle 1 FOLFOX.    · 11/16/2022 cycle 2 FOLFOX.  Received cycle 3 with Neulasta due to neutropenia.  · 12/14/2022: Labs show improved WBC/ANC.  Given significant peripheral neuropathy, will reduce oxaliplatin dose by 25%.  With significant arthralgias despite Claritin, we will not administer Neulasta with this cycle.  Proceed  with cycle 4 FOLFOX.  Follow-up in 2 weeks with NP for port, labs and cycle 5 FOLFOX.  · 12/28/2022 cycle 5 FOLFOX we will proceed with continued 25% dose reduction in oxaliplatin.  Today WBC 2.91, ANC 1.69.  Recommended we use Neulasta with his treatment otherwise we will have treatment delays going forward.  The patient is agreeable and will start taking Claritin today in preparation.  We will have to closely monitor her neuropathy as if she has any worsening of her neuropathy we will likely have to omit oxaliplatin.  · Seen 1/11/2023 patient did have an MRI of her pelvis a week ago to evaluate her treatment response, we are trying to get that scan read.  Complaining of worsening neuropathy due to oxaliplatin.  She is also had problems with nausea and diarrhea and just generally not feeling well.  We will plan to omit oxaliplatin with this cycle.    #GERD  · Significant epigastric discomfort and reflux with initiation of chemo  · Continue Protonix 40 mg nightly    #Diarrhea  · Initially began following radiation and was exacerbated with chemotherapy  · Utilizing Imodium, Lomotil was added  · We discussed today alternating Imodium and Lomotil as needed    #Nausea due to chemotherapy:  · Slightly worse after her fifth cycle.  Had been taking Zofran on a regular basis.  She does have a prescription for Compazine, and I encouraged her to use this intermittently if needed as well.    PLAN:   -Proceed with cycle 6 chemotherapy today however we will omit the oxaliplatin due to worsening neuropathy.  -Continue antiemetics if needed.  -Patient will receive 1 L of IV normal saline on disconnect day.  -Return in 2 weeks for follow-up with Dr. Weber with repeat labs reevaluation and consideration of continued FOLFOX chemotherapy.  -We will not use Neulasta with this cycle, and will evaluate each time.  -Call/ return sooner should he develop any new concerns or problems.  -Planning 4 months neoadjuvant FOLFOX.        No orders  of the defined types were placed in this encounter.      Laxmi Greenberg, APRN  01/11/2023    Mode of Visit: Video  Location of patient: other: office  You have chosen to receive care through a telehealth visit.  Does the patient consent to use a video/audio connection for your medical care today? Yes  The visit included audio and video interaction. No technical issues occurred during this visit.

## 2023-01-13 ENCOUNTER — INFUSION (OUTPATIENT)
Dept: ONCOLOGY | Facility: HOSPITAL | Age: 63
End: 2023-01-13
Payer: COMMERCIAL

## 2023-01-13 VITALS
RESPIRATION RATE: 18 BRPM | DIASTOLIC BLOOD PRESSURE: 69 MMHG | HEIGHT: 70 IN | TEMPERATURE: 96.4 F | WEIGHT: 231 LBS | SYSTOLIC BLOOD PRESSURE: 105 MMHG | HEART RATE: 76 BPM | BODY MASS INDEX: 33.07 KG/M2 | OXYGEN SATURATION: 97 %

## 2023-01-13 DIAGNOSIS — C20 RECTAL CANCER: Primary | ICD-10-CM

## 2023-01-13 DIAGNOSIS — Z45.2 ENCOUNTER FOR FITTING AND ADJUSTMENT OF VASCULAR CATHETER: ICD-10-CM

## 2023-01-13 PROCEDURE — 96360 HYDRATION IV INFUSION INIT: CPT

## 2023-01-13 PROCEDURE — 25010000002 HEPARIN LOCK FLUSH PER 10 UNITS: Performed by: INTERNAL MEDICINE

## 2023-01-13 RX ORDER — SODIUM CHLORIDE 0.9 % (FLUSH) 0.9 %
10 SYRINGE (ML) INJECTION AS NEEDED
Status: CANCELLED | OUTPATIENT
Start: 2023-01-13

## 2023-01-13 RX ORDER — HEPARIN SODIUM (PORCINE) LOCK FLUSH IV SOLN 100 UNIT/ML 100 UNIT/ML
500 SOLUTION INTRAVENOUS AS NEEDED
Status: DISCONTINUED | OUTPATIENT
Start: 2023-01-13 | End: 2023-01-13 | Stop reason: HOSPADM

## 2023-01-13 RX ORDER — SODIUM CHLORIDE 9 MG/ML
1000 INJECTION, SOLUTION INTRAVENOUS ONCE
Status: COMPLETED | OUTPATIENT
Start: 2023-01-13 | End: 2023-01-13

## 2023-01-13 RX ORDER — SODIUM CHLORIDE 0.9 % (FLUSH) 0.9 %
10 SYRINGE (ML) INJECTION AS NEEDED
Status: DISCONTINUED | OUTPATIENT
Start: 2023-01-13 | End: 2023-01-13 | Stop reason: HOSPADM

## 2023-01-13 RX ORDER — HEPARIN SODIUM (PORCINE) LOCK FLUSH IV SOLN 100 UNIT/ML 100 UNIT/ML
500 SOLUTION INTRAVENOUS AS NEEDED
Status: CANCELLED | OUTPATIENT
Start: 2023-01-13

## 2023-01-13 RX ADMIN — Medication 500 UNITS: at 11:44

## 2023-01-13 RX ADMIN — SODIUM CHLORIDE 1000 ML: 9 INJECTION, SOLUTION INTRAVENOUS at 10:16

## 2023-01-13 RX ADMIN — Medication 10 ML: at 11:44

## 2023-01-20 ENCOUNTER — PREP FOR SURGERY (OUTPATIENT)
Dept: OTHER | Facility: HOSPITAL | Age: 63
End: 2023-01-20
Payer: COMMERCIAL

## 2023-01-20 DIAGNOSIS — C20 RECTAL CANCER: Primary | ICD-10-CM

## 2023-01-25 ENCOUNTER — INFUSION (OUTPATIENT)
Dept: ONCOLOGY | Facility: HOSPITAL | Age: 63
End: 2023-01-25
Payer: COMMERCIAL

## 2023-01-25 ENCOUNTER — OFFICE VISIT (OUTPATIENT)
Dept: ONCOLOGY | Facility: CLINIC | Age: 63
End: 2023-01-25
Payer: COMMERCIAL

## 2023-01-25 ENCOUNTER — APPOINTMENT (OUTPATIENT)
Dept: ONCOLOGY | Facility: HOSPITAL | Age: 63
End: 2023-01-25
Payer: COMMERCIAL

## 2023-01-25 ENCOUNTER — TELEPHONE (OUTPATIENT)
Dept: ONCOLOGY | Facility: CLINIC | Age: 63
End: 2023-01-25

## 2023-01-25 VITALS
RESPIRATION RATE: 16 BRPM | TEMPERATURE: 96.9 F | HEART RATE: 86 BPM | SYSTOLIC BLOOD PRESSURE: 121 MMHG | DIASTOLIC BLOOD PRESSURE: 85 MMHG | OXYGEN SATURATION: 98 % | BODY MASS INDEX: 34.02 KG/M2 | WEIGHT: 229.7 LBS | HEIGHT: 69 IN

## 2023-01-25 DIAGNOSIS — C20 RECTAL CANCER: Primary | ICD-10-CM

## 2023-01-25 LAB
ALBUMIN SERPL-MCNC: 4.1 G/DL (ref 3.5–5.2)
ALBUMIN/GLOB SERPL: 1.5 G/DL (ref 1.1–2.4)
ALP SERPL-CCNC: 82 U/L (ref 38–116)
ALT SERPL W P-5'-P-CCNC: 24 U/L (ref 0–33)
ANION GAP SERPL CALCULATED.3IONS-SCNC: 13 MMOL/L (ref 5–15)
AST SERPL-CCNC: 28 U/L (ref 0–32)
BASOPHILS # BLD AUTO: 0.01 10*3/MM3 (ref 0–0.2)
BASOPHILS NFR BLD AUTO: 0.4 % (ref 0–1.5)
BILIRUB SERPL-MCNC: 0.8 MG/DL (ref 0.2–1.2)
BUN SERPL-MCNC: 6 MG/DL (ref 6–20)
BUN/CREAT SERPL: 9 (ref 7.3–30)
CALCIUM SPEC-SCNC: 9.4 MG/DL (ref 8.5–10.2)
CHLORIDE SERPL-SCNC: 106 MMOL/L (ref 98–107)
CO2 SERPL-SCNC: 23 MMOL/L (ref 22–29)
CREAT SERPL-MCNC: 0.67 MG/DL (ref 0.6–1.1)
DEPRECATED RDW RBC AUTO: 66.8 FL (ref 37–54)
EGFRCR SERPLBLD CKD-EPI 2021: 98.4 ML/MIN/1.73
EOSINOPHIL # BLD AUTO: 0.06 10*3/MM3 (ref 0–0.4)
EOSINOPHIL NFR BLD AUTO: 2.1 % (ref 0.3–6.2)
ERYTHROCYTE [DISTWIDTH] IN BLOOD BY AUTOMATED COUNT: 20 % (ref 12.3–15.4)
GLOBULIN UR ELPH-MCNC: 2.7 GM/DL (ref 1.8–3.5)
GLUCOSE SERPL-MCNC: 97 MG/DL (ref 74–124)
HCT VFR BLD AUTO: 29.7 % (ref 34–46.6)
HGB BLD-MCNC: 9.8 G/DL (ref 12–15.9)
IMM GRANULOCYTES # BLD AUTO: 0.01 10*3/MM3 (ref 0–0.05)
IMM GRANULOCYTES NFR BLD AUTO: 0.4 % (ref 0–0.5)
LYMPHOCYTES # BLD AUTO: 0.87 10*3/MM3 (ref 0.7–3.1)
LYMPHOCYTES NFR BLD AUTO: 31 % (ref 19.6–45.3)
MCH RBC QN AUTO: 30.2 PG (ref 26.6–33)
MCHC RBC AUTO-ENTMCNC: 33 G/DL (ref 31.5–35.7)
MCV RBC AUTO: 91.4 FL (ref 79–97)
MONOCYTES # BLD AUTO: 0.3 10*3/MM3 (ref 0.1–0.9)
MONOCYTES NFR BLD AUTO: 10.7 % (ref 5–12)
NEUTROPHILS NFR BLD AUTO: 1.56 10*3/MM3 (ref 1.7–7)
NEUTROPHILS NFR BLD AUTO: 55.4 % (ref 42.7–76)
NRBC BLD AUTO-RTO: 0 /100 WBC (ref 0–0.2)
PLATELET # BLD AUTO: 164 10*3/MM3 (ref 140–450)
PMV BLD AUTO: 9.4 FL (ref 6–12)
POTASSIUM SERPL-SCNC: 3.9 MMOL/L (ref 3.5–4.7)
PROT SERPL-MCNC: 6.8 G/DL (ref 6.3–8)
RBC # BLD AUTO: 3.25 10*6/MM3 (ref 3.77–5.28)
SODIUM SERPL-SCNC: 142 MMOL/L (ref 134–145)
WBC NRBC COR # BLD: 2.81 10*3/MM3 (ref 3.4–10.8)

## 2023-01-25 PROCEDURE — 96416 CHEMO PROLONG INFUSE W/PUMP: CPT

## 2023-01-25 PROCEDURE — 25010000002 LEUCOVORIN CALCIUM PER 50 MG: Performed by: INTERNAL MEDICINE

## 2023-01-25 PROCEDURE — 25010000002 PALONOSETRON PER 25 MCG: Performed by: INTERNAL MEDICINE

## 2023-01-25 PROCEDURE — 80053 COMPREHEN METABOLIC PANEL: CPT

## 2023-01-25 PROCEDURE — 96375 TX/PRO/DX INJ NEW DRUG ADDON: CPT

## 2023-01-25 PROCEDURE — 85025 COMPLETE CBC W/AUTO DIFF WBC: CPT

## 2023-01-25 PROCEDURE — G0498 CHEMO EXTEND IV INFUS W/PUMP: HCPCS

## 2023-01-25 PROCEDURE — 99215 OFFICE O/P EST HI 40 MIN: CPT | Performed by: INTERNAL MEDICINE

## 2023-01-25 PROCEDURE — 25010000002 FOSAPREPITANT PER 1 MG: Performed by: INTERNAL MEDICINE

## 2023-01-25 PROCEDURE — 96367 TX/PROPH/DG ADDL SEQ IV INF: CPT

## 2023-01-25 PROCEDURE — 96409 CHEMO IV PUSH SNGL DRUG: CPT

## 2023-01-25 PROCEDURE — 25010000002 DEXAMETHASONE SODIUM PHOSPHATE 100 MG/10ML SOLUTION: Performed by: INTERNAL MEDICINE

## 2023-01-25 PROCEDURE — 25010000002 FLUOROURACIL PER 500 MG: Performed by: INTERNAL MEDICINE

## 2023-01-25 RX ORDER — DEXTROSE MONOHYDRATE 50 MG/ML
250 INJECTION, SOLUTION INTRAVENOUS ONCE
Status: COMPLETED | OUTPATIENT
Start: 2023-01-25 | End: 2023-01-25

## 2023-01-25 RX ORDER — FLUOROURACIL 50 MG/ML
400 INJECTION, SOLUTION INTRAVENOUS ONCE
Status: CANCELLED | OUTPATIENT
Start: 2023-01-25

## 2023-01-25 RX ORDER — PALONOSETRON 0.05 MG/ML
0.25 INJECTION, SOLUTION INTRAVENOUS ONCE
Status: CANCELLED | OUTPATIENT
Start: 2023-01-25

## 2023-01-25 RX ORDER — DEXTROSE MONOHYDRATE 50 MG/ML
250 INJECTION, SOLUTION INTRAVENOUS ONCE
Status: CANCELLED | OUTPATIENT
Start: 2023-01-25

## 2023-01-25 RX ORDER — FAMOTIDINE 10 MG/ML
20 INJECTION, SOLUTION INTRAVENOUS AS NEEDED
Status: CANCELLED | OUTPATIENT
Start: 2023-01-25

## 2023-01-25 RX ORDER — DIPHENHYDRAMINE HYDROCHLORIDE 50 MG/ML
50 INJECTION INTRAMUSCULAR; INTRAVENOUS AS NEEDED
Status: CANCELLED | OUTPATIENT
Start: 2023-01-25

## 2023-01-25 RX ORDER — FLUOROURACIL 50 MG/ML
400 INJECTION, SOLUTION INTRAVENOUS ONCE
Status: COMPLETED | OUTPATIENT
Start: 2023-01-25 | End: 2023-01-25

## 2023-01-25 RX ORDER — SODIUM CHLORIDE 9 MG/ML
1000 INJECTION, SOLUTION INTRAVENOUS ONCE
Status: CANCELLED
Start: 2023-01-27 | End: 2023-01-27

## 2023-01-25 RX ORDER — PALONOSETRON 0.05 MG/ML
0.25 INJECTION, SOLUTION INTRAVENOUS ONCE
Status: COMPLETED | OUTPATIENT
Start: 2023-01-25 | End: 2023-01-25

## 2023-01-25 RX ADMIN — SODIUM CHLORIDE 100 ML: 9 INJECTION, SOLUTION INTRAVENOUS at 10:38

## 2023-01-25 RX ADMIN — FLUOROURACIL 900 MG: 50 INJECTION, SOLUTION INTRAVENOUS at 11:54

## 2023-01-25 RX ADMIN — DEXAMETHASONE SODIUM PHOSPHATE 12 MG: 100 INJECTION INTRAMUSCULAR; INTRAVENOUS at 10:19

## 2023-01-25 RX ADMIN — LEUCOVORIN CALCIUM 900 MG: 350 INJECTION, POWDER, LYOPHILIZED, FOR SOLUTION INTRAMUSCULAR; INTRAVENOUS at 11:10

## 2023-01-25 RX ADMIN — PALONOSETRON 0.25 MG: 0.05 INJECTION, SOLUTION INTRAVENOUS at 10:17

## 2023-01-25 RX ADMIN — FLUOROURACIL 5420 MG: 50 INJECTION, SOLUTION INTRAVENOUS at 11:54

## 2023-01-25 RX ADMIN — DEXTROSE MONOHYDRATE 250 ML: 50 INJECTION, SOLUTION INTRAVENOUS at 10:16

## 2023-01-25 NOTE — NURSING NOTE
Pt seen by Dr. Weber at [facility]. Spoke to him via telephone with review of pt's lab results including ANC of 1.56. Per Dr. Weber okay to proceed with treatment today with 5FU and leucovorin as oxaliplatin will be omitted again for this cycle. Pt will receive Neulasta support on day of unhook.

## 2023-01-25 NOTE — PROGRESS NOTES
[facility]    CONSULTING IN BLOOD DISORDERS & CANCER    This was an audio and video enabled telemedicine encounter.      REASON FOR CONSULTATION/CHIEF COMPLAINT:     Evaluation and management for rectal adenocarcinoma                             REQUESTING PHYSICIAN: No ref. provider found  RECORDS OBTAINED:  Records of the patients history including those from the electronic medical record were reviewed and summarized in detail.    HISTORY OF PRESENT ILLNESS:    The patient is a 62 y.o. year old female with medical history significant for migraines, depression/anxiety, and IBS had presented with epigastric discomfort in September 2022.      An EGD and colonoscopy performed by SHOAIB Esteves on 9/1/2022 demonstrated a 3 cm mass in the proximal rectum around 13 cm from the anal verge.  The polyp was multilobulated and somewhat fixed to the underlying tissue.  Friable with contact bleeding.  Other smaller polyps were found in the cecum and ascending colon which were removed.  The EGD revealed mucosal nodule in the esophagus and multiple gastric polyps.  No evidence of bleeding.     The pathology from the rectal mass came back as invasive moderately differentiated adenocarcinoma arising out of traditional serrated adenoma with high-grade dysplasia.    Patient was subsequently referred to Dr. Amaya, colorectal surgery who performed a flexible sigmoidoscopy on 9/21/2022, revealing an infiltrative nonobstructing mass in the mid rectum.  The mass was partially circumferential involving one third of the lumen circumference.     9/13/2022: CT C/A/P with contrast noted rectal neoplasm not well visualized on this exam.  Calcified and noncalcified sub-6mm pulmonary nodules likely the sequela of prior granulomatous disease.  Scattered colonic diverticulosis.  Mildly prominent, but not pathologically enlarged pelvic nodes measuring up to 4 mm.    9/26/2022: MRI pelvis showed high rectal mass with effacement of the muscularis  propria along the right wall and extended beyond the muscularis propria at the posterior wall, T3 lesion. There is no evidence for involvement of the mesorectal fascia. There are no pathologically enlarged nodes.     Patient has been seen by , rad-onc & being planned for neoadjuvant radiation. Patient has come to this clinic to discuss systemic therapy options.    Guardant 360: MSI stable.  Low TMB.    10/17- 10/22/2022: Short course of radiotherapy to the rectal mass.    11/2/2022: Cycle 1 FOLFOX  12/14/2022: Cycle 4 FOLFOX  12/28/2022: Cycle 5 FOLFOX  1/7/2022: MRI pelvis shows marked response to treatment  1/11/2023: Cycle 6 5-FU/LV.  Oxaliplatin dropped due to neuropathy.      INTERIM HISTORY:  Ms. Felton is a 62-year-old female with the above-mentioned history who was seen today for lab review and evaluation prior to cycle 7 5-FU/LV.  She reports improvement in her neuropathy and cold sensitivity symptoms since stopping oxaliplatin with last cycle.  She has not been iced that her heart rate has been elevated intermittently, and she is having palpitations related to this.  No further episodes of nosebleed or bruises.  She admits she is likely not drinking enough fluid because everything has to be warm.  Afebrile.  No other major concerns today.    Unfortunately, due to scheduling issues, patient is currently scheduled for follow-up with me at the [facility] office while treatment is scheduled at [place] office.  She will go back to [place] after her visit here today.    Past Medical History:   Diagnosis Date   • Abdominal cramping 07/01/2022   • Anemia    • Bloating 07/01/2022   • Bloody diarrhea 07/01/2022   • Chronic back pain    • Colon polyps     FOLLOWED BY DR. BEVERLY MENDEZ   • Depression    • Gastric polyps     FOLLOWED BY DR. BEVERLY MENDEZ   • GERD (gastroesophageal reflux disease)    • Hearing loss    • Hiatal hernia 09/2021    3 CM, FOLLOWED BY DR. BEVERLY MENDEZ   • Hyperlipidemia    •  "IBS (irritable bowel syndrome)    • Migraines    • Patellofemoral arthritis 07/2021   • PNA (pneumonia)    • PONV (postoperative nausea and vomiting) 1965    Numerous procedures with nausea   • Rectal bleeding 1983    Off and on for years   • Rectal cancer (HCC) 09/2022    INVASIVE MODERATELY DIFFERENTIATED ADENOCARCINOMA ARISING FROM TRADITIONAL SERRATED ADENOMA WITH HGD   • Sciatica 09/2015   • Tear of medial meniscus of knee 07/2021    RIGHT KNEE   • Vitamin D deficiency      Past Surgical History:   Procedure Laterality Date   • COLONOSCOPY N/A 1986   • COLONOSCOPY W/ POLYPECTOMY N/A 09/01/2022    3 MM TUBULAR ADENOMA POLYP IN ASCENDING, 6 MM TUBULAR ADENOMA POLYP IN CECUM, 30 MM MASS IN RECTUM, PATH:INVASIVE MODERATELY DIFFERENTIATED ADENOCARCINOMA ARISING FROM TRADITIONAL SERRATED ADENOMA WITH HGD, HYPERTROPHIED ANAL PAPILLA, DR. BEVERLY MENDEZ AT [facility]   • ENDOSCOPY N/A 09/01/2022    MULTIPLE BENIGN GASTRIC POLYPS, 2 SQUAMOUS PAPILLAS IN ESOPHAGUS, 3 CM HIATAL HERNIA,   • EXTERNAL EAR SURGERY  1966    MULTIPLE \"LANCES\", DR. ACE IN [facility]   • EYE SURGERY Right 1967    DR. CASTANEDA IN [facility]   • KNEE ARTHRODESIS Right 07/27/2021    RIGHT KNEE ARTHROCENTESIS, DR. ALBERT GILMAN   • KNEE SURGERY Left 1985    DR. PEREZ AT [place]   • SHOULDER MANIPULATION Left 01/07/2013    FOR FROZEN SHOULDER, DR. ALBERT GILMAN AT [facility]   • SIGMOIDOSCOPY N/A 09/21/2022    AN INFILTRATIVE NON OBSTRUCTING MASS IN MID RECTUM, AREA TATTOOED, DR. TEMO AMAYA AT [facility]   • TRANSRECTAL ULTRASOUND N/A 09/21/2022    Procedure: ULTRASOUND TRANSRECTAL;  Surgeon: Temo Amaya MD;  Location: [facility] ENDOSCOPY;  Service: General;  Laterality: N/A;   • VENOUS ACCESS DEVICE (PORT) INSERTION Left 10/26/2022    Procedure: INSERTION OF PORTACATH;  Surgeon: Leonel Bridges MD;  Location: [facility] MAIN OR;  Service: General;  Laterality: Left;   • WISDOM TOOTH EXTRACTION Bilateral        MEDICATIONS    Current " Outpatient Medications:   •  dicyclomine (BENTYL) 20 MG tablet, Take 1 tablet by mouth Every 6 (Six) Hours As Needed., Disp: , Rfl:   •  diphenoxylate-atropine (LOMOTIL) 2.5-0.025 MG per tablet, Take 1 tablet by mouth 4 (Four) Times a Day As Needed for Diarrhea., Disp: 30 tablet, Rfl: 2  •  ELDERBERRY PO, Take 2 doses by mouth Daily., Disp: , Rfl:   •  lidocaine-prilocaine (EMLA) 2.5-2.5 % cream, Apply 1 application topically to the appropriate area as directed As Needed for Mild Pain. Apply 30 minutes prior to port access, Disp: 5 g, Rfl: 1  •  loperamide (IMODIUM) 2 MG capsule, Take 1 capsule by mouth As Needed for Diarrhea., Disp: , Rfl:   •  loratadine (CLARITIN) 10 MG tablet, Take 10 mg by mouth Daily. 1 daily around time of Neulasta OBI, Disp: , Rfl:   •  ondansetron (ZOFRAN) 8 MG tablet, Take 1 tablet by mouth 3 (Three) Times a Day As Needed for Nausea or Vomiting., Disp: 30 tablet, Rfl: 5  •  oxyCODONE-acetaminophen (PERCOCET) 5-325 MG per tablet, Take 1 tablet by mouth Every 8 (Eight) Hours As Needed for Moderate Pain or Severe Pain. Please fill asap and notify patient when ready to .  Thanks a, Disp: 60 tablet, Rfl: 0  •  pantoprazole (PROTONIX) 40 MG EC tablet, Take 1 tablet by mouth Daily., Disp: 30 tablet, Rfl: 3  •  prochlorperazine (COMPAZINE) 10 MG tablet, Take 1 tablet by mouth Every 6 (Six) Hours As Needed for Nausea or Vomiting., Disp: 30 tablet, Rfl: 3  •  traMADol (ULTRAM) 50 MG tablet, Take 1 tablet by mouth Every 6 (Six) Hours As Needed for Moderate Pain., Disp: 15 tablet, Rfl: 0  No current facility-administered medications for this visit.    Facility-Administered Medications Ordered in Other Visits:   •  fluorouracil (ADRUCIL) 5,420 mg in sodium chloride 0.9 % 240 mL chemo infusion - FOR HOME USE, 2,400 mg/m2 (Treatment Plan Recorded), Intravenous, Once, Tristan Weber MD, 5,420 mg at 01/25/23 1154    ALLERGIES:     Allergies   Allergen Reactions   • Adhesive Tape Rash      "Patient has sensitive skin.    • Levofloxacin Swelling   • Sulfa Antibiotics Rash       SOCIAL HISTORY:       Social History     Socioeconomic History   • Marital status: Single   Tobacco Use   • Smoking status: Never   • Smokeless tobacco: Never   Vaping Use   • Vaping Use: Never used   Substance and Sexual Activity   • Alcohol use: Yes     Alcohol/week: 1.0 standard drink     Types: 1 Shots of liquor per week     Comment: Per month   • Drug use: Never   • Sexual activity: Not Currently         FAMILY HISTORY:  Family History   Problem Relation Age of Onset   • Colon polyps Mother    • Asthma Mother    • COPD Mother    • Lung disease Mother    • Migraines Mother    • Cancer Father         liver cancer   • Colon polyps Father    • Heart disease Father    • Diabetes Father    • Kidney disease Father    • Angina Father    • Liver cancer Father    • Hepatitis Father    • Hearing loss Father    • Cancer Sister         Breast cancer   • Arthritis Sister    • Colon polyps Sister    • Breast cancer Sister    • Diabetes Sister    • Colon polyps Sister    • Alcohol abuse Maternal Uncle    • Colon cancer Neg Hx    • Crohn's disease Neg Hx    • Irritable bowel syndrome Neg Hx    • Ulcerative colitis Neg Hx    • Malig Hyperthermia Neg Hx        REVIEW OF SYSTEMS:  As per HPI         Vitals:    01/25/23 0856   BP: 121/85   Pulse: 86   Resp: 16   Temp: 96.9 °F (36.1 °C)   TempSrc: Temporal   SpO2: 98%   Weight: 104 kg (229 lb 11.2 oz)   Height: 176.5 cm (69.49\")   PainSc: 0-No pain     Current Status 1/25/2023   ECOG score 0     EXAM:  CONSTITUTIONAL:  Vital signs reviewed.  No distress, looks comfortable.  EYES:  Conjunctiva and lids unremarkable.    EARS,NOSE,MOUTH,THROAT:  Ears and nose appear unremarkable.    RESPIRATORY:  Normal respiratory effort.  Lungs clear to auscultation bilaterally.  CARDIOVASCULAR:  Normal S1, S2.  No murmurs rubs or gallops.  No significant lower extremity edema.  GASTROINTESTINAL: Abdomen appears " unremarkable.  Nondistended   LYMPHATIC:  No cervical, supraclavicular lymphadenopathy.  SKIN:  Warm.  No rashes  PSYCHIATRIC:  Normal judgment and insight.  Normal mood and affect.  NEURO: AAOx3, no obvious focal deficits.    RECENT LABS:  Results from last 7 days   Lab Units 01/25/23  0833   WBC 10*3/mm3 2.81*   NEUTROS ABS 10*3/mm3 1.56*   HEMOGLOBIN g/dL 9.8*   HEMATOCRIT % 29.7*   PLATELETS 10*3/mm3 164       ASSESSMENT:   is a pleasant 63 y/o WF with medical history significant for migraines, depression/anxiety, and IBS comes for rectal adenocarcinoma evaluation & management.     # Rectal adenocarcinoma, T3N0M0, Stage IIA:  · Diagnosed on a c-scope performed for gastric discomfort in September 2022.   · The flex sig noted a an infiltrative nonobstructing mass in the mid rectum.  The mass was partially circumferential involving one third of the lumen circumference. CT c/a/p negative for mets. MRI pelvis most consistent with T3N0 disease, with no evidence of involvement of mesorectal fascia.  · Reviewed various management strategies for localized rectal cancer with chemotherapy, radiation and surgical resection.  Patient met with Dr. Amaya, colorectal surgery and is being tentatively planned for low anterior resection after neoadjuvant chemotherapy and radiation.  She received neoadjuvant short course RT by Dr. Freeman, radiation oncology from 10/17- 10/22/2022.   · Reviewed the role and rationale of systemic therapy and localized rectal adenocarcinoma.  Informed patient that the SOC for such patients is 4-months of chemotherapy with FOLFOX/CAPOX. Total neoadjuvant therapy is preferred in these cases.   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 She expressed interest in alternative treatment options. I reviewed the recent data about IOs in dMMR patients, although informed her this is not the current SOC.  A guardant 360 NGS was obtained which  did not show MSI high.  Low TMB.  · Patient is agreeable to proceed with FOLFOX with tentative plan to dose-reduce or stop oxaliplatin if develops neuropathy.    · 11/2/2022: Is clinically stable.  Labs stable.  Proceed with cycle 1 FOLFOX.    · 11/16/2022 cycle 2 FOLFOX.  Received cycle 3 with Neulasta due to neutropenia.  · 12/14/2022: Labs show improved WBC/ANC.  Given significant peripheral neuropathy, will reduce oxaliplatin dose by 25%.  With significant arthralgias despite Claritin, we will not administer Neulasta with this cycle.  Proceed with cycle 4 FOLFOX.  Follow-up in 2 weeks with NP for port, labs and cycle 5 FOLFOX.  · 12/28/2022 cycle 5 FOLFOX we will proceed with continued 25% dose reduction in oxaliplatin.  Today WBC 2.91, ANC 1.69.  Recommended we use Neulasta with his treatment otherwise we will have treatment delays going forward.  The patient is agreeable and will start taking Claritin today in preparation.  Oxaliplatin was not administered with the sixth cycle due to neuropathy.    · 1/25/2023: Reports of improvement in fatigue, neuropathy and cold sensitivity since stopping oxaliplatin.  Reviewed the MRI pelvis findings which show marked response to treatment.  Labs from today show mild neutropenia with ANC of 1560.  Afebrile.  Proceed with cycle seven 5-FU/LV with Neulasta support.  Follow-up in 2 weeks with NP at [place] for port, labs and cycle eight 5-FU/LV.  Will consider dose reduction if ANC less than 1000 at follow-up.  Patient is planned to receive total 8 cycles of chemotherapy.  Patient states she is scheduled for flexible sigmoidoscopy with Dr. Amaya on 3/8/2023 and follow-up with Dr. Amaya on 3/17/2023.    #GERD  · Significant epigastric discomfort and reflux with initiation of chemo  · Continue Protonix 40 mg nightly    #Diarrhea  · Initially began following radiation and was exacerbated with chemotherapy  · Utilizing Imodium, Lomotil was added  · We discussed today  alternating Imodium and Lomotil as needed    #Nausea due to chemotherapy:  · Slightly worse after her fifth cycle.  Had been taking Zofran on a regular basis.  She does have a prescription for Compazine, and I encouraged her to use this intermittently if needed as well.    PLAN:   -Proceed with cycle 7 5-FU/LV with Neulasta support  -Continue antiemetics if needed.  -Patient will receive 1 L of IV normal saline on disconnect day.  -Return in 2 weeks with NP at [place] for labs and consideration of continued 5-FU/LV.  That will be her eighth and last cycle of chemotherapy  -Call/ return sooner should he develop any new concerns or problems.  -Follow-up with NP in 2 weeks and with me at [facility] office in 4 weeks.  Orders Placed This Encounter   Procedures   • Comprehensive Metabolic Panel     Standing Status:   Future     Standing Expiration Date:   1/25/2024     Order Specific Question:   Release to patient     Answer:   Routine Release   • CEA     Standing Status:   Future     Standing Expiration Date:   1/25/2024     Order Specific Question:   Release to patient     Answer:   Routine Release   • CBC & Differential     Standing Status:   Future     Standing Expiration Date:   1/25/2024     Order Specific Question:   Manual Differential     Answer:   No   Total time spent during this patient encounter is 45 minutes. The total time spent with the patient includes at least one or more of the following: preparing to see the patient by reviewing of tests, prior notes or other relevant information, performing appropriate independent examination & evaluation, counseling, ordering of medications, tests or procedures, communicating with other healthcare professionals, when appropriate to coordinate care, documenting clinic information in the electronic medical records or other health records, independently interpreting results of tests and communicating the results to the patient/family or caregiver.

## 2023-01-25 NOTE — TELEPHONE ENCOUNTER
Caller: Babs Felton    Relationship: Self    Best call back number: [phone]      What was the call regarding: PT MISSED CALL FROM GISELL    Do you require a callback: YES

## 2023-01-27 ENCOUNTER — INFUSION (OUTPATIENT)
Dept: ONCOLOGY | Facility: HOSPITAL | Age: 63
End: 2023-01-27
Payer: COMMERCIAL

## 2023-01-27 VITALS
BODY MASS INDEX: 33.61 KG/M2 | HEART RATE: 74 BPM | RESPIRATION RATE: 18 BRPM | OXYGEN SATURATION: 98 % | HEIGHT: 70 IN | DIASTOLIC BLOOD PRESSURE: 73 MMHG | SYSTOLIC BLOOD PRESSURE: 133 MMHG | TEMPERATURE: 96.4 F | WEIGHT: 234.8 LBS

## 2023-01-27 DIAGNOSIS — Z45.2 ENCOUNTER FOR FITTING AND ADJUSTMENT OF VASCULAR CATHETER: ICD-10-CM

## 2023-01-27 DIAGNOSIS — C20 RECTAL CANCER: Primary | ICD-10-CM

## 2023-01-27 PROCEDURE — 96360 HYDRATION IV INFUSION INIT: CPT

## 2023-01-27 PROCEDURE — 96377 APPLICATON ON-BODY INJECTOR: CPT

## 2023-01-27 PROCEDURE — 25010000002 PEGFILGRASTIM 6 MG/0.6ML PREFILLED SYRINGE KIT: Performed by: INTERNAL MEDICINE

## 2023-01-27 PROCEDURE — 25010000002 HEPARIN LOCK FLUSH PER 10 UNITS: Performed by: INTERNAL MEDICINE

## 2023-01-27 RX ORDER — SODIUM CHLORIDE 0.9 % (FLUSH) 0.9 %
10 SYRINGE (ML) INJECTION AS NEEDED
Status: DISCONTINUED | OUTPATIENT
Start: 2023-01-27 | End: 2023-01-27 | Stop reason: HOSPADM

## 2023-01-27 RX ORDER — SODIUM CHLORIDE 9 MG/ML
1000 INJECTION, SOLUTION INTRAVENOUS ONCE
Status: COMPLETED | OUTPATIENT
Start: 2023-01-27 | End: 2023-01-27

## 2023-01-27 RX ORDER — HEPARIN SODIUM (PORCINE) LOCK FLUSH IV SOLN 100 UNIT/ML 100 UNIT/ML
500 SOLUTION INTRAVENOUS AS NEEDED
Status: CANCELLED | OUTPATIENT
Start: 2023-01-27

## 2023-01-27 RX ORDER — HEPARIN SODIUM (PORCINE) LOCK FLUSH IV SOLN 100 UNIT/ML 100 UNIT/ML
500 SOLUTION INTRAVENOUS AS NEEDED
Status: DISCONTINUED | OUTPATIENT
Start: 2023-01-27 | End: 2023-01-27 | Stop reason: HOSPADM

## 2023-01-27 RX ORDER — SODIUM CHLORIDE 0.9 % (FLUSH) 0.9 %
10 SYRINGE (ML) INJECTION AS NEEDED
Status: CANCELLED | OUTPATIENT
Start: 2023-01-27

## 2023-01-27 RX ADMIN — Medication 10 ML: at 13:10

## 2023-01-27 RX ADMIN — PEGFILGRASTIM 6 MG: KIT SUBCUTANEOUS at 12:11

## 2023-01-27 RX ADMIN — SODIUM CHLORIDE 1000 ML: 9 INJECTION, SOLUTION INTRAVENOUS at 11:36

## 2023-01-27 RX ADMIN — Medication 500 UNITS: at 13:10

## 2023-01-29 DIAGNOSIS — C20 RECTAL CANCER: ICD-10-CM

## 2023-01-30 RX ORDER — TRAMADOL HYDROCHLORIDE 50 MG/1
50 TABLET ORAL EVERY 6 HOURS PRN
Qty: 15 TABLET | Refills: 0 | OUTPATIENT
Start: 2023-01-30

## 2023-01-31 DIAGNOSIS — C20 RECTAL CANCER: ICD-10-CM

## 2023-01-31 RX ORDER — TRAMADOL HYDROCHLORIDE 50 MG/1
50 TABLET ORAL EVERY 6 HOURS PRN
Qty: 30 TABLET | Refills: 0 | Status: SHIPPED | OUTPATIENT
Start: 2023-01-31

## 2023-01-31 RX ORDER — TRAMADOL HYDROCHLORIDE 50 MG/1
50 TABLET ORAL EVERY 6 HOURS PRN
Qty: 15 TABLET | Refills: 0 | Status: CANCELLED | OUTPATIENT
Start: 2023-01-31

## 2023-01-31 NOTE — TELEPHONE ENCOUNTER
Caller: Jennifer Felton    Relationship: Self    Best call back number: [phone]    What is the best time to reach you: ANY    Who are you requesting to speak with (clinical staff, provider,  specific staff member): CLINICAL    What was the call regarding: JENNIFER IS CALLING STATES SHE IS TAKING THE CLARITIN BUT SHE IS STILL HAVING BONE PAIN.  SHE WANTED TO KNOW WHY THAT IS     Do you require a callback: YES

## 2023-01-31 NOTE — TELEPHONE ENCOUNTER
Reviewed patient chart and returned call to patient, who is reporting severe pain in her hips, spine and up into her sternum.  She is aware that the probable cause of this pain is from the Neulasta.  She stated that she is still trying to work and if she takes the Percocet, she is too sleepy to work.  The surgeon had ordered Tramadol in the past and if she takes one of those for pain, that allows her better pain control and she is able to work.  Surgeon refused the medication yesterday, she is wondering if she could have the Tramadol refilled by Dr. Weber instead of taking the Percocet.  This would allow her to be more functional and able to work.  Advised she could also use a heating pad to the areas and to drink plenty of water.  She v/u.

## 2023-02-08 ENCOUNTER — INFUSION (OUTPATIENT)
Dept: ONCOLOGY | Facility: HOSPITAL | Age: 63
End: 2023-02-08
Payer: COMMERCIAL

## 2023-02-08 ENCOUNTER — OFFICE VISIT (OUTPATIENT)
Dept: ONCOLOGY | Facility: CLINIC | Age: 63
End: 2023-02-08
Payer: COMMERCIAL

## 2023-02-08 VITALS
BODY MASS INDEX: 33.98 KG/M2 | RESPIRATION RATE: 16 BRPM | DIASTOLIC BLOOD PRESSURE: 81 MMHG | HEIGHT: 69 IN | WEIGHT: 229.4 LBS | SYSTOLIC BLOOD PRESSURE: 125 MMHG | HEART RATE: 87 BPM | TEMPERATURE: 96.4 F | OXYGEN SATURATION: 98 %

## 2023-02-08 DIAGNOSIS — Z45.2 ENCOUNTER FOR FITTING AND ADJUSTMENT OF VASCULAR CATHETER: ICD-10-CM

## 2023-02-08 DIAGNOSIS — C20 RECTAL CANCER: ICD-10-CM

## 2023-02-08 DIAGNOSIS — C20 RECTAL CANCER: Primary | ICD-10-CM

## 2023-02-08 LAB
ALBUMIN SERPL-MCNC: 4.2 G/DL (ref 3.5–5.2)
ALBUMIN/GLOB SERPL: 1.6 G/DL
ALP SERPL-CCNC: 101 U/L (ref 39–117)
ALT SERPL W P-5'-P-CCNC: 37 U/L (ref 1–33)
ANION GAP SERPL CALCULATED.3IONS-SCNC: 10.5 MMOL/L (ref 5–15)
AST SERPL-CCNC: 35 U/L (ref 1–32)
BASOPHILS # BLD AUTO: 0.03 10*3/MM3 (ref 0–0.2)
BASOPHILS NFR BLD AUTO: 0.8 % (ref 0–1.5)
BILIRUB SERPL-MCNC: 0.7 MG/DL (ref 0–1.2)
BUN SERPL-MCNC: 9 MG/DL (ref 8–23)
BUN/CREAT SERPL: 13.2 (ref 7–25)
CALCIUM SPEC-SCNC: 9.5 MG/DL (ref 8.6–10.5)
CEA SERPL-MCNC: 1.54 NG/ML
CHLORIDE SERPL-SCNC: 105 MMOL/L (ref 98–107)
CO2 SERPL-SCNC: 25.5 MMOL/L (ref 22–29)
CREAT SERPL-MCNC: 0.68 MG/DL (ref 0.57–1)
DEPRECATED RDW RBC AUTO: 62.9 FL (ref 37–54)
EGFRCR SERPLBLD CKD-EPI 2021: 98 ML/MIN/1.73
EOSINOPHIL # BLD AUTO: 0.11 10*3/MM3 (ref 0–0.4)
EOSINOPHIL NFR BLD AUTO: 2.9 % (ref 0.3–6.2)
ERYTHROCYTE [DISTWIDTH] IN BLOOD BY AUTOMATED COUNT: 18.6 % (ref 12.3–15.4)
GLOBULIN UR ELPH-MCNC: 2.6 GM/DL
GLUCOSE SERPL-MCNC: 99 MG/DL (ref 65–99)
HCT VFR BLD AUTO: 31.9 % (ref 34–46.6)
HGB BLD-MCNC: 10.1 G/DL (ref 12–15.9)
IMM GRANULOCYTES # BLD AUTO: 0.04 10*3/MM3 (ref 0–0.05)
IMM GRANULOCYTES NFR BLD AUTO: 1 % (ref 0–0.5)
LYMPHOCYTES # BLD AUTO: 0.99 10*3/MM3 (ref 0.7–3.1)
LYMPHOCYTES NFR BLD AUTO: 25.9 % (ref 19.6–45.3)
MCH RBC QN AUTO: 29.5 PG (ref 26.6–33)
MCHC RBC AUTO-ENTMCNC: 31.7 G/DL (ref 31.5–35.7)
MCV RBC AUTO: 93.3 FL (ref 79–97)
MONOCYTES # BLD AUTO: 0.28 10*3/MM3 (ref 0.1–0.9)
MONOCYTES NFR BLD AUTO: 7.3 % (ref 5–12)
NEUTROPHILS NFR BLD AUTO: 2.37 10*3/MM3 (ref 1.7–7)
NEUTROPHILS NFR BLD AUTO: 62.1 % (ref 42.7–76)
NRBC BLD AUTO-RTO: 0 /100 WBC (ref 0–0.2)
PLATELET # BLD AUTO: 181 10*3/MM3 (ref 140–450)
PMV BLD AUTO: 10.4 FL (ref 6–12)
POTASSIUM SERPL-SCNC: 4.1 MMOL/L (ref 3.5–5.2)
PROT SERPL-MCNC: 6.8 G/DL (ref 6–8.5)
RBC # BLD AUTO: 3.42 10*6/MM3 (ref 3.77–5.28)
SODIUM SERPL-SCNC: 141 MMOL/L (ref 136–145)
WBC NRBC COR # BLD: 3.82 10*3/MM3 (ref 3.4–10.8)

## 2023-02-08 PROCEDURE — 96367 TX/PROPH/DG ADDL SEQ IV INF: CPT

## 2023-02-08 PROCEDURE — 96375 TX/PRO/DX INJ NEW DRUG ADDON: CPT

## 2023-02-08 PROCEDURE — 99214 OFFICE O/P EST MOD 30 MIN: CPT | Performed by: NURSE PRACTITIONER

## 2023-02-08 PROCEDURE — 85025 COMPLETE CBC W/AUTO DIFF WBC: CPT | Performed by: INTERNAL MEDICINE

## 2023-02-08 PROCEDURE — 96416 CHEMO PROLONG INFUSE W/PUMP: CPT

## 2023-02-08 PROCEDURE — 25010000002 LEUCOVORIN CALCIUM PER 50 MG: Performed by: NURSE PRACTITIONER

## 2023-02-08 PROCEDURE — 96409 CHEMO IV PUSH SNGL DRUG: CPT

## 2023-02-08 PROCEDURE — 25010000002 DEXAMETHASONE SODIUM PHOSPHATE 100 MG/10ML SOLUTION: Performed by: NURSE PRACTITIONER

## 2023-02-08 PROCEDURE — 82378 CARCINOEMBRYONIC ANTIGEN: CPT | Performed by: INTERNAL MEDICINE

## 2023-02-08 PROCEDURE — 25010000002 FOSAPREPITANT PER 1 MG: Performed by: NURSE PRACTITIONER

## 2023-02-08 PROCEDURE — 25010000002 PALONOSETRON PER 25 MCG: Performed by: NURSE PRACTITIONER

## 2023-02-08 PROCEDURE — 80053 COMPREHEN METABOLIC PANEL: CPT | Performed by: INTERNAL MEDICINE

## 2023-02-08 PROCEDURE — G0498 CHEMO EXTEND IV INFUS W/PUMP: HCPCS

## 2023-02-08 PROCEDURE — 25010000002 FLUOROURACIL PER 500 MG: Performed by: NURSE PRACTITIONER

## 2023-02-08 RX ORDER — HEPARIN SODIUM (PORCINE) LOCK FLUSH IV SOLN 100 UNIT/ML 100 UNIT/ML
500 SOLUTION INTRAVENOUS AS NEEDED
Status: CANCELLED | OUTPATIENT
Start: 2023-02-08

## 2023-02-08 RX ORDER — FAMOTIDINE 10 MG/ML
20 INJECTION, SOLUTION INTRAVENOUS AS NEEDED
Status: CANCELLED | OUTPATIENT
Start: 2023-02-08

## 2023-02-08 RX ORDER — HEPARIN SODIUM (PORCINE) LOCK FLUSH IV SOLN 100 UNIT/ML 100 UNIT/ML
500 SOLUTION INTRAVENOUS AS NEEDED
Status: DISCONTINUED | OUTPATIENT
Start: 2023-02-08 | End: 2023-02-08 | Stop reason: HOSPADM

## 2023-02-08 RX ORDER — PALONOSETRON 0.05 MG/ML
0.25 INJECTION, SOLUTION INTRAVENOUS ONCE
Status: COMPLETED | OUTPATIENT
Start: 2023-02-08 | End: 2023-02-08

## 2023-02-08 RX ORDER — FLUOROURACIL 50 MG/ML
400 INJECTION, SOLUTION INTRAVENOUS ONCE
Status: CANCELLED | OUTPATIENT
Start: 2023-02-08

## 2023-02-08 RX ORDER — DIPHENHYDRAMINE HYDROCHLORIDE 50 MG/ML
50 INJECTION INTRAMUSCULAR; INTRAVENOUS AS NEEDED
Status: CANCELLED | OUTPATIENT
Start: 2023-02-08

## 2023-02-08 RX ORDER — DEXTROSE MONOHYDRATE 50 MG/ML
250 INJECTION, SOLUTION INTRAVENOUS ONCE
Status: CANCELLED | OUTPATIENT
Start: 2023-02-08

## 2023-02-08 RX ORDER — DEXTROSE MONOHYDRATE 50 MG/ML
250 INJECTION, SOLUTION INTRAVENOUS ONCE
Status: COMPLETED | OUTPATIENT
Start: 2023-02-08 | End: 2023-02-08

## 2023-02-08 RX ORDER — SODIUM CHLORIDE 0.9 % (FLUSH) 0.9 %
10 SYRINGE (ML) INJECTION AS NEEDED
Status: CANCELLED | OUTPATIENT
Start: 2023-02-08

## 2023-02-08 RX ORDER — SODIUM CHLORIDE 0.9 % (FLUSH) 0.9 %
10 SYRINGE (ML) INJECTION AS NEEDED
Status: DISCONTINUED | OUTPATIENT
Start: 2023-02-08 | End: 2023-02-08 | Stop reason: HOSPADM

## 2023-02-08 RX ORDER — PALONOSETRON 0.05 MG/ML
0.25 INJECTION, SOLUTION INTRAVENOUS ONCE
Status: CANCELLED | OUTPATIENT
Start: 2023-02-08

## 2023-02-08 RX ORDER — FLUOROURACIL 50 MG/ML
400 INJECTION, SOLUTION INTRAVENOUS ONCE
Status: COMPLETED | OUTPATIENT
Start: 2023-02-08 | End: 2023-02-08

## 2023-02-08 RX ADMIN — DEXAMETHASONE SODIUM PHOSPHATE 12 MG: 100 INJECTION INTRAMUSCULAR; INTRAVENOUS at 11:03

## 2023-02-08 RX ADMIN — FLUOROURACIL 900 MG: 50 INJECTION, SOLUTION INTRAVENOUS at 12:38

## 2023-02-08 RX ADMIN — DEXTROSE MONOHYDRATE 900 MG: 50 INJECTION, SOLUTION INTRAVENOUS at 11:59

## 2023-02-08 RX ADMIN — SODIUM CHLORIDE 100 ML: 9 INJECTION, SOLUTION INTRAVENOUS at 11:24

## 2023-02-08 RX ADMIN — DEXTROSE MONOHYDRATE 250 ML: 50 INJECTION, SOLUTION INTRAVENOUS at 11:01

## 2023-02-08 RX ADMIN — FLUOROURACIL 5420 MG: 50 INJECTION, SOLUTION INTRAVENOUS at 12:38

## 2023-02-08 RX ADMIN — PALONOSETRON 0.25 MG: 0.05 INJECTION, SOLUTION INTRAVENOUS at 11:01

## 2023-02-08 NOTE — PROGRESS NOTES
[facility]    CONSULTING IN BLOOD DISORDERS & CANCER    This was an audio and video enabled telemedicine encounter.      REASON FOR CONSULTATION/CHIEF COMPLAINT:     Evaluation and management for rectal adenocarcinoma                             REQUESTING PHYSICIAN: No ref. provider found  RECORDS OBTAINED:  Records of the patients history including those from the electronic medical record were reviewed and summarized in detail.    HISTORY OF PRESENT ILLNESS:    The patient is a 62 y.o. year old female with medical history significant for migraines, depression/anxiety, and IBS had presented with epigastric discomfort in September 2022.      An EGD and colonoscopy performed by SHOAIB Esteves on 9/1/2022 demonstrated a 3 cm mass in the proximal rectum around 13 cm from the anal verge.  The polyp was multilobulated and somewhat fixed to the underlying tissue.  Friable with contact bleeding.  Other smaller polyps were found in the cecum and ascending colon which were removed.  The EGD revealed mucosal nodule in the esophagus and multiple gastric polyps.  No evidence of bleeding.     The pathology from the rectal mass came back as invasive moderately differentiated adenocarcinoma arising out of traditional serrated adenoma with high-grade dysplasia.    Patient was subsequently referred to Dr. Amaya, colorectal surgery who performed a flexible sigmoidoscopy on 9/21/2022, revealing an infiltrative nonobstructing mass in the mid rectum.  The mass was partially circumferential involving one third of the lumen circumference.     9/13/2022: CT C/A/P with contrast noted rectal neoplasm not well visualized on this exam.  Calcified and noncalcified sub-6mm pulmonary nodules likely the sequela of prior granulomatous disease.  Scattered colonic diverticulosis.  Mildly prominent, but not pathologically enlarged pelvic nodes measuring up to 4 mm.    9/26/2022: MRI pelvis showed high rectal mass with effacement of the muscularis  propria along the right wall and extended beyond the muscularis propria at the posterior wall, T3 lesion. There is no evidence for involvement of the mesorectal fascia. There are no pathologically enlarged nodes.     Patient has been seen by , rad-onc & being planned for neoadjuvant radiation. Patient has come to this clinic to discuss systemic therapy options.    Guardant 360: MSI stable.  Low TMB.    10/17- 10/22/2022: Short course of radiotherapy to the rectal mass.    11/2/2022: Cycle 1 FOLFOX  12/14/2022: Cycle 4 FOLFOX  12/28/2022: Cycle 5 FOLFOX  1/7/2022: MRI pelvis shows marked response to treatment  1/11/2023: Cycle 6 5-FU/LV.  Oxaliplatin dropped due to neuropathy.      INTERIM HISTORY:  Ms. Felton is a 62-year-old female with the above-mentioned history who is seen today for lab review and evaluation prior to cycle 8 of 5-FU, leucovorin.  She states that despite not having received oxaliplatin since the end of December she does still seem to have some cold sensitivity especially in her fingers.  Patient also notes that she had some issues with left lower pelvic pain Monday night and into Tuesday.  This prompted her to take a pain pill which did provide her with some relief.  She still has some tenderness in this area.  She states that it was not related to diarrhea or constipation.  She did have a lot of arthralgias from receiving the Neulasta injection previously.  She did take Claritin but it did not significantly help.    Past Medical History:   Diagnosis Date   • Abdominal cramping 07/01/2022   • Anemia    • Bloating 07/01/2022   • Bloody diarrhea 07/01/2022   • Chronic back pain    • Colon polyps     FOLLOWED BY DR. BEVERLY MENDEZ   • Depression    • Gastric polyps     FOLLOWED BY DR. BEVERLY MENDEZ   • GERD (gastroesophageal reflux disease)    • Hearing loss    • Hiatal hernia 09/2021    3 CM, FOLLOWED BY DR. BEVERLY MENDEZ   • Hyperlipidemia    • IBS (irritable bowel syndrome)    •  "Migraines    • Patellofemoral arthritis 07/2021   • PNA (pneumonia)    • PONV (postoperative nausea and vomiting) 1965    Numerous procedures with nausea   • Rectal bleeding 1983    Off and on for years   • Rectal cancer (HCC) 09/2022    INVASIVE MODERATELY DIFFERENTIATED ADENOCARCINOMA ARISING FROM TRADITIONAL SERRATED ADENOMA WITH HGD   • Sciatica 09/2015   • Tear of medial meniscus of knee 07/2021    RIGHT KNEE   • Vitamin D deficiency      Past Surgical History:   Procedure Laterality Date   • COLONOSCOPY N/A 1986   • COLONOSCOPY W/ POLYPECTOMY N/A 09/01/2022    3 MM TUBULAR ADENOMA POLYP IN ASCENDING, 6 MM TUBULAR ADENOMA POLYP IN CECUM, 30 MM MASS IN RECTUM, PATH:INVASIVE MODERATELY DIFFERENTIATED ADENOCARCINOMA ARISING FROM TRADITIONAL SERRATED ADENOMA WITH HGD, HYPERTROPHIED ANAL PAPILLA, DR. BEVERLY MENDEZ AT [facility]   • ENDOSCOPY N/A 09/01/2022    MULTIPLE BENIGN GASTRIC POLYPS, 2 SQUAMOUS PAPILLAS IN ESOPHAGUS, 3 CM HIATAL HERNIA,   • EXTERNAL EAR SURGERY  1966    MULTIPLE \"LANCES\", DR. ACE IN [facility]   • EYE SURGERY Right 1967    DR. CASTANEDA IN [facility]   • KNEE ARTHRODESIS Right 07/27/2021    RIGHT KNEE ARTHROCENTESIS, DR. ALBERT GILMAN   • KNEE SURGERY Left 1985    DR. PEREZ AT [place]   • SHOULDER MANIPULATION Left 01/07/2013    FOR FROZEN SHOULDER, DR. ALBERT GILMAN AT [facility]   • SIGMOIDOSCOPY N/A 09/21/2022    AN INFILTRATIVE NON OBSTRUCTING MASS IN MID RECTUM, AREA TATTOOED, DR. TEMO AMAYA AT [facility]   • TRANSRECTAL ULTRASOUND N/A 09/21/2022    Procedure: ULTRASOUND TRANSRECTAL;  Surgeon: Temo Amaya MD;  Location: [facility] ENDOSCOPY;  Service: General;  Laterality: N/A;   • VENOUS ACCESS DEVICE (PORT) INSERTION Left 10/26/2022    Procedure: INSERTION OF PORTACATH;  Surgeon: Leonel Bridges MD;  Location: [facility] MAIN OR;  Service: General;  Laterality: Left;   • WISDOM TOOTH EXTRACTION Bilateral        MEDICATIONS    Current Outpatient Medications:   •  " dicyclomine (BENTYL) 20 MG tablet, Take 1 tablet by mouth Every 6 (Six) Hours As Needed., Disp: , Rfl:   •  diphenoxylate-atropine (LOMOTIL) 2.5-0.025 MG per tablet, Take 1 tablet by mouth 4 (Four) Times a Day As Needed for Diarrhea., Disp: 30 tablet, Rfl: 2  •  ELDERBERRY PO, Take 2 doses by mouth Daily., Disp: , Rfl:   •  lidocaine-prilocaine (EMLA) 2.5-2.5 % cream, Apply 1 application topically to the appropriate area as directed As Needed for Mild Pain. Apply 30 minutes prior to port access, Disp: 5 g, Rfl: 1  •  loperamide (IMODIUM) 2 MG capsule, Take 1 capsule by mouth As Needed for Diarrhea., Disp: , Rfl:   •  loratadine (CLARITIN) 10 MG tablet, Take 10 mg by mouth Daily. 1 daily around time of Neulasta OBI, Disp: , Rfl:   •  ondansetron (ZOFRAN) 8 MG tablet, Take 1 tablet by mouth 3 (Three) Times a Day As Needed for Nausea or Vomiting., Disp: 30 tablet, Rfl: 5  •  oxyCODONE-acetaminophen (PERCOCET) 5-325 MG per tablet, Take 1 tablet by mouth Every 8 (Eight) Hours As Needed for Moderate Pain or Severe Pain. Please fill asap and notify patient when ready to .  Thanks a, Disp: 60 tablet, Rfl: 0  •  pantoprazole (PROTONIX) 40 MG EC tablet, Take 1 tablet by mouth Daily., Disp: 30 tablet, Rfl: 3  •  prochlorperazine (COMPAZINE) 10 MG tablet, Take 1 tablet by mouth Every 6 (Six) Hours As Needed for Nausea or Vomiting., Disp: 30 tablet, Rfl: 3  •  traMADol (ULTRAM) 50 MG tablet, Take 1 tablet by mouth Every 6 (Six) Hours As Needed for Moderate Pain., Disp: 30 tablet, Rfl: 0  No current facility-administered medications for this visit.    ALLERGIES:     Allergies   Allergen Reactions   • Adhesive Tape Rash     Patient has sensitive skin.    • Levofloxacin Swelling   • Sulfa Antibiotics Rash       SOCIAL HISTORY:       Social History     Socioeconomic History   • Marital status: Single   Tobacco Use   • Smoking status: Never   • Smokeless tobacco: Never   Vaping Use   • Vaping Use: Never used   Substance and  "Sexual Activity   • Alcohol use: Yes     Alcohol/week: 1.0 standard drink     Types: 1 Shots of liquor per week     Comment: Per month   • Drug use: Never   • Sexual activity: Not Currently         FAMILY HISTORY:  Family History   Problem Relation Age of Onset   • Colon polyps Mother    • Asthma Mother    • COPD Mother    • Lung disease Mother    • Migraines Mother    • Cancer Father         liver cancer   • Colon polyps Father    • Heart disease Father    • Diabetes Father    • Kidney disease Father    • Angina Father    • Liver cancer Father    • Hepatitis Father    • Hearing loss Father    • Cancer Sister         Breast cancer   • Arthritis Sister    • Colon polyps Sister    • Breast cancer Sister    • Diabetes Sister    • Colon polyps Sister    • Alcohol abuse Maternal Uncle    • Colon cancer Neg Hx    • Crohn's disease Neg Hx    • Irritable bowel syndrome Neg Hx    • Ulcerative colitis Neg Hx    • Malig Hyperthermia Neg Hx        REVIEW OF SYSTEMS:  As per HPI         Vitals:    02/08/23 0934   BP: 125/81   Pulse: 87   Resp: 16   Temp: 96.4 °F (35.8 °C)   TempSrc: Temporal   SpO2: 98%   Weight: 104 kg (229 lb 6.4 oz)   Height: 176.5 cm (69.49\")   PainSc: 0-No pain     Current Status 2/8/2023   ECOG score 0     Physical Exam  Vitals reviewed.   Constitutional:       General: She is not in acute distress.     Appearance: Normal appearance. She is well-developed.   HENT:      Head: Normocephalic and atraumatic.   Eyes:      Pupils: Pupils are equal, round, and reactive to light.   Cardiovascular:      Rate and Rhythm: Normal rate and regular rhythm.      Heart sounds: Normal heart sounds. No murmur heard.  Pulmonary:      Effort: Pulmonary effort is normal. No respiratory distress.      Breath sounds: Normal breath sounds. No wheezing, rhonchi or rales.   Abdominal:      General: Bowel sounds are normal. There is no distension.      Palpations: Abdomen is soft.      Tenderness: There is abdominal tenderness in " the left lower quadrant.   Musculoskeletal:         General: Normal range of motion.      Cervical back: Normal range of motion.   Skin:     General: Skin is warm and dry.      Findings: No rash.   Neurological:      Mental Status: She is alert and oriented to person, place, and time.         RECENT LABS:  Results from last 7 days   Lab Units 02/08/23  0931   WBC 10*3/mm3 3.82   NEUTROS ABS 10*3/mm3 2.37   HEMOGLOBIN g/dL 10.1*   HEMATOCRIT % 31.9*   PLATELETS 10*3/mm3 181     Lab Results   Component Value Date    GLUCOSE 99 02/08/2023    BUN 9 02/08/2023    CREATININE 0.68 02/08/2023    EGFRRESULT 86 05/27/2022    EGFR 98.0 02/08/2023    BCR 13.2 02/08/2023    K 4.1 02/08/2023    CO2 25.5 02/08/2023    CALCIUM 9.5 02/08/2023    PROTENTOTREF 6.8 05/27/2022    ALBUMIN 4.2 02/08/2023    LABIL2 2.1 05/27/2022    AST 35 (H) 02/08/2023    ALT 37 (H) 02/08/2023         ASSESSMENT:   is a pleasant 63 y/o WF with medical history significant for migraines, depression/anxiety, and IBS comes for rectal adenocarcinoma evaluation & management.     # Rectal adenocarcinoma, T3N0M0, Stage IIA:  · Diagnosed on a c-scope performed for gastric discomfort in September 2022.   · The flex sig noted a an infiltrative nonobstructing mass in the mid rectum.  The mass was partially circumferential involving one third of the lumen circumference. CT c/a/p negative for mets. MRI pelvis most consistent with T3N0 disease, with no evidence of involvement of mesorectal fascia.  · Reviewed various management strategies for localized rectal cancer with chemotherapy, radiation and surgical resection.  Patient met with Dr. Amaya, colorectal surgery and is being tentatively planned for low anterior resection after neoadjuvant chemotherapy and radiation.  She received neoadjuvant short course RT by Dr. Freeman, radiation oncology from 10/17- 10/22/2022.   · Reviewed the role and rationale of systemic therapy and localized rectal adenocarcinoma.   Informed patient that the SOC for such patients is 4-months of chemotherapy with FOLFOX/CAPOX. Total neoadjuvant therapy is preferred in these cases.   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 She expressed interest in alternative treatment options. I reviewed the recent data about IOs in dMMR patients, although informed her this is not the current SOC.  A guardant 360 NGS was obtained which did not show MSI high.  Low TMB.  · Patient is agreeable to proceed with FOLFOX with tentative plan to dose-reduce or stop oxaliplatin if develops neuropathy.    · 11/2/2022: Is clinically stable.  Labs stable.  Proceed with cycle 1 FOLFOX.    · 11/16/2022 cycle 2 FOLFOX.  Received cycle 3 with Neulasta due to neutropenia.  · 12/14/2022: Labs show improved WBC/ANC.  Given significant peripheral neuropathy, will reduce oxaliplatin dose by 25%.  With significant arthralgias despite Claritin, we will not administer Neulasta with this cycle.  Proceed with cycle 4 FOLFOX.  Follow-up in 2 weeks with NP for port, labs and cycle 5 FOLFOX.  · 12/28/2022 cycle 5 FOLFOX we will proceed with continued 25% dose reduction in oxaliplatin.  Today WBC 2.91, ANC 1.69.  Recommended we use Neulasta with his treatment otherwise we will have treatment delays going forward.  The patient is agreeable and will start taking Claritin today in preparation.  Oxaliplatin was not administered with the sixth cycle due to neuropathy.    · 1/25/2023: Reports of improvement in fatigue, neuropathy and cold sensitivity since stopping oxaliplatin.  Reviewed the MRI pelvis findings which show marked response to treatment.  Labs from today show mild neutropenia with ANC of 1560.  Afebrile.  Proceed with cycle seven 5-FU/LV with Neulasta support.  Follow-up in 2 weeks with NP at [place] for port, labs and cycle eight 5-FU/LV.  Will consider dose reduction if ANC less than 1000 at follow-up.  Patient  is planned to receive total 8 cycles of chemotherapy.  Patient states she is scheduled for flexible sigmoidoscopy with Dr. Amaya on 3/8/2023 and follow-up with Dr. Amaya on 3/17/2023.  · 2/8/2023 cycle eight 5-FU, leucovorin which will be her final dose of chemotherapy.  Today WBC 3.82, ANC 2.37.    #GERD  · Significant epigastric discomfort and reflux with initiation of chemo  · Continue Protonix 40 mg nightly    #Diarrhea  · Initially began following radiation and was exacerbated with chemotherapy  · Utilizing Imodium and Lomotil as needed.     #Nausea due to chemotherapy:  · Slightly worse after her fifth cycle.  Had been taking Zofran on a regular basis.  She does have a prescription for Compazine, and I encouraged her to use this intermittently if needed as well.    PLAN:   1. Proceed with cycle eight 5-FU, leucovorin today.  2. Continue antiemetics if needed.  3. We discussed trying B complex specifically with at least 50 mg of B6 and 1000 mg of B12 to see if this would help any of her neuropathy symptoms.  4. Patient is scheduled to return in 2 weeks for follow-up with Dr. Weber with repeat CBC and CMP.  5. She is scheduled on 3/8/2023 for a flexible sigmoidoscopy, and a follow-up with Dr. Amaya on 3/17/2023.  6. Patient will receive 1 L of IV normal saline on disconnect day.  7. Call/ return sooner should he develop any new concerns or problems.      Patient is on a high risk medication requiring close monitoring for toxicity.    Laxmi Greenberg, YAMILEX    02/08/2023

## 2023-02-10 ENCOUNTER — INFUSION (OUTPATIENT)
Dept: ONCOLOGY | Facility: HOSPITAL | Age: 63
End: 2023-02-10
Payer: COMMERCIAL

## 2023-02-10 VITALS
RESPIRATION RATE: 18 BRPM | OXYGEN SATURATION: 97 % | DIASTOLIC BLOOD PRESSURE: 81 MMHG | WEIGHT: 229 LBS | HEIGHT: 69 IN | TEMPERATURE: 96.1 F | HEART RATE: 80 BPM | BODY MASS INDEX: 33.92 KG/M2 | SYSTOLIC BLOOD PRESSURE: 134 MMHG

## 2023-02-10 DIAGNOSIS — C20 RECTAL CANCER: Primary | ICD-10-CM

## 2023-02-10 DIAGNOSIS — Z45.2 ENCOUNTER FOR FITTING AND ADJUSTMENT OF VASCULAR CATHETER: ICD-10-CM

## 2023-02-10 PROCEDURE — 96366 THER/PROPH/DIAG IV INF ADDON: CPT

## 2023-02-10 PROCEDURE — 96360 HYDRATION IV INFUSION INIT: CPT

## 2023-02-10 PROCEDURE — 25010000002 HEPARIN LOCK FLUSH PER 10 UNITS: Performed by: INTERNAL MEDICINE

## 2023-02-10 RX ORDER — SODIUM CHLORIDE 0.9 % (FLUSH) 0.9 %
10 SYRINGE (ML) INJECTION AS NEEDED
Status: CANCELLED | OUTPATIENT
Start: 2023-02-10

## 2023-02-10 RX ORDER — SODIUM CHLORIDE 0.9 % (FLUSH) 0.9 %
10 SYRINGE (ML) INJECTION AS NEEDED
Status: DISCONTINUED | OUTPATIENT
Start: 2023-02-10 | End: 2023-02-10 | Stop reason: HOSPADM

## 2023-02-10 RX ORDER — HEPARIN SODIUM (PORCINE) LOCK FLUSH IV SOLN 100 UNIT/ML 100 UNIT/ML
500 SOLUTION INTRAVENOUS AS NEEDED
Status: DISCONTINUED | OUTPATIENT
Start: 2023-02-10 | End: 2023-02-10 | Stop reason: HOSPADM

## 2023-02-10 RX ORDER — HEPARIN SODIUM (PORCINE) LOCK FLUSH IV SOLN 100 UNIT/ML 100 UNIT/ML
500 SOLUTION INTRAVENOUS AS NEEDED
Status: CANCELLED | OUTPATIENT
Start: 2023-02-10

## 2023-02-10 RX ORDER — SODIUM CHLORIDE 9 MG/ML
1000 INJECTION, SOLUTION INTRAVENOUS ONCE
Status: COMPLETED | OUTPATIENT
Start: 2023-02-10 | End: 2023-02-10

## 2023-02-10 RX ADMIN — SODIUM CHLORIDE 1000 ML: 9 INJECTION, SOLUTION INTRAVENOUS at 13:30

## 2023-02-10 RX ADMIN — Medication 500 UNITS: at 14:55

## 2023-02-10 RX ADMIN — Medication 10 ML: at 14:55

## 2023-02-22 ENCOUNTER — OFFICE VISIT (OUTPATIENT)
Dept: ONCOLOGY | Facility: CLINIC | Age: 63
End: 2023-02-22
Payer: COMMERCIAL

## 2023-02-22 ENCOUNTER — INFUSION (OUTPATIENT)
Dept: ONCOLOGY | Facility: HOSPITAL | Age: 63
End: 2023-02-22
Payer: COMMERCIAL

## 2023-02-22 VITALS
BODY MASS INDEX: 33.98 KG/M2 | DIASTOLIC BLOOD PRESSURE: 85 MMHG | OXYGEN SATURATION: 98 % | TEMPERATURE: 98.4 F | HEART RATE: 90 BPM | WEIGHT: 229.4 LBS | HEIGHT: 69 IN | SYSTOLIC BLOOD PRESSURE: 144 MMHG | RESPIRATION RATE: 16 BRPM

## 2023-02-22 DIAGNOSIS — Z45.2 ENCOUNTER FOR FITTING AND ADJUSTMENT OF VASCULAR CATHETER: ICD-10-CM

## 2023-02-22 DIAGNOSIS — G62.0 CHEMOTHERAPY-INDUCED NEUROPATHY: ICD-10-CM

## 2023-02-22 DIAGNOSIS — T45.1X5A ANEMIA ASSOCIATED WITH CHEMOTHERAPY: ICD-10-CM

## 2023-02-22 DIAGNOSIS — T45.1X5A CHEMOTHERAPY-INDUCED NEUROPATHY: ICD-10-CM

## 2023-02-22 DIAGNOSIS — C20 RECTAL CANCER: Primary | ICD-10-CM

## 2023-02-22 DIAGNOSIS — D64.81 ANEMIA ASSOCIATED WITH CHEMOTHERAPY: ICD-10-CM

## 2023-02-22 LAB
ALBUMIN SERPL-MCNC: 4.2 G/DL (ref 3.5–5.2)
ALBUMIN/GLOB SERPL: 1.6 G/DL
ALP SERPL-CCNC: 75 U/L (ref 39–117)
ALT SERPL W P-5'-P-CCNC: 27 U/L (ref 1–33)
ANION GAP SERPL CALCULATED.3IONS-SCNC: 9.1 MMOL/L (ref 5–15)
AST SERPL-CCNC: 29 U/L (ref 1–32)
BASOPHILS # BLD AUTO: 0.01 10*3/MM3 (ref 0–0.2)
BASOPHILS NFR BLD AUTO: 0.3 % (ref 0–1.5)
BILIRUB SERPL-MCNC: 0.6 MG/DL (ref 0–1.2)
BUN SERPL-MCNC: 8 MG/DL (ref 8–23)
BUN/CREAT SERPL: 11.8 (ref 7–25)
CALCIUM SPEC-SCNC: 9.4 MG/DL (ref 8.6–10.5)
CHLORIDE SERPL-SCNC: 106 MMOL/L (ref 98–107)
CO2 SERPL-SCNC: 25.9 MMOL/L (ref 22–29)
CREAT SERPL-MCNC: 0.68 MG/DL (ref 0.57–1)
DEPRECATED RDW RBC AUTO: 54.6 FL (ref 37–54)
EGFRCR SERPLBLD CKD-EPI 2021: 98 ML/MIN/1.73
EOSINOPHIL # BLD AUTO: 0.16 10*3/MM3 (ref 0–0.4)
EOSINOPHIL NFR BLD AUTO: 4 % (ref 0.3–6.2)
ERYTHROCYTE [DISTWIDTH] IN BLOOD BY AUTOMATED COUNT: 15.8 % (ref 12.3–15.4)
GLOBULIN UR ELPH-MCNC: 2.6 GM/DL
GLUCOSE SERPL-MCNC: 98 MG/DL (ref 65–99)
HCT VFR BLD AUTO: 30.8 % (ref 34–46.6)
HGB BLD-MCNC: 9.9 G/DL (ref 12–15.9)
IMM GRANULOCYTES # BLD AUTO: 0.01 10*3/MM3 (ref 0–0.05)
IMM GRANULOCYTES NFR BLD AUTO: 0.3 % (ref 0–0.5)
LYMPHOCYTES # BLD AUTO: 1.13 10*3/MM3 (ref 0.7–3.1)
LYMPHOCYTES NFR BLD AUTO: 28.4 % (ref 19.6–45.3)
MCH RBC QN AUTO: 30.3 PG (ref 26.6–33)
MCHC RBC AUTO-ENTMCNC: 32.1 G/DL (ref 31.5–35.7)
MCV RBC AUTO: 94.2 FL (ref 79–97)
MONOCYTES # BLD AUTO: 0.3 10*3/MM3 (ref 0.1–0.9)
MONOCYTES NFR BLD AUTO: 7.5 % (ref 5–12)
NEUTROPHILS NFR BLD AUTO: 2.37 10*3/MM3 (ref 1.7–7)
NEUTROPHILS NFR BLD AUTO: 59.5 % (ref 42.7–76)
NRBC BLD AUTO-RTO: 0 /100 WBC (ref 0–0.2)
PLATELET # BLD AUTO: 206 10*3/MM3 (ref 140–450)
PMV BLD AUTO: 9.3 FL (ref 6–12)
POTASSIUM SERPL-SCNC: 4.1 MMOL/L (ref 3.5–5.2)
PROT SERPL-MCNC: 6.8 G/DL (ref 6–8.5)
RBC # BLD AUTO: 3.27 10*6/MM3 (ref 3.77–5.28)
SODIUM SERPL-SCNC: 141 MMOL/L (ref 136–145)
WBC NRBC COR # BLD: 3.98 10*3/MM3 (ref 3.4–10.8)

## 2023-02-22 PROCEDURE — 25010000002 HEPARIN LOCK FLUSH PER 10 UNITS: Performed by: INTERNAL MEDICINE

## 2023-02-22 PROCEDURE — 80053 COMPREHEN METABOLIC PANEL: CPT | Performed by: INTERNAL MEDICINE

## 2023-02-22 PROCEDURE — 85025 COMPLETE CBC W/AUTO DIFF WBC: CPT | Performed by: INTERNAL MEDICINE

## 2023-02-22 PROCEDURE — 36591 DRAW BLOOD OFF VENOUS DEVICE: CPT

## 2023-02-22 PROCEDURE — 99214 OFFICE O/P EST MOD 30 MIN: CPT | Performed by: NURSE PRACTITIONER

## 2023-02-22 RX ORDER — SODIUM CHLORIDE 0.9 % (FLUSH) 0.9 %
10 SYRINGE (ML) INJECTION AS NEEDED
Status: DISCONTINUED | OUTPATIENT
Start: 2023-02-22 | End: 2023-02-22 | Stop reason: HOSPADM

## 2023-02-22 RX ORDER — SODIUM CHLORIDE 0.9 % (FLUSH) 0.9 %
10 SYRINGE (ML) INJECTION AS NEEDED
Status: CANCELLED | OUTPATIENT
Start: 2023-02-22

## 2023-02-22 RX ORDER — HEPARIN SODIUM (PORCINE) LOCK FLUSH IV SOLN 100 UNIT/ML 100 UNIT/ML
500 SOLUTION INTRAVENOUS AS NEEDED
Status: CANCELLED | OUTPATIENT
Start: 2023-02-22

## 2023-02-22 RX ORDER — HEPARIN SODIUM (PORCINE) LOCK FLUSH IV SOLN 100 UNIT/ML 100 UNIT/ML
500 SOLUTION INTRAVENOUS AS NEEDED
Status: DISCONTINUED | OUTPATIENT
Start: 2023-02-22 | End: 2023-02-22 | Stop reason: HOSPADM

## 2023-02-22 RX ADMIN — Medication 10 ML: at 11:36

## 2023-02-22 RX ADMIN — Medication 500 UNITS: at 11:36

## 2023-02-22 NOTE — PROGRESS NOTES
[facility]    CONSULTING IN BLOOD DISORDERS & CANCER    This was an audio and video enabled telemedicine encounter.      REASON FOR CONSULTATION/CHIEF COMPLAINT:     Evaluation and management for rectal adenocarcinoma                             REQUESTING PHYSICIAN: No ref. provider found  RECORDS OBTAINED:  Records of the patients history including those from the electronic medical record were reviewed and summarized in detail.    HISTORY OF PRESENT ILLNESS:    The patient is a 62 y.o. year old female with medical history significant for migraines, depression/anxiety, and IBS had presented with epigastric discomfort in September 2022.      An EGD and colonoscopy performed by SHOAIB Esteves on 9/1/2022 demonstrated a 3 cm mass in the proximal rectum around 13 cm from the anal verge.  The polyp was multilobulated and somewhat fixed to the underlying tissue.  Friable with contact bleeding.  Other smaller polyps were found in the cecum and ascending colon which were removed.  The EGD revealed mucosal nodule in the esophagus and multiple gastric polyps.  No evidence of bleeding.     The pathology from the rectal mass came back as invasive moderately differentiated adenocarcinoma arising out of traditional serrated adenoma with high-grade dysplasia.    Patient was subsequently referred to Dr. Amaya, colorectal surgery who performed a flexible sigmoidoscopy on 9/21/2022, revealing an infiltrative nonobstructing mass in the mid rectum.  The mass was partially circumferential involving one third of the lumen circumference.     9/13/2022: CT C/A/P with contrast noted rectal neoplasm not well visualized on this exam.  Calcified and noncalcified sub-6mm pulmonary nodules likely the sequela of prior granulomatous disease.  Scattered colonic diverticulosis.  Mildly prominent, but not pathologically enlarged pelvic nodes measuring up to 4 mm.    9/26/2022: MRI pelvis showed high rectal mass with effacement of the muscularis  propria along the right wall and extended beyond the muscularis propria at the posterior wall, T3 lesion. There is no evidence for involvement of the mesorectal fascia. There are no pathologically enlarged nodes.     Patient has been seen by , rad-onc & being planned for neoadjuvant radiation. Patient has come to this clinic to discuss systemic therapy options.    Guardant 360: MSI stable.  Low TMB.    10/17- 10/22/2022: Short course of radiotherapy to the rectal mass.    11/2/2022: Cycle 1 FOLFOX  12/14/2022: Cycle 4 FOLFOX  12/28/2022: Cycle 5 FOLFOX  1/7/2022: MRI pelvis shows marked response to treatment  1/11/2023: Cycle 6 5-FU/LV.  Oxaliplatin dropped due to neuropathy.      INTERIM HISTORY:  Ms. Felton is a 62-year-old female with the above-mentioned history who is here today for lab review and evaluation.  She completed her eighth and final cycle of chemotherapy on 2/8/2023.    She is currently scheduled for a flexible sigmoidoscopy by Dr. Amaya on 3/8/2023, and we will follow-up with her again on 3/17/2023.    She is still having a lot of fatigue.  Another part of the issue is that she is not sleeping well.  She states that she gets about a 3-hour stretch of sleep at night.  She has tried Tylenol PM, which caused her to feel too groggy.  She tried melatonin which was not beneficial.  She tried Ambien in the past and did not like that.    She does still have sensitivity in her fingertips, which is frustrating.  She is also noticed some areas on her great toenails that are bubbling up.  She reports improvement in her nausea, vomiting, and diarrhea.  Her appetite is improved.  She does still notice some occasional issues with palpitations where her heart rate is elevated.    She is trying to be more active when she feels like it.    Past Medical History:   Diagnosis Date   • Abdominal cramping 07/01/2022   • Anemia    • Bloating 07/01/2022   • Bloody diarrhea 07/01/2022   • Chronic back pain    • Colon  "polyps     FOLLOWED BY DR. BEVERLY MENDEZ   • Depression    • Gastric polyps     FOLLOWED BY DR. BEVERLY MENDEZ   • GERD (gastroesophageal reflux disease)    • Hearing loss    • Hiatal hernia 09/2021    3 CM, FOLLOWED BY DR. BEVERLY MENDEZ   • Hyperlipidemia    • IBS (irritable bowel syndrome)    • Migraines    • Patellofemoral arthritis 07/2021   • PNA (pneumonia)    • PONV (postoperative nausea and vomiting) 1965    Numerous procedures with nausea   • Rectal bleeding 1983    Off and on for years   • Rectal cancer (HCC) 09/2022    INVASIVE MODERATELY DIFFERENTIATED ADENOCARCINOMA ARISING FROM TRADITIONAL SERRATED ADENOMA WITH HGD   • Sciatica 09/2015   • Tear of medial meniscus of knee 07/2021    RIGHT KNEE   • Vitamin D deficiency      Past Surgical History:   Procedure Laterality Date   • COLONOSCOPY N/A 1986   • COLONOSCOPY W/ POLYPECTOMY N/A 09/01/2022    3 MM TUBULAR ADENOMA POLYP IN ASCENDING, 6 MM TUBULAR ADENOMA POLYP IN CECUM, 30 MM MASS IN RECTUM, PATH:INVASIVE MODERATELY DIFFERENTIATED ADENOCARCINOMA ARISING FROM TRADITIONAL SERRATED ADENOMA WITH HGD, HYPERTROPHIED ANAL PAPILLA, DR. BEVERLY MENDEZ AT [facility]   • ENDOSCOPY N/A 09/01/2022    MULTIPLE BENIGN GASTRIC POLYPS, 2 SQUAMOUS PAPILLAS IN ESOPHAGUS, 3 CM HIATAL HERNIA,   • EXTERNAL EAR SURGERY  1966    MULTIPLE \"LANCES\", DR. ACE IN [facility]   • EYE SURGERY Right 1967    DR. CASTANEDA IN [facility]   • KNEE ARTHRODESIS Right 07/27/2021    RIGHT KNEE ARTHROCENTESIS, DR. ALBERT GILMAN   • KNEE SURGERY Left 1985    DR. PEREZ AT [place]   • SHOULDER MANIPULATION Left 01/07/2013    FOR FROZEN SHOULDER, DR. ALBERT GILMAN AT [facility]   • SIGMOIDOSCOPY N/A 09/21/2022    AN INFILTRATIVE NON OBSTRUCTING MASS IN MID RECTUM, AREA TATTOOED, DR. JENNIFER AMAYA AT [facility]   • TRANSRECTAL ULTRASOUND N/A 09/21/2022    Procedure: ULTRASOUND TRANSRECTAL;  Surgeon: Jennifer Amaya MD;  Location: [facility] ENDOSCOPY;  Service: General;  " Laterality: N/A;   • VENOUS ACCESS DEVICE (PORT) INSERTION Left 10/26/2022    Procedure: INSERTION OF PORTACATH;  Surgeon: Leonel Bridges MD;  Location: [facility];  Service: General;  Laterality: Left;   • WISDOM TOOTH EXTRACTION Bilateral        MEDICATIONS    Current Outpatient Medications:   •  dicyclomine (BENTYL) 20 MG tablet, Take 1 tablet by mouth Every 6 (Six) Hours As Needed., Disp: , Rfl:   •  diphenoxylate-atropine (LOMOTIL) 2.5-0.025 MG per tablet, Take 1 tablet by mouth 4 (Four) Times a Day As Needed for Diarrhea., Disp: 30 tablet, Rfl: 2  •  ELDERBERRY PO, Take 2 doses by mouth Daily., Disp: , Rfl:   •  lidocaine-prilocaine (EMLA) 2.5-2.5 % cream, Apply 1 application topically to the appropriate area as directed As Needed for Mild Pain. Apply 30 minutes prior to port access, Disp: 5 g, Rfl: 1  •  loperamide (IMODIUM) 2 MG capsule, Take 1 capsule by mouth As Needed for Diarrhea., Disp: , Rfl:   •  loratadine (CLARITIN) 10 MG tablet, Take 10 mg by mouth Daily. 1 daily around time of Neulasta OBI, Disp: , Rfl:   •  ondansetron (ZOFRAN) 8 MG tablet, Take 1 tablet by mouth 3 (Three) Times a Day As Needed for Nausea or Vomiting., Disp: 30 tablet, Rfl: 5  •  oxyCODONE-acetaminophen (PERCOCET) 5-325 MG per tablet, Take 1 tablet by mouth Every 8 (Eight) Hours As Needed for Moderate Pain or Severe Pain. Please fill asap and notify patient when ready to .  Thanks a, Disp: 60 tablet, Rfl: 0  •  pantoprazole (PROTONIX) 40 MG EC tablet, Take 1 tablet by mouth Daily., Disp: 30 tablet, Rfl: 3  •  prochlorperazine (COMPAZINE) 10 MG tablet, Take 1 tablet by mouth Every 6 (Six) Hours As Needed for Nausea or Vomiting., Disp: 30 tablet, Rfl: 3  •  traMADol (ULTRAM) 50 MG tablet, Take 1 tablet by mouth Every 6 (Six) Hours As Needed for Moderate Pain., Disp: 30 tablet, Rfl: 0  No current facility-administered medications for this visit.    Facility-Administered Medications Ordered in Other Visits:   •   "heparin injection 500 Units, 500 Units, Intravenous, PRN, Tristan Weber MD, 500 Units at 02/22/23 1136  •  sodium chloride 0.9 % flush 10 mL, 10 mL, Intravenous, PRN, Tristan Weber MD, 10 mL at 02/22/23 1136    ALLERGIES:     Allergies   Allergen Reactions   • Adhesive Tape Rash     Patient has sensitive skin.    • Levofloxacin Swelling   • Sulfa Antibiotics Rash       SOCIAL HISTORY:       Social History     Socioeconomic History   • Marital status: Single   Tobacco Use   • Smoking status: Never   • Smokeless tobacco: Never   Vaping Use   • Vaping Use: Never used   Substance and Sexual Activity   • Alcohol use: Yes     Alcohol/week: 1.0 standard drink     Types: 1 Shots of liquor per week     Comment: Per month   • Drug use: Never   • Sexual activity: Not Currently         FAMILY HISTORY:  Family History   Problem Relation Age of Onset   • Colon polyps Mother    • Asthma Mother    • COPD Mother    • Lung disease Mother    • Migraines Mother    • Cancer Father         liver cancer   • Colon polyps Father    • Heart disease Father    • Diabetes Father    • Kidney disease Father    • Angina Father    • Liver cancer Father    • Hepatitis Father    • Hearing loss Father    • Cancer Sister         Breast cancer   • Arthritis Sister    • Colon polyps Sister    • Breast cancer Sister    • Diabetes Sister    • Colon polyps Sister    • Alcohol abuse Maternal Uncle    • Colon cancer Neg Hx    • Crohn's disease Neg Hx    • Irritable bowel syndrome Neg Hx    • Ulcerative colitis Neg Hx    • Malig Hyperthermia Neg Hx        REVIEW OF SYSTEMS:  As per HPI         Vitals:    02/22/23 1147   BP: 144/85   Pulse: 90   Resp: 16   Temp: 98.4 °F (36.9 °C)   TempSrc: Temporal   SpO2: 98%   Weight: 104 kg (229 lb 6.4 oz)   Height: 175 cm (68.9\")   PainSc: 0-No pain     Current Status 2/22/2023   ECOG score 0     Physical Exam  Constitutional:       General: She is not in acute distress.     Appearance: She is well-developed. "   Pulmonary:      Effort: Pulmonary effort is normal. No respiratory distress.   Skin:     General: Skin is warm and dry.   Neurological:      Mental Status: She is alert and oriented to person, place, and time.         RECENT LABS:  Results from last 7 days   Lab Units 02/22/23  1132   WBC 10*3/mm3 3.98   NEUTROS ABS 10*3/mm3 2.37   HEMOGLOBIN g/dL 9.9*   HEMATOCRIT % 30.8*   PLATELETS 10*3/mm3 206     Lab Results   Component Value Date    GLUCOSE 98 02/22/2023    BUN 8 02/22/2023    CREATININE 0.68 02/22/2023    EGFRRESULT 86 05/27/2022    EGFR 98.0 02/22/2023    BCR 11.8 02/22/2023    K 4.1 02/22/2023    CO2 25.9 02/22/2023    CALCIUM 9.4 02/22/2023    PROTENTOTREF 6.8 05/27/2022    ALBUMIN 4.2 02/22/2023    LABIL2 2.1 05/27/2022    AST 29 02/22/2023    ALT 27 02/22/2023         ASSESSMENT:   is a pleasant 63 y/o WF with medical history significant for migraines, depression/anxiety, and IBS comes for rectal adenocarcinoma evaluation & management.     # Rectal adenocarcinoma, T3N0M0, Stage IIA:  · Diagnosed on a c-scope performed for gastric discomfort in September 2022.   · The flex sig noted a an infiltrative nonobstructing mass in the mid rectum.  The mass was partially circumferential involving one third of the lumen circumference. CT c/a/p negative for mets. MRI pelvis most consistent with T3N0 disease, with no evidence of involvement of mesorectal fascia.  · Reviewed various management strategies for localized rectal cancer with chemotherapy, radiation and surgical resection.  Patient met with Dr. Amaya, colorectal surgery and is being tentatively planned for low anterior resection after neoadjuvant chemotherapy and radiation.  She received neoadjuvant short course RT by Dr. Freeman, radiation oncology from 10/17- 10/22/2022.   · Reviewed the role and rationale of systemic therapy and localized rectal adenocarcinoma.  Informed patient that the SOC for such patients is 4-months of chemotherapy with  FOLFOX/CAPOX. Total neoadjuvant therapy is preferred in these cases.   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 She expressed interest in alternative treatment options. I reviewed the recent data about IOs in dMMR patients, although informed her this is not the current SOC.  A guardant 360 NGS was obtained which did not show MSI high.  Low TMB.  · Patient is agreeable to proceed with FOLFOX with tentative plan to dose-reduce or stop oxaliplatin if develops neuropathy.    · 11/2/2022: Is clinically stable.  Labs stable.  Proceed with cycle 1 FOLFOX.    · 11/16/2022 cycle 2 FOLFOX.  Received cycle 3 with Neulasta due to neutropenia.  · 12/14/2022: Labs show improved WBC/ANC.  Given significant peripheral neuropathy, will reduce oxaliplatin dose by 25%.  With significant arthralgias despite Claritin, we will not administer Neulasta with this cycle.  Proceed with cycle 4 FOLFOX.  Follow-up in 2 weeks with NP for port, labs and cycle 5 FOLFOX.  · 12/28/2022 cycle 5 FOLFOX we will proceed with continued 25% dose reduction in oxaliplatin.  Today WBC 2.91, ANC 1.69.  Recommended we use Neulasta with his treatment otherwise we will have treatment delays going forward.  The patient is agreeable and will start taking Claritin today in preparation.  Oxaliplatin was not administered with the sixth cycle due to neuropathy.    · 1/25/2023: Reports of improvement in fatigue, neuropathy and cold sensitivity since stopping oxaliplatin.  Reviewed the MRI pelvis findings which show marked response to treatment.  Labs from today show mild neutropenia with ANC of 1560.  Afebrile.  Proceed with cycle seven 5-FU/LV with Neulasta support.  Follow-up in 2 weeks with NP at [place] for port, labs and cycle eight 5-FU/LV.  Will consider dose reduction if ANC less than 1000 at follow-up.  Patient is planned to receive total 8 cycles of chemotherapy.  Patient states she is  scheduled for flexible sigmoidoscopy with Dr. Amaya on 3/8/2023 and follow-up with Dr. Amaya on 3/17/2023.  · 2/8/2023 cycle eight 5-FU, leucovorin which will be her final dose of chemotherapy.  Today WBC 3.82, ANC 2.37.  · 2/22/2023 seen in follow-up post chemotherapy, gradually improving.  She will have a flexible sigmoidoscopy on 3/8/2022 by Dr. Amaya and follow-up with Dr. Amaya on 3/17/2022 to discuss surgery.    #GERD  · Significant epigastric discomfort and reflux with initiation of chemo  · Continue Protonix 40 mg nightly    #Diarrhea  · Initially began following radiation and was exacerbated with chemotherapy  · Utilizing Imodium and Lomotil as needed.     #Nausea due to chemotherapy:  · Slightly worse after her fifth cycle.  Had been taking Zofran on a regular basis.  She does have a prescription for Compazine, and I encouraged her to use this intermittently if needed as well.    #Insomnia:  · Recommend she try plain Benadryl 25 mg initially to see if that helped her.  She states she feels that she could also benefit from the antihistamine.  We discussed she could increase to 50 mg at bedtime to see if this helps her symptoms although it may cause increased  grogginess the next morning.    PLAN:   1. Return in 1 month for follow-up with Dr. Weber with repeat CBC, CMP, and port flush.  2. Proceed with flexible sigmoidoscopy on 3/8/2023 as scheduled with Dr. Amaya.  3. Patient also scheduled for follow-up with Dr. Amaya on 3/17/2023.  4. Call/ return sooner should he develop any new concerns or problems.    Patient is on a high risk medication requiring close monitoring for toxicity.    Laxmi Greenberg, APRN    02/22/2023

## 2023-03-08 ENCOUNTER — ANESTHESIA EVENT (OUTPATIENT)
Dept: GASTROENTEROLOGY | Facility: HOSPITAL | Age: 63
End: 2023-03-08
Payer: COMMERCIAL

## 2023-03-08 ENCOUNTER — HOSPITAL ENCOUNTER (OUTPATIENT)
Facility: HOSPITAL | Age: 63
Setting detail: HOSPITAL OUTPATIENT SURGERY
Discharge: HOME | End: 2023-03-08
Attending: COLON & RECTAL SURGERY | Admitting: COLON & RECTAL SURGERY
Payer: COMMERCIAL

## 2023-03-08 ENCOUNTER — ANESTHESIA (OUTPATIENT)
Dept: GASTROENTEROLOGY | Facility: HOSPITAL | Age: 63
End: 2023-03-08
Payer: COMMERCIAL

## 2023-03-08 ENCOUNTER — PREP FOR SURGERY (OUTPATIENT)
Dept: OTHER | Facility: HOSPITAL | Age: 63
End: 2023-03-08
Payer: COMMERCIAL

## 2023-03-08 VITALS
SYSTOLIC BLOOD PRESSURE: 148 MMHG | BODY MASS INDEX: 32.96 KG/M2 | HEART RATE: 88 BPM | HEIGHT: 70 IN | OXYGEN SATURATION: 98 % | WEIGHT: 230.2 LBS | RESPIRATION RATE: 20 BRPM | DIASTOLIC BLOOD PRESSURE: 90 MMHG

## 2023-03-08 DIAGNOSIS — C20 RECTAL CANCER: ICD-10-CM

## 2023-03-08 PROCEDURE — 25010000002 PROPOFOL 10 MG/ML EMULSION: Performed by: NURSE ANESTHETIST, CERTIFIED REGISTERED

## 2023-03-08 PROCEDURE — 88305 TISSUE EXAM BY PATHOLOGIST: CPT | Performed by: COLON & RECTAL SURGERY

## 2023-03-08 RX ORDER — LIDOCAINE HYDROCHLORIDE 20 MG/ML
INJECTION, SOLUTION INFILTRATION; PERINEURAL AS NEEDED
Status: DISCONTINUED | OUTPATIENT
Start: 2023-03-08 | End: 2023-03-08 | Stop reason: SURG

## 2023-03-08 RX ORDER — SODIUM CHLORIDE 9 MG/ML
40 INJECTION, SOLUTION INTRAVENOUS AS NEEDED
Status: DISCONTINUED | OUTPATIENT
Start: 2023-03-08 | End: 2023-03-08 | Stop reason: HOSPADM

## 2023-03-08 RX ORDER — SODIUM CHLORIDE 0.9 % (FLUSH) 0.9 %
10 SYRINGE (ML) INJECTION EVERY 12 HOURS SCHEDULED
Status: DISCONTINUED | OUTPATIENT
Start: 2023-03-08 | End: 2023-03-08 | Stop reason: HOSPADM

## 2023-03-08 RX ORDER — SODIUM CHLORIDE 0.9 % (FLUSH) 0.9 %
10 SYRINGE (ML) INJECTION AS NEEDED
Status: DISCONTINUED | OUTPATIENT
Start: 2023-03-08 | End: 2023-03-08 | Stop reason: HOSPADM

## 2023-03-08 RX ORDER — PROPOFOL 10 MG/ML
VIAL (ML) INTRAVENOUS CONTINUOUS PRN
Status: DISCONTINUED | OUTPATIENT
Start: 2023-03-08 | End: 2023-03-08 | Stop reason: SURG

## 2023-03-08 RX ORDER — SODIUM CHLORIDE, SODIUM LACTATE, POTASSIUM CHLORIDE, CALCIUM CHLORIDE 600; 310; 30; 20 MG/100ML; MG/100ML; MG/100ML; MG/100ML
30 INJECTION, SOLUTION INTRAVENOUS CONTINUOUS PRN
Status: DISCONTINUED | OUTPATIENT
Start: 2023-03-08 | End: 2023-03-08 | Stop reason: HOSPADM

## 2023-03-08 RX ORDER — PROPOFOL 10 MG/ML
VIAL (ML) INTRAVENOUS AS NEEDED
Status: DISCONTINUED | OUTPATIENT
Start: 2023-03-08 | End: 2023-03-08 | Stop reason: SURG

## 2023-03-08 RX ADMIN — LIDOCAINE HYDROCHLORIDE 50 MG: 20 INJECTION, SOLUTION INFILTRATION; PERINEURAL at 11:10

## 2023-03-08 RX ADMIN — Medication 80 MG: at 11:10

## 2023-03-08 RX ADMIN — PROPOFOL 180 MCG/KG/MIN: 10 INJECTION, EMULSION INTRAVENOUS at 11:10

## 2023-03-08 RX ADMIN — SODIUM CHLORIDE, POTASSIUM CHLORIDE, SODIUM LACTATE AND CALCIUM CHLORIDE 30 ML/HR: 600; 310; 30; 20 INJECTION, SOLUTION INTRAVENOUS at 10:31

## 2023-03-08 NOTE — DISCHARGE INSTRUCTIONS
For the next 24 hours patient needs to be with a responsible adult.    For 24 hours DO NOT drive, operate machinery, appliances, drink alcohol, make important decisions or sign legal documents.    Start with a light or bland diet if you are feeling sick to your stomach otherwise advance to regular diet as tolerated.    Follow recommendations on procedure report if provided by your doctor.    Call Dr Malhotra for problems [phone]    Problems may include but not limited to: large amounts of bleeding, trouble breathing, repeated vomiting, severe unrelieved pain, fever or chills.

## 2023-03-08 NOTE — ANESTHESIA POSTPROCEDURE EVALUATION
Patient: Babs Felton    Procedure Summary     Date: 03/08/23 Room / Location: [facility] ENDOSCOPY 9 /  [facility]    Anesthesia Start: 1106 Anesthesia Stop: 1128    Procedure: FLEXIBLE SIGMOIDOSCOPY WITH BIOPSIES AND 3 ML RECTAL TATTOO Diagnosis:       Rectal cancer (HCC)      (Rectal cancer (HCC) [C20])    Surgeons: Jennifer Amaya MD Provider: Jhon Montesinos MD    Anesthesia Type: MAC ASA Status: 2          Anesthesia Type: MAC    Vitals  Vitals Value Taken Time   /84 03/08/23 1138   Temp     Pulse 87 03/08/23 1138   Resp 20 03/08/23 1138   SpO2 99 % 03/08/23 1138           Post Anesthesia Care and Evaluation    Patient location during evaluation: bedside  Patient participation: complete - patient participated  Level of consciousness: awake  Pain management: adequate    Airway patency: patent  Anesthetic complications: No anesthetic complications  PONV Status: controlled  Cardiovascular status: acceptable  Respiratory status: acceptable  Hydration status: acceptable    Comments: --------------------            03/08/23               1138     --------------------   BP:       142/84     Pulse:      87       Resp:       20       SpO2:      99%      --------------------      
No

## 2023-03-08 NOTE — H&P
"Babs Felton is a 62 y.o. female  who is referred by Jennifer Amaya MD for a colonoscopy. She   has an indications: rectal cancer s/p neoadjuvant tx.     She denies any change in bowel function, melena, or hematochezia.    Past Medical History:   Diagnosis Date   • Abdominal cramping 07/01/2022   • Anemia    • Bloating 07/01/2022   • Bloody diarrhea 07/01/2022   • Chronic back pain    • Colon polyps     FOLLOWED BY DR. BEVERLY MENDEZ   • Depression    • Gastric polyps     FOLLOWED BY DR. BEVERLY MENDEZ   • GERD (gastroesophageal reflux disease)    • Hearing loss    • Hiatal hernia 09/2021    3 CM, FOLLOWED BY DR. BEVERLY MENDEZ   • Hyperlipidemia    • IBS (irritable bowel syndrome)    • Migraines    • Patellofemoral arthritis 07/2021   • PNA (pneumonia)    • Rectal bleeding 1983    Off and on for years   • Rectal cancer (HCC) 09/2022    INVASIVE MODERATELY DIFFERENTIATED ADENOCARCINOMA ARISING FROM TRADITIONAL SERRATED ADENOMA WITH HGD   • Sciatica 09/2015   • Tear of medial meniscus of knee 07/2021    RIGHT KNEE   • Vitamin D deficiency        Past Surgical History:   Procedure Laterality Date   • COLONOSCOPY N/A 1986   • COLONOSCOPY W/ POLYPECTOMY N/A 09/01/2022    3 MM TUBULAR ADENOMA POLYP IN ASCENDING, 6 MM TUBULAR ADENOMA POLYP IN CECUM, 30 MM MASS IN RECTUM, PATH:INVASIVE MODERATELY DIFFERENTIATED ADENOCARCINOMA ARISING FROM TRADITIONAL SERRATED ADENOMA WITH HGD, HYPERTROPHIED ANAL PAPILLA, DR. BEVERLY MENDEZ AT [facility]   • ENDOSCOPY N/A 09/01/2022    MULTIPLE BENIGN GASTRIC POLYPS, 2 SQUAMOUS PAPILLAS IN ESOPHAGUS, 3 CM HIATAL HERNIA,   • EXTERNAL EAR SURGERY  1966    MULTIPLE \"LANCES\", DR. ACE IN [facility]   • [facility]   • KNEE ARTHRODESIS Right 07/27/2021    RIGHT KNEE ARTHROCENTESIS, DR. ALBERT GILMAN   • KNEE SURGERY Left 1985    DR. PEREZ AT [place]   • SHOULDER MANIPULATION Left 01/07/2013    FOR FROZEN SHOULDER, DR." ALBERT GILMAN AT [facility]   • SIGMOIDOSCOPY N/A 09/21/2022    AN INFILTRATIVE NON OBSTRUCTING MASS IN MID RECTUM, AREA TATTOOED, DR. JENNIFER OJEDA AT [facility]   • TRANSRECTAL ULTRASOUND N/A 09/21/2022    Procedure: ULTRASOUND TRANSRECTAL;  Surgeon: Jennifer Ojeda MD;  Location: [facility] ENDOSCOPY;  Service: General;  Laterality: N/A;   • VENOUS ACCESS DEVICE (PORT) INSERTION Left 10/26/2022    Procedure: INSERTION OF PORTACATH;  Surgeon: Leonel Bridges MD;  Location: [facility] MAIN OR;  Service: General;  Laterality: Left;   • WISDOM TOOTH EXTRACTION Bilateral        Medications Prior to Admission   Medication Sig Dispense Refill Last Dose   • ELDERBERRY PO Take 2 doses by mouth Daily.   Past Week   • ondansetron (ZOFRAN) 8 MG tablet Take 1 tablet by mouth 3 (Three) Times a Day As Needed for Nausea or Vomiting. 30 tablet 5 Past Week   • pantoprazole (PROTONIX) 40 MG EC tablet Take 1 tablet by mouth Daily. 30 tablet 3 Past Month   • traMADol (ULTRAM) 50 MG tablet Take 1 tablet by mouth Every 6 (Six) Hours As Needed for Moderate Pain. 30 tablet 0 Past Week   • dicyclomine (BENTYL) 20 MG tablet Take 1 tablet by mouth Every 6 (Six) Hours As Needed.   More than a month   • diphenoxylate-atropine (LOMOTIL) 2.5-0.025 MG per tablet Take 1 tablet by mouth 4 (Four) Times a Day As Needed for Diarrhea. 30 tablet 2 More than a month   • loperamide (IMODIUM) 2 MG capsule Take 1 capsule by mouth As Needed for Diarrhea.   More than a month   • loratadine (CLARITIN) 10 MG tablet Take 1 tablet by mouth As Needed. 1 daily around time of Neulasta OBI   More than a month   • oxyCODONE-acetaminophen (PERCOCET) 5-325 MG per tablet Take 1 tablet by mouth Every 8 (Eight) Hours As Needed for Moderate Pain or Severe Pain. Please fill asap and notify patient when ready to .  Thanks a 60 tablet 0 More than a month   • prochlorperazine (COMPAZINE) 10 MG tablet Take 1 tablet by mouth Every 6 (Six) Hours As Needed for Nausea or Vomiting. 30 tablet 3  More than a month       Allergies   Allergen Reactions   • Adhesive Tape Rash     Patient has sensitive skin.    • Levofloxacin Swelling   • Sulfa Antibiotics Rash       Family History   Problem Relation Age of Onset   • Colon polyps Mother    • Asthma Mother    • COPD Mother    • Lung disease Mother    • Migraines Mother    • Cancer Father         liver cancer   • Colon polyps Father    • Heart disease Father    • Diabetes Father    • Kidney disease Father    • Angina Father    • Liver cancer Father    • Hepatitis Father    • Hearing loss Father    • Cancer Sister         Breast cancer   • Arthritis Sister    • Colon polyps Sister    • Breast cancer Sister    • Diabetes Sister    • Colon polyps Sister    • Alcohol abuse Maternal Uncle    • Colon cancer Neg Hx    • Crohn's disease Neg Hx    • Irritable bowel syndrome Neg Hx    • Ulcerative colitis Neg Hx    • Malig Hyperthermia Neg Hx        Social History     Socioeconomic History   • Marital status: Single   Tobacco Use   • Smoking status: Never   • Smokeless tobacco: Never   Vaping Use   • Vaping Use: Never used   Substance and Sexual Activity   • Alcohol use: Not Currently   • Drug use: Never   • Sexual activity: Defer       Review of Systems   Gastrointestinal: Negative for abdominal pain, nausea and vomiting.   All other systems reviewed and are negative.      Vitals:    03/08/23 1012   BP: 161/80   Pulse: 89   Resp: 24   SpO2: 99%         Physical Exam  Constitutional:       Appearance: She is well-developed.   HENT:      Head: Normocephalic and atraumatic.   Eyes:      Pupils: Pupils are equal, round, and reactive to light.   Cardiovascular:      Rate and Rhythm: Regular rhythm.   Pulmonary:      Effort: Pulmonary effort is normal.   Abdominal:      General: There is no distension.      Palpations: Abdomen is soft.   Musculoskeletal:         General: Normal range of motion.   Skin:     General: Skin is warm and dry.   Neurological:      Mental Status: She is  alert and oriented to person, place, and time.   Psychiatric:         Thought Content: Thought content normal.         Judgment: Judgment normal.           Assessment & Plan      indications:  rectal cancer s/p neoadjuvant tx.          I recommend flex sigmoidoscopy.  I described risks, benefits of the procedure with the patient including but not limited to bleeding, infection, possibility of perforation and possible polypectomy. All of the patient's questions were answered and they would like to proceed with the above recommendations.

## 2023-03-08 NOTE — ANESTHESIA PREPROCEDURE EVALUATION
Anesthesia Evaluation     Patient summary reviewed and Nursing notes reviewed   history of anesthetic complications: PONV  NPO Solid Status: > 8 hours             Airway   Mallampati: II  TM distance: >3 FB  Neck ROM: full  Dental - normal exam     Pulmonary - normal exam   Cardiovascular - normal exam    (+) hyperlipidemia,       Neuro/Psych  (+) headaches, numbness, psychiatric history Depression,      ROS Comment: Migraines  GI/Hepatic/Renal/Endo    (+) obesity,  hiatal hernia, GERD,      Musculoskeletal     (+) back pain, chronic pain,   Abdominal   (+) obese,    Substance History      OB/GYN          Other   arthritis,    history of cancer active      Other Comment: Hx rectal CA                    Anesthesia Plan    ASA 2     MAC     (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        CODE STATUS:

## 2023-03-11 LAB
LAB AP CASE REPORT: NORMAL
PATH REPORT.FINAL DX SPEC: NORMAL
PATH REPORT.GROSS SPEC: NORMAL

## 2023-03-17 ENCOUNTER — OFFICE VISIT (OUTPATIENT)
Dept: SURGERY | Facility: CLINIC | Age: 63
End: 2023-03-17
Payer: COMMERCIAL

## 2023-03-17 VITALS
WEIGHT: 231.8 LBS | BODY MASS INDEX: 33.18 KG/M2 | HEART RATE: 77 BPM | HEIGHT: 70 IN | TEMPERATURE: 97.5 F | SYSTOLIC BLOOD PRESSURE: 116 MMHG | DIASTOLIC BLOOD PRESSURE: 84 MMHG | OXYGEN SATURATION: 97 %

## 2023-03-17 DIAGNOSIS — C20 RECTAL CANCER: Primary | ICD-10-CM

## 2023-03-17 PROCEDURE — 99214 OFFICE O/P EST MOD 30 MIN: CPT | Performed by: COLON & RECTAL SURGERY

## 2023-03-17 NOTE — PROGRESS NOTES
"Babs Felton is a 62 y.o. female in for follow up of Rectal cancer (HCC)    The patient underwent a flexible sigmoidoscopy for her rectal cancer. She has undergone neoadjuvant short course radiation and chemotherapy. There is no evidence of any tumor. Biopsies of the area came back negative. Had discussion with radiation and medical oncology regarding continued close active surveillance for a complete responder given she had short course. There are not any studies that talk about organ preservation with short course radiation. There is evolving data that could potentially support it though.    She notes as long as she eats green beans and potatoes she is fine however; if she eats anything else she usually ends up sick. The patient has been wanting to try different things again. The patient reports she has an appointment with Dr. Weber on 03/22/2023.        /84 (BP Location: Right arm, Patient Position: Sitting, Cuff Size: Large Adult)   Pulse 77   Temp 97.5 °F (36.4 °C) (Temporal)   Ht 176.5 cm (69.5\")   Wt 105 kg (231 lb 12.8 oz)   LMP  (LMP Unknown)   SpO2 97%   Breastfeeding No   BMI 33.74 kg/m²   Body mass index is 33.74 kg/m².      PE:  Physical Exam  Constitutional:       General: She is not in acute distress.     Appearance: She is well-developed.   HENT:      Head: Normocephalic and atraumatic.   Abdominal:      General: There is no distension.      Palpations: Abdomen is soft.   Musculoskeletal:         General: Normal range of motion.   Neurological:      Mental Status: She is alert.   Psychiatric:         Thought Content: Thought content normal.           Assessment:   1. Rectal cancer (HCC)         Plan: We had a long discussion on standard of care being surgical resection.  We discussed options of active surveillance given that it appears she is a complete responder.  If we continue with close surveillance she would need to have a flex sig or MRI and alternating 6-month intervals.  " Patient will take this under advisement.  She has a appoint with Dr. Weber very soon.   We will do flexible sigmoidoscopy in 12 months.  An MRI in 6 months        Transcribed from ambient dictation for Jennifer Amaya MD by Samantha Salcedo.  03/17/23   10:47 EDT    Patient or patient representative verbalized consent to the visit recording.  I have personally performed the services described in this document as transcribed by the above individual, and it is both accurate and complete.     I spent 32 minutes caring for Babs Felton on this date of service. This time includes time spent by me in the following activities:preparing for the visit, reviewing tests, obtaining and/or reviewing a separately obtained history, performing a medically appropriate examination and/or evaluation , counseling and educating the patient/family/caregiver, ordering medications, tests, or procedures, referring and communicating with other health care professionals  and independently interpreting results and communicating that information with the patient/family/caregiver

## 2023-03-22 ENCOUNTER — INFUSION (OUTPATIENT)
Dept: ONCOLOGY | Facility: HOSPITAL | Age: 63
End: 2023-03-22
Payer: COMMERCIAL

## 2023-03-22 ENCOUNTER — OFFICE VISIT (OUTPATIENT)
Dept: ONCOLOGY | Facility: CLINIC | Age: 63
End: 2023-03-22
Payer: COMMERCIAL

## 2023-03-22 ENCOUNTER — LAB (OUTPATIENT)
Dept: LAB | Facility: HOSPITAL | Age: 63
End: 2023-03-22
Payer: COMMERCIAL

## 2023-03-22 VITALS
BODY MASS INDEX: 34.33 KG/M2 | DIASTOLIC BLOOD PRESSURE: 90 MMHG | WEIGHT: 231.8 LBS | SYSTOLIC BLOOD PRESSURE: 134 MMHG | OXYGEN SATURATION: 97 % | TEMPERATURE: 95.5 F | HEART RATE: 80 BPM | HEIGHT: 69 IN | RESPIRATION RATE: 18 BRPM

## 2023-03-22 DIAGNOSIS — C20 RECTAL CANCER: ICD-10-CM

## 2023-03-22 DIAGNOSIS — Z45.2 ENCOUNTER FOR FITTING AND ADJUSTMENT OF VASCULAR CATHETER: Primary | ICD-10-CM

## 2023-03-22 DIAGNOSIS — C20 RECTAL CANCER: Primary | ICD-10-CM

## 2023-03-22 LAB
ALBUMIN SERPL-MCNC: 4.4 G/DL (ref 3.5–5.2)
ALBUMIN/GLOB SERPL: 1.8 G/DL (ref 1.1–2.4)
ALP SERPL-CCNC: 79 U/L (ref 38–116)
ALT SERPL W P-5'-P-CCNC: 22 U/L (ref 0–33)
ANION GAP SERPL CALCULATED.3IONS-SCNC: 12.2 MMOL/L (ref 5–15)
AST SERPL-CCNC: 26 U/L (ref 0–32)
BASOPHILS # BLD AUTO: 0.02 10*3/MM3 (ref 0–0.2)
BASOPHILS NFR BLD AUTO: 0.4 % (ref 0–1.5)
BILIRUB SERPL-MCNC: 0.4 MG/DL (ref 0.2–1.2)
BUN SERPL-MCNC: 8 MG/DL (ref 6–20)
BUN/CREAT SERPL: 12.3 (ref 7.3–30)
CALCIUM SPEC-SCNC: 9.8 MG/DL (ref 8.5–10.2)
CEA SERPL-MCNC: 1.31 NG/ML
CHLORIDE SERPL-SCNC: 106 MMOL/L (ref 98–107)
CO2 SERPL-SCNC: 24.8 MMOL/L (ref 22–29)
CREAT SERPL-MCNC: 0.65 MG/DL (ref 0.6–1.1)
DEPRECATED RDW RBC AUTO: 43.3 FL (ref 37–54)
EGFRCR SERPLBLD CKD-EPI 2021: 99.1 ML/MIN/1.73
EOSINOPHIL # BLD AUTO: 0.48 10*3/MM3 (ref 0–0.4)
EOSINOPHIL NFR BLD AUTO: 10.3 % (ref 0.3–6.2)
ERYTHROCYTE [DISTWIDTH] IN BLOOD BY AUTOMATED COUNT: 12.6 % (ref 12.3–15.4)
GLOBULIN UR ELPH-MCNC: 2.5 GM/DL (ref 1.8–3.5)
GLUCOSE SERPL-MCNC: 93 MG/DL (ref 74–124)
HCT VFR BLD AUTO: 36.2 % (ref 34–46.6)
HGB BLD-MCNC: 11.6 G/DL (ref 12–15.9)
IMM GRANULOCYTES # BLD AUTO: 0.01 10*3/MM3 (ref 0–0.05)
IMM GRANULOCYTES NFR BLD AUTO: 0.2 % (ref 0–0.5)
LYMPHOCYTES # BLD AUTO: 1.41 10*3/MM3 (ref 0.7–3.1)
LYMPHOCYTES NFR BLD AUTO: 30.2 % (ref 19.6–45.3)
MCH RBC QN AUTO: 29.9 PG (ref 26.6–33)
MCHC RBC AUTO-ENTMCNC: 32 G/DL (ref 31.5–35.7)
MCV RBC AUTO: 93.3 FL (ref 79–97)
MONOCYTES # BLD AUTO: 0.33 10*3/MM3 (ref 0.1–0.9)
MONOCYTES NFR BLD AUTO: 7.1 % (ref 5–12)
NEUTROPHILS NFR BLD AUTO: 2.42 10*3/MM3 (ref 1.7–7)
NEUTROPHILS NFR BLD AUTO: 51.8 % (ref 42.7–76)
NRBC BLD AUTO-RTO: 0 /100 WBC (ref 0–0.2)
PLATELET # BLD AUTO: 239 10*3/MM3 (ref 140–450)
PMV BLD AUTO: 8.8 FL (ref 6–12)
POTASSIUM SERPL-SCNC: 4.4 MMOL/L (ref 3.5–4.7)
PROT SERPL-MCNC: 6.9 G/DL (ref 6.3–8)
RBC # BLD AUTO: 3.88 10*6/MM3 (ref 3.77–5.28)
SODIUM SERPL-SCNC: 143 MMOL/L (ref 134–145)
WBC NRBC COR # BLD: 4.67 10*3/MM3 (ref 3.4–10.8)

## 2023-03-22 PROCEDURE — 36415 COLL VENOUS BLD VENIPUNCTURE: CPT

## 2023-03-22 PROCEDURE — 80053 COMPREHEN METABOLIC PANEL: CPT

## 2023-03-22 PROCEDURE — 85025 COMPLETE CBC W/AUTO DIFF WBC: CPT

## 2023-03-22 PROCEDURE — 99417 PROLNG OP E/M EACH 15 MIN: CPT | Performed by: INTERNAL MEDICINE

## 2023-03-22 PROCEDURE — 82378 CARCINOEMBRYONIC ANTIGEN: CPT | Performed by: INTERNAL MEDICINE

## 2023-03-22 PROCEDURE — 36591 DRAW BLOOD OFF VENOUS DEVICE: CPT

## 2023-03-22 PROCEDURE — 25010000002 HEPARIN LOCK FLUSH PER 10 UNITS: Performed by: INTERNAL MEDICINE

## 2023-03-22 PROCEDURE — 99215 OFFICE O/P EST HI 40 MIN: CPT | Performed by: INTERNAL MEDICINE

## 2023-03-22 RX ORDER — SODIUM CHLORIDE 0.9 % (FLUSH) 0.9 %
10 SYRINGE (ML) INJECTION AS NEEDED
Status: DISCONTINUED | OUTPATIENT
Start: 2023-03-22 | End: 2023-03-22 | Stop reason: HOSPADM

## 2023-03-22 RX ORDER — LIDOCAINE AND PRILOCAINE 25; 25 MG/G; MG/G
1 CREAM TOPICAL
Qty: 5 G | Refills: 1 | Status: SHIPPED | OUTPATIENT
Start: 2023-03-22

## 2023-03-22 RX ORDER — HYDROXYZINE HYDROCHLORIDE 10 MG/1
10 TABLET, FILM COATED ORAL EVERY 8 HOURS PRN
Qty: 30 TABLET | Refills: 0 | Status: SHIPPED | OUTPATIENT
Start: 2023-03-22

## 2023-03-22 RX ORDER — SODIUM CHLORIDE 0.9 % (FLUSH) 0.9 %
10 SYRINGE (ML) INJECTION AS NEEDED
OUTPATIENT
Start: 2023-03-22

## 2023-03-22 RX ORDER — HEPARIN SODIUM (PORCINE) LOCK FLUSH IV SOLN 100 UNIT/ML 100 UNIT/ML
500 SOLUTION INTRAVENOUS AS NEEDED
Status: DISCONTINUED | OUTPATIENT
Start: 2023-03-22 | End: 2023-03-22 | Stop reason: HOSPADM

## 2023-03-22 RX ORDER — HEPARIN SODIUM (PORCINE) LOCK FLUSH IV SOLN 100 UNIT/ML 100 UNIT/ML
500 SOLUTION INTRAVENOUS AS NEEDED
OUTPATIENT
Start: 2023-03-22

## 2023-03-22 RX ORDER — DICYCLOMINE HCL 20 MG
20 TABLET ORAL EVERY 6 HOURS PRN
Qty: 90 TABLET | Refills: 1 | Status: SHIPPED | OUTPATIENT
Start: 2023-03-22

## 2023-03-22 RX ADMIN — Medication 10 ML: at 11:58

## 2023-03-22 RX ADMIN — Medication 500 UNITS: at 11:58

## 2023-03-22 NOTE — PROGRESS NOTES
[facility]    CONSULTING IN BLOOD DISORDERS & CANCER    This was an audio and video enabled telemedicine encounter.      REASON FOR CONSULTATION/CHIEF COMPLAINT:     Evaluation and management for rectal adenocarcinoma                             REQUESTING PHYSICIAN: No ref. provider found  RECORDS OBTAINED:  Records of the patients history including those from the electronic medical record were reviewed and summarized in detail.    HISTORY OF PRESENT ILLNESS:    The patient is a 62 y.o. year old female with medical history significant for migraines, depression/anxiety, and IBS had presented with epigastric discomfort in September 2022.      An EGD and colonoscopy performed by SHOAIB Esteves on 9/1/2022 demonstrated a 3 cm mass in the proximal rectum around 13 cm from the anal verge.  The polyp was multilobulated and somewhat fixed to the underlying tissue.  Friable with contact bleeding.  Other smaller polyps were found in the cecum and ascending colon which were removed.  The EGD revealed mucosal nodule in the esophagus and multiple gastric polyps.  No evidence of bleeding.     The pathology from the rectal mass came back as invasive moderately differentiated adenocarcinoma arising out of traditional serrated adenoma with high-grade dysplasia.    Patient was subsequently referred to Dr. Amaya, colorectal surgery who performed a flexible sigmoidoscopy on 9/21/2022, revealing an infiltrative nonobstructing mass in the mid rectum.  The mass was partially circumferential involving one third of the lumen circumference.     9/13/2022: CT C/A/P with contrast noted rectal neoplasm not well visualized on this exam.  Calcified and noncalcified sub-6mm pulmonary nodules likely the sequela of prior granulomatous disease.  Scattered colonic diverticulosis.  Mildly prominent, but not pathologically enlarged pelvic nodes measuring up to 4 mm.    9/26/2022: MRI pelvis showed high rectal mass with effacement of the muscularis  propria along the right wall and extended beyond the muscularis propria at the posterior wall, T3 lesion. There is no evidence for involvement of the mesorectal fascia. There are no pathologically enlarged nodes.     Patient has been seen by , rad-onc & being planned for neoadjuvant radiation. Patient has come to this clinic to discuss systemic therapy options.    Guardant 360: MSI stable.  Low TMB.    10/17- 10/22/2022: Short course of radiotherapy to the rectal mass.    11/2/2022: Cycle 1 FOLFOX  12/14/2022: Cycle 4 FOLFOX  12/28/2022: Cycle 5 FOLFOX  1/7/2022: MRI pelvis shows marked response to treatment  1/11/2023: Cycle 6 5-FU/LV.  Oxaliplatin dropped due to neuropathy.  2/8/2023: Cycle 8 5-FU    3/6/2023: Clara sigmoidoscopy and surface biopsy.  No evidence of residual disease.  Case discussed with Dr. Amaya and Dr. Freeman.  Plan made for active surveillance.    INTERIM HISTORY:  Patient returns to the clinic for a follow-up visit.  She is recovering well since completion of chemotherapy.  Still has some fatigue.  Persistent neuropathy and nail changes.  Has intermittent loose stools.  No blood in stool.  Appetite and weight has been stable.    Past Medical History:   Diagnosis Date   • Abdominal cramping 07/01/2022   • Anemia    • Bloating 07/01/2022   • Bloody diarrhea 07/01/2022   • Chronic back pain    • Colon polyps     FOLLOWED BY DR. BEVERLY MENDEZ   • Depression    • Gastric polyps     FOLLOWED BY DR. BEVERLY MENDEZ   • GERD (gastroesophageal reflux disease)    • Hearing loss    • Hiatal hernia 09/2021    3 CM, FOLLOWED BY DR. BEVERLY MENDEZ   • Hyperlipidemia    • IBS (irritable bowel syndrome)    • Migraines    • Patellofemoral arthritis 07/2021   • PNA (pneumonia)    • Rectal bleeding 1983    Off and on for years   • Rectal cancer (HCC) 09/2022    INVASIVE MODERATELY DIFFERENTIATED ADENOCARCINOMA ARISING FROM TRADITIONAL SERRATED ADENOMA WITH HGD   • Sciatica 09/2015   • Tear of medial  "meniscus of knee 07/2021    RIGHT KNEE   • Vitamin D deficiency      Past Surgical History:   Procedure Laterality Date   • COLONOSCOPY N/A 1986   • COLONOSCOPY W/ POLYPECTOMY N/A 09/01/2022    3 MM TUBULAR ADENOMA POLYP IN ASCENDING, 6 MM TUBULAR ADENOMA POLYP IN CECUM, 30 MM MASS IN RECTUM, PATH:INVASIVE MODERATELY DIFFERENTIATED ADENOCARCINOMA ARISING FROM TRADITIONAL SERRATED ADENOMA WITH HGD, HYPERTROPHIED ANAL PAPILLA, DR. BEVERLY MENDEZ AT [facility]   • ENDOSCOPY N/A 09/01/2022    MULTIPLE BENIGN GASTRIC POLYPS, 2 SQUAMOUS PAPILLAS IN ESOPHAGUS, 3 CM HIATAL HERNIA,   • EXTERNAL EAR SURGERY  1966    MULTIPLE \"LANCES\", DR. ACE IN [facility]   • EYE SURGERY Right 1967    DR. CASTANEDA IN [facility]   • KNEE ARTHRODESIS Right 07/27/2021    RIGHT KNEE ARTHROCENTESIS, DR. ALBERT GILMAN   • KNEE SURGERY Left 1985    DR. PEREZ AT [place]   • SHOULDER MANIPULATION Left 01/07/2013    FOR FROZEN SHOULDER, DR. ALBERT GILMAN AT [facility]   • SIGMOIDOSCOPY N/A 09/21/2022    AN INFILTRATIVE NON OBSTRUCTING MASS IN MID RECTUM, AREA TATTOOED, DR. TEMO AMAYA AT [facility]   • SIGMOIDOSCOPY N/A 3/8/2023    Procedure: FLEXIBLE SIGMOIDOSCOPY WITH BIOPSIES AND 3 ML RECTAL TATTOO;  Surgeon: Temo Amaya MD;  Location: [facility] ENDOSCOPY;  Service: General;  Laterality: N/A;  PRE- RECTAL CANCER  POST- SAME   • TRANSRECTAL ULTRASOUND N/A 09/21/2022    Procedure: ULTRASOUND TRANSRECTAL;  Surgeon: Temo Amaya MD;  Location: [facility] ENDOSCOPY;  Service: General;  Laterality: N/A;   • VENOUS ACCESS DEVICE (PORT) INSERTION Left 10/26/2022    Procedure: INSERTION OF PORTACATH;  Surgeon: Leonel Bridges MD;  Location: [facility] MAIN OR;  Service: General;  Laterality: Left;   • WISDOM TOOTH EXTRACTION Bilateral        MEDICATIONS    Current Outpatient Medications:   •  dicyclomine (BENTYL) 20 MG tablet, Take 1 tablet by mouth Every 6 (Six) Hours As Needed (abd cramps)., Disp: 90 tablet, Rfl: 1  •  " diphenoxylate-atropine (LOMOTIL) 2.5-0.025 MG per tablet, Take 1 tablet by mouth 4 (Four) Times a Day As Needed for Diarrhea., Disp: 30 tablet, Rfl: 2  •  ELDERBERRY PO, Take 2 doses by mouth Daily., Disp: , Rfl:   •  loperamide (IMODIUM) 2 MG capsule, Take 1 capsule by mouth As Needed for Diarrhea., Disp: , Rfl:   •  loratadine (CLARITIN) 10 MG tablet, Take 1 tablet by mouth As Needed. 1 daily around time of Neulasta OBI, Disp: , Rfl:   •  ondansetron (ZOFRAN) 8 MG tablet, Take 1 tablet by mouth 3 (Three) Times a Day As Needed for Nausea or Vomiting., Disp: 30 tablet, Rfl: 5  •  oxyCODONE-acetaminophen (PERCOCET) 5-325 MG per tablet, Take 1 tablet by mouth Every 8 (Eight) Hours As Needed for Moderate Pain or Severe Pain. Please fill asap and notify patient when ready to .  Thanks a, Disp: 60 tablet, Rfl: 0  •  pantoprazole (PROTONIX) 40 MG EC tablet, Take 1 tablet by mouth Daily., Disp: 30 tablet, Rfl: 3  •  prochlorperazine (COMPAZINE) 10 MG tablet, Take 1 tablet by mouth Every 6 (Six) Hours As Needed for Nausea or Vomiting., Disp: 30 tablet, Rfl: 3  •  traMADol (ULTRAM) 50 MG tablet, Take 1 tablet by mouth Every 6 (Six) Hours As Needed for Moderate Pain., Disp: 30 tablet, Rfl: 0  •  hydrOXYzine (ATARAX) 10 MG tablet, Take 1 tablet by mouth Every 8 (Eight) Hours As Needed for Itching., Disp: 30 tablet, Rfl: 0  •  lidocaine-prilocaine (EMLA) 2.5-2.5 % cream, Apply 1 application topically to the appropriate area as directed Every 2 (Two) Hours As Needed for Mild Pain., Disp: 5 g, Rfl: 1    ALLERGIES:     Allergies   Allergen Reactions   • Adhesive Tape Rash     Patient has sensitive skin.    • Levofloxacin Swelling   • Sulfa Antibiotics Rash       SOCIAL HISTORY:       Social History     Socioeconomic History   • Marital status: Single   Tobacco Use   • Smoking status: Never     Passive exposure: Never   • Smokeless tobacco: Never   Vaping Use   • Vaping Use: Never used   Substance and Sexual Activity   •  "Alcohol use: Not Currently   • Drug use: Never   • Sexual activity: Defer         FAMILY HISTORY:  Family History   Problem Relation Age of Onset   • Colon polyps Mother    • Asthma Mother    • COPD Mother    • Lung disease Mother    • Migraines Mother    • Cancer Father         liver cancer   • Colon polyps Father    • Heart disease Father    • Diabetes Father    • Kidney disease Father    • Angina Father    • Liver cancer Father    • Hepatitis Father    • Hearing loss Father    • Cancer Sister         Breast cancer   • Arthritis Sister    • Colon polyps Sister    • Breast cancer Sister    • Diabetes Sister    • Colon polyps Sister    • Alcohol abuse Maternal Uncle    • Colon cancer Neg Hx    • Crohn's disease Neg Hx    • Irritable bowel syndrome Neg Hx    • Ulcerative colitis Neg Hx    • Malig Hyperthermia Neg Hx        REVIEW OF SYSTEMS:  As per HPI         Vitals:    03/22/23 1049   BP: 134/90   Pulse: 80   Resp: 18   Temp: 95.5 °F (35.3 °C)   TempSrc: Temporal   SpO2: 97%   Weight: 105 kg (231 lb 12.8 oz)   Height: 176.5 cm (69.49\")   PainSc: 0-No pain     Current Status 3/22/2023   ECOG score 0     CONSTITUTIONAL:  Vital signs reviewed.  No distress, looks comfortable.  EYES:  Conjunctiva and lids unremarkable.    EARS,NOSE,MOUTH,THROAT:  Ears and nose appear unremarkable.    RESPIRATORY:  Normal respiratory effort.  Lungs clear to auscultation bilaterally.  CARDIOVASCULAR:  Normal S1, S2.  No murmurs rubs or gallops.  No significant lower extremity edema.  GASTROINTESTINAL: Abdomen appears unremarkable.  Nondistended   LYMPHATIC:  No cervical, supraclavicular lymphadenopathy.  SKIN:  Warm.  No rashes.  PSYCHIATRIC:  Normal judgment and insight.  Normal mood and affect.  NEURO: AAOx3, no obvious focal deficits.    RECENT LABS:  Results from last 7 days   Lab Units 03/22/23  1032   WBC 10*3/mm3 4.67   NEUTROS ABS 10*3/mm3 2.42   HEMOGLOBIN g/dL 11.6*   HEMATOCRIT % 36.2   PLATELETS 10*3/mm3 239     Lab Results "   Component Value Date    GLUCOSE 98 02/22/2023    BUN 8 02/22/2023    CREATININE 0.68 02/22/2023    EGFRRESULT 86 05/27/2022    EGFR 98.0 02/22/2023    BCR 11.8 02/22/2023    K 4.1 02/22/2023    CO2 25.9 02/22/2023    CALCIUM 9.4 02/22/2023    PROTENTOTREF 6.8 05/27/2022    ALBUMIN 4.2 02/22/2023    LABIL2 2.1 05/27/2022    AST 29 02/22/2023    ALT 27 02/22/2023         ASSESSMENT:   is a pleasant 63 y/o WF with medical history significant for migraines, depression/anxiety, and IBS comes for rectal adenocarcinoma evaluation & management.     # Rectal adenocarcinoma, T3N0M0, Stage IIA:  · Diagnosed on a c-scope performed for gastric discomfort in September 2022.   · The flex sig noted a an infiltrative nonobstructing mass in the mid rectum.  The mass was partially circumferential involving one third of the lumen circumference. CT c/a/p negative for mets. MRI pelvis most consistent with T3N0 disease, with no evidence of involvement of mesorectal fascia.  · Reviewed various management strategies for localized rectal cancer with chemotherapy, radiation and surgical resection.  Patient met with Dr. Amaya, colorectal surgery and is being tentatively planned for low anterior resection after neoadjuvant chemotherapy and radiation.  She received neoadjuvant short course RT by Dr. Freeman, radiation oncology from 10/17- 10/22/2022.   · Received neoadjuvant chemotherapy with 8 cycles of FOLFOX between 11/2/2022 - 2/8/2023.  Oxaliplatin dropped with last 2 cycles.  · Post treatment flexible sigmoidoscopy and rectal biopsies performed 3/8/2023 showed no evidence of residual disease.  Case discussed with Dr. Amaya and Lydia.  I extensively discussed risks/benefits of surgical resection versus active surveillance.  Informed patient that active surveillance is currently not the standard of care but is an active area of research in patients who have complete response.  Also discussed surveillance strategies.  Patient expressed  understanding of the risks and opted for active surveillance.    · 3/22/2023: Recovering well after recent chemotherapy.  Discussed plan for active surveillance.  Will check CEA and guardant reveal circulating tumor DNA today.  Also obtain CT C/A/P in next few weeks.  Plan to repeat MRI pelvis in 3 months and flexible sigmoidoscopy in 6 months time.  Follow-up in 3 months or sooner if needed.    #GERD  · Significant epigastric discomfort and reflux with initiation of chemo  · Continue Protonix 40 mg nightly    #Diarrhea  · Initially began following radiation and was exacerbated with chemotherapy  · Utilizing Imodium and Lomotil as needed.  · Is planning to follow-up with GI again.    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  On multivitamin supplements    PLAN:   1. Continue active surveillance.  Check CEA and circulating tumor DNA, guardant reveal today and repeat every 3 months  2. CT C/A/P in next 1-2 weeks.  3. MRI pelvis in 3 months.  4. Dr. Amaya planning flexible sigmoidoscopy in 6 months (September 2023)  5. Follow-up in 3 months or sooner if needed  6. Call/ return sooner should he develop any new concerns or problems.  Orders Placed This Encounter   Procedures   • CT Chest With Contrast Diagnostic     Standing Status:   Future     Standing Expiration Date:   3/21/2024   • CT Abdomen Pelvis With Contrast     Standing Status:   Future     Standing Expiration Date:   3/21/2024     Order Specific Question:   Will Oral Contrast be needed for this procedure?     Answer:   Yes   • MRI Pelvis With & Without Contrast     Standing Status:   Future     Standing Expiration Date:   3/22/2024   • Comprehensive Metabolic Panel     Standing Status:   Future     Number of Occurrences:   1     Standing Expiration Date:   3/9/2024     Order Specific Question:   Release to patient     Answer:   Immediate   • CEA     Order Specific Question:   Release to  patient     Answer:   Routine Release   • CEA     Standing Status:   Future     Standing Expiration Date:   3/21/2024     Order Specific Question:   Release to patient     Answer:   Routine Release   • CBC & Differential     Standing Status:   Future     Number of Occurrences:   1     Standing Expiration Date:   3/9/2024     Order Specific Question:   Manual Differential     Answer:   No   Total time spent during this patient encounter is 69 minutes. The total time spent with the patient includes at least one or more of the following: preparing to see the patient by reviewing of tests, prior notes or other relevant information, performing appropriate independent examination & evaluation, counseling, ordering of medications, tests or procedures, communicating with other healthcare professionals, when appropriate to coordinate care, documenting clinic information in the electronic medical records or other health records, independently interpreting results of tests and communicating the results to the patient/family or caregiver.

## 2023-03-31 ENCOUNTER — TELEPHONE (OUTPATIENT)
Dept: ONCOLOGY | Facility: CLINIC | Age: 63
End: 2023-03-31
Payer: COMMERCIAL

## 2023-03-31 LAB — REF LAB TEST METHOD: NORMAL

## 2023-03-31 NOTE — TELEPHONE ENCOUNTER
"  Caller: Babs Felton \"Cait\"    Relationship: Self    Best call back number: [phone]    What is the best time to reach you: ANYTIME    Who are you requesting to speak with (clinical staff, provider,  specific staff member): CLINICAL     What was the call regarding: PT RECD E-MAIL THAT THE GUARDANT REVEAL LAB RESULTS ARE BACK PLEASE CALL HER DISCUSS RESULTS.    Do you require a callback: YES PLEASE           "

## 2023-04-04 ENCOUNTER — HOSPITAL ENCOUNTER (OUTPATIENT)
Dept: CT IMAGING | Facility: HOSPITAL | Age: 63
Discharge: HOME | End: 2023-04-04
Admitting: INTERNAL MEDICINE
Payer: COMMERCIAL

## 2023-04-04 ENCOUNTER — INFUSION (OUTPATIENT)
Dept: ONCOLOGY | Facility: HOSPITAL | Age: 63
End: 2023-04-04
Payer: COMMERCIAL

## 2023-04-04 DIAGNOSIS — C20 RECTAL CANCER: ICD-10-CM

## 2023-04-04 PROCEDURE — 0 DIATRIZOATE MEGLUMINE & SODIUM PER 1 ML: Performed by: INTERNAL MEDICINE

## 2023-04-04 PROCEDURE — 74177 CT ABD & PELVIS W/CONTRAST: CPT

## 2023-04-04 PROCEDURE — 71260 CT THORAX DX C+: CPT

## 2023-04-04 PROCEDURE — 25510000001 IOPAMIDOL 61 % SOLUTION: Performed by: INTERNAL MEDICINE

## 2023-04-04 RX ADMIN — IOPAMIDOL 100 ML: 612 INJECTION, SOLUTION INTRAVENOUS at 13:54

## 2023-04-04 RX ADMIN — DIATRIZOATE MEGLUMINE AND DIATRIZOATE SODIUM 30 ML: 660; 100 LIQUID ORAL; RECTAL at 12:50

## 2023-04-27 ENCOUNTER — TELEPHONE (OUTPATIENT)
Dept: RADIATION ONCOLOGY | Facility: HOSPITAL | Age: 63
End: 2023-04-27
Payer: COMMERCIAL

## 2023-04-28 ENCOUNTER — HOSPITAL ENCOUNTER (OUTPATIENT)
Dept: RADIATION ONCOLOGY | Facility: HOSPITAL | Age: 63
Setting detail: RADIATION/ONCOLOGY SERIES
End: 2023-04-28
Payer: COMMERCIAL

## 2023-04-28 ENCOUNTER — OFFICE VISIT (OUTPATIENT)
Dept: RADIATION ONCOLOGY | Facility: HOSPITAL | Age: 63
End: 2023-04-28
Payer: COMMERCIAL

## 2023-04-28 VITALS
OXYGEN SATURATION: 97 % | WEIGHT: 232.8 LBS | HEART RATE: 78 BPM | DIASTOLIC BLOOD PRESSURE: 88 MMHG | BODY MASS INDEX: 33.9 KG/M2 | SYSTOLIC BLOOD PRESSURE: 162 MMHG

## 2023-04-28 DIAGNOSIS — C20 RECTAL CANCER: ICD-10-CM

## 2023-04-28 PROCEDURE — G0463 HOSPITAL OUTPT CLINIC VISIT: HCPCS | Performed by: STUDENT IN AN ORGANIZED HEALTH CARE EDUCATION/TRAINING PROGRAM

## 2023-04-28 NOTE — PROGRESS NOTES
[facility] Radiation Oncology   Follow Up    Chief Complaint  Rectal cancer        Diagnosis: Adenocarcinoma of the rectum     Overall Stage IIA     cT3: Per rectal MRI  cN0: Per rectal MRI  cM0: Per CT chest abdomen pelvis      Radiation Completion Date: 10/22/2022        Prescription:      Site: Rectum  Laterality: N/A  Total Dose: 2500cGy  Dose per Fraction: 500cGy  Total Fractions: 5  Daily or BID: Daily  Modality: Photon  Technique: IMRT/VMAT/Rapid Arc  Bolus: No       Interval History:    Babs Felton presents for regularly scheduled follow-up approximately 6 months after completion of short course radiation therapy for rectal cancer.  The patient completed her neoadjuvant chemotherapy following radiation therapy.  The patient was taken for flexible sigmoidoscopy following completion of neoadjuvant therapy and no residual disease was found in the rectum.  Dr. Amaya discussed this with the patient and the patient elected for watchful waiting.  The patient has undergone interval imaging which shows some asymmetric rectal thickening and no evidence of metastatic disease.  Dr. Amaya has ordered MRI in 6 months and  repeat flexible sigmoidoscopy in 1 year.    Overall, the patient has gradually recovered from her chemotherapy and radiation.  She continues to struggle significantly with fatigue.  She has ongoing intermittent diarrhea which is highly diet dependent.  She has increased urinary frequency which is causing her difficulty with sleep.  She describes intermittent soft tissue/bone pain in bilateral hips.      Imaging:      CT CAP 4/4/2023    IMPRESSION:  1. Stable tiny pulmonary nodules, very likely benign. Recommend  attention on follow-up  2. Asymmetric rectal thickening, possibly reflecting the patient's  rectal neoplasm, difficult to compare because of differences in rectal  distention  3. Incidental findings as above, without convincing evidence of  metastatic disease.      Pathology:    Rectal Biopsy  3/8/2023      Final Diagnosis   1. Rectum, Biopsy:               A. Colorectal mucosa with mild increase in lamina propria chronic inflammation and focal scarring.               B. Negative for carcinoma.          Labs:    Lab Results   Component Value Date    CREATININE 0.65 03/22/2023       Lab Results   Component Value Date    CEA 1.31 03/22/2023             Problem List:  Patient Active Problem List   Diagnosis   • Diarrhea   • Bloody diarrhea   • Abdominal cramping   • Bloating   • Gastroesophageal reflux disease without esophagitis   • Rectal cancer   • Encounter for fitting and adjustment of vascular catheter          Medications:  Current Outpatient Medications on File Prior to Visit   Medication Sig Dispense Refill   • dicyclomine (BENTYL) 20 MG tablet Take 1 tablet by mouth Every 6 (Six) Hours As Needed (abd cramps). 90 tablet 1   • diphenoxylate-atropine (LOMOTIL) 2.5-0.025 MG per tablet Take 1 tablet by mouth 4 (Four) Times a Day As Needed for Diarrhea. 30 tablet 2   • ELDERBERRY PO Take 2 doses by mouth Daily.     • hydrOXYzine (ATARAX) 10 MG tablet Take 1 tablet by mouth Every 8 (Eight) Hours As Needed for Itching. 30 tablet 0   • lidocaine-prilocaine (EMLA) 2.5-2.5 % cream Apply 1 application topically to the appropriate area as directed Every 2 (Two) Hours As Needed for Mild Pain. 5 g 1   • loperamide (IMODIUM) 2 MG capsule Take 1 capsule by mouth As Needed for Diarrhea.     • loratadine (CLARITIN) 10 MG tablet Take 1 tablet by mouth As Needed. 1 daily around time of Neulasta OBI     • ondansetron (ZOFRAN) 8 MG tablet Take 1 tablet by mouth 3 (Three) Times a Day As Needed for Nausea or Vomiting. 30 tablet 5   • oxyCODONE-acetaminophen (PERCOCET) 5-325 MG per tablet Take 1 tablet by mouth Every 8 (Eight) Hours As Needed for Moderate Pain or Severe Pain. Please fill asap and notify patient when ready to .  Thanks a 60 tablet 0   • pantoprazole (PROTONIX) 40 MG EC tablet Take 1 tablet by mouth  "Daily. 30 tablet 3   • prochlorperazine (COMPAZINE) 10 MG tablet Take 1 tablet by mouth Every 6 (Six) Hours As Needed for Nausea or Vomiting. 30 tablet 3   • traMADol (ULTRAM) 50 MG tablet Take 1 tablet by mouth Every 6 (Six) Hours As Needed for Moderate Pain. 30 tablet 0     No current facility-administered medications on file prior to visit.          Allergies:  Allergies   Allergen Reactions   • Adhesive Tape Rash     Patient has sensitive skin.    • Levofloxacin Swelling   • Sulfa Antibiotics Rash           Vital Signs:  /88   Pulse 78   Wt 106 kg (232 lb 12.8 oz)   SpO2 97%   BMI 33.90 kg/m²   Estimated body mass index is 33.9 kg/m² as calculated from the following:    Height as of 3/22/23: 176.5 cm (69.49\").    Weight as of this encounter: 106 kg (232 lb 12.8 oz).  Pain Score    04/28/23 1007   PainSc:   3   PainLoc: Comment: Bilateral hip pain         ECOG: Restricted in physically strenuous activity but ambulatory and able to carry out work of a light or sedentary nature, e.g., light house work, office work = 1    Physical Exam  Vitals reviewed.   Constitutional:       General: She is not in acute distress.     Appearance: Normal appearance.   HENT:      Head: Normocephalic and atraumatic.   Eyes:      Extraocular Movements: Extraocular movements intact.      Pupils: Pupils are equal, round, and reactive to light.   Pulmonary:      Effort: Pulmonary effort is normal.   Abdominal:      General: Abdomen is flat.      Palpations: Abdomen is soft.   Musculoskeletal:      Cervical back: Normal range of motion.   Skin:     General: Skin is warm and dry.   Neurological:      General: No focal deficit present.      Mental Status: She is alert and oriented to person, place, and time.   Psychiatric:         Mood and Affect: Mood normal.         Behavior: Behavior normal.            Result Review :  The following data was reviewed by: Jacques Freeman MD on 04/28/2023:  Labs: Last Creatinine   Data reviewed: " Radiologic studies CT CAP            Diagnoses and all orders for this visit:    1. Rectal cancer        Assessment:    Babs Felton presents for regularly scheduled follow-up approximately 6 months after completion of short course radiation therapy for rectal cancer.  The patient completed her neoadjuvant chemotherapy following radiation therapy.  The patient was taken for flexible sigmoidoscopy following completion of neoadjuvant therapy and no residual disease was found in the rectum.  Dr. Amaya discussed this with the patient and the patient elected for watchful waiting.  The patient has undergone interval imaging which shows some asymmetric rectal thickening and no evidence of metastatic disease.  Dr. Amaya has ordered MRI at 6 months and repeat flexible sigmoidoscopy in 1 year.    Overall, the patient has gradually recovered from her chemotherapy and radiation.  She continues to struggle significantly with fatigue.  She has ongoing intermittent diarrhea which is highly diet dependent.  She has increased urinary frequency which is causing her difficulty with sleep.  She describes intermittent soft tissue/bone pain in bilateral hips.    I discussed the symptoms in detail with the patient.  We will order pelvic physical therapy as well as standard physical therapy to help with her bladder symptoms and muscle/bone pain in bilateral hips.  We will have her see our nutrition specialist to assist with her ongoing dietary issues.  We will refer her to our survivorship clinic.    Plan:    - Patient with locally advanced rectal cancer treated with chemotherapy and radiation with clinical complete response, MRI planned at 6 months and flex sigmoid in 1 year per Dr. Amaya  - Referral to nutrition specialist for ongoing dietary/diarrhea issues  - Referral to physical therapy/pelvic physical therapy for ongoing bilateral hip pain and urinary frequency  - Referral to survivorship clinic  - Follow-up after next MRI  pelvis       I spent 40 minutes caring for Babs on this date of service. This time includes time spent by me in the following activities:preparing for the visit, reviewing tests, obtaining and/or reviewing a separately obtained history, documenting information in the medical record, independently interpreting results and communicating that information with the patient/family/caregiver and care coordination  Follow Up   No follow-ups on file.  Patient was given instructions and counseling regarding her condition or for health maintenance advice. Please see specific information pulled into the AVS if appropriate.     Jacques Freeman MD

## 2023-05-03 ENCOUNTER — INFUSION (OUTPATIENT)
Dept: ONCOLOGY | Facility: HOSPITAL | Age: 63
End: 2023-05-03
Payer: COMMERCIAL

## 2023-05-03 DIAGNOSIS — Z45.2 ENCOUNTER FOR FITTING AND ADJUSTMENT OF VASCULAR CATHETER: Primary | ICD-10-CM

## 2023-05-03 PROCEDURE — 25010000002 HEPARIN LOCK FLUSH PER 10 UNITS: Performed by: INTERNAL MEDICINE

## 2023-05-03 PROCEDURE — 96523 IRRIG DRUG DELIVERY DEVICE: CPT

## 2023-05-03 RX ORDER — HEPARIN SODIUM (PORCINE) LOCK FLUSH IV SOLN 100 UNIT/ML 100 UNIT/ML
500 SOLUTION INTRAVENOUS AS NEEDED
OUTPATIENT
Start: 2023-05-03

## 2023-05-03 RX ORDER — SODIUM CHLORIDE 0.9 % (FLUSH) 0.9 %
10 SYRINGE (ML) INJECTION AS NEEDED
OUTPATIENT
Start: 2023-05-03

## 2023-05-03 RX ORDER — SODIUM CHLORIDE 0.9 % (FLUSH) 0.9 %
10 SYRINGE (ML) INJECTION AS NEEDED
Status: DISCONTINUED | OUTPATIENT
Start: 2023-05-03 | End: 2023-05-03 | Stop reason: HOSPADM

## 2023-05-03 RX ORDER — HEPARIN SODIUM (PORCINE) LOCK FLUSH IV SOLN 100 UNIT/ML 100 UNIT/ML
500 SOLUTION INTRAVENOUS AS NEEDED
Status: DISCONTINUED | OUTPATIENT
Start: 2023-05-03 | End: 2023-05-03 | Stop reason: HOSPADM

## 2023-05-03 RX ADMIN — Medication 10 ML: at 13:34

## 2023-05-03 RX ADMIN — HEPARIN 500 UNITS: 100 SYRINGE at 13:34

## 2023-05-18 ENCOUNTER — TELEPHONE (OUTPATIENT)
Dept: OTHER | Facility: HOSPITAL | Age: 63
End: 2023-05-18
Payer: COMMERCIAL

## 2023-05-18 NOTE — TELEPHONE ENCOUNTER
[facility] MULTIDISCIPLINARY CLINIC  SURVIVORSHIP SERVICES CARE COORDINATION NOTE  PHONE      Call placed to patient  RE: open referral to survivorship treatment summary visit.    Left voice mail for patient    Introduced myself and reviewed purpose and goals of survivorship treatment summary visit as well as what to expect..    Patient mailed survivorship program informational brochure.    Patient encouraged to call the office at any point for additional information, resources or support.

## 2023-05-30 ENCOUNTER — PATIENT OUTREACH (OUTPATIENT)
Dept: OTHER | Facility: HOSPITAL | Age: 63
End: 2023-05-30

## 2023-05-30 NOTE — PROGRESS NOTES
Nurse Navigator F/U Visit: Patient has h/o rectal adenocarcinoma. She has completed her neoadjuvant chemotherapy following radiation therapy. Patient ready for survivorship. [facility] placed call to patient to schedule appointment with Jennifer on 6/22/23. No needs noted. Will sign off……Katie Sanchez RN, Oncology Nurse Navigator

## 2023-06-07 ENCOUNTER — HOSPITAL ENCOUNTER (OUTPATIENT)
Dept: MRI IMAGING | Facility: HOSPITAL | Age: 63
Discharge: HOME | End: 2023-06-07
Admitting: INTERNAL MEDICINE
Payer: COMMERCIAL

## 2023-06-07 DIAGNOSIS — C20 RECTAL CANCER: ICD-10-CM

## 2023-06-07 LAB — CREAT BLDA-MCNC: 0.7 MG/DL (ref 0.6–1.3)

## 2023-06-07 PROCEDURE — 0 GADOBENATE DIMEGLUMINE 529 MG/ML SOLUTION: Performed by: INTERNAL MEDICINE

## 2023-06-07 PROCEDURE — A9577 INJ MULTIHANCE: HCPCS | Performed by: INTERNAL MEDICINE

## 2023-06-07 PROCEDURE — 72197 MRI PELVIS W/O & W/DYE: CPT

## 2023-06-07 PROCEDURE — 82565 ASSAY OF CREATININE: CPT

## 2023-06-07 RX ADMIN — GADOBENATE DIMEGLUMINE 20 ML: 529 INJECTION, SOLUTION INTRAVENOUS at 10:51

## 2023-06-09 DIAGNOSIS — C20 RECTAL CANCER: Primary | ICD-10-CM

## 2023-06-13 ENCOUNTER — TELEPHONE (OUTPATIENT)
Dept: RADIATION ONCOLOGY | Facility: HOSPITAL | Age: 63
End: 2023-06-13
Payer: COMMERCIAL

## 2023-06-13 NOTE — TELEPHONE ENCOUNTER
Spoke with patient about referral to physical therapy and she has notified me that she has been seeing Dr. Kehinde Daigle at [facility]. She had appt with the office today 6/13.

## 2023-06-14 ENCOUNTER — OFFICE VISIT (OUTPATIENT)
Dept: ONCOLOGY | Facility: CLINIC | Age: 63
End: 2023-06-14
Payer: COMMERCIAL

## 2023-06-14 ENCOUNTER — INFUSION (OUTPATIENT)
Dept: ONCOLOGY | Facility: HOSPITAL | Age: 63
End: 2023-06-14
Payer: COMMERCIAL

## 2023-06-14 VITALS
DIASTOLIC BLOOD PRESSURE: 86 MMHG | OXYGEN SATURATION: 97 % | SYSTOLIC BLOOD PRESSURE: 126 MMHG | RESPIRATION RATE: 16 BRPM | WEIGHT: 228 LBS | BODY MASS INDEX: 33.77 KG/M2 | HEIGHT: 69 IN | TEMPERATURE: 97.8 F | HEART RATE: 68 BPM

## 2023-06-14 DIAGNOSIS — Z79.899 HIGH RISK MEDICATION USE: ICD-10-CM

## 2023-06-14 DIAGNOSIS — Z45.2 ENCOUNTER FOR FITTING AND ADJUSTMENT OF VASCULAR CATHETER: Primary | ICD-10-CM

## 2023-06-14 DIAGNOSIS — C20 RECTAL CANCER: Primary | ICD-10-CM

## 2023-06-14 DIAGNOSIS — C20 RECTAL CANCER: ICD-10-CM

## 2023-06-14 LAB
ALBUMIN SERPL-MCNC: 4.2 G/DL (ref 3.5–5.2)
ALBUMIN/GLOB SERPL: 1.6 G/DL (ref 1.1–2.4)
ALP SERPL-CCNC: 81 U/L (ref 38–116)
ALT SERPL W P-5'-P-CCNC: 17 U/L (ref 0–33)
ANION GAP SERPL CALCULATED.3IONS-SCNC: 11.7 MMOL/L (ref 5–15)
AST SERPL-CCNC: 20 U/L (ref 0–32)
BASOPHILS # BLD AUTO: 0.02 10*3/MM3 (ref 0–0.2)
BASOPHILS NFR BLD AUTO: 0.4 % (ref 0–1.5)
BILIRUB SERPL-MCNC: 0.5 MG/DL (ref 0.2–1.2)
BUN SERPL-MCNC: 8 MG/DL (ref 6–20)
BUN/CREAT SERPL: 11.4 (ref 7.3–30)
CALCIUM SPEC-SCNC: 9.8 MG/DL (ref 8.5–10.2)
CEA SERPL-MCNC: 1.87 NG/ML
CHLORIDE SERPL-SCNC: 104 MMOL/L (ref 98–107)
CO2 SERPL-SCNC: 25.3 MMOL/L (ref 22–29)
CREAT SERPL-MCNC: 0.7 MG/DL (ref 0.6–1.1)
DEPRECATED RDW RBC AUTO: 45 FL (ref 37–54)
EGFRCR SERPLBLD CKD-EPI 2021: 97.3 ML/MIN/1.73
EOSINOPHIL # BLD AUTO: 0.19 10*3/MM3 (ref 0–0.4)
EOSINOPHIL NFR BLD AUTO: 4.2 % (ref 0.3–6.2)
ERYTHROCYTE [DISTWIDTH] IN BLOOD BY AUTOMATED COUNT: 13.9 % (ref 12.3–15.4)
GLOBULIN UR ELPH-MCNC: 2.7 GM/DL (ref 1.8–3.5)
GLUCOSE SERPL-MCNC: 85 MG/DL (ref 74–124)
HCT VFR BLD AUTO: 38.1 % (ref 34–46.6)
HGB BLD-MCNC: 12.2 G/DL (ref 12–15.9)
IMM GRANULOCYTES # BLD AUTO: 0.02 10*3/MM3 (ref 0–0.05)
IMM GRANULOCYTES NFR BLD AUTO: 0.4 % (ref 0–0.5)
LYMPHOCYTES # BLD AUTO: 1.3 10*3/MM3 (ref 0.7–3.1)
LYMPHOCYTES NFR BLD AUTO: 29 % (ref 19.6–45.3)
MCH RBC QN AUTO: 28.5 PG (ref 26.6–33)
MCHC RBC AUTO-ENTMCNC: 32 G/DL (ref 31.5–35.7)
MCV RBC AUTO: 89 FL (ref 79–97)
MONOCYTES # BLD AUTO: 0.33 10*3/MM3 (ref 0.1–0.9)
MONOCYTES NFR BLD AUTO: 7.3 % (ref 5–12)
NEUTROPHILS NFR BLD AUTO: 2.63 10*3/MM3 (ref 1.7–7)
NEUTROPHILS NFR BLD AUTO: 58.7 % (ref 42.7–76)
NRBC BLD AUTO-RTO: 0 /100 WBC (ref 0–0.2)
PLATELET # BLD AUTO: 227 10*3/MM3 (ref 140–450)
PMV BLD AUTO: 9.3 FL (ref 6–12)
POTASSIUM SERPL-SCNC: 4 MMOL/L (ref 3.5–4.7)
PROT SERPL-MCNC: 6.9 G/DL (ref 6.3–8)
RBC # BLD AUTO: 4.28 10*6/MM3 (ref 3.77–5.28)
SODIUM SERPL-SCNC: 141 MMOL/L (ref 134–145)
WBC NRBC COR # BLD: 4.49 10*3/MM3 (ref 3.4–10.8)

## 2023-06-14 PROCEDURE — 36591 DRAW BLOOD OFF VENOUS DEVICE: CPT

## 2023-06-14 PROCEDURE — 99215 OFFICE O/P EST HI 40 MIN: CPT | Performed by: INTERNAL MEDICINE

## 2023-06-14 PROCEDURE — 80053 COMPREHEN METABOLIC PANEL: CPT

## 2023-06-14 PROCEDURE — 85025 COMPLETE CBC W/AUTO DIFF WBC: CPT

## 2023-06-14 PROCEDURE — 25010000002 HEPARIN LOCK FLUSH PER 10 UNITS: Performed by: INTERNAL MEDICINE

## 2023-06-14 PROCEDURE — 82378 CARCINOEMBRYONIC ANTIGEN: CPT | Performed by: INTERNAL MEDICINE

## 2023-06-14 RX ORDER — SODIUM CHLORIDE 0.9 % (FLUSH) 0.9 %
10 SYRINGE (ML) INJECTION AS NEEDED
OUTPATIENT
Start: 2023-06-14

## 2023-06-14 RX ORDER — SODIUM CHLORIDE 0.9 % (FLUSH) 0.9 %
10 SYRINGE (ML) INJECTION AS NEEDED
Status: DISCONTINUED | OUTPATIENT
Start: 2023-06-14 | End: 2023-06-14 | Stop reason: HOSPADM

## 2023-06-14 RX ORDER — HEPARIN SODIUM (PORCINE) LOCK FLUSH IV SOLN 100 UNIT/ML 100 UNIT/ML
500 SOLUTION INTRAVENOUS AS NEEDED
Status: DISCONTINUED | OUTPATIENT
Start: 2023-06-14 | End: 2023-06-14 | Stop reason: HOSPADM

## 2023-06-14 RX ORDER — HEPARIN SODIUM (PORCINE) LOCK FLUSH IV SOLN 100 UNIT/ML 100 UNIT/ML
500 SOLUTION INTRAVENOUS AS NEEDED
OUTPATIENT
Start: 2023-06-14

## 2023-06-14 RX ADMIN — Medication 500 UNITS: at 12:17

## 2023-06-14 RX ADMIN — Medication 10 ML: at 12:17

## 2023-06-14 NOTE — PROGRESS NOTES
[facility]    CONSULTING IN BLOOD DISORDERS & CANCER    This was an audio and video enabled telemedicine encounter.      REASON FOR CONSULTATION/CHIEF COMPLAINT:     Evaluation and management for rectal adenocarcinoma                             REQUESTING PHYSICIAN: No ref. provider found  RECORDS OBTAINED:  Records of the patients history including those from the electronic medical record were reviewed and summarized in detail.    HISTORY OF PRESENT ILLNESS:    The patient is a 62 y.o. year old female with medical history significant for migraines, depression/anxiety, and IBS had presented with epigastric discomfort in September 2022.      An EGD and colonoscopy performed by SHOAIB Esteves on 9/1/2022 demonstrated a 3 cm mass in the proximal rectum around 13 cm from the anal verge.  The polyp was multilobulated and somewhat fixed to the underlying tissue.  Friable with contact bleeding.  Other smaller polyps were found in the cecum and ascending colon which were removed.  The EGD revealed mucosal nodule in the esophagus and multiple gastric polyps.  No evidence of bleeding.     The pathology from the rectal mass came back as invasive moderately differentiated adenocarcinoma arising out of traditional serrated adenoma with high-grade dysplasia.    Patient was subsequently referred to Dr. Amaya, colorectal surgery who performed a flexible sigmoidoscopy on 9/21/2022, revealing an infiltrative nonobstructing mass in the mid rectum.  The mass was partially circumferential involving one third of the lumen circumference.     9/13/2022: CT C/A/P with contrast noted rectal neoplasm not well visualized on this exam.  Calcified and noncalcified sub-6mm pulmonary nodules likely the sequela of prior granulomatous disease.  Scattered colonic diverticulosis.  Mildly prominent, but not pathologically enlarged pelvic nodes measuring up to 4 mm.    9/26/2022: MRI pelvis showed high rectal mass with effacement of the muscularis  propria along the right wall and extended beyond the muscularis propria at the posterior wall, T3 lesion. There is no evidence for involvement of the mesorectal fascia. There are no pathologically enlarged nodes.     Patient has been seen by , rad-onc & being planned for neoadjuvant radiation. Patient has come to this clinic to discuss systemic therapy options.    Guardant 360: MSI stable.  Low TMB.    10/17- 10/22/2022: Short course of radiotherapy to the rectal mass.    11/2/2022: Cycle 1 FOLFOX  12/14/2022: Cycle 4 FOLFOX  12/28/2022: Cycle 5 FOLFOX  1/7/2022: MRI pelvis shows marked response to treatment  1/11/2023: Cycle 6 5-FU/LV.  Oxaliplatin dropped due to neuropathy.  2/8/2023: Cycle 8 5-FU    3/6/2023: Clara sigmoidoscopy and surface biopsy.  No evidence of residual disease.  Case discussed with Dr. Amaya and Dr. Freeman.  Plan made for active surveillance.    March 2023: Circulating DNA negative.    April 2023: CT c/a/p with no evidence of disease recurrence.  6/7/23: MRI pelvis with near complete response to therapy of the rectal mass.    INTERIM HISTORY:  Patient returns to the clinic for a follow-up visit.   Still has some fatigue & loose stools.  The neuropathy and nail changes improving.  Has intermittent loose stools.  No blood in stool.  Appetite and weight has been stable.    Past Medical History:   Diagnosis Date   • Abdominal cramping 07/01/2022   • Anemia    • Bloating 07/01/2022   • Bloody diarrhea 07/01/2022   • Chronic back pain    • Colon polyps     FOLLOWED BY DR. BEVERLY MENDEZ   • Depression    • Gastric polyps     FOLLOWED BY DR. BEVERLY MENDEZ   • GERD (gastroesophageal reflux disease)    • Hearing loss    • Hiatal hernia 09/2021    3 CM, FOLLOWED BY DR. BEVERLY MENDEZ   • Hyperlipidemia    • IBS (irritable bowel syndrome)    • Migraines    • Patellofemoral arthritis 07/2021   • PNA (pneumonia)    • Rectal bleeding 1983    Off and on for years   • Rectal cancer 09/2022     "INVASIVE MODERATELY DIFFERENTIATED ADENOCARCINOMA ARISING FROM TRADITIONAL SERRATED ADENOMA WITH HGD   • Sciatica 09/2015   • Tear of medial meniscus of knee 07/2021    RIGHT KNEE   • Vitamin D deficiency      Past Surgical History:   Procedure Laterality Date   • COLONOSCOPY N/A 1986   • COLONOSCOPY W/ POLYPECTOMY N/A 09/01/2022    3 MM TUBULAR ADENOMA POLYP IN ASCENDING, 6 MM TUBULAR ADENOMA POLYP IN CECUM, 30 MM MASS IN RECTUM, PATH:INVASIVE MODERATELY DIFFERENTIATED ADENOCARCINOMA ARISING FROM TRADITIONAL SERRATED ADENOMA WITH HGD, HYPERTROPHIED ANAL PAPILLA, DR. BEVERLY MENDEZ AT [facility]   • ENDOSCOPY N/A 09/01/2022    MULTIPLE BENIGN GASTRIC POLYPS, 2 SQUAMOUS PAPILLAS IN ESOPHAGUS, 3 CM HIATAL HERNIA,   • EXTERNAL EAR SURGERY  1966    MULTIPLE \"LANCES\", DR. ACE IN [facility]   • EYE SURGERY Right 1967    DR. CASTANEDA IN [facility]   • KNEE ARTHRODESIS Right 07/27/2021    RIGHT KNEE ARTHROCENTESIS, DR. ALBERT GILMAN   • KNEE SURGERY Left 1985    DR. PEREZ AT [place]   • SHOULDER MANIPULATION Left 01/07/2013    FOR FROZEN SHOULDER, DR. ALBERT GILMAN AT [facility]   • SIGMOIDOSCOPY N/A 09/21/2022    AN INFILTRATIVE NON OBSTRUCTING MASS IN MID RECTUM, AREA TATTOOED, DR. TEMO AMAYA AT [facility]   • SIGMOIDOSCOPY N/A 03/08/2023    7 MM HEALED ULCERATION WHERE RECTAL CANCER WAS, SCAR TISSUE HEALTHY, BIOPSY SHOWED INFLAMMATION AND SCAR, NO RESIDUAL CANCER, FLEX SIG IN 12 MONTHS, DR. TEMO AMAYA AT [facility]   • TRANSRECTAL ULTRASOUND N/A 09/21/2022    Procedure: ULTRASOUND TRANSRECTAL;  Surgeon: Temo Amaya MD;  Location: [facility] ENDOSCOPY;  Service: General;  Laterality: N/A;   • VENOUS ACCESS DEVICE (PORT) INSERTION Left 10/26/2022    Procedure: INSERTION OF PORTACATH;  Surgeon: Leonel Bridges MD;  Location: [facility] MAIN OR;  Service: General;  Laterality: Left;   • WISDOM TOOTH EXTRACTION Bilateral        MEDICATIONS    Current Outpatient Medications:   •  dicyclomine (BENTYL) 20 MG tablet, " Take 1 tablet by mouth Every 6 (Six) Hours As Needed (abd cramps)., Disp: 90 tablet, Rfl: 1  •  diphenoxylate-atropine (LOMOTIL) 2.5-0.025 MG per tablet, Take 1 tablet by mouth 4 (Four) Times a Day As Needed for Diarrhea., Disp: 30 tablet, Rfl: 2  •  ELDERBERRY PO, Take 2 doses by mouth Daily., Disp: , Rfl:   •  hydrOXYzine (ATARAX) 10 MG tablet, Take 1 tablet by mouth Every 8 (Eight) Hours As Needed for Itching., Disp: 30 tablet, Rfl: 0  •  lidocaine-prilocaine (EMLA) 2.5-2.5 % cream, Apply 1 application topically to the appropriate area as directed Every 2 (Two) Hours As Needed for Mild Pain., Disp: 5 g, Rfl: 1  •  loperamide (IMODIUM) 2 MG capsule, Take 1 capsule by mouth As Needed for Diarrhea., Disp: , Rfl:   •  loratadine (CLARITIN) 10 MG tablet, Take 1 tablet by mouth As Needed. 1 daily around time of Neulasta OBI, Disp: , Rfl:   •  ondansetron (ZOFRAN) 8 MG tablet, Take 1 tablet by mouth 3 (Three) Times a Day As Needed for Nausea or Vomiting., Disp: 30 tablet, Rfl: 5  •  oxyCODONE-acetaminophen (PERCOCET) 5-325 MG per tablet, Take 1 tablet by mouth Every 8 (Eight) Hours As Needed for Moderate Pain or Severe Pain. Please fill asap and notify patient when ready to .  Thanks a, Disp: 60 tablet, Rfl: 0  •  pantoprazole (PROTONIX) 40 MG EC tablet, Take 1 tablet by mouth Daily., Disp: 30 tablet, Rfl: 3  •  prochlorperazine (COMPAZINE) 10 MG tablet, Take 1 tablet by mouth Every 6 (Six) Hours As Needed for Nausea or Vomiting., Disp: 30 tablet, Rfl: 3  •  traMADol (ULTRAM) 50 MG tablet, Take 1 tablet by mouth Every 6 (Six) Hours As Needed for Moderate Pain., Disp: 30 tablet, Rfl: 0    ALLERGIES:     Allergies   Allergen Reactions   • Adhesive Tape Rash     Patient has sensitive skin.    • Levofloxacin Swelling   • Sulfa Antibiotics Rash       SOCIAL HISTORY:       Social History     Socioeconomic History   • Marital status: Single   Tobacco Use   • Smoking status: Never     Passive exposure: Never   •  "Smokeless tobacco: Never   Vaping Use   • Vaping Use: Never used   Substance and Sexual Activity   • Alcohol use: Not Currently   • Drug use: Never   • Sexual activity: Defer         FAMILY HISTORY:  Family History   Problem Relation Age of Onset   • Colon polyps Mother    • Asthma Mother    • COPD Mother    • Lung disease Mother    • Migraines Mother    • Cancer Father         liver cancer   • Colon polyps Father    • Heart disease Father    • Diabetes Father    • Kidney disease Father    • Angina Father    • Liver cancer Father    • Hepatitis Father    • Hearing loss Father    • Cancer Sister         Breast cancer   • Arthritis Sister    • Colon polyps Sister    • Breast cancer Sister    • Diabetes Sister    • Colon polyps Sister    • Alcohol abuse Maternal Uncle    • Colon cancer Neg Hx    • Crohn's disease Neg Hx    • Irritable bowel syndrome Neg Hx    • Ulcerative colitis Neg Hx    • Malig Hyperthermia Neg Hx        REVIEW OF SYSTEMS:  As per HPI         Vitals:    06/14/23 1308   BP: 126/86   Pulse: 68   Resp: 16   Temp: 97.8 °F (36.6 °C)   TempSrc: Temporal   SpO2: 97%   Weight: 103 kg (228 lb)   Height: 176 cm (69.29\")   PainSc:   1   PainLoc: Abdomen         6/14/2023     1:12 PM   Current Status   ECOG score 0     CONSTITUTIONAL:  Vital signs reviewed.  No distress, looks comfortable.  EYES:  Conjunctiva and lids unremarkable.    EARS,NOSE,MOUTH,THROAT:  Ears and nose appear unremarkable.    RESPIRATORY:  Normal respiratory effort.  Lungs clear to auscultation bilaterally.  CARDIOVASCULAR:  Normal S1, S2.  No murmurs rubs or gallops.  No significant lower extremity edema.  GASTROINTESTINAL: Abdomen appears unremarkable.  Nondistended   LYMPHATIC:  No cervical, supraclavicular lymphadenopathy.  SKIN:  Warm.  No rashes.  PSYCHIATRIC:  Normal judgment and insight.  Normal mood and affect.  NEURO: AAOx3, no obvious focal deficits.    RECENT LABS:  Results from last 7 days   Lab Units 06/14/23  1216   WBC " 10*3/mm3 4.49   NEUTROS ABS 10*3/mm3 2.63   HEMOGLOBIN g/dL 12.2   HEMATOCRIT % 38.1   PLATELETS 10*3/mm3 227     Lab Results   Component Value Date    GLUCOSE 85 06/14/2023    BUN 8 06/14/2023    CREATININE 0.70 06/14/2023    EGFRRESULT 86 05/27/2022    EGFR 97.3 06/14/2023    BCR 11.4 06/14/2023    K 4.0 06/14/2023    CO2 25.3 06/14/2023    CALCIUM 9.8 06/14/2023    PROTENTOTREF 6.8 05/27/2022    ALBUMIN 4.2 06/14/2023    LABIL2 2.1 05/27/2022    AST 20 06/14/2023    ALT 17 06/14/2023         ASSESSMENT:   is a pleasant 61 y/o WF with medical history significant for migraines, depression/anxiety, and IBS comes for rectal adenocarcinoma evaluation & management.     # Rectal adenocarcinoma, T3N0M0, Stage IIA:  · Diagnosed on a c-scope performed for gastric discomfort in September 2022.   · The flex sig noted a an infiltrative nonobstructing mass in the mid rectum.  The mass was partially circumferential involving one third of the lumen circumference. CT c/a/p negative for mets. MRI pelvis most consistent with T3N0 disease, with no evidence of involvement of mesorectal fascia.  · Reviewed various management strategies for localized rectal cancer with chemotherapy, radiation and surgical resection.  Patient met with Dr. Amaya, colorectal surgery and is being tentatively planned for low anterior resection after neoadjuvant chemotherapy and radiation.  She received neoadjuvant short course RT by Dr. Freeman, radiation oncology from 10/17- 10/22/2022.   · Received neoadjuvant chemotherapy with 8 cycles of FOLFOX between 11/2/2022 - 2/8/2023.  Oxaliplatin dropped with last 2 cycles.  · Post treatment flexible sigmoidoscopy and rectal biopsies performed 3/8/2023 showed no evidence of residual disease.  Case discussed with Dr. Amaya and Lydia.  I extensively discussed risks/benefits of surgical resection versus active surveillance.  Informed patient that active surveillance is currently not the standard of care but is  an active area of research in patients who have complete response.  Also discussed surveillance strategies.  Patient expressed understanding of the risks and opted for active surveillance.    · 3/22/2023: Recovering well after recent chemotherapy.  Discussed plan for active surveillance.  The CEA and guardant reveal circulating tumor DNA negative.  A CT C/A/P showed asymetric rectal thickening.  Plan to repeat MRI pelvis in 3 months and flexible sigmoidoscopy in 6 months time.    · 6/14/23: Clinically improving since CRT. Recent MRI pelvis shows near complete resolution of the rectal mass. Residual changes likely post treatment related. Will continue active surveillance.Circulating tumor DNA analysis sent today. Advised close f/u with rad-onc & colorectal surgery. Tentative plan for flex sig in Sept 2023. Will see her back in 3 months or sooner if needed    #GERD  · Significant epigastric discomfort and reflux with initiation of chemo  · Continue Protonix 40 mg nightly    #Diarrhea  · Initially began following radiation and was exacerbated with chemotherapy  · Utilizing Imodium and Lomotil as needed.  · Is planning to follow-up with GI again.    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  On multivitamin supplements    PLAN:   1. Continue active surveillance.  Check CEA and circulating tumor DNA, guardant reveal today and repeat every 3 months  2. Dr. Aamya planning flexible sigmoidoscopy in September 2023  3. Follow-up in 3 months or sooner if needed  4. Call/ return sooner should he develop any new concerns or problems.  Orders Placed This Encounter   Procedures   • Comprehensive Metabolic Panel     Standing Status:   Future     Standing Expiration Date:   6/18/2024     Order Specific Question:   Release to patient     Answer:   Routine Release   • CEA     Standing Status:   Future     Standing Expiration Date:   6/18/2024     Order Specific Question:   Release to patient     Answer:   Routine Release    • CBC & Differential     Standing Status:   Future     Standing Expiration Date:   6/18/2024     Order Specific Question:   Manual Differential     Answer:   No   Total time spent during this patient encounter is 45 minutes. The total time spent with the patient includes at least one or more of the following: preparing to see the patient by reviewing of tests, prior notes or other relevant information, performing appropriate independent examination & evaluation, counseling, ordering of medications, tests or procedures, communicating with other healthcare professionals, when appropriate to coordinate care, documenting clinic information in the electronic medical records or other health records, independently interpreting results of tests and communicating the results to the patient/family or caregiver.

## 2023-06-15 ENCOUNTER — TELEPHONE (OUTPATIENT)
Dept: RADIATION ONCOLOGY | Facility: HOSPITAL | Age: 63
End: 2023-06-15
Payer: COMMERCIAL

## 2023-06-16 ENCOUNTER — OFFICE VISIT (OUTPATIENT)
Dept: RADIATION ONCOLOGY | Facility: HOSPITAL | Age: 63
End: 2023-06-16
Payer: COMMERCIAL

## 2023-06-16 VITALS
BODY MASS INDEX: 33.39 KG/M2 | DIASTOLIC BLOOD PRESSURE: 65 MMHG | WEIGHT: 228 LBS | OXYGEN SATURATION: 97 % | SYSTOLIC BLOOD PRESSURE: 116 MMHG | HEART RATE: 72 BPM

## 2023-06-16 DIAGNOSIS — C20 RECTAL CANCER: Primary | ICD-10-CM

## 2023-06-16 NOTE — PROGRESS NOTES
[facility] Radiation Oncology   Follow Up    Chief Complaint  Rectal cancer        Diagnosis: Adenocarcinoma of the rectum     Overall Stage IIA     cT3: Per rectal MRI  cN0: Per rectal MRI  cM0: Per CT chest abdomen pelvis        Radiation Completion Date: 10/22/2022        Prescription:      Site: Rectum  Laterality: N/A  Total Dose: 2500cGy  Dose per Fraction: 500cGy  Total Fractions: 5  Daily or BID: Daily  Modality: Photon  Technique: IMRT/VMAT/Rapid Arc  Bolus: No      Interval History:    Babs Felton presents for regularly scheduled follow-up approximately 8 months after completion of  radiation therapy as part of definitive treatment for a locally advanced rectal cancer.  The patient ultimately opted for nonoperative management with close surveillance.  She has continued to have issues with intermittent diarrhea as well as ongoing hip pain for which she is seeing a PT.      Imaging:      MRI Pelvis 6/7/2023    IMPRESSION:  Near complete response to therapy of the rectal mass.      Pathology:      No new relevant pathology      Labs:    Lab Results   Component Value Date    CREATININE 0.70 06/14/2023     Lab Results   Component Value Date    CEA 1.87 06/14/2023               Problem List:  Patient Active Problem List   Diagnosis    Diarrhea    Bloody diarrhea    Abdominal cramping    Bloating    Gastroesophageal reflux disease without esophagitis    Rectal cancer    Encounter for fitting and adjustment of vascular catheter          Medications:  Current Outpatient Medications on File Prior to Visit   Medication Sig Dispense Refill    dicyclomine (BENTYL) 20 MG tablet Take 1 tablet by mouth Every 6 (Six) Hours As Needed (abd cramps). 90 tablet 1    diphenoxylate-atropine (LOMOTIL) 2.5-0.025 MG per tablet Take 1 tablet by mouth 4 (Four) Times a Day As Needed for Diarrhea. 30 tablet 2    ELDERBERRY PO Take 2 doses by mouth Daily.      hydrOXYzine (ATARAX) 10 MG tablet Take 1 tablet by mouth Every 8 (Eight)  "Hours As Needed for Itching. 30 tablet 0    lidocaine-prilocaine (EMLA) 2.5-2.5 % cream Apply 1 application topically to the appropriate area as directed Every 2 (Two) Hours As Needed for Mild Pain. 5 g 1    loperamide (IMODIUM) 2 MG capsule Take 1 capsule by mouth As Needed for Diarrhea.      loratadine (CLARITIN) 10 MG tablet Take 1 tablet by mouth As Needed. 1 daily around time of Neulasta OBI      ondansetron (ZOFRAN) 8 MG tablet Take 1 tablet by mouth 3 (Three) Times a Day As Needed for Nausea or Vomiting. 30 tablet 5    oxyCODONE-acetaminophen (PERCOCET) 5-325 MG per tablet Take 1 tablet by mouth Every 8 (Eight) Hours As Needed for Moderate Pain or Severe Pain. Please fill asap and notify patient when ready to .  Thanks a 60 tablet 0    pantoprazole (PROTONIX) 40 MG EC tablet Take 1 tablet by mouth Daily. 30 tablet 3    prochlorperazine (COMPAZINE) 10 MG tablet Take 1 tablet by mouth Every 6 (Six) Hours As Needed for Nausea or Vomiting. 30 tablet 3    traMADol (ULTRAM) 50 MG tablet Take 1 tablet by mouth Every 6 (Six) Hours As Needed for Moderate Pain. 30 tablet 0     No current facility-administered medications on file prior to visit.          Allergies:  Allergies   Allergen Reactions    Adhesive Tape Rash     Patient has sensitive skin.     Levofloxacin Swelling    Sulfa Antibiotics Rash           Vital Signs:  /65   Pulse 72   Wt 103 kg (228 lb)   SpO2 97%   BMI 33.39 kg/m²   Estimated body mass index is 33.39 kg/m² as calculated from the following:    Height as of 6/14/23: 176 cm (69.29\").    Weight as of this encounter: 103 kg (228 lb).  Pain Score    06/16/23 1005   PainSc:   5   PainLoc: Hip         ECOG: Restricted in physically strenuous activity but ambulatory and able to carry out work of a light or sedentary nature, e.g., light house work, office work = 1    Physical Exam  Vitals reviewed.   Constitutional:       General: She is not in acute distress.     Appearance: Normal " appearance.   HENT:      Head: Normocephalic and atraumatic.   Eyes:      Extraocular Movements: Extraocular movements intact.      Pupils: Pupils are equal, round, and reactive to light.   Pulmonary:      Effort: Pulmonary effort is normal.   Abdominal:      General: Abdomen is flat.      Palpations: Abdomen is soft.   Musculoskeletal:      Cervical back: Normal range of motion.   Skin:     General: Skin is warm and dry.   Neurological:      General: No focal deficit present.      Mental Status: She is alert and oriented to person, place, and time.   Psychiatric:         Mood and Affect: Mood normal.         Behavior: Behavior normal.        Result Review :  The following data was reviewed by: Jacques Freeman MD on 06/16/2023:  Labs: Last Creatinine   Data reviewed : Radiologic studies MRI Pelvis             Diagnoses and all orders for this visit:    1. Rectal cancer (Primary)        Assessment:    Babs Felton presents for regularly scheduled follow-up approximately 8 months after completion of  radiation therapy as part of definitive treatment for a locally advanced rectal cancer.  The patient ultimately opted for nonoperative management with close surveillance.      With regards to her cancer surveillance, she has had an interval MRI of the pelvis which shows what appears to be near complete response to initial therapy with a 1.5 cm area in the rectum somewhat concerning for possible residual disease.  Could alternatively be scarring.  I will reach out to Dr. Amaya to review and see if this finding changes her surveillance plan with regards to sigmoidoscopy.    The patient has had some lingering symptoms of diarrhea which may be related to her radiation therapy.  She also reports ongoing issues with hip pain.  I did review her prior radiation plan and her femoral head and necks were well carved out of the radiation field and I would not expect her to have symptoms from that.  She did have some sacral pain  which potentially could have been exacerbated by her radiation.  She is seeing a physical therapist with some improvement.  I have written for her to be evaluated by pelvic floor rehab, but she reports she has not heard from them.      Plan:    -We will reach out to Dr. Amaya with regards to MRI findings  -We will reach out to pelvic floor rehab to see if she can be evaluated  -Tentative follow-up in 3 months       I spent 40 minutes caring for Babs on this date of service. This time includes time spent by me in the following activities:preparing for the visit, reviewing tests, obtaining and/or reviewing a separately obtained history, referring and communicating with other health care professionals , documenting information in the medical record, independently interpreting results and communicating that information with the patient/family/caregiver, and care coordination  Follow Up   No follow-ups on file.  Patient was given instructions and counseling regarding her condition or for health maintenance advice. Please see specific information pulled into the AVS if appropriate.     Jacques Freeman MD

## 2023-06-28 LAB — REF LAB TEST METHOD: NORMAL

## 2023-07-25 ENCOUNTER — INFUSION (OUTPATIENT)
Dept: ONCOLOGY | Facility: HOSPITAL | Age: 63
End: 2023-07-25
Payer: COMMERCIAL

## 2023-07-25 DIAGNOSIS — Z45.2 ENCOUNTER FOR FITTING AND ADJUSTMENT OF VASCULAR CATHETER: Primary | ICD-10-CM

## 2023-07-25 PROCEDURE — 25010000002 HEPARIN LOCK FLUSH PER 10 UNITS: Performed by: INTERNAL MEDICINE

## 2023-07-25 PROCEDURE — 96523 IRRIG DRUG DELIVERY DEVICE: CPT

## 2023-07-25 RX ORDER — HEPARIN SODIUM (PORCINE) LOCK FLUSH IV SOLN 100 UNIT/ML 100 UNIT/ML
500 SOLUTION INTRAVENOUS AS NEEDED
Status: DISCONTINUED | OUTPATIENT
Start: 2023-07-25 | End: 2023-07-25 | Stop reason: HOSPADM

## 2023-07-25 RX ORDER — HEPARIN SODIUM (PORCINE) LOCK FLUSH IV SOLN 100 UNIT/ML 100 UNIT/ML
500 SOLUTION INTRAVENOUS AS NEEDED
OUTPATIENT
Start: 2023-07-25

## 2023-07-25 RX ORDER — SODIUM CHLORIDE 0.9 % (FLUSH) 0.9 %
10 SYRINGE (ML) INJECTION AS NEEDED
OUTPATIENT
Start: 2023-07-25

## 2023-07-25 RX ORDER — SODIUM CHLORIDE 0.9 % (FLUSH) 0.9 %
10 SYRINGE (ML) INJECTION AS NEEDED
Status: DISCONTINUED | OUTPATIENT
Start: 2023-07-25 | End: 2023-07-25 | Stop reason: HOSPADM

## 2023-07-25 RX ADMIN — Medication 10 ML: at 13:40

## 2023-07-25 RX ADMIN — Medication 500 UNITS: at 13:40

## 2023-08-01 NOTE — PROGRESS NOTES
Medicine Refill Request    Last Office Visit: 3/2/2023   Last Consult Visit: Visit date not found  Last Telemedicine Visit: 12/28/2022 Mariel Lee CRNP    Next Appointment: Visit date not found      Current Outpatient Medications:   •  AMABELZ 0.5-0.1 mg per tablet, 1 tablet 3 (three) times a week (Mon, Wed, Fri). TID weekly, due tomorrow Saturday 8/1/2020 , Disp: , Rfl:   •  ascorbic acid (VITAMIN C) 500 mg tablet, Take 1,000 mg by mouth daily., Disp: , Rfl:   •  aspirin 81 mg enteric coated tablet, Take 81 mg by mouth daily., Disp: , Rfl:   •  b complex vitamins capsule, Take 1 capsule by mouth daily., Disp: , Rfl:   •  cholecalciferol, vitamin D3, 1,000 unit (25 mcg) tablet, Take 1,000 Units by mouth daily., Disp: , Rfl:   •  escitalopram (LEXAPRO) 20 mg tablet, TAKE 1 TABLET ONCE DAILY, Disp: 90 tablet, Rfl: 1  •  LORazepam (ATIVAN) 2 mg tablet, Take 2 mg by mouth every 6 (six) hours as needed., Disp: , Rfl:   •  meloxicam (MOBIC) 15 mg tablet, Take 1 tablet (15 mg total) by mouth daily for 14 days. With food, Disp: 14 tablet, Rfl: 0  •  RANEXA 500 mg 12 hr tablet, , Disp: , Rfl:   •  rosuvastatin (CRESTOR) 40 mg tablet, , Disp: , Rfl:   •  valACYclovir (VALTREX) 1 gram tablet, Take 1 tablet (1,000 mg total) by mouth 3 (three) times a day for 7 days., Disp: 21 tablet, Rfl: 0      BP Readings from Last 3 Encounters:   03/02/23 132/82   10/20/22 130/82   04/20/22 138/82       Recent Lab results:  Lab Results   Component Value Date    CHOL 167 01/12/2023   ,   Lab Results   Component Value Date    HDL 54 01/12/2023   ,   Lab Results   Component Value Date    LDLCALC 99 01/12/2023   ,   Lab Results   Component Value Date    TRIG 74 01/12/2023        Lab Results   Component Value Date    GLUCOSE 111 (H) 01/12/2023   ,   Lab Results   Component Value Date    HGBA1C 5.5 01/12/2023         Lab Results   Component Value Date    CREATININE 0.84 01/12/2023       Lab Results   Component Value Date    TSH 2.420  Radiation Oncology  On-Treatment Note      Patient: Babs Felton    MRN: [id number]    Attending Physician: Jacques Freeman MD    Diagnosis:     ICD-10-CM ICD-9-CM   1. Rectal cancer (HCC)  C20 154.1       Radiation Therapy Visit:  Continue radiation therapy, Dosimetry plan remains acceptable, Films reviewed and remains acceptable, Pain assessed, Pain management planned, Radiation dose schedule reviewed and remains acceptable, Radiation technique remains acceptable and Symptoms within expected range    Radiation Treatments     Active   Plans   Rectum   Most recent treatment: Dose planned: 500 cGy (fraction 1 on 10/17/2022)   Total: Dose planned: 2,500 cGy (5 fractions)   Elapsed Days: 0      Reference Points   RXPT Rectum   Most recent treatment: Dose given: 500 cGy (on 10/17/2022)   Total: Dose given: 500 cGy   Elapsed Days: 0      Rectum calc   Most recent treatment: Dose given: 509 cGy (on 10/17/2022)   Total: Dose given: 509 cGy   Elapsed Days: 0                    Physical Examination:  Vitals: Weight 112 kg (247 lb 9.6 oz), not currently breastfeeding.  Vitals:    10/17/22 1640   Weight: 112 kg (247 lb 9.6 oz)     Pain Score    10/17/22 1640   PainSc: 0-No pain       Fully active, able to carry on all pre-disease performance without restriction = 0    We examined the relevant areas: yes  Findings are within the expected range for this stage of treatment: yes  -------------------------------------------------------------------------------------------------------------------    ACTION ITEMS:  Patient tolerating treatment well and as expected for this stage in their treatment and Continue radiation therapy as planned    Estimated Completion Date: 10/21/2022      Jacques Freeman MD  Radiation Oncology   01/12/2023           Lab Results   Component Value Date    HGBA1C 5.5 01/12/2023

## 2023-08-17 ENCOUNTER — TELEPHONE (OUTPATIENT)
Dept: ONCOLOGY | Facility: CLINIC | Age: 63
End: 2023-08-17
Payer: COMMERCIAL

## 2023-08-17 NOTE — TELEPHONE ENCOUNTER
Provider: Dr. Weber  Caller: Babs Felton  Relationship to Patient: self  Call Back Phone Number: [phone]  Reason for Call: Pt has tested positive for Covid. PCP has prescribed Paxlovid.  PCP and pt want to make sure this is OK with Dr Weber.

## 2023-09-06 ENCOUNTER — INFUSION (OUTPATIENT)
Dept: ONCOLOGY | Facility: HOSPITAL | Age: 63
End: 2023-09-06
Payer: COMMERCIAL

## 2023-09-06 ENCOUNTER — OFFICE VISIT (OUTPATIENT)
Dept: ONCOLOGY | Facility: CLINIC | Age: 63
End: 2023-09-06
Payer: COMMERCIAL

## 2023-09-06 VITALS
RESPIRATION RATE: 16 BRPM | BODY MASS INDEX: 34.24 KG/M2 | HEIGHT: 69 IN | HEART RATE: 72 BPM | DIASTOLIC BLOOD PRESSURE: 83 MMHG | SYSTOLIC BLOOD PRESSURE: 162 MMHG | WEIGHT: 231.2 LBS | TEMPERATURE: 98 F

## 2023-09-06 DIAGNOSIS — C20 RECTAL CANCER: ICD-10-CM

## 2023-09-06 DIAGNOSIS — Z45.2 ENCOUNTER FOR FITTING AND ADJUSTMENT OF VASCULAR CATHETER: Primary | ICD-10-CM

## 2023-09-06 DIAGNOSIS — R19.7 DIARRHEA, UNSPECIFIED TYPE: ICD-10-CM

## 2023-09-06 DIAGNOSIS — C20 RECTAL CANCER: Primary | ICD-10-CM

## 2023-09-06 LAB
ALBUMIN SERPL-MCNC: 4.1 G/DL (ref 3.5–5.2)
ALBUMIN/GLOB SERPL: 1.9 G/DL
ALP SERPL-CCNC: 77 U/L (ref 39–117)
ALT SERPL W P-5'-P-CCNC: 6 U/L (ref 1–33)
ANION GAP SERPL CALCULATED.3IONS-SCNC: 13.6 MMOL/L (ref 5–15)
AST SERPL-CCNC: 17 U/L (ref 1–32)
BASOPHILS # BLD AUTO: 0.03 10*3/MM3 (ref 0–0.2)
BASOPHILS NFR BLD AUTO: 0.6 % (ref 0–1.5)
BILIRUB SERPL-MCNC: 0.4 MG/DL (ref 0–1.2)
BUN SERPL-MCNC: 10 MG/DL (ref 8–23)
BUN/CREAT SERPL: 14.7 (ref 7–25)
CALCIUM SPEC-SCNC: 9.2 MG/DL (ref 8.6–10.5)
CEA SERPL-MCNC: 1.65 NG/ML
CHLORIDE SERPL-SCNC: 107 MMOL/L (ref 98–107)
CO2 SERPL-SCNC: 23.4 MMOL/L (ref 22–29)
CREAT SERPL-MCNC: 0.68 MG/DL (ref 0.6–1.1)
DEPRECATED RDW RBC AUTO: 45.9 FL (ref 37–54)
EGFRCR SERPLBLD CKD-EPI 2021: 98 ML/MIN/1.73
EOSINOPHIL # BLD AUTO: 0.19 10*3/MM3 (ref 0–0.4)
EOSINOPHIL NFR BLD AUTO: 4 % (ref 0.3–6.2)
ERYTHROCYTE [DISTWIDTH] IN BLOOD BY AUTOMATED COUNT: 14.1 % (ref 12.3–15.4)
GLOBULIN UR ELPH-MCNC: 2.2 GM/DL
GLUCOSE SERPL-MCNC: 109 MG/DL (ref 65–99)
HCT VFR BLD AUTO: 36.8 % (ref 34–46.6)
HGB BLD-MCNC: 12.1 G/DL (ref 12–15.9)
IMM GRANULOCYTES # BLD AUTO: 0.02 10*3/MM3 (ref 0–0.05)
IMM GRANULOCYTES NFR BLD AUTO: 0.4 % (ref 0–0.5)
LYMPHOCYTES # BLD AUTO: 1.23 10*3/MM3 (ref 0.7–3.1)
LYMPHOCYTES NFR BLD AUTO: 25.8 % (ref 19.6–45.3)
MCH RBC QN AUTO: 29.4 PG (ref 26.6–33)
MCHC RBC AUTO-ENTMCNC: 32.9 G/DL (ref 31.5–35.7)
MCV RBC AUTO: 89.3 FL (ref 79–97)
MONOCYTES # BLD AUTO: 0.35 10*3/MM3 (ref 0.1–0.9)
MONOCYTES NFR BLD AUTO: 7.3 % (ref 5–12)
NEUTROPHILS NFR BLD AUTO: 2.95 10*3/MM3 (ref 1.7–7)
NEUTROPHILS NFR BLD AUTO: 61.9 % (ref 42.7–76)
NRBC BLD AUTO-RTO: 0 /100 WBC (ref 0–0.2)
PLATELET # BLD AUTO: 203 10*3/MM3 (ref 140–450)
PMV BLD AUTO: 9.7 FL (ref 6–12)
POTASSIUM SERPL-SCNC: 3.9 MMOL/L (ref 3.5–5.2)
PROT SERPL-MCNC: 6.3 G/DL (ref 6–8.5)
RBC # BLD AUTO: 4.12 10*6/MM3 (ref 3.77–5.28)
SODIUM SERPL-SCNC: 144 MMOL/L (ref 136–145)
WBC NRBC COR # BLD: 4.77 10*3/MM3 (ref 3.4–10.8)

## 2023-09-06 PROCEDURE — 85025 COMPLETE CBC W/AUTO DIFF WBC: CPT

## 2023-09-06 PROCEDURE — 82378 CARCINOEMBRYONIC ANTIGEN: CPT | Performed by: INTERNAL MEDICINE

## 2023-09-06 PROCEDURE — 36591 DRAW BLOOD OFF VENOUS DEVICE: CPT

## 2023-09-06 PROCEDURE — 25010000002 HEPARIN LOCK FLUSH PER 10 UNITS: Performed by: INTERNAL MEDICINE

## 2023-09-06 PROCEDURE — 80053 COMPREHEN METABOLIC PANEL: CPT

## 2023-09-06 RX ORDER — SODIUM CHLORIDE 0.9 % (FLUSH) 0.9 %
10 SYRINGE (ML) INJECTION AS NEEDED
Status: DISCONTINUED | OUTPATIENT
Start: 2023-09-06 | End: 2023-09-06 | Stop reason: HOSPADM

## 2023-09-06 RX ORDER — SODIUM CHLORIDE 0.9 % (FLUSH) 0.9 %
10 SYRINGE (ML) INJECTION AS NEEDED
OUTPATIENT
Start: 2023-09-06

## 2023-09-06 RX ORDER — HEPARIN SODIUM (PORCINE) LOCK FLUSH IV SOLN 100 UNIT/ML 100 UNIT/ML
500 SOLUTION INTRAVENOUS AS NEEDED
OUTPATIENT
Start: 2023-09-06

## 2023-09-06 RX ORDER — HEPARIN SODIUM (PORCINE) LOCK FLUSH IV SOLN 100 UNIT/ML 100 UNIT/ML
500 SOLUTION INTRAVENOUS AS NEEDED
Status: DISCONTINUED | OUTPATIENT
Start: 2023-09-06 | End: 2023-09-06 | Stop reason: HOSPADM

## 2023-09-06 RX ADMIN — Medication 10 ML: at 10:35

## 2023-09-06 RX ADMIN — Medication 500 UNITS: at 10:35

## 2023-09-06 NOTE — PROGRESS NOTES
[facility]    CONSULTING IN BLOOD DISORDERS & CANCER    This was an audio and video enabled telemedicine encounter.      REASON FOR CONSULTATION/CHIEF COMPLAINT:     Evaluation and management for rectal adenocarcinoma                             REQUESTING PHYSICIAN: No ref. provider found  RECORDS OBTAINED:  Records of the patients history including those from the electronic medical record were reviewed and summarized in detail.    HISTORY OF PRESENT ILLNESS:    The patient is a 62 y.o. year old female with medical history significant for migraines, depression/anxiety, and IBS had presented with epigastric discomfort in September 2022.      An EGD and colonoscopy performed by SHOAIB Esteves on 9/1/2022 demonstrated a 3 cm mass in the proximal rectum around 13 cm from the anal verge.  The polyp was multilobulated and somewhat fixed to the underlying tissue.  Friable with contact bleeding.  Other smaller polyps were found in the cecum and ascending colon which were removed.  The EGD revealed mucosal nodule in the esophagus and multiple gastric polyps.  No evidence of bleeding.     The pathology from the rectal mass came back as invasive moderately differentiated adenocarcinoma arising out of traditional serrated adenoma with high-grade dysplasia.    Patient was subsequently referred to Dr. Amaya, colorectal surgery who performed a flexible sigmoidoscopy on 9/21/2022, revealing an infiltrative nonobstructing mass in the mid rectum.  The mass was partially circumferential involving one third of the lumen circumference.     9/13/2022: CT C/A/P with contrast noted rectal neoplasm not well visualized on this exam.  Calcified and noncalcified sub-6mm pulmonary nodules likely the sequela of prior granulomatous disease.  Scattered colonic diverticulosis.  Mildly prominent, but not pathologically enlarged pelvic nodes measuring up to 4 mm.    9/26/2022: MRI pelvis showed high rectal mass with effacement of the muscularis  propria along the right wall and extended beyond the muscularis propria at the posterior wall, T3 lesion. There is no evidence for involvement of the mesorectal fascia. There are no pathologically enlarged nodes.     Patient has been seen by , rad-onc & being planned for neoadjuvant radiation. Patient has come to this clinic to discuss systemic therapy options.    Guardant 360: MSI stable.  Low TMB.    10/17- 10/22/2022: Short course of radiotherapy to the rectal mass.    11/2/2022: Cycle 1 FOLFOX  12/14/2022: Cycle 4 FOLFOX  12/28/2022: Cycle 5 FOLFOX  1/7/2022: MRI pelvis shows marked response to treatment  1/11/2023: Cycle 6 5-FU/LV.  Oxaliplatin dropped due to neuropathy.  2/8/2023: Cycle 8 5-FU    3/6/2023: Clara sigmoidoscopy and surface biopsy.  No evidence of residual disease.  Case discussed with Dr. Amaya and Dr. Freeman.  Plan made for active surveillance.    March 2023: Circulating DNA negative.    April 2023: CT c/a/p with no evidence of disease recurrence.  6/7/23: MRI pelvis with near complete response to therapy of the rectal mass.  7/13/2023: Flex sigmoidoscopy by Dr. Amaya noted scar in the mid rectum.  No evidence of disease recurrence.    INTERIM HISTORY:  Patient returns to the clinic for a follow-up visit.  She is still recovering from recent COVID infection.  Has persistent fatigue & loose stools.  No blood in stool.  The neuropathy and nail changes stable. Appetite and weight has been stable.  Says she is busy at work.  She does pelvic floor exercises.    Past Medical History:   Diagnosis Date    Abdominal cramping 07/01/2022    Anemia     Bloating 07/01/2022    Bloody diarrhea 07/01/2022    Chronic back pain     Colon polyps     FOLLOWED BY DR. BEVERLY MENDEZ    COVID 08/15/2023    Depression     Gastric polyps     FOLLOWED BY DR. BEVERLY MENDEZ    GERD (gastroesophageal reflux disease)     Hearing loss     Hiatal hernia 09/2021    3 CM, FOLLOWED BY DR. BEVERLY MENDEZ    History of  "chemotherapy     LAST TREATMENT 2/10/2023    History of radiation therapy     Hyperlipidemia     NO MEDS    IBS (irritable bowel syndrome)     Migraines     Rectal bleeding 1983    Off and on for years    Rectal cancer 09/2022    INVASIVE MODERATELY DIFFERENTIATED ADENOCARCINOMA ARISING FROM TRADITIONAL SERRATED ADENOMA WITH HGD    Sciatica 09/2015    Tear of medial meniscus of knee 07/2021    RIGHT KNEE    Vitamin D deficiency      Past Surgical History:   Procedure Laterality Date    COLONOSCOPY N/A 1986    COLONOSCOPY W/ POLYPECTOMY N/A 09/01/2022    3 MM TUBULAR ADENOMA POLYP IN ASCENDING, 6 MM TUBULAR ADENOMA POLYP IN CECUM, 30 MM MASS IN RECTUM, PATH:INVASIVE MODERATELY DIFFERENTIATED ADENOCARCINOMA ARISING FROM TRADITIONAL SERRATED ADENOMA WITH HGD, [place]  [facility]    ENDOSCOPY N/A 09/01/2022    MULTIPLE BENIGN GASTRIC POLYPS, 2 SQUAMOUS PAPILLAS IN ESOPHAGUS, 3 CM HIATAL HERNIA,    EXTERNAL EAR SURGERY  1966    MULTIPLE \"LANCES\", DR. ACE IN [facility]    EYE SURGERY Right [facility]    KNEE ARTHRODESIS Right 07/27/2021    RIGHT KNEE ARTHROCENTESIS, DR. ALBERT GILMAN    KNEE SURGERY Left 1985    DR. PEREZ AT [place]    SHOULDER MANIPULATION Left 01/07/2013    FOR FROZEN SHOULDER, DR. ALBERT GILMAN AT [facility]    SIGMOIDOSCOPY N/A 09/21/2022    AN INFILTRATIVE NON OBSTRUCTING MASS IN MID RECTUM, AREA TATTOOED, DR. JENNIFER AMAYA AT [facility]    SIGMOIDOSCOPY N/A 03/08/2023    7 MM HEALED ULCERATION WHERE RECTAL CANCER WAS, SCAR TISSUE HEALTHY, BIOPSY SHOWED INFLAMMATION AND SCAR, NO RESIDUAL CANCER, FLEX SIG IN 12 MONTHS, DR. JENNIFER AMAYA AT [facility]    SIGMOIDOSCOPY N/A 07/13/2023    A HEALTHY SCAR WAS IN MID RECTUM, DR. JENNIFER AMAYA AT [facility]    TRANSRECTAL ULTRASOUND N/A 09/21/2022    Procedure: ULTRASOUND TRANSRECTAL;  Surgeon: Jennifer Amaya MD;  Location: [facility] ENDOSCOPY;  Service: General;  Laterality: N/A;    VENOUS ACCESS " DEVICE (PORT) INSERTION Left 10/26/2022    Procedure: INSERTION OF PORTACATH;  Surgeon: Leonel Bridges MD;  Location: [facility] MAIN OR;  Service: General;  Laterality: Left;    WISDOM TOOTH EXTRACTION Bilateral        MEDICATIONS    Current Outpatient Medications:     dicyclomine (BENTYL) 20 MG tablet, Take 1 tablet by mouth Every 6 (Six) Hours As Needed (abd cramps)., Disp: 90 tablet, Rfl: 1    diphenoxylate-atropine (LOMOTIL) 2.5-0.025 MG per tablet, Take 1 tablet by mouth 4 (Four) Times a Day As Needed for Diarrhea., Disp: 30 tablet, Rfl: 2    ELDERBERRY PO, Take 2 doses by mouth Daily. HOLD PRIOR TO SURG, Disp: , Rfl:     hydrOXYzine (ATARAX) 10 MG tablet, Take 1 tablet by mouth Every 8 (Eight) Hours As Needed for Itching., Disp: 30 tablet, Rfl: 0    lidocaine-prilocaine (EMLA) 2.5-2.5 % cream, Apply 1 application topically to the appropriate area as directed Every 2 (Two) Hours As Needed for Mild Pain., Disp: 5 g, Rfl: 1    loperamide (IMODIUM) 2 MG capsule, Take 1 capsule by mouth As Needed for Diarrhea., Disp: , Rfl:     loratadine (CLARITIN) 10 MG tablet, Take 1 tablet by mouth As Needed. 1 daily around time of Neulasta OBI, Disp: , Rfl:     ondansetron (ZOFRAN) 8 MG tablet, Take 1 tablet by mouth 3 (Three) Times a Day As Needed for Nausea or Vomiting., Disp: 30 tablet, Rfl: 5    pantoprazole (PROTONIX) 40 MG EC tablet, Take 1 tablet by mouth Daily. (Patient taking differently: Take 1 tablet by mouth Daily As Needed.), Disp: 30 tablet, Rfl: 3    traMADol (ULTRAM) 50 MG tablet, Take 1 tablet by mouth Every 6 (Six) Hours As Needed for Moderate Pain., Disp: 30 tablet, Rfl: 0    Unable to find, 1 each 1 (One) Time. CBD GUMMY PRN HS FOR SLEEP/HOLD FOR PROCEDURE, Disp: , Rfl:   No current facility-administered medications for this visit.  All current medications reviewed and updated as appropriate for today's encounter on 09/06/2023.     ALLERGIES:     Allergies   Allergen Reactions    Adhesive Tape Rash      "Patient has sensitive skin.     Levofloxacin Swelling    Sulfa Antibiotics Rash       SOCIAL HISTORY:       Social History     Socioeconomic History    Marital status: Single   Tobacco Use    Smoking status: Never     Passive exposure: Never    Smokeless tobacco: Never   Vaping Use    Vaping Use: Never used   Substance and Sexual Activity    Alcohol use: Not Currently    Drug use: Never    Sexual activity: Defer         FAMILY HISTORY:  Family History   Problem Relation Age of Onset    Colon polyps Mother     Asthma Mother     COPD Mother     Lung disease Mother     Migraines Mother     Cancer Father         liver cancer    Colon polyps Father     Heart disease Father     Diabetes Father     Kidney disease Father     Angina Father     Liver cancer Father     Hepatitis Father     Hearing loss Father     Cancer Sister         Breast cancer    Arthritis Sister     Colon polyps Sister     Breast cancer Sister     Diabetes Sister     Colon polyps Sister     Alcohol abuse Maternal Uncle     Colon cancer Neg Hx     Crohn's disease Neg Hx     Irritable bowel syndrome Neg Hx     Ulcerative colitis Neg Hx     Malig Hyperthermia Neg Hx        REVIEW OF SYSTEMS:  As per HPI         Vitals:    09/06/23 1055   BP: 162/83   Pulse: 72   Resp: 16   Temp: 98 °F (36.7 °C)   TempSrc: Infrared   Weight: 105 kg (231 lb 3.2 oz)   Height: 176 cm (69.29\")   PainSc: 0-No pain         9/6/2023    11:12 AM   Current Status   ECOG score 0     CONSTITUTIONAL:  Vital signs reviewed.  No distress, looks comfortable.  EYES:  Conjunctiva and lids unremarkable.    EARS,NOSE,MOUTH,THROAT:  Ears and nose appear unremarkable.    RESPIRATORY:  Normal respiratory effort.  Lungs clear to auscultation bilaterally.  CARDIOVASCULAR:  Normal S1, S2.  No murmurs rubs or gallops.  No significant lower extremity edema.  GASTROINTESTINAL: Abdomen appears unremarkable.  Nondistended   LYMPHATIC:  No cervical, supraclavicular lymphadenopathy.  SKIN:  Warm.  No " rashes.  PSYCHIATRIC:  Normal judgment and insight.  Normal mood and affect.  NEURO: AAOx3, no obvious focal deficits.    RECENT LABS:  Reviewed    ASSESSMENT:   is a pleasant 61 y/o WF with medical history significant for migraines, depression/anxiety, and IBS comes for rectal adenocarcinoma evaluation & management.     # Rectal adenocarcinoma, T3N0M0, Stage IIA:  Diagnosed on a c-scope performed for gastric discomfort in September 2022.   The flex sig noted a an infiltrative nonobstructing mass in the mid rectum.  The mass was partially circumferential involving one third of the lumen circumference. CT c/a/p negative for mets. MRI pelvis most consistent with T3N0 disease, with no evidence of involvement of mesorectal fascia.  Reviewed various management strategies for localized rectal cancer with chemotherapy, radiation and surgical resection.  Patient met with Dr. Amaya, colorectal surgery and is being tentatively planned for low anterior resection after neoadjuvant chemotherapy and radiation.  She received neoadjuvant short course RT by Dr. Freeman, radiation oncology from 10/17- 10/22/2022.   Received neoadjuvant chemotherapy with 8 cycles of FOLFOX between 11/2/2022 - 2/8/2023.  Oxaliplatin dropped with last 2 cycles.  Post treatment flexible sigmoidoscopy and rectal biopsies performed 3/8/2023 showed no evidence of residual disease.  Case discussed with Dr. Amaya and Lydia.  I extensively discussed risks/benefits of surgical resection versus active surveillance.  Informed patient that active surveillance is currently not the standard of care but is an active area of research in patients who have complete response.  Also discussed surveillance strategies.  Patient expressed understanding of the risks and opted for active surveillance.    3/22/2023: Recovering well after recent chemotherapy.  Discussed plan for active surveillance.  The CEA and guardant reveal circulating tumor DNA negative.  A CT C/A/P  showed asymetric rectal thickening.  Plan to repeat MRI pelvis in 3 months and flexible sigmoidoscopy in 6 months time.    A MRI pelvis from June 2023 showed near complete resolution of the rectal mass with minimal irregularity measuring 1.3 x 1.5 cm.  Patient subsequently underwent a flex ex which showed scar in the mid rectum.  Circulating tumor DNA from June 2023 negative as well.    9/6/2023: Patient overall asymptomatic.  CEA and circulating tumor DNA from today pending.  Residual changes likely post treatment related. Will continue active surveillance.Circulating tumor DNA analysis sent today. Advised close f/u with rad-onc & colorectal surgery.  As per patient's request, Will enter referral to GI given loose stools.  Will see her back in 3 months or sooner if needed    #GERD  Significant epigastric discomfort and reflux with initiation of chemo  Continue Protonix 40 mg nightly    #Diarrhea  Initially began following radiation and was exacerbated with chemotherapy  Utilizing Imodium and Lomotil as needed.  Is planning to follow-up with GI again.    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  On multivitamin supplements    PLAN:   Continue active surveillance.  Check CEA and circulating tumor DNA, guardant reveal today and repeat every 3 months  Will clarify the timing of MRI and flex sig with Viki and Lydia.   Follow-up in 3 months or sooner if needed  Call/ return sooner should he develop any new concerns or problems.  Orders Placed This Encounter   Procedures    Comprehensive Metabolic Panel     Standing Status:   Future     Standing Expiration Date:   9/5/2024     Order Specific Question:   Release to patient     Answer:   Routine Release [[id number]]    CEA     Standing Status:   Future     Standing Expiration Date:   9/5/2024     Order Specific Question:   Release to patient     Answer:   Routine Release [[id number]]    Ambulatory Referral to Gastroenterology     Referral Priority:   Routine      Referral Type:   Consultation     Referral Reason:   Specialty Services Required     Requested Specialty:   Gastroenterology     Number of Visits Requested:   1    CBC & Differential     Standing Status:   Future     Standing Expiration Date:   9/5/2024     Order Specific Question:   Manual Differential     Answer:   No     Order Specific Question:   Release to patient     Answer:   Routine Release [[id number]]   Total time spent during this patient encounter is 45 minutes. The total time spent with the patient includes at least one or more of the following: preparing to see the patient by reviewing of tests, prior notes or other relevant information, performing appropriate independent examination & evaluation, counseling, ordering of medications, tests or procedures, communicating with other healthcare professionals, when appropriate to coordinate care, documenting clinic information in the electronic medical records or other health records, independently interpreting results of tests and communicating the results to the patient/family or caregiver.

## 2023-09-07 ENCOUNTER — PREP FOR SURGERY (OUTPATIENT)
Dept: OTHER | Facility: HOSPITAL | Age: 63
End: 2023-09-07
Payer: COMMERCIAL

## 2023-09-07 DIAGNOSIS — Z85.048 PERSONAL HISTORY OF MALIGNANT NEOPLASM OF RECTUM, RECTOSIGMOID JUNCTION, AND ANUS: Primary | ICD-10-CM

## 2023-09-08 RX ORDER — DIPHENOXYLATE HYDROCHLORIDE AND ATROPINE SULFATE 2.5; .025 MG/1; MG/1
TABLET ORAL
Qty: 30 TABLET | Refills: 0 | Status: SHIPPED | OUTPATIENT
Start: 2023-09-08

## 2023-09-14 LAB — REF LAB TEST METHOD: NORMAL

## 2023-09-21 ENCOUNTER — TELEPHONE (OUTPATIENT)
Dept: RADIATION ONCOLOGY | Facility: HOSPITAL | Age: 63
End: 2023-09-21
Payer: COMMERCIAL

## 2023-09-22 ENCOUNTER — HOSPITAL ENCOUNTER (OUTPATIENT)
Dept: RADIATION ONCOLOGY | Facility: HOSPITAL | Age: 63
Setting detail: RADIATION/ONCOLOGY SERIES
End: 2023-09-22
Payer: COMMERCIAL

## 2023-09-22 ENCOUNTER — OFFICE VISIT (OUTPATIENT)
Dept: RADIATION ONCOLOGY | Facility: HOSPITAL | Age: 63
End: 2023-09-22
Payer: COMMERCIAL

## 2023-09-22 VITALS
OXYGEN SATURATION: 99 % | WEIGHT: 232.6 LBS | HEART RATE: 75 BPM | BODY MASS INDEX: 34.06 KG/M2 | SYSTOLIC BLOOD PRESSURE: 162 MMHG | DIASTOLIC BLOOD PRESSURE: 85 MMHG

## 2023-09-22 DIAGNOSIS — C20 RECTAL CANCER: Primary | ICD-10-CM

## 2023-09-22 PROCEDURE — G0463 HOSPITAL OUTPT CLINIC VISIT: HCPCS

## 2023-09-22 NOTE — PROGRESS NOTES
[facility] Radiation Oncology   Follow Up    Chief Complaint  Rectal cancer        Diagnosis: Adenocarcinoma of the rectum     Overall Stage IIA     cT3: Per rectal MRI  cN0: Per rectal MRI  cM0: Per CT chest abdomen pelvis        Radiation Completion Date: 10/22/2022        Prescription:      Site: Rectum  Laterality: N/A  Total Dose: 2500cGy  Dose per Fraction: 500cGy  Total Fractions: 5  Daily or BID: Daily  Modality: Photon  Technique: IMRT/VMAT/Rapid Arc  Bolus: No      Interval History:    Babs Felton presents for regularly scheduled follow-up approximately 11 months after completion of short course radiation therapy as part of treatment for a locally advanced adenocarcinoma of the rectum.  Patient ultimately underwent nonoperative management and has undergone continued surveillance with Dr. Weber and Dr. Amaya.  Since I last met with her in June the patient has undergone flexible sigmoidoscopy with Dr. Amaya which did not demonstrate evidence of recurrent disease.    The patient has done fairly well in the interim.  She has some ongoing issues with diarrhea which is somewhat controlled with Lomotil, although she has an upcoming appointment with a gastroenterologist.  She also has some intermittent bladder irritation.  She has tried Azo in the past although this has not been particularly effective.  She also continues to undergo pelvic physical therapy.      Imaging:      No new relevant imaging      Pathology:      No new relevant pathology    Labs:    Lab Results   Component Value Date    CREATININE 0.68 09/06/2023       Lab Results   Component Value Date    CEA 1.65 09/06/2023               Problem List:  Patient Active Problem List   Diagnosis    Diarrhea    Bloody diarrhea    Abdominal cramping    Bloating    Gastroesophageal reflux disease without esophagitis    Rectal cancer    Encounter for fitting and adjustment of vascular catheter    Personal history of malignant neoplasm of rectum, rectosigmoid  "junction, and anus          Medications:  Current Outpatient Medications on File Prior to Visit   Medication Sig Dispense Refill    dicyclomine (BENTYL) 20 MG tablet Take 1 tablet by mouth Every 6 (Six) Hours As Needed (abd cramps). 90 tablet 1    diphenoxylate-atropine (LOMOTIL) 2.5-0.025 MG per tablet TAKE ONE TABLET BY MOUTH FOUR TIMES A DAY AS NEEDED FOR DIARRHEA 30 tablet 0    ELDERBERRY PO Take 2 doses by mouth Daily. HOLD PRIOR TO SURG      hydrOXYzine (ATARAX) 10 MG tablet Take 1 tablet by mouth Every 8 (Eight) Hours As Needed for Itching. 30 tablet 0    lidocaine-prilocaine (EMLA) 2.5-2.5 % cream Apply 1 application topically to the appropriate area as directed Every 2 (Two) Hours As Needed for Mild Pain. 5 g 1    loperamide (IMODIUM) 2 MG capsule Take 1 capsule by mouth As Needed for Diarrhea.      loratadine (CLARITIN) 10 MG tablet Take 1 tablet by mouth As Needed. 1 daily around time of Neulasta OBI      ondansetron (ZOFRAN) 8 MG tablet Take 1 tablet by mouth 3 (Three) Times a Day As Needed for Nausea or Vomiting. 30 tablet 5    pantoprazole (PROTONIX) 40 MG EC tablet Take 1 tablet by mouth Daily. (Patient taking differently: Take 1 tablet by mouth Daily As Needed.) 30 tablet 3    traMADol (ULTRAM) 50 MG tablet Take 1 tablet by mouth Every 6 (Six) Hours As Needed for Moderate Pain. 30 tablet 0    Unable to find 1 each 1 (One) Time. CBD GUMMY PRN HS FOR SLEEP/HOLD FOR PROCEDURE       No current facility-administered medications on file prior to visit.          Allergies:  Allergies   Allergen Reactions    Adhesive Tape Rash     Patient has sensitive skin.     Levofloxacin Swelling    Sulfa Antibiotics Rash           Vital Signs:  /85   Pulse 75   Wt 106 kg (232 lb 9.6 oz)   SpO2 99%   BMI 34.06 kg/m²   Estimated body mass index is 34.06 kg/m² as calculated from the following:    Height as of 9/6/23: 176 cm (69.29\").    Weight as of this encounter: 106 kg (232 lb 9.6 oz).  Pain Score    09/22/23 " 0854   PainSc: 0-No pain         ECOG: Fully active, able to carry on all pre-disease performance without restriction = 0    Physical Exam  Vitals reviewed.   Constitutional:       General: She is not in acute distress.     Appearance: Normal appearance.   HENT:      Head: Normocephalic and atraumatic.   Eyes:      Extraocular Movements: Extraocular movements intact.      Pupils: Pupils are equal, round, and reactive to light.   Pulmonary:      Effort: Pulmonary effort is normal.   Abdominal:      General: Abdomen is flat.      Palpations: Abdomen is soft.   Musculoskeletal:      Cervical back: Normal range of motion.   Skin:     General: Skin is warm and dry.   Neurological:      General: No focal deficit present.      Mental Status: She is alert and oriented to person, place, and time.   Psychiatric:         Mood and Affect: Mood normal.         Behavior: Behavior normal.        Result Review :  The following data was reviewed by: Jacques Freeman MD on 09/22/2023:  Labs: Last Creatinine, CEA         Diagnoses and all orders for this visit:    1. Rectal cancer (Primary)      Assessment:    Babs Felton presents for regularly scheduled follow-up approximately 11 months after completion of short course radiation therapy as part of treatment for a locally advanced adenocarcinoma of the rectum.  Patient ultimately underwent nonoperative management and has undergone continued surveillance with Dr. Weber and Dr. Amaya.  Since I last met with her in June the patient has undergone flexible sigmoidoscopy with Dr. Amaya which did not demonstrate evidence of recurrent disease.    The patient has done fairly well in the interim.  She has some ongoing issues with diarrhea which is somewhat controlled with Lomotil, although she has an upcoming appointment with a gastroenterologist.  She also has some intermittent bladder irritation.  She has tried Azo in the past although this has not been particularly effective.  She also  continues to undergo pelvic physical therapy.    The patient has scheduled follow-up with Dr. Weber.  The patient reports that he is planning on ordering a pelvic MRI during her next visit.  She is scheduled to see Dr. Amaya for repeat flexible sigmoidoscopy in November.  I will plan to see the patient back in follow-up in approximately 6 months after her next MRI.  Patient has my contact information and can reach out with any questions at any time.      Plan:    -Patient with scheduled follow-up with Dr. Weber and Dr. Amaya.  - Patient to follow-up with me in 6 months.     I spent 30 minutes caring for Babs on this date of service. This time includes time spent by me in the following activities:preparing for the visit, reviewing tests, obtaining and/or reviewing a separately obtained history, documenting information in the medical record, independently interpreting results and communicating that information with the patient/family/caregiver, and care coordination  Follow Up   No follow-ups on file.  Patient was given instructions and counseling regarding her condition or for health maintenance advice. Please see specific information pulled into the AVS if appropriate.     Jacques Freeman MD

## 2023-09-26 ENCOUNTER — OFFICE VISIT (OUTPATIENT)
Dept: GASTROENTEROLOGY | Facility: CLINIC | Age: 63
End: 2023-09-26
Payer: COMMERCIAL

## 2023-09-26 VITALS
HEART RATE: 75 BPM | HEIGHT: 69 IN | WEIGHT: 236.8 LBS | SYSTOLIC BLOOD PRESSURE: 162 MMHG | BODY MASS INDEX: 35.07 KG/M2 | DIASTOLIC BLOOD PRESSURE: 98 MMHG | TEMPERATURE: 98 F | OXYGEN SATURATION: 98 %

## 2023-09-26 DIAGNOSIS — K59.1 FUNCTIONAL DIARRHEA: Primary | ICD-10-CM

## 2023-09-26 DIAGNOSIS — R15.2 FECAL URGENCY: ICD-10-CM

## 2023-09-26 DIAGNOSIS — R15.1 FECAL SMEARING: ICD-10-CM

## 2023-09-26 NOTE — PROGRESS NOTES
Chief Complaint   Patient presents with    Diarrhea           History of Present Illness    Patient is a 62 y.o. who presents to the office for follow up evaluation.  Last in office visit was on  6/30/2022 . Patient has a significant past medical history of rectal cancer treated with neoadjuvant short course radiation and chemotherapy followed by Dr. Yamilka Amaya and Dr. Jacques Freeman.      She is currently completing short surveillance protocol with Dr. Amaya in which she receives flexible sigmoidoscopy every 6 months as well as alternating MRI every 6 months for monitoring.  Next flexible sigmoidoscopy scheduled for November 2023.    She denies upper GI concerns including heartburn, nausea, vomiting, or dysphagia.    Current bowel habits are described as occurring upwards of 3-4 times a day within a.m. predominance and characterized by fecal urgency and has experienced episodic fecal smearing if limited access to restroom.  Denies melena or hematochezia.      She will experience abdominal cramping that is controlled with Bentyl every 12 hours and or Lomotil if she will have limited access to restroom.  Last dosing of this was on 9/24.  Denies bowel movement since this time.  Stool consistency is described as watery.  Known dietary triggers include consumption of roughage.  She continues to follow-up with pelvic floor therapy.  Previous use of fiber supplementation resulted in diarrhea.  Denies use of Konsyl in the past.    No unintentional weight loss.      Family history of liver cancer in father.  Denies colon cancer or colon polyps        Result Review :       Office Visit with Tristan Weber MD (09/06/2023)   Office Visit with Dinesh Pérez MD (06/30/2022)     EGD and colonoscopy performed by SHOAIB Esteves on 9/1/2022 demonstrated a 3 cm mass in the proximal rectum around 13 cm from the anal verge.     The polyp was multilobulated and somewhat fixed to the underlying tissue. Friable with contact  "bleeding. Other smaller polyps were found in the cecum and ascending colon which were removed. The EGD revealed mucosal nodule in the esophagus and multiple gastric polyps. No evidence of bleeding.     pathology from the rectal mass came back as invasive moderately differentiated adenocarcinoma arising out of traditional serrated adenoma with high-grade dysplasia.     Patient was subsequently referred to Dr. Amaya, colorectal surgery who performed a flexible sigmoidoscopy on 9/21/2022, revealing an infiltrative nonobstructing mass in the mid rectum.  The mass was partially circumferential involving one third of the lumen circumference.      9/13/2022: CT C/A/P with contrast noted rectal neoplasm not well visualized on this exam.  Calcified and noncalcified sub-6mm pulmonary nodules likely the sequela of prior granulomatous disease.  Scattered colonic diverticulosis.  Mildly prominent, but not pathologically enlarged pelvic nodes measuring up to 4 mm.     9/26/2022: MRI pelvis showed high rectal mass with effacement of the muscularis propria along the right wall and extended beyond the muscularis propria at the posterior wall, T3 lesion. There is no evidence for involvement of the mesorectal fascia. There are no pathologically enlarged nodes.     3/6/2023: Flex sigmoidoscopy and surface biopsy.  No evidence of residual disease.        April 2023: CT c/a/p with no evidence of disease recurrence.  6/7/23: MRI pelvis with near complete response to therapy of the rectal mass.  7/13/2023: Flex sigmoidoscopy by Dr. Amaya noted scar in the mid rectum.  No evidence of disease recurrence.    Vital Signs:   /98   Pulse 75   Temp 98 °F (36.7 °C)   Ht 175.3 cm (69\")   Wt 107 kg (236 lb 12.8 oz)   SpO2 98%   BMI 34.97 kg/m²     Body mass index is 34.97 kg/m².     Physical Exam  Vitals reviewed.   Constitutional:       General: She is not in acute distress.     Appearance: Normal appearance. She is not ill-appearing. "   Eyes:      General: No scleral icterus.  Pulmonary:      Effort: Pulmonary effort is normal. No respiratory distress.   Abdominal:      General: Bowel sounds are normal. There is no distension.      Palpations: Abdomen is soft. Abdomen is not rigid. There is no mass or pulsatile mass.      Tenderness: There is no abdominal tenderness. There is no guarding or rebound.      Hernia: No hernia is present.   Skin:     Coloration: Skin is not jaundiced.   Neurological:      Mental Status: She is alert and oriented to person, place, and time.   Psychiatric:         Thought Content: Thought content normal.         Judgment: Judgment normal.         Assessment and Plan    Diagnoses and all orders for this visit:    1. Functional diarrhea (Primary)    2. Fecal urgency    3. Fecal smearing           Discussion:    Patient is a pleasant 62-year-old female who presents for further evaluation of persistent diarrhea.  Reviewed previous records with oncologist and Dr. Amaya.  Recommend starting Konsyl supplementation to help improve stool consistency as well as reduce frequency of fecal smearing by promoting complete evacuation of stool.    I have also recommended that she start Banatrol plus supplementation beginning with 1 scoop twice a day for 7 days which then can be further reduced to once daily dosing.  If a.m. stool frequency persist she can begin taking nightly Imodium to help reduce a.m. frequency and urgency.  I have also encouraged her to continue dicyclomine and Lomotil dosing if needed.    She will provide update to our office in 4 weeks on symptom response or sooner if symptoms do not begin to improve with this regimen for further recommendations at that time.  Additionally, patient request recommendations for registered dietitian to assess for underlying food sensitivity testing.  Information provided for Vivian Arias RD.    Patient is agreeable to the outlined above treatment plan.  Verbalizes understanding  and will contact office for any new or worsening concerns.  All questions answered and support provided.        Patient Instructions   For Diarrhea we recommend starting a prebiotic supplement called Banatrol plus which can be found on [employer]:    Begin taking 1 scoop of Banatrol plus with 4 ounces of water or juice twice a day until symptoms improve or after 7 days, then begin taking 1 scoop once daily.  Benadryl plus can also be mixed in oatmeal or applesauce  Do not take more than 6 scoops per day or more than 2 scoops at a single time  It is okay to use long-term  Banatrol plus works by solidifying stools through absorbing excess water in the colon while feeling the growth of good bacteria in your gut and helping to reduce inflammation.  This is also helpful in promoting bowel regularity.  Do not take if you allergic to latex or fruits bananas, avocados, passionfruit, plums, strawberries, and tomatoes           For Fecal Incontinence:    Start fiber supplement, Konsyl.  This can be purchased over-the-counter.  It is a powder.  Start with 1 teaspoon daily and increase up to 4 teaspoons daily as tolerated.  The goal is to bulk stools and prevent episodes of fecal incontinence.      You can take a single imodium at night if you continue to experience am stool frequency        Registered Dietician   Vivian Arias   [facility]  [address] [place] [id number] [phone]          EMR Dragon/Transcription Disclaimer:  This document has been Dictated utilizing Dragon dictation.

## 2023-09-26 NOTE — PATIENT INSTRUCTIONS
For Diarrhea we recommend starting a prebiotic supplement called Banatrol plus which can be found on [employer]:    Begin taking 1 scoop of Banatrol plus with 4 ounces of water or juice twice a day until symptoms improve or after 7 days, then begin taking 1 scoop once daily.  Benadryl plus can also be mixed in oatmeal or applesauce  Do not take more than 6 scoops per day or more than 2 scoops at a single time  It is okay to use long-term  Banatrol plus works by solidifying stools through absorbing excess water in the colon while feeling the growth of good bacteria in your gut and helping to reduce inflammation.  This is also helpful in promoting bowel regularity.  Do not take if you allergic to latex or fruits bananas, avocados, passionfruit, plums, strawberries, and tomatoes           For Fecal Incontinence:    Start fiber supplement, Konsyl.  This can be purchased over-the-counter.  It is a powder.  Start with 1 teaspoon daily and increase up to 4 teaspoons daily as tolerated.  The goal is to bulk stools and prevent episodes of fecal incontinence.      You can take a single imodium at night if you continue to experience am stool frequency        Registered Dietician   Vivian Arias   [facility]  [address] [place] [id number] [phone]

## 2023-10-19 ENCOUNTER — INFUSION (OUTPATIENT)
Dept: ONCOLOGY | Facility: HOSPITAL | Age: 63
End: 2023-10-19
Payer: COMMERCIAL

## 2023-10-19 DIAGNOSIS — Z45.2 ENCOUNTER FOR FITTING AND ADJUSTMENT OF VASCULAR CATHETER: Primary | ICD-10-CM

## 2023-10-19 PROCEDURE — 96523 IRRIG DRUG DELIVERY DEVICE: CPT

## 2023-10-19 PROCEDURE — 25010000002 HEPARIN LOCK FLUSH PER 10 UNITS: Performed by: INTERNAL MEDICINE

## 2023-10-19 RX ORDER — HEPARIN SODIUM (PORCINE) LOCK FLUSH IV SOLN 100 UNIT/ML 100 UNIT/ML
500 SOLUTION INTRAVENOUS AS NEEDED
OUTPATIENT
Start: 2023-10-19

## 2023-10-19 RX ORDER — SODIUM CHLORIDE 0.9 % (FLUSH) 0.9 %
10 SYRINGE (ML) INJECTION AS NEEDED
Status: DISCONTINUED | OUTPATIENT
Start: 2023-10-19 | End: 2023-10-19 | Stop reason: HOSPADM

## 2023-10-19 RX ORDER — HEPARIN SODIUM (PORCINE) LOCK FLUSH IV SOLN 100 UNIT/ML 100 UNIT/ML
500 SOLUTION INTRAVENOUS AS NEEDED
Status: DISCONTINUED | OUTPATIENT
Start: 2023-10-19 | End: 2023-10-19 | Stop reason: HOSPADM

## 2023-10-19 RX ORDER — SODIUM CHLORIDE 0.9 % (FLUSH) 0.9 %
10 SYRINGE (ML) INJECTION AS NEEDED
OUTPATIENT
Start: 2023-10-19

## 2023-10-19 RX ADMIN — Medication 500 UNITS: at 12:41

## 2023-10-19 RX ADMIN — Medication 10 ML: at 12:40

## 2023-11-06 ENCOUNTER — ANESTHESIA (OUTPATIENT)
Dept: GASTROENTEROLOGY | Facility: HOSPITAL | Age: 63
End: 2023-11-06
Payer: COMMERCIAL

## 2023-11-06 ENCOUNTER — ANESTHESIA EVENT (OUTPATIENT)
Dept: GASTROENTEROLOGY | Facility: HOSPITAL | Age: 63
End: 2023-11-06
Payer: COMMERCIAL

## 2023-11-06 ENCOUNTER — HOSPITAL ENCOUNTER (OUTPATIENT)
Facility: HOSPITAL | Age: 63
Setting detail: HOSPITAL OUTPATIENT SURGERY
Discharge: HOME | End: 2023-11-06
Attending: COLON & RECTAL SURGERY | Admitting: COLON & RECTAL SURGERY
Payer: COMMERCIAL

## 2023-11-06 VITALS
RESPIRATION RATE: 14 BRPM | SYSTOLIC BLOOD PRESSURE: 130 MMHG | DIASTOLIC BLOOD PRESSURE: 88 MMHG | HEART RATE: 68 BPM | TEMPERATURE: 98.3 F | BODY MASS INDEX: 35.46 KG/M2 | OXYGEN SATURATION: 94 % | WEIGHT: 239.4 LBS | HEIGHT: 69 IN

## 2023-11-06 PROCEDURE — 25010000002 PROPOFOL 10 MG/ML EMULSION: Performed by: NURSE ANESTHETIST, CERTIFIED REGISTERED

## 2023-11-06 PROCEDURE — 25810000003 LACTATED RINGERS PER 1000 ML: Performed by: COLON & RECTAL SURGERY

## 2023-11-06 RX ORDER — PROPOFOL 10 MG/ML
VIAL (ML) INTRAVENOUS AS NEEDED
Status: DISCONTINUED | OUTPATIENT
Start: 2023-11-06 | End: 2023-11-06 | Stop reason: SURG

## 2023-11-06 RX ORDER — LIDOCAINE HYDROCHLORIDE 20 MG/ML
INJECTION, SOLUTION INFILTRATION; PERINEURAL AS NEEDED
Status: DISCONTINUED | OUTPATIENT
Start: 2023-11-06 | End: 2023-11-06 | Stop reason: SURG

## 2023-11-06 RX ORDER — SODIUM CHLORIDE, SODIUM LACTATE, POTASSIUM CHLORIDE, CALCIUM CHLORIDE 600; 310; 30; 20 MG/100ML; MG/100ML; MG/100ML; MG/100ML
1000 INJECTION, SOLUTION INTRAVENOUS CONTINUOUS
Status: DISCONTINUED | OUTPATIENT
Start: 2023-11-06 | End: 2023-11-06 | Stop reason: HOSPADM

## 2023-11-06 RX ADMIN — PROPOFOL 100 MG: 10 INJECTION, EMULSION INTRAVENOUS at 08:35

## 2023-11-06 RX ADMIN — SODIUM CHLORIDE, POTASSIUM CHLORIDE, SODIUM LACTATE AND CALCIUM CHLORIDE 1000 ML: 600; 310; 30; 20 INJECTION, SOLUTION INTRAVENOUS at 07:58

## 2023-11-06 RX ADMIN — PROPOFOL 300 MCG/KG/MIN: 10 INJECTION, EMULSION INTRAVENOUS at 08:35

## 2023-11-06 RX ADMIN — LIDOCAINE HYDROCHLORIDE 100 MG: 20 INJECTION, SOLUTION INFILTRATION; PERINEURAL at 08:35

## 2023-11-06 NOTE — ANESTHESIA PREPROCEDURE EVALUATION
Anesthesia Evaluation     Patient summary reviewed and Nursing notes reviewed                Airway   Mallampati: II  TM distance: >3 FB  Neck ROM: full  Dental      Pulmonary - negative pulmonary ROS   Cardiovascular     Rhythm: regular  Rate: normal    (+) hyperlipidemia      Neuro/Psych  (+) headaches, numbness, psychiatric history Anxiety and Depression  GI/Hepatic/Renal/Endo    (+) morbid obesity, hiatal hernia, GERD, GI bleeding     Musculoskeletal (-) negative ROS    Abdominal    Substance History - negative use     OB/GYN negative ob/gyn ROS         Other      history of cancer                  Anesthesia Plan    ASA 2     MAC     intravenous induction     Anesthetic plan, risks, benefits, and alternatives have been provided, discussed and informed consent has been obtained with: patient.    CODE STATUS:

## 2023-11-06 NOTE — ANESTHESIA POSTPROCEDURE EVALUATION
Patient: Babs Felton    Procedure Summary       Date: 11/06/23 Room / Location: [facility] ENDOSCOPY 5 / [facility] ENDOSCOPY    Anesthesia Start: 0832 Anesthesia Stop: 0849    Procedure: SIGMOIDOSCOPY to descending colon Diagnosis:       Personal history of malignant neoplasm of rectum, rectosigmoid junction, and anus      (Personal history of malignant neoplasm of rectum, rectosigmoid junction, and anus [Z85.048])    Surgeons: Jennifer Amaya MD Provider: Davon Arnold MD    Anesthesia Type: MAC ASA Status: 2            Anesthesia Type: MAC    Vitals  Vitals Value Taken Time   /76 11/06/23 0857   Temp     Pulse 69 11/06/23 0903   Resp 12 11/06/23 0855   SpO2 92 % 11/06/23 0903   Vitals shown include unfiled device data.        Post Anesthesia Care and Evaluation    Patient location during evaluation: PACU  Patient participation: complete - patient participated  Level of consciousness: awake and alert  Pain management: adequate    Airway patency: patent  Anesthetic complications: No anesthetic complications    Cardiovascular status: acceptable  Respiratory status: acceptable  Hydration status: acceptable    Comments: --------------------            11/06/23               0855     --------------------   BP:       118/76     Pulse:      65       Resp:       12       Temp:                SpO2:      95%      --------------------

## 2023-11-06 NOTE — H&P
"Babs Felton is a 62 y.o. female  who is referred by Jennifer Amaya MD for a colonoscopy. She   has an indications: rectal cancer s/p miller.     She denies any change in bowel function, melena, or hematochezia.    Past Medical History:   Diagnosis Date    Abdominal cramping 07/01/2022    Anemia     Bloating 07/01/2022    Bloody diarrhea 07/01/2022    Chronic back pain     Colon polyps     FOLLOWED BY DR. BEVERLY MENDEZ    COVID 08/15/2023    Depression     Gastric polyps     FOLLOWED BY DR. BEVERLY MENDEZ    GERD (gastroesophageal reflux disease)     Hearing loss     Hiatal hernia 09/2021    3 CM, FOLLOWED BY DR. BEVERLY MENDEZ    History of chemotherapy     LAST TREATMENT 2/10/2023    History of radiation therapy     Hyperlipidemia     NO MEDS    IBS (irritable bowel syndrome)     Migraines     Rectal bleeding 1983    Off and on for years    Rectal cancer 09/2022    INVASIVE MODERATELY DIFFERENTIATED ADENOCARCINOMA ARISING FROM TRADITIONAL SERRATED ADENOMA WITH HGD    Sciatica 09/2015    Tear of medial meniscus of knee 07/2021    RIGHT KNEE    Vitamin D deficiency        Past Surgical History:   Procedure Laterality Date    COLONOSCOPY N/A 1986    COLONOSCOPY W/ POLYPECTOMY N/A 09/01/2022    3 MM TUBULAR ADENOMA POLYP IN ASCENDING, 6 MM TUBULAR ADENOMA POLYP IN CECUM, 30 MM MASS IN RECTUM, PATH:INVASIVE MODERATELY DIFFERENTIATED ADENOCARCINOMA ARISING FROM TRADITIONAL SERRATED ADENOMA WITH HGD, HYPERTROPHIED ANAL PAPILLA, DR. BEVERLY MENDEZ AT [facility]    ENDOSCOPY N/A 09/01/2022    MULTIPLE BENIGN GASTRIC POLYPS, 2 SQUAMOUS PAPILLAS IN ESOPHAGUS, 3 CM HIATAL HERNIA,    EXTERNAL EAR SURGERY  1966    MULTIPLE \"LANCES\", DR. ACE IN [facility]    EYE SURGERY Right 1967    DR. CASTANEDA IN [facility]    KNEE ARTHRODESIS Right 07/27/2021    RIGHT KNEE ARTHROCENTESIS, DR. PRICE Lewis    KNEE SURGERY Left 1985    DR. PEREZ AT [facility]    SHOULDER MANIPULATION Left 01/07/2013    FOR " FROZEN SHOULDER, DR. ALBERT GILMAN AT [facility]    SIGMOIDOSCOPY N/A 09/21/2022    AN INFILTRATIVE NON OBSTRUCTING MASS IN MID RECTUM, AREA TATTOOED, DR. JENNIFER OJEDA AT [facility]    SIGMOIDOSCOPY N/A 03/08/2023    7 MM HEALED ULCERATION WHERE RECTAL CANCER WAS, SCAR TISSUE HEALTHY, BIOPSY SHOWED INFLAMMATION AND SCAR, NO RESIDUAL CANCER, FLEX SIG IN 12 MONTHS, DR. JENNIFER OJEDA AT [facility]    SIGMOIDOSCOPY N/A 07/13/2023    A HEALTHY SCAR WAS IN MID RECTUM, DR. JENNIFER OJEDA AT [facility]    TRANSRECTAL ULTRASOUND N/A 09/21/2022    Procedure: ULTRASOUND TRANSRECTAL;  Surgeon: Jennifre Ojeda MD;  Location: [facility] ENDOSCOPY;  Service: General;  Laterality: N/A;    VENOUS ACCESS DEVICE (PORT) INSERTION Left 10/26/2022    Procedure: INSERTION OF PORTACATH;  Surgeon: Leonel Bridges MD;  Location: [facility] MAIN OR;  Service: General;  Laterality: Left;    WISDOM TOOTH EXTRACTION Bilateral        Medications Prior to Admission   Medication Sig Dispense Refill Last Dose    dicyclomine (BENTYL) 20 MG tablet Take 1 tablet by mouth Every 6 (Six) Hours As Needed (abd cramps). 90 tablet 1 Past Month    diphenoxylate-atropine (LOMOTIL) 2.5-0.025 MG per tablet TAKE ONE TABLET BY MOUTH FOUR TIMES A DAY AS NEEDED FOR DIARRHEA 30 tablet 0 Past Month    ELDERBERRY PO Take 2 doses by mouth Daily. HOLD PRIOR TO SURG   11/5/2023    hydrOXYzine (ATARAX) 10 MG tablet Take 1 tablet by mouth Every 8 (Eight) Hours As Needed for Itching. 30 tablet 0 Past Month    ondansetron (ZOFRAN) 8 MG tablet Take 1 tablet by mouth 3 (Three) Times a Day As Needed for Nausea or Vomiting. 30 tablet 5 Past Month    loperamide (IMODIUM) 2 MG capsule Take 1 capsule by mouth As Needed for Diarrhea.   More than a month    loratadine (CLARITIN) 10 MG tablet Take 1 tablet by mouth As Needed. 1 daily around time of Neulasta OBI   More than a month    pantoprazole (PROTONIX) 40 MG EC tablet Take 1 tablet by mouth Daily. (Patient taking differently: Take 1 tablet by mouth Daily As Needed.)  30 tablet 3 More than a month    traMADol (ULTRAM) 50 MG tablet Take 1 tablet by mouth Every 6 (Six) Hours As Needed for Moderate Pain. 30 tablet 0 More than a month    Unable to find 1 each 1 (One) Time. CBD GUMMY PRN HS FOR SLEEP/HOLD FOR PROCEDURE          Allergies   Allergen Reactions    Adhesive Tape Rash     Patient has sensitive skin.     Levofloxacin Swelling    Sulfa Antibiotics Rash       Family History   Problem Relation Age of Onset    Colon polyps Mother     Asthma Mother     COPD Mother     Lung disease Mother     Migraines Mother     Cancer Father         liver cancer    Colon polyps Father     Heart disease Father     Diabetes Father     Kidney disease Father     Angina Father     Liver cancer Father     Hepatitis Father     Hearing loss Father     Cancer Sister         Breast cancer    Arthritis Sister     Colon polyps Sister     Breast cancer Sister     Diabetes Sister     Colon polyps Sister     Alcohol abuse Maternal Uncle     Colon cancer Neg Hx     Crohn's disease Neg Hx     Irritable bowel syndrome Neg Hx     Ulcerative colitis Neg Hx     Malig Hyperthermia Neg Hx        Social History     Socioeconomic History    Marital status: Single   Tobacco Use    Smoking status: Never     Passive exposure: Never    Smokeless tobacco: Never   Vaping Use    Vaping Use: Never used   Substance and Sexual Activity    Alcohol use: Not Currently    Drug use: Never    Sexual activity: Defer       Review of Systems   Gastrointestinal:  Negative for abdominal pain, nausea and vomiting.   All other systems reviewed and are negative.      Vitals:    11/06/23 0727   BP: 144/79   Pulse: 64   Resp: 16   Temp: 98.3 °F (36.8 °C)   SpO2: 95%         Physical Exam  Constitutional:       Appearance: She is well-developed.   HENT:      Head: Normocephalic and atraumatic.   Eyes:      Pupils: Pupils are equal, round, and reactive to light.   Cardiovascular:      Rate and Rhythm: Regular rhythm.   Pulmonary:      Effort:  Pulmonary effort is normal.   Abdominal:      General: There is no distension.      Palpations: Abdomen is soft.   Musculoskeletal:         General: Normal range of motion.   Skin:     General: Skin is warm and dry.   Neurological:      Mental Status: She is alert and oriented to person, place, and time.   Psychiatric:         Thought Content: Thought content normal.         Judgment: Judgment normal.           Assessment & Plan      indications: rectal cancer s/p miller         I recommend flex sigmoidoscopy.  I described risks, benefits of the procedure with the patient including but not limited to bleeding, infection, possibility of perforation and possible polypectomy. All of the patient's questions were answered and they would like to proceed with the above recommendations.

## 2023-11-07 NOTE — PROGRESS NOTES
Chief Complaint   Patient presents with    Diarrhea           History of Present Illness    Patient is a 62 y.o. who presents to the office for follow up evaluation.  Last in office visit was on  9/26/23 . Patient has a significant past medical history of  rectal cancer treated with neoadjuvant short course radiation and chemotherapy followed by Dr. Yamilka Amaya and Dr. Jacques Freeman.       She is currently completing short surveillance protocol with Dr. Amaya in which she receives flexible sigmoidoscopy every 6 months as well as alternating MRI every 6 months for monitoring.      On 11/6/2023 patient underwent flexible sigmoidoscopy evaluation with Dr. Amaya which is overall unremarkable except for a visible scar in the mid rectum that was healthy in appearance.  Next colonoscopy recommended in 4 months due March 2024.    Over the past 4 weeks she describes lower GI concerns of fecal smearing, abdominal cramping, and diarrhea as improving with use of Konsyl supplementation as well as Banatrol 1 scoop 5-7 times per week.  Current bowel habits are described as occurring every 48 hours without visible melena or hematochezia.    Verbalizes difficulty Palating powder formulation of Konsyl secondary to texture    On days that she does experience bowel movements that she does report onset of fecal urgency with stool consistency described as a loose.  Denies use of Lomotil since last in office visit and or reoccurrence of fecal leakage since starting above outlined treatment regimen.  Denies abdominal bloating since starting Banatrol.    She does report intermittent left lower quadrant abdominal pressure prior to defecation that is nonradiating and nonsevere.    Reports intermittent nausea however denies requiring use of Zofran and will gradually improved without further intervention most recently after consuming broccoli.  No fever or chills.  Denies heartburn, vomiting, or dysphagia.    No unintentional weight loss.     "  Family history of liver cancer in father.  Denies colon cancer or colon polyps        Result Review :       Office Visit with Genie Rose APRN (09/26/2023)   H&P by Jennifer Amaya MD (11/06/2023 08:33)    Vital Signs:   /80   Pulse 86   Temp 97.1 °F (36.2 °C)   Ht 175.3 cm (69.02\")   Wt 109 kg (240 lb)   SpO2 95%   BMI 35.43 kg/m²     Body mass index is 35.43 kg/m².     Physical Exam  Vitals reviewed.   Constitutional:       General: She is not in acute distress.     Appearance: Normal appearance. She is not ill-appearing.   Eyes:      General: No scleral icterus.  Pulmonary:      Effort: Pulmonary effort is normal. No respiratory distress.   Abdominal:      General: Abdomen is flat. Bowel sounds are normal. There is no distension.      Palpations: Abdomen is soft. Abdomen is not rigid. There is no mass or pulsatile mass.      Tenderness: There is no abdominal tenderness. There is no guarding or rebound.      Hernia: No hernia is present.   Skin:     Coloration: Skin is not jaundiced.   Neurological:      Mental Status: She is alert and oriented to person, place, and time.   Psychiatric:         Thought Content: Thought content normal.         Judgment: Judgment normal.           Assessment and Plan    Diagnoses and all orders for this visit:    1. Functional diarrhea (Primary)    2. Fecal smearing    3. Personal history of malignant neoplasm of rectum, rectosigmoid junction, and anus           Discussion:    Overall patient describes symptoms as gradually improving with above outlined treatment regimen.  For loose stools, encouraged her to continue with Banatrol as well as Konsyl supplementation to help promote complete evacuation of stool as well as improve stool consistency.    If powder formulation of consult is not tolerated can transition to capsule formulation to improve compliance.  Encouraged her to continue with planned colonoscopy in March 2024 for continued surveillance of rectal " carcinoma.    For nausea and heartburn, symptoms are currently controlled on current regimen and encouraged her to continue with as needed pantoprazole and Zofran for any recurrence in symptoms.  If symptoms persist despite this regimen she will contact our office for further evaluation.  Recommend next follow-up with our office after completing colonoscopy evaluation with Dr. Amaya.      Patient is agreeable to the outlined above treatment plan.  Verbalizes understanding and will contact office for any new or worsening concerns.  All questions answered and support provided.        Patient Instructions   For loose stool:     Start fiber supplement, Konsyl.  This can be purchased over-the-counter.  It is a powder.  Start with 1 teaspoon daily and increase up to 4 teaspoons daily as tolerated.  The goal is to bulk stools and prevent episodes of fecal incontinence    Continue Banatrol     Can take imodium as needed    Goal is to promote complete emptying and to reduce frequency of days you experience diarrhea         EMR Dragon/Transcription Disclaimer:  This document has been Dictated utilizing Dragon dictation.

## 2023-11-08 ENCOUNTER — OFFICE VISIT (OUTPATIENT)
Dept: GASTROENTEROLOGY | Facility: CLINIC | Age: 63
End: 2023-11-08
Payer: COMMERCIAL

## 2023-11-08 VITALS
BODY MASS INDEX: 35.55 KG/M2 | OXYGEN SATURATION: 95 % | HEIGHT: 69 IN | DIASTOLIC BLOOD PRESSURE: 80 MMHG | WEIGHT: 240 LBS | HEART RATE: 86 BPM | TEMPERATURE: 97.1 F | SYSTOLIC BLOOD PRESSURE: 130 MMHG

## 2023-11-08 DIAGNOSIS — K59.1 FUNCTIONAL DIARRHEA: Primary | ICD-10-CM

## 2023-11-08 DIAGNOSIS — Z85.048 PERSONAL HISTORY OF MALIGNANT NEOPLASM OF RECTUM, RECTOSIGMOID JUNCTION, AND ANUS: ICD-10-CM

## 2023-11-08 DIAGNOSIS — R15.1 FECAL SMEARING: ICD-10-CM

## 2023-11-08 RX ORDER — MELOXICAM 15 MG/1
15 TABLET ORAL DAILY
COMMUNITY
Start: 2023-10-02

## 2023-11-08 NOTE — PATIENT INSTRUCTIONS
For loose stool:     Start fiber supplement, Konsyl.  This can be purchased over-the-counter.  It is a powder.  Start with 1 teaspoon daily and increase up to 4 teaspoons daily as tolerated.  The goal is to bulk stools and prevent episodes of fecal incontinence    Continue Banatrol     Can take imodium as needed    Goal is to promote complete emptying and to reduce frequency of days you experience diarrhea

## 2023-11-21 ENCOUNTER — PREP FOR SURGERY (OUTPATIENT)
Dept: OTHER | Facility: HOSPITAL | Age: 63
End: 2023-11-21
Payer: COMMERCIAL

## 2023-11-21 DIAGNOSIS — Z85.048 PERSONAL HISTORY OF MALIGNANT NEOPLASM OF RECTUM, RECTOSIGMOID JUNCTION, AND ANUS: Primary | ICD-10-CM

## 2023-11-28 DIAGNOSIS — C20 RECTAL CANCER: Primary | ICD-10-CM

## 2023-11-29 ENCOUNTER — INFUSION (OUTPATIENT)
Dept: ONCOLOGY | Facility: HOSPITAL | Age: 63
End: 2023-11-29
Payer: COMMERCIAL

## 2023-11-29 ENCOUNTER — OFFICE VISIT (OUTPATIENT)
Dept: ONCOLOGY | Facility: CLINIC | Age: 63
End: 2023-11-29
Payer: COMMERCIAL

## 2023-11-29 VITALS
HEART RATE: 66 BPM | OXYGEN SATURATION: 99 % | BODY MASS INDEX: 36.24 KG/M2 | DIASTOLIC BLOOD PRESSURE: 80 MMHG | TEMPERATURE: 98.2 F | SYSTOLIC BLOOD PRESSURE: 141 MMHG | HEIGHT: 69 IN | WEIGHT: 244.7 LBS

## 2023-11-29 DIAGNOSIS — Z45.2 ENCOUNTER FOR FITTING AND ADJUSTMENT OF VASCULAR CATHETER: Primary | ICD-10-CM

## 2023-11-29 DIAGNOSIS — C20 RECTAL CANCER: ICD-10-CM

## 2023-11-29 DIAGNOSIS — C20 RECTAL CANCER: Primary | ICD-10-CM

## 2023-11-29 DIAGNOSIS — R19.7 DIARRHEA, UNSPECIFIED TYPE: ICD-10-CM

## 2023-11-29 LAB
ALBUMIN SERPL-MCNC: 4.1 G/DL (ref 3.5–5.2)
ALBUMIN/GLOB SERPL: 1.5 G/DL
ALP SERPL-CCNC: 81 U/L (ref 39–117)
ALT SERPL W P-5'-P-CCNC: 6 U/L (ref 1–33)
ANION GAP SERPL CALCULATED.3IONS-SCNC: 11.9 MMOL/L (ref 5–15)
AST SERPL-CCNC: 19 U/L (ref 1–32)
BASOPHILS # BLD AUTO: 0.02 10*3/MM3 (ref 0–0.2)
BASOPHILS NFR BLD AUTO: 0.4 % (ref 0–1.5)
BILIRUB SERPL-MCNC: 0.5 MG/DL (ref 0–1.2)
BUN SERPL-MCNC: 12 MG/DL (ref 8–23)
BUN/CREAT SERPL: 16.9 (ref 7–25)
CALCIUM SPEC-SCNC: 9 MG/DL (ref 8.6–10.5)
CEA SERPL-MCNC: 1.59 NG/ML
CHLORIDE SERPL-SCNC: 104 MMOL/L (ref 98–107)
CO2 SERPL-SCNC: 26.1 MMOL/L (ref 22–29)
CREAT SERPL-MCNC: 0.71 MG/DL (ref 0.6–1.1)
DEPRECATED RDW RBC AUTO: 42.7 FL (ref 37–54)
EGFRCR SERPLBLD CKD-EPI 2021: 95.7 ML/MIN/1.73
EOSINOPHIL # BLD AUTO: 0.21 10*3/MM3 (ref 0–0.4)
EOSINOPHIL NFR BLD AUTO: 3.7 % (ref 0.3–6.2)
ERYTHROCYTE [DISTWIDTH] IN BLOOD BY AUTOMATED COUNT: 13.2 % (ref 12.3–15.4)
GLOBULIN UR ELPH-MCNC: 2.8 GM/DL
GLUCOSE SERPL-MCNC: 82 MG/DL (ref 65–99)
HCT VFR BLD AUTO: 37.8 % (ref 34–46.6)
HGB BLD-MCNC: 12.5 G/DL (ref 12–15.9)
IMM GRANULOCYTES # BLD AUTO: 0.04 10*3/MM3 (ref 0–0.05)
IMM GRANULOCYTES NFR BLD AUTO: 0.7 % (ref 0–0.5)
LYMPHOCYTES # BLD AUTO: 1.54 10*3/MM3 (ref 0.7–3.1)
LYMPHOCYTES NFR BLD AUTO: 27.5 % (ref 19.6–45.3)
MCH RBC QN AUTO: 29.4 PG (ref 26.6–33)
MCHC RBC AUTO-ENTMCNC: 33.1 G/DL (ref 31.5–35.7)
MCV RBC AUTO: 88.9 FL (ref 79–97)
MONOCYTES # BLD AUTO: 0.34 10*3/MM3 (ref 0.1–0.9)
MONOCYTES NFR BLD AUTO: 6.1 % (ref 5–12)
NEUTROPHILS NFR BLD AUTO: 3.46 10*3/MM3 (ref 1.7–7)
NEUTROPHILS NFR BLD AUTO: 61.6 % (ref 42.7–76)
NRBC BLD AUTO-RTO: 0 /100 WBC (ref 0–0.2)
PLATELET # BLD AUTO: 223 10*3/MM3 (ref 140–450)
PMV BLD AUTO: 9.4 FL (ref 6–12)
POTASSIUM SERPL-SCNC: 3.9 MMOL/L (ref 3.5–5.2)
PROT SERPL-MCNC: 6.9 G/DL (ref 6–8.5)
RBC # BLD AUTO: 4.25 10*6/MM3 (ref 3.77–5.28)
SODIUM SERPL-SCNC: 142 MMOL/L (ref 136–145)
WBC NRBC COR # BLD AUTO: 5.61 10*3/MM3 (ref 3.4–10.8)

## 2023-11-29 PROCEDURE — 80053 COMPREHEN METABOLIC PANEL: CPT

## 2023-11-29 PROCEDURE — 36591 DRAW BLOOD OFF VENOUS DEVICE: CPT

## 2023-11-29 PROCEDURE — 85025 COMPLETE CBC W/AUTO DIFF WBC: CPT

## 2023-11-29 PROCEDURE — 82378 CARCINOEMBRYONIC ANTIGEN: CPT | Performed by: INTERNAL MEDICINE

## 2023-11-29 PROCEDURE — 25010000002 HEPARIN LOCK FLUSH PER 10 UNITS: Performed by: INTERNAL MEDICINE

## 2023-11-29 RX ORDER — HEPARIN SODIUM (PORCINE) LOCK FLUSH IV SOLN 100 UNIT/ML 100 UNIT/ML
500 SOLUTION INTRAVENOUS AS NEEDED
Status: DISCONTINUED | OUTPATIENT
Start: 2023-11-29 | End: 2023-11-29 | Stop reason: HOSPADM

## 2023-11-29 RX ORDER — SODIUM CHLORIDE 0.9 % (FLUSH) 0.9 %
10 SYRINGE (ML) INJECTION AS NEEDED
Status: DISCONTINUED | OUTPATIENT
Start: 2023-11-29 | End: 2023-11-29 | Stop reason: HOSPADM

## 2023-11-29 RX ADMIN — Medication 500 UNITS: at 12:53

## 2023-11-29 RX ADMIN — Medication 10 ML: at 12:53

## 2023-11-29 NOTE — PROGRESS NOTES
[facility]    CONSULTING IN BLOOD DISORDERS & CANCER    This was an audio and video enabled telemedicine encounter.      REASON FOR CONSULTATION/CHIEF COMPLAINT:     Evaluation and management for rectal adenocarcinoma                             REQUESTING PHYSICIAN: No ref. provider found  RECORDS OBTAINED:  Records of the patients history including those from the electronic medical record were reviewed and summarized in detail.    HISTORY OF PRESENT ILLNESS:    The patient is a 62 y.o. year old female with medical history significant for migraines, depression/anxiety, and IBS had presented with epigastric discomfort in September 2022.      An EGD and colonoscopy performed by SHOAIB Esteves on 9/1/2022 demonstrated a 3 cm mass in the proximal rectum around 13 cm from the anal verge.  The polyp was multilobulated and somewhat fixed to the underlying tissue.  Friable with contact bleeding.  Other smaller polyps were found in the cecum and ascending colon which were removed.  The EGD revealed mucosal nodule in the esophagus and multiple gastric polyps.  No evidence of bleeding.     The pathology from the rectal mass came back as invasive moderately differentiated adenocarcinoma arising out of traditional serrated adenoma with high-grade dysplasia.    Patient was subsequently referred to Dr. Amaya, colorectal surgery who performed a flexible sigmoidoscopy on 9/21/2022, revealing an infiltrative nonobstructing mass in the mid rectum.  The mass was partially circumferential involving one third of the lumen circumference.     9/13/2022: CT C/A/P with contrast noted rectal neoplasm not well visualized on this exam.  Calcified and noncalcified sub-6mm pulmonary nodules likely the sequela of prior granulomatous disease.  Scattered colonic diverticulosis.  Mildly prominent, but not pathologically enlarged pelvic nodes measuring up to 4 mm.    9/26/2022: MRI pelvis showed high rectal mass with effacement of the muscularis  propria along the right wall and extended beyond the muscularis propria at the posterior wall, T3 lesion. There is no evidence for involvement of the mesorectal fascia. There are no pathologically enlarged nodes.     Patient has been seen by , rad-onc & being planned for neoadjuvant radiation. Patient has come to this clinic to discuss systemic therapy options.    Guardant 360: MSI stable.  Low TMB.    10/17- 10/22/2022: Short course of radiotherapy to the rectal mass.    11/2/2022: Cycle 1 FOLFOX  12/14/2022: Cycle 4 FOLFOX  12/28/2022: Cycle 5 FOLFOX  1/7/2022: MRI pelvis shows marked response to treatment  1/11/2023: Cycle 6 5-FU/LV.  Oxaliplatin dropped due to neuropathy.  2/8/2023: Cycle 8 5-FU    3/6/2023: Clara sigmoidoscopy and surface biopsy.  No evidence of residual disease.  Case discussed with Dr. Amaya and Dr. Freeman.  Plan made for active surveillance.    March 2023: Circulating DNA negative.    April 2023: CT c/a/p with no evidence of disease recurrence.  6/7/23: MRI pelvis with near complete response to therapy of the rectal mass.  7/13/2023: Flex sigmoidoscopy by Dr. Amaya noted scar in the mid rectum.  No evidence of disease recurrence.  11/6/23: Flex sig showed no evidence of disease recurrence    INTERIM HISTORY:  Patient returns to the clinic for a follow-up visit.  No new symptoms. Has persistent fatigue & loose stools.  No blood in stool.  The neuropathy and nail changes stable. Appetite and weight has been stable.  Says she is busy at work. Started playing piano and singing. She does pelvic floor exercises.    Past Medical History:   Diagnosis Date    Abdominal cramping 07/01/2022    Anemia     Bloating 07/01/2022    Bloody diarrhea 07/01/2022    Chronic back pain     Colon polyps     FOLLOWED BY DR. BEVERLY MENDEZ    COVID 08/15/2023    Depression     Gastric polyps     FOLLOWED BY DR. BEVERLY MENDEZ    GERD (gastroesophageal reflux disease)     Hearing loss     Hiatal hernia  "09/2021    3 CM, FOLLOWED BY DR. BEVERLY MENDEZ    History of chemotherapy     LAST TREATMENT 2/10/2023    History of radiation therapy     Hyperlipidemia     NO MEDS    IBS (irritable bowel syndrome)     Migraines     Rectal bleeding 1983    Off and on for years    Rectal cancer 09/2022    INVASIVE MODERATELY DIFFERENTIATED ADENOCARCINOMA ARISING FROM TRADITIONAL SERRATED ADENOMA WITH HGD    Sciatica 09/2015    Tear of medial meniscus of knee 07/2021    RIGHT KNEE    Vitamin D deficiency      Past Surgical History:   Procedure Laterality Date    COLONOSCOPY N/A 1986    COLONOSCOPY W/ POLYPECTOMY N/A 09/01/2022    3 MM TUBULAR ADENOMA POLYP IN ASCENDING, 6 MM TUBULAR ADENOMA POLYP IN CECUM, 30 MM MASS IN RECTUM, PATH:INVASIVE MODERATELY DIFFERENTIATED ADENOCARCINOMA ARISING FROM TRADITIONAL SERRATED ADENOMA WITH HGD, HYPERTROPHIED ANAL PAPILLA, DR. BEVERLY MENDEZ AT [facility]    ENDOSCOPY N/A 09/01/2022    MULTIPLE BENIGN GASTRIC POLYPS, 2 SQUAMOUS PAPILLAS IN ESOPHAGUS, 3 CM HIATAL HERNIA,    EXTERNAL EAR SURGERY  1966    MULTIPLE \"LANCES\", DR. ACE IN [facility]    EYE SURGERY Right 1967    DR. CASTANEDA IN [facility]    KNEE ARTHRODESIS Right 07/27/2021    RIGHT KNEE ARTHROCENTESIS, DR. ALBERT GILMAN    KNEE SURGERY Left 1985    DR. PEREZ AT [place]    SHOULDER MANIPULATION Left 01/07/2013    FOR FROZEN SHOULDER, DR. ALBERT GILMAN AT [facility]    SIGMOIDOSCOPY N/A 09/21/2022    AN INFILTRATIVE NON OBSTRUCTING MASS IN MID RECTUM, AREA TATTOOED, DR. TEMO OJEDA AT [facility]    SIGMOIDOSCOPY N/A 03/08/2023    7 MM HEALED ULCERATION WHERE RECTAL CANCER WAS, SCAR TISSUE HEALTHY, BIOPSY SHOWED INFLAMMATION AND SCAR, NO RESIDUAL CANCER, FLEX SIG IN 12 MONTHS, DR. TEMO OJEDA AT [facility]    SIGMOIDOSCOPY N/A 07/13/2023    A HEALTHY SCAR WAS IN MID RECTUM, DR. TEMO OJEDA AT [facility]    SIGMOIDOSCOPY N/A 11/06/2023    A HEALTHY APPEARING SCAR IN MID RECTUM, COLONOSCOPY IN 4 MONTHS, DR. TEMO OJEDA AT [facility]    " TRANSRECTAL ULTRASOUND N/A 09/21/2022    Procedure: ULTRASOUND TRANSRECTAL;  Surgeon: Jennifer Amaya MD;  Location: [facility] ENDOSCOPY;  Service: General;  Laterality: N/A;    VENOUS ACCESS DEVICE (PORT) INSERTION Left 10/26/2022    Procedure: INSERTION OF PORTACATH;  Surgeon: Leonel Bridges MD;  Location: [facility] MAIN OR;  Service: General;  Laterality: Left;    WISDOM TOOTH EXTRACTION Bilateral        MEDICATIONS    Current Outpatient Medications:     dicyclomine (BENTYL) 20 MG tablet, Take 1 tablet by mouth Every 6 (Six) Hours As Needed (abd cramps)., Disp: 90 tablet, Rfl: 1    diphenoxylate-atropine (LOMOTIL) 2.5-0.025 MG per tablet, TAKE ONE TABLET BY MOUTH FOUR TIMES A DAY AS NEEDED FOR DIARRHEA, Disp: 30 tablet, Rfl: 0    ELDERBERRY PO, Take 2 doses by mouth Daily. HOLD PRIOR TO SURG, Disp: , Rfl:     hydrOXYzine (ATARAX) 10 MG tablet, Take 1 tablet by mouth Every 8 (Eight) Hours As Needed for Itching., Disp: 30 tablet, Rfl: 0    loperamide (IMODIUM) 2 MG capsule, Take 1 capsule by mouth As Needed for Diarrhea., Disp: , Rfl:     loratadine (CLARITIN) 10 MG tablet, Take 1 tablet by mouth As Needed. 1 daily around time of Neulasta OBI, Disp: , Rfl:     meloxicam (MOBIC) 15 MG tablet, Take 1 tablet by mouth Daily., Disp: , Rfl:     ondansetron (ZOFRAN) 8 MG tablet, Take 1 tablet by mouth 3 (Three) Times a Day As Needed for Nausea or Vomiting., Disp: 30 tablet, Rfl: 5    pantoprazole (PROTONIX) 40 MG EC tablet, Take 1 tablet by mouth Daily. (Patient taking differently: Take 1 tablet by mouth Daily As Needed.), Disp: 30 tablet, Rfl: 3    traMADol (ULTRAM) 50 MG tablet, Take 1 tablet by mouth Every 6 (Six) Hours As Needed for Moderate Pain., Disp: 30 tablet, Rfl: 0    Unable to find, 1 each 1 (One) Time. CBD GUMMY PRN HS FOR SLEEP/HOLD FOR PROCEDURE, Disp: , Rfl:   All current medications reviewed and updated as appropriate for today's encounter on 11/29/2023.     ALLERGIES:     Allergies   Allergen Reactions     "Adhesive Tape Rash     Patient has sensitive skin.     Levofloxacin Swelling    Sulfa Antibiotics Rash       SOCIAL HISTORY:       Social History     Socioeconomic History    Marital status: Single   Tobacco Use    Smoking status: Never     Passive exposure: Never    Smokeless tobacco: Never   Vaping Use    Vaping Use: Never used   Substance and Sexual Activity    Alcohol use: Not Currently    Drug use: Never    Sexual activity: Defer         FAMILY HISTORY:  Family History   Problem Relation Age of Onset    Colon polyps Mother     Asthma Mother     COPD Mother     Lung disease Mother     Migraines Mother     Cancer Father         liver cancer    Colon polyps Father     Heart disease Father     Diabetes Father     Kidney disease Father     Angina Father     Liver cancer Father     Hepatitis Father     Hearing loss Father     Cancer Sister         Breast cancer    Arthritis Sister     Colon polyps Sister     Breast cancer Sister     Diabetes Sister     Colon polyps Sister     Alcohol abuse Maternal Uncle     Colon cancer Neg Hx     Crohn's disease Neg Hx     Irritable bowel syndrome Neg Hx     Ulcerative colitis Neg Hx     Malig Hyperthermia Neg Hx        REVIEW OF SYSTEMS:  As per HPI         Vitals:    11/29/23 1421   BP: 141/80   Pulse: 66   Temp: 98.2 °F (36.8 °C)   TempSrc: Temporal   SpO2: 99%   Weight: 111 kg (244 lb 11.2 oz)   Height: 175.3 cm (69.02\")   PainSc: 0-No pain         11/29/2023     2:03 PM   Current Status   ECOG score 0     CONSTITUTIONAL:  Vital signs reviewed.  No distress, looks comfortable.  EYES:  Conjunctiva and lids unremarkable.    EARS,NOSE,MOUTH,THROAT:  Ears and nose appear unremarkable.    RESPIRATORY:  Normal respiratory effort.  Lungs clear to auscultation bilaterally.  CARDIOVASCULAR:  Normal S1, S2.  No murmurs rubs or gallops.  No significant lower extremity edema.  GASTROINTESTINAL: Abdomen appears unremarkable.  Nondistended   LYMPHATIC:  No cervical, supraclavicular " lymphadenopathy.  SKIN:  Warm.  No rashes.  PSYCHIATRIC:  Normal judgment and insight.  Normal mood and affect.  NEURO: AAOx3, no obvious focal deficits.    RECENT LABS:  Reviewed    ASSESSMENT:   is a pleasant 61 y/o WF with medical history significant for migraines, depression/anxiety, and IBS comes for rectal adenocarcinoma evaluation & management.     # Rectal adenocarcinoma, T3N0M0, Stage IIA:  Diagnosed on a c-scope performed for gastric discomfort in September 2022.   The flex sig noted a an infiltrative nonobstructing mass in the mid rectum.  The mass was partially circumferential involving one third of the lumen circumference. CT c/a/p negative for mets. MRI pelvis most consistent with T3N0 disease, with no evidence of involvement of mesorectal fascia.  Reviewed various management strategies for localized rectal cancer with chemotherapy, radiation and surgical resection.  Patient met with Dr. Amaya, colorectal surgery and is being tentatively planned for low anterior resection after neoadjuvant chemotherapy and radiation.  She received neoadjuvant short course RT by Dr. Freeman, radiation oncology from 10/17- 10/22/2022.   Received neoadjuvant chemotherapy with 8 cycles of FOLFOX between 11/2/2022 - 2/8/2023.  Oxaliplatin dropped with last 2 cycles.  Post treatment flexible sigmoidoscopy and rectal biopsies performed 3/8/2023 showed no evidence of residual disease.  Case discussed with Dr. Amaya and Lydia.  I extensively discussed risks/benefits of surgical resection versus active surveillance.  Informed patient that active surveillance is currently not the standard of care but is an active area of research in patients who have complete response.  Also discussed surveillance strategies.  Patient expressed understanding of the risks and opted for active surveillance.    3/22/2023: Recovering well after recent chemotherapy.  Discussed plan for active surveillance.  The CEA and guardant reveal circulating  tumor DNA negative.  A CT C/A/P showed asymetric rectal thickening.  Plan to repeat MRI pelvis in 3 months and flexible sigmoidoscopy in 6 months time.    A MRI pelvis from June 2023 showed near complete resolution of the rectal mass with minimal irregularity measuring 1.3 x 1.5 cm.  Patient subsequently underwent a flex ex which showed scar in the mid rectum.  Circulating tumor DNA from June 2023 negative as well.    9/6/2023: Patient overall asymptomatic.  CEA and circulating tumor DNA from today pending.  Residual changes likely post treatment related. Will continue active surveillance.Circulating tumor DNA analysis sent today. Advised close f/u with rad-onc & colorectal surgery.  As per patient's request, Will enter referral to GI given loose stools.  Will see her back in 3 months or sooner if needed    #GERD  Significant epigastric discomfort and reflux with initiation of chemo  Continue Protonix 40 mg nightly    #Diarrhea  Initially began following radiation and was exacerbated with chemotherapy  Utilizing Imodium and Lomotil as needed.  Is planning to follow-up with GI again.    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  On multivitamin supplements    PLAN:   Continue active surveillance.  Check CEA and guardant reveal today and repeat every 3 months  MRI pelvis and CT chest/abd in 1-2 weeks. If negative, repeat in 6 months.   Flex sign per   Follow-up in 6 weeks for port flush,in 3 months for labs with RN review and in 6 months for CT c/a, MRI pelvis and MD review or sooner if needed  Call/ return sooner should he develop any new concerns or problems.  Orders Placed This Encounter   Procedures    CT Chest With Contrast Diagnostic     Standing Status:   Future     Standing Expiration Date:   11/28/2024     Order Specific Question:   Release to patient     Answer:   Routine Release [[id number]]    CT Abdomen With Contrast     Standing Status:   Future     Standing Expiration Date:    11/28/2024     Order Specific Question:   Will Oral Contrast be needed for this procedure?     Answer:   Yes   Total time spent during this patient encounter is 45 minutes. The total time spent with the patient includes: preparing to see the patient by reviewing of tests, prior notes or other relevant information, performing appropriate independent examination & evaluation, counseling, ordering of medications, tests or procedures, documenting clinic information in the electronic medical records or other health records, independently interpreting results of tests and communicating the results to the patient/family or caregiver.

## 2023-12-07 ENCOUNTER — INFUSION (OUTPATIENT)
Dept: ONCOLOGY | Facility: HOSPITAL | Age: 63
End: 2023-12-07
Payer: COMMERCIAL

## 2023-12-07 ENCOUNTER — HOSPITAL ENCOUNTER (OUTPATIENT)
Dept: CT IMAGING | Facility: HOSPITAL | Age: 63
Discharge: HOME | End: 2023-12-07
Admitting: INTERNAL MEDICINE
Payer: COMMERCIAL

## 2023-12-07 ENCOUNTER — TELEPHONE (OUTPATIENT)
Dept: ONCOLOGY | Facility: CLINIC | Age: 63
End: 2023-12-07
Payer: COMMERCIAL

## 2023-12-07 DIAGNOSIS — C20 RECTAL CANCER: ICD-10-CM

## 2023-12-07 DIAGNOSIS — C20 RECTAL CANCER: Primary | ICD-10-CM

## 2023-12-07 PROCEDURE — 25510000001 IOPAMIDOL 61 % SOLUTION: Performed by: INTERNAL MEDICINE

## 2023-12-07 PROCEDURE — 71260 CT THORAX DX C+: CPT

## 2023-12-07 PROCEDURE — 74160 CT ABDOMEN W/CONTRAST: CPT

## 2023-12-07 PROCEDURE — 0 DIATRIZOATE MEGLUMINE & SODIUM PER 1 ML: Performed by: INTERNAL MEDICINE

## 2023-12-07 RX ADMIN — IOPAMIDOL 100 ML: 612 INJECTION, SOLUTION INTRAVENOUS at 09:35

## 2023-12-07 RX ADMIN — DIATRIZOATE MEGLUMINE AND DIATRIZOATE SODIUM 30 ML: 660; 100 LIQUID ORAL; RECTAL at 08:25

## 2023-12-07 NOTE — TELEPHONE ENCOUNTER
----- Message from Tristan Weber MD sent at 12/7/2023  2:50 PM EST -----  Her scans look good. No evidence of disease progression.  Could you please let her know and also order another CT C/A/P with only IV contrast in 6 months time prior to her next follow-up with me?  Thanks,  ----- Message -----  From: Interface, Rad Results [place] In  Sent: 12/7/2023  11:26 AM EST  To: Tristan Weber MD    Called patient to relay Dr Weber's message. Pt v/u. Pt is pleased not to drink the oral contrast. Pt knows to expect another call from the office for her scans appt.

## 2023-12-08 ENCOUNTER — HOSPITAL ENCOUNTER (OUTPATIENT)
Dept: MRI IMAGING | Facility: HOSPITAL | Age: 63
Discharge: HOME | End: 2023-12-08
Admitting: INTERNAL MEDICINE
Payer: COMMERCIAL

## 2023-12-08 DIAGNOSIS — C20 RECTAL CANCER: ICD-10-CM

## 2023-12-08 PROCEDURE — 0 GADOBENATE DIMEGLUMINE 529 MG/ML SOLUTION: Performed by: INTERNAL MEDICINE

## 2023-12-08 PROCEDURE — 72197 MRI PELVIS W/O & W/DYE: CPT

## 2023-12-08 PROCEDURE — A9577 INJ MULTIHANCE: HCPCS | Performed by: INTERNAL MEDICINE

## 2023-12-08 RX ADMIN — GADOBENATE DIMEGLUMINE 20 ML: 529 INJECTION, SOLUTION INTRAVENOUS at 07:54

## 2023-12-11 LAB — REF LAB TEST METHOD: NORMAL

## 2023-12-12 ENCOUNTER — TELEPHONE (OUTPATIENT)
Dept: ONCOLOGY | Facility: CLINIC | Age: 63
End: 2023-12-12
Payer: COMMERCIAL

## 2023-12-12 DIAGNOSIS — C20 RECTAL CANCER: Primary | ICD-10-CM

## 2023-12-15 ENCOUNTER — HOSPITAL ENCOUNTER (OUTPATIENT)
Dept: PET IMAGING | Facility: HOSPITAL | Age: 63
Discharge: HOME | End: 2023-12-15
Payer: COMMERCIAL

## 2023-12-18 ENCOUNTER — HOSPITAL ENCOUNTER (OUTPATIENT)
Dept: PET IMAGING | Facility: HOSPITAL | Age: 63
Discharge: HOME | End: 2023-12-18
Payer: COMMERCIAL

## 2023-12-18 DIAGNOSIS — C20 RECTAL CANCER: ICD-10-CM

## 2023-12-18 LAB — GLUCOSE BLDC GLUCOMTR-MCNC: 86 MG/DL (ref 70–130)

## 2023-12-18 PROCEDURE — 78815 PET IMAGE W/CT SKULL-THIGH: CPT

## 2023-12-18 PROCEDURE — A9552 F18 FDG: HCPCS | Performed by: INTERNAL MEDICINE

## 2023-12-18 PROCEDURE — 82948 REAGENT STRIP/BLOOD GLUCOSE: CPT

## 2023-12-18 PROCEDURE — 0 FLUDEOXYGLUCOSE F18 SOLUTION: Performed by: INTERNAL MEDICINE

## 2023-12-18 RX ADMIN — FLUDEOXYGLUCOSE F 18 1 DOSE: 200 INJECTION, SOLUTION INTRAVENOUS at 12:44

## 2023-12-21 DIAGNOSIS — C20 RECTAL CANCER: Primary | ICD-10-CM

## 2023-12-22 ENCOUNTER — ANESTHESIA EVENT (OUTPATIENT)
Dept: GASTROENTEROLOGY | Facility: HOSPITAL | Age: 63
End: 2023-12-22
Payer: COMMERCIAL

## 2023-12-22 ENCOUNTER — ANESTHESIA (OUTPATIENT)
Dept: GASTROENTEROLOGY | Facility: HOSPITAL | Age: 63
End: 2023-12-22
Payer: COMMERCIAL

## 2023-12-22 ENCOUNTER — HOSPITAL ENCOUNTER (OUTPATIENT)
Facility: HOSPITAL | Age: 63
Setting detail: HOSPITAL OUTPATIENT SURGERY
Discharge: HOME | End: 2023-12-22
Attending: COLON & RECTAL SURGERY | Admitting: COLON & RECTAL SURGERY
Payer: COMMERCIAL

## 2023-12-22 VITALS
RESPIRATION RATE: 16 BRPM | SYSTOLIC BLOOD PRESSURE: 138 MMHG | WEIGHT: 241.1 LBS | BODY MASS INDEX: 35.71 KG/M2 | DIASTOLIC BLOOD PRESSURE: 85 MMHG | HEIGHT: 69 IN | OXYGEN SATURATION: 100 % | HEART RATE: 66 BPM

## 2023-12-22 DIAGNOSIS — C20 RECTAL CANCER: ICD-10-CM

## 2023-12-22 PROCEDURE — 76872 US TRANSRECTAL: CPT | Performed by: COLON & RECTAL SURGERY

## 2023-12-22 PROCEDURE — 88305 TISSUE EXAM BY PATHOLOGIST: CPT | Performed by: COLON & RECTAL SURGERY

## 2023-12-22 PROCEDURE — 25010000002 PROPOFOL 10 MG/ML EMULSION: Performed by: NURSE ANESTHETIST, CERTIFIED REGISTERED

## 2023-12-22 PROCEDURE — 25810000003 LACTATED RINGERS PER 1000 ML: Performed by: COLON & RECTAL SURGERY

## 2023-12-22 DEVICE — DEV CLIP HEMO RESOLUTION360/ULTR 235CM 17MM STRL: Type: IMPLANTABLE DEVICE | Site: RECTUM | Status: FUNCTIONAL

## 2023-12-22 RX ORDER — LIDOCAINE HYDROCHLORIDE 20 MG/ML
INJECTION, SOLUTION INFILTRATION; PERINEURAL AS NEEDED
Status: DISCONTINUED | OUTPATIENT
Start: 2023-12-22 | End: 2023-12-22 | Stop reason: SURG

## 2023-12-22 RX ORDER — SODIUM CHLORIDE, SODIUM LACTATE, POTASSIUM CHLORIDE, CALCIUM CHLORIDE 600; 310; 30; 20 MG/100ML; MG/100ML; MG/100ML; MG/100ML
30 INJECTION, SOLUTION INTRAVENOUS CONTINUOUS PRN
Status: DISCONTINUED | OUTPATIENT
Start: 2023-12-22 | End: 2023-12-22 | Stop reason: HOSPADM

## 2023-12-22 RX ORDER — PROPOFOL 10 MG/ML
VIAL (ML) INTRAVENOUS AS NEEDED
Status: DISCONTINUED | OUTPATIENT
Start: 2023-12-22 | End: 2023-12-22 | Stop reason: SURG

## 2023-12-22 RX ADMIN — PROPOFOL 100 MG: 10 INJECTION, EMULSION INTRAVENOUS at 07:40

## 2023-12-22 RX ADMIN — LIDOCAINE HYDROCHLORIDE 60 MG: 20 INJECTION, SOLUTION INFILTRATION; PERINEURAL at 07:40

## 2023-12-22 RX ADMIN — PROPOFOL 160 MCG/KG/MIN: 10 INJECTION, EMULSION INTRAVENOUS at 07:40

## 2023-12-22 RX ADMIN — SODIUM CHLORIDE, POTASSIUM CHLORIDE, SODIUM LACTATE AND CALCIUM CHLORIDE 30 ML/HR: 600; 310; 30; 20 INJECTION, SOLUTION INTRAVENOUS at 06:32

## 2023-12-22 NOTE — H&P
"Babs Felton is a 62 y.o. female  who is referred by Jennifer Amaya MD for a colonoscopy. She   has an indications: abnl pet and mri.     She denies any change in bowel function, melena, or hematochezia.    Past Medical History:   Diagnosis Date    Abdominal cramping 07/01/2022    Anemia     Bloating 07/01/2022    Bloody diarrhea 07/01/2022    Chronic back pain     Colon polyps     FOLLOWED BY DR. BEVERLY MENDEZ    COVID 08/15/2023    Depression     Gastric polyps     FOLLOWED BY DR. BEVERLY MENDEZ    GERD (gastroesophageal reflux disease)     Hearing loss     Hiatal hernia 09/2021    3 CM, FOLLOWED BY DR. BEVERLY MENDEZ    History of chemotherapy     LAST TREATMENT 2/10/2023    History of radiation therapy     Hyperlipidemia     NO MEDS    IBS (irritable bowel syndrome)     Migraines     Rectal bleeding 1983    Off and on for years    Rectal cancer 09/2022    INVASIVE MODERATELY DIFFERENTIATED ADENOCARCINOMA ARISING FROM TRADITIONAL SERRATED ADENOMA WITH HGD    Sciatica 09/2015    Tear of medial meniscus of knee 07/2021    RIGHT KNEE    Vitamin D deficiency        Past Surgical History:   Procedure Laterality Date    COLONOSCOPY N/A 1986    COLONOSCOPY W/ POLYPECTOMY N/A 09/01/2022    3 MM TUBULAR ADENOMA POLYP IN ASCENDING, 6 MM TUBULAR ADENOMA POLYP IN CECUM, 30 MM MASS IN RECTUM, PATH:INVASIVE MODERATELY DIFFERENTIATED ADENOCARCINOMA ARISING FROM TRADITIONAL SERRATED ADENOMA WITH HGD, HYPERTROPHIED ANAL PAPILLA, DR. BEVERLY MENDEZ AT [facility]    ENDOSCOPY N/A 09/01/2022    MULTIPLE BENIGN GASTRIC POLYPS, 2 SQUAMOUS PAPILLAS IN ESOPHAGUS, 3 CM HIATAL HERNIA,    EXTERNAL EAR SURGERY  1966    MULTIPLE \"LANCES\", DR. ACE IN [facility]    EYE SURGERY Right 1967    DR. CASTANEDA IN [facility]    KNEE ARTHRODESIS Right 07/27/2021    RIGHT KNEE ARTHROCENTESIS, DR. PRICE [place]    KNEE SURGERY Left 1985    DR. PEREZ AT [place]    SHOULDER MANIPULATION Left 01/07/2013    FOR FROZEN " SHOULDER, DR. ALBERT GILMAN AT [facility]    SIGMOIDOSCOPY N/A 09/21/2022    AN INFILTRATIVE NON OBSTRUCTING MASS IN MID RECTUM, AREA TATTOOED, DR. JENNIFER OJEDA AT [facility]    SIGMOIDOSCOPY N/A 03/08/2023    7 MM HEALED ULCERATION WHERE RECTAL CANCER WAS, SCAR TISSUE HEALTHY, BIOPSY SHOWED INFLAMMATION AND SCAR, NO RESIDUAL CANCER, FLEX SIG IN 12 MONTHS, DR. JENNIFER OJEDA AT [facility]    SIGMOIDOSCOPY N/A 07/13/2023    A HEALTHY SCAR WAS IN MID RECTUM, DR. JENNIFER OJEDA AT [facility]    SIGMOIDOSCOPY N/A 11/06/2023    A HEALTHY APPEARING SCAR IN MID RECTUM, COLONOSCOPY IN 4 MONTHS, DR. JENNIFER OJEDA AT [facility]    TRANSRECTAL ULTRASOUND N/A 09/21/2022    Procedure: ULTRASOUND TRANSRECTAL;  Surgeon: Jennifer Ojeda MD;  Location: [facility] ENDOSCOPY;  Service: General;  Laterality: N/A;    VENOUS ACCESS DEVICE (PORT) INSERTION Left 10/26/2022    Procedure: INSERTION OF PORTACATH;  Surgeon: Leonel Bridges MD;  Location: [facility] MAIN OR;  Service: General;  Laterality: Left;    WISDOM TOOTH EXTRACTION Bilateral        Medications Prior to Admission   Medication Sig Dispense Refill Last Dose    dicyclomine (BENTYL) 20 MG tablet Take 1 tablet by mouth Every 6 (Six) Hours As Needed (abd cramps). 90 tablet 1 Past Week    diphenoxylate-atropine (LOMOTIL) 2.5-0.025 MG per tablet TAKE ONE TABLET BY MOUTH FOUR TIMES A DAY AS NEEDED FOR DIARRHEA 30 tablet 0 Past Week    ELDERBERRY PO Take 2 doses by mouth Daily. HOLD PRIOR TO SURG   12/21/2023    hydrOXYzine (ATARAX) 10 MG tablet Take 1 tablet by mouth Every 8 (Eight) Hours As Needed for Itching. 30 tablet 0 Past Week    meloxicam (MOBIC) 15 MG tablet Take 1 tablet by mouth Daily.   Past Month    ondansetron (ZOFRAN) 8 MG tablet Take 1 tablet by mouth 3 (Three) Times a Day As Needed for Nausea or Vomiting. 30 tablet 5 Past Month    traMADol (ULTRAM) 50 MG tablet Take 1 tablet by mouth Every 6 (Six) Hours As Needed for Moderate Pain. 30 tablet 0 Past Week    loperamide (IMODIUM) 2 MG capsule Take 1 capsule by  mouth As Needed for Diarrhea.   More than a month    loratadine (CLARITIN) 10 MG tablet Take 1 tablet by mouth As Needed. 1 daily around time of Neulasta OBI   More than a month    pantoprazole (PROTONIX) 40 MG EC tablet Take 1 tablet by mouth Daily. (Patient taking differently: Take 1 tablet by mouth Daily As Needed.) 30 tablet 3 More than a month    Unable to find 1 each 1 (One) Time. CBD GUMMY PRN HS FOR SLEEP/HOLD FOR PROCEDURE   More than a month       Allergies   Allergen Reactions    Adhesive Tape Rash     Patient has sensitive skin.     Levofloxacin Swelling    Sulfa Antibiotics Rash       Family History   Problem Relation Age of Onset    Colon polyps Mother     Asthma Mother     COPD Mother     Lung disease Mother     Migraines Mother     Cancer Father         liver cancer    Colon polyps Father     Heart disease Father     Diabetes Father     Kidney disease Father     Angina Father     Liver cancer Father     Hepatitis Father     Hearing loss Father     Cancer Sister         Breast cancer    Arthritis Sister     Colon polyps Sister     Breast cancer Sister     Diabetes Sister     Colon polyps Sister     Alcohol abuse Maternal Uncle     Colon cancer Neg Hx     Crohn's disease Neg Hx     Irritable bowel syndrome Neg Hx     Ulcerative colitis Neg Hx     Malig Hyperthermia Neg Hx        Social History     Socioeconomic History    Marital status: Single   Tobacco Use    Smoking status: Never     Passive exposure: Never    Smokeless tobacco: Never   Vaping Use    Vaping Use: Never used   Substance and Sexual Activity    Alcohol use: Not Currently    Drug use: Never    Sexual activity: Defer       ROS    Vitals:    12/22/23 0627   BP: 140/79   Pulse: 73   Resp: 18   SpO2: 99%         Physical Exam      Assessment & Plan      indications: ho rectal ca and abnl mri and pet         I recommend flex sig + eus.  I described risks, benefits of the procedure with the patient including but not limited to bleeding,  infection, possibility of perforation and possible polypectomy. All of the patient's questions were answered and they would like to proceed with the above recommendations.

## 2023-12-22 NOTE — ANESTHESIA POSTPROCEDURE EVALUATION
Patient: Babs Felton    Procedure Summary       Date: 12/22/23 Room / Location:  [facility] /  [facility]    Anesthesia Start: 0732 Anesthesia Stop: 0810    Procedures:       ULTRASOUND TRANSRECTAL (Rectum)      FLEXIBLE SIGMOIDOSCOPY WITH BIOPSY and clip placement 2 Diagnosis:       Rectal cancer      (Rectal cancer [C20])    Surgeons: Jennifer Amaya MD Provider: Isreal Gipson MD    Anesthesia Type: MAC ASA Status: 3            Anesthesia Type: MAC    Vitals  Vitals Value Taken Time   /85 12/22/23 0829   Temp     Pulse 63 12/22/23 0833   Resp 16 12/22/23 0828   SpO2 94 % 12/22/23 0833   Vitals shown include unfiled device data.        Post Anesthesia Care and Evaluation    Patient location during evaluation: PACU  Patient participation: complete - patient participated  Level of consciousness: awake  Pain management: adequate    Airway patency: patent  Anesthetic complications: No anesthetic complications  PONV Status: none  Cardiovascular status: acceptable  Respiratory status: acceptable  Hydration status: acceptable

## 2023-12-22 NOTE — ANESTHESIA PREPROCEDURE EVALUATION
Anesthesia Evaluation     NPO Solid Status: > 8 hours  NPO Liquid Status: > 8 hours           Airway   Mallampati: I  No difficulty expected  Dental      Pulmonary    Cardiovascular     (+) hyperlipidemia      Neuro/Psych  (+) headaches, numbness, psychiatric history  GI/Hepatic/Renal/Endo    (+) hiatal hernia, GERD    Musculoskeletal     Abdominal    Substance History      OB/GYN          Other      history of cancer                      Anesthesia Plan    ASA 3     MAC     intravenous induction     Anesthetic plan, risks, benefits, and alternatives have been provided, discussed and informed consent has been obtained with: patient.        CODE STATUS:

## 2023-12-22 NOTE — OP NOTE
12/22/23      Pre-and postoperative diagnosis: rectal cancer    Surgeon: Jennifer Amaya MD    Indication:  Patient is a 62 y.o. female who comes for rectal cancer surviellence.     Procedure: Endorectal ultrasound    Patient was in the left lateral decubitus position with copious K-Y jelly endorectal ultrasound wand was placed in the anal canal.Tumor identified in *** position at *** cm Balloon was inflated . Multiple images were obtained.  Tumor is through the *** making it a T***.  Lymph nodes were *** identified.      T *** N *** Rectal cancer at  *** cm

## 2023-12-26 LAB
LAB AP CASE REPORT: NORMAL
PATH REPORT.FINAL DX SPEC: NORMAL
PATH REPORT.GROSS SPEC: NORMAL

## 2024-01-11 ENCOUNTER — INFUSION (OUTPATIENT)
Dept: ONCOLOGY | Facility: HOSPITAL | Age: 64
End: 2024-01-11
Payer: COMMERCIAL

## 2024-01-11 DIAGNOSIS — Z45.2 ENCOUNTER FOR FITTING AND ADJUSTMENT OF VASCULAR CATHETER: ICD-10-CM

## 2024-01-11 DIAGNOSIS — C20 RECTAL CANCER: Primary | ICD-10-CM

## 2024-01-11 PROCEDURE — 96523 IRRIG DRUG DELIVERY DEVICE: CPT

## 2024-01-11 PROCEDURE — 25010000002 HEPARIN LOCK FLUSH PER 10 UNITS: Performed by: INTERNAL MEDICINE

## 2024-01-11 RX ORDER — SODIUM CHLORIDE 0.9 % (FLUSH) 0.9 %
10 SYRINGE (ML) INJECTION AS NEEDED
Status: DISCONTINUED | OUTPATIENT
Start: 2024-01-11 | End: 2024-01-11 | Stop reason: HOSPADM

## 2024-01-11 RX ORDER — SODIUM CHLORIDE 0.9 % (FLUSH) 0.9 %
10 SYRINGE (ML) INJECTION AS NEEDED
OUTPATIENT
Start: 2024-01-11

## 2024-01-11 RX ORDER — HEPARIN SODIUM (PORCINE) LOCK FLUSH IV SOLN 100 UNIT/ML 100 UNIT/ML
500 SOLUTION INTRAVENOUS AS NEEDED
Status: DISCONTINUED | OUTPATIENT
Start: 2024-01-11 | End: 2024-01-11 | Stop reason: HOSPADM

## 2024-01-11 RX ORDER — HEPARIN SODIUM (PORCINE) LOCK FLUSH IV SOLN 100 UNIT/ML 100 UNIT/ML
500 SOLUTION INTRAVENOUS AS NEEDED
OUTPATIENT
Start: 2024-01-11

## 2024-01-11 RX ADMIN — Medication 10 ML: at 13:05

## 2024-01-11 RX ADMIN — Medication 500 UNITS: at 13:05

## 2024-02-22 ENCOUNTER — CLINICAL SUPPORT (OUTPATIENT)
Dept: ONCOLOGY | Facility: HOSPITAL | Age: 64
End: 2024-02-22
Payer: COMMERCIAL

## 2024-02-22 ENCOUNTER — INFUSION (OUTPATIENT)
Dept: ONCOLOGY | Facility: HOSPITAL | Age: 64
End: 2024-02-22
Payer: COMMERCIAL

## 2024-02-22 DIAGNOSIS — C20 RECTAL CANCER: ICD-10-CM

## 2024-02-22 DIAGNOSIS — Z45.2 ENCOUNTER FOR FITTING AND ADJUSTMENT OF VASCULAR CATHETER: Primary | ICD-10-CM

## 2024-02-22 LAB
ALBUMIN SERPL-MCNC: 4.4 G/DL (ref 3.5–5.2)
ALBUMIN/GLOB SERPL: 1.6 G/DL
ALP SERPL-CCNC: 93 U/L (ref 39–117)
ALT SERPL W P-5'-P-CCNC: 18 U/L (ref 1–33)
ANION GAP SERPL CALCULATED.3IONS-SCNC: 9.6 MMOL/L (ref 5–15)
AST SERPL-CCNC: 20 U/L (ref 1–32)
BASOPHILS # BLD AUTO: 0.02 10*3/MM3 (ref 0–0.2)
BASOPHILS NFR BLD AUTO: 0.3 % (ref 0–1.5)
BILIRUB SERPL-MCNC: 0.5 MG/DL (ref 0–1.2)
BUN SERPL-MCNC: 13 MG/DL (ref 8–23)
BUN/CREAT SERPL: 20.6 (ref 7–25)
CALCIUM SPEC-SCNC: 9.8 MG/DL (ref 8.6–10.5)
CEA SERPL-MCNC: 1.58 NG/ML
CHLORIDE SERPL-SCNC: 104 MMOL/L (ref 98–107)
CO2 SERPL-SCNC: 25.4 MMOL/L (ref 22–29)
CREAT SERPL-MCNC: 0.63 MG/DL (ref 0.57–1)
DEPRECATED RDW RBC AUTO: 43.3 FL (ref 37–54)
EGFRCR SERPLBLD CKD-EPI 2021: 99.2 ML/MIN/1.73
EOSINOPHIL # BLD AUTO: 0.22 10*3/MM3 (ref 0–0.4)
EOSINOPHIL NFR BLD AUTO: 3.6 % (ref 0.3–6.2)
ERYTHROCYTE [DISTWIDTH] IN BLOOD BY AUTOMATED COUNT: 13.7 % (ref 12.3–15.4)
GLOBULIN UR ELPH-MCNC: 2.8 GM/DL
GLUCOSE SERPL-MCNC: 87 MG/DL (ref 65–99)
HCT VFR BLD AUTO: 37.6 % (ref 34–46.6)
HGB BLD-MCNC: 12.4 G/DL (ref 12–15.9)
IMM GRANULOCYTES # BLD AUTO: 0.03 10*3/MM3 (ref 0–0.05)
IMM GRANULOCYTES NFR BLD AUTO: 0.5 % (ref 0–0.5)
LYMPHOCYTES # BLD AUTO: 1.75 10*3/MM3 (ref 0.7–3.1)
LYMPHOCYTES NFR BLD AUTO: 28.9 % (ref 19.6–45.3)
MCH RBC QN AUTO: 28.6 PG (ref 26.6–33)
MCHC RBC AUTO-ENTMCNC: 33 G/DL (ref 31.5–35.7)
MCV RBC AUTO: 86.6 FL (ref 79–97)
MONOCYTES # BLD AUTO: 0.34 10*3/MM3 (ref 0.1–0.9)
MONOCYTES NFR BLD AUTO: 5.6 % (ref 5–12)
NEUTROPHILS NFR BLD AUTO: 3.7 10*3/MM3 (ref 1.7–7)
NEUTROPHILS NFR BLD AUTO: 61.1 % (ref 42.7–76)
NRBC BLD AUTO-RTO: 0 /100 WBC (ref 0–0.2)
PLATELET # BLD AUTO: 255 10*3/MM3 (ref 140–450)
PMV BLD AUTO: 9.6 FL (ref 6–12)
POTASSIUM SERPL-SCNC: 4.1 MMOL/L (ref 3.5–5.2)
PROT SERPL-MCNC: 7.2 G/DL (ref 6–8.5)
RBC # BLD AUTO: 4.34 10*6/MM3 (ref 3.77–5.28)
SODIUM SERPL-SCNC: 139 MMOL/L (ref 136–145)
WBC NRBC COR # BLD AUTO: 6.06 10*3/MM3 (ref 3.4–10.8)

## 2024-02-22 PROCEDURE — 25010000002 HEPARIN LOCK FLUSH PER 10 UNITS: Performed by: INTERNAL MEDICINE

## 2024-02-22 PROCEDURE — 82378 CARCINOEMBRYONIC ANTIGEN: CPT | Performed by: INTERNAL MEDICINE

## 2024-02-22 PROCEDURE — 85025 COMPLETE CBC W/AUTO DIFF WBC: CPT | Performed by: INTERNAL MEDICINE

## 2024-02-22 PROCEDURE — 80053 COMPREHEN METABOLIC PANEL: CPT | Performed by: INTERNAL MEDICINE

## 2024-02-22 PROCEDURE — 36591 DRAW BLOOD OFF VENOUS DEVICE: CPT

## 2024-02-22 RX ORDER — SODIUM CHLORIDE 0.9 % (FLUSH) 0.9 %
10 SYRINGE (ML) INJECTION AS NEEDED
Status: DISCONTINUED | OUTPATIENT
Start: 2024-02-22 | End: 2024-02-22 | Stop reason: HOSPADM

## 2024-02-22 RX ORDER — HEPARIN SODIUM (PORCINE) LOCK FLUSH IV SOLN 100 UNIT/ML 100 UNIT/ML
500 SOLUTION INTRAVENOUS AS NEEDED
OUTPATIENT
Start: 2024-02-22

## 2024-02-22 RX ORDER — HEPARIN SODIUM (PORCINE) LOCK FLUSH IV SOLN 100 UNIT/ML 100 UNIT/ML
500 SOLUTION INTRAVENOUS AS NEEDED
Status: DISCONTINUED | OUTPATIENT
Start: 2024-02-22 | End: 2024-02-22 | Stop reason: HOSPADM

## 2024-02-22 RX ORDER — SODIUM CHLORIDE 0.9 % (FLUSH) 0.9 %
10 SYRINGE (ML) INJECTION AS NEEDED
OUTPATIENT
Start: 2024-02-22

## 2024-02-22 RX ADMIN — Medication 10 ML: at 13:08

## 2024-02-22 RX ADMIN — Medication 500 UNITS: at 13:08

## 2024-02-22 NOTE — NURSING NOTE
Patient is here for lab review with RN did not want to stay and asked to be called the results.    1400 pt called and CBC, CMP and CEA reviewed, counts are stable for this patient at this time. Patient has no complaints. Follow up appointment reviewed. Patient is instructed to call the office with any concerns or new symptoms prior to next visit. Patient verbalized understanding.    Lab Results   Component Value Date    WBC 6.06 02/22/2024    HGB 12.4 02/22/2024    HCT 37.6 02/22/2024    MCV 86.6 02/22/2024     02/22/2024     Lab Results   Component Value Date    GLUCOSE 87 02/22/2024    BUN 13 02/22/2024    CREATININE 0.63 02/22/2024    EGFRRESULT 86 05/27/2022    EGFR 99.2 02/22/2024    BCR 20.6 02/22/2024    K 4.1 02/22/2024    CO2 25.4 02/22/2024    CALCIUM 9.8 02/22/2024    PROTENTOTREF 6.8 05/27/2022    ALBUMIN 4.4 02/22/2024    BILITOT 0.5 02/22/2024    AST 20 02/22/2024    ALT 18 02/22/2024     Lab Results   Component Value Date    CEA 1.58 02/22/2024

## 2024-03-07 LAB — REF LAB TEST METHOD: NORMAL

## 2024-03-08 ENCOUNTER — TELEPHONE (OUTPATIENT)
Dept: ONCOLOGY | Facility: CLINIC | Age: 64
End: 2024-03-08
Payer: COMMERCIAL

## 2024-03-08 DIAGNOSIS — Z79.899 HIGH RISK MEDICATION USE: ICD-10-CM

## 2024-03-08 DIAGNOSIS — C20 RECTAL CANCER: Primary | ICD-10-CM

## 2024-03-08 NOTE — TELEPHONE ENCOUNTER
Tried to contact patient but had to LV RCB. Will await her call back to inform that her guardant reveal result is negative and will have her labs rechecked on 5/16/24 when the patient's port is accessed.     3/12/24 Pt had accessed MyChart and is aware of the result. She is aware that she will have her labs drawn when port is accessed on the day of CT scans.

## 2024-03-27 ENCOUNTER — HOSPITAL ENCOUNTER (OUTPATIENT)
Facility: HOSPITAL | Age: 64
Setting detail: HOSPITAL OUTPATIENT SURGERY
Discharge: HOME | End: 2024-03-27
Attending: COLON & RECTAL SURGERY | Admitting: COLON & RECTAL SURGERY
Payer: COMMERCIAL

## 2024-03-27 ENCOUNTER — ANESTHESIA EVENT (OUTPATIENT)
Dept: GASTROENTEROLOGY | Facility: HOSPITAL | Age: 64
End: 2024-03-27
Payer: COMMERCIAL

## 2024-03-27 ENCOUNTER — ANESTHESIA (OUTPATIENT)
Dept: GASTROENTEROLOGY | Facility: HOSPITAL | Age: 64
End: 2024-03-27
Payer: COMMERCIAL

## 2024-03-27 VITALS
WEIGHT: 240 LBS | HEIGHT: 69 IN | RESPIRATION RATE: 7 BRPM | BODY MASS INDEX: 35.55 KG/M2 | DIASTOLIC BLOOD PRESSURE: 77 MMHG | HEART RATE: 72 BPM | SYSTOLIC BLOOD PRESSURE: 107 MMHG | OXYGEN SATURATION: 98 %

## 2024-03-27 PROCEDURE — 25810000003 LACTATED RINGERS PER 1000 ML: Performed by: NURSE ANESTHETIST, CERTIFIED REGISTERED

## 2024-03-27 PROCEDURE — 25010000002 PROPOFOL 10 MG/ML EMULSION: Performed by: NURSE ANESTHETIST, CERTIFIED REGISTERED

## 2024-03-27 PROCEDURE — 25810000003 LACTATED RINGERS PER 1000 ML: Performed by: COLON & RECTAL SURGERY

## 2024-03-27 RX ORDER — LIDOCAINE HYDROCHLORIDE 20 MG/ML
INJECTION, SOLUTION INFILTRATION; PERINEURAL AS NEEDED
Status: DISCONTINUED | OUTPATIENT
Start: 2024-03-27 | End: 2024-03-27 | Stop reason: SURG

## 2024-03-27 RX ORDER — PREDNISONE 1 MG/1
1 TABLET ORAL DAILY
COMMUNITY

## 2024-03-27 RX ORDER — SODIUM CHLORIDE, SODIUM LACTATE, POTASSIUM CHLORIDE, CALCIUM CHLORIDE 600; 310; 30; 20 MG/100ML; MG/100ML; MG/100ML; MG/100ML
30 INJECTION, SOLUTION INTRAVENOUS CONTINUOUS PRN
Status: DISCONTINUED | OUTPATIENT
Start: 2024-03-27 | End: 2024-03-27 | Stop reason: HOSPADM

## 2024-03-27 RX ORDER — SODIUM CHLORIDE, SODIUM LACTATE, POTASSIUM CHLORIDE, CALCIUM CHLORIDE 600; 310; 30; 20 MG/100ML; MG/100ML; MG/100ML; MG/100ML
INJECTION, SOLUTION INTRAVENOUS CONTINUOUS PRN
Status: DISCONTINUED | OUTPATIENT
Start: 2024-03-27 | End: 2024-03-27 | Stop reason: SURG

## 2024-03-27 RX ORDER — PROPOFOL 10 MG/ML
VIAL (ML) INTRAVENOUS CONTINUOUS PRN
Status: DISCONTINUED | OUTPATIENT
Start: 2024-03-27 | End: 2024-03-27 | Stop reason: SURG

## 2024-03-27 RX ORDER — CEFDINIR 300 MG/1
300 CAPSULE ORAL 2 TIMES DAILY
COMMUNITY

## 2024-03-27 RX ADMIN — SODIUM CHLORIDE, POTASSIUM CHLORIDE, SODIUM LACTATE AND CALCIUM CHLORIDE 30 ML/HR: 600; 310; 30; 20 INJECTION, SOLUTION INTRAVENOUS at 09:05

## 2024-03-27 RX ADMIN — PROPOFOL 300 MCG/KG/MIN: 10 INJECTION, EMULSION INTRAVENOUS at 09:26

## 2024-03-27 RX ADMIN — SODIUM CHLORIDE, POTASSIUM CHLORIDE, SODIUM LACTATE AND CALCIUM CHLORIDE: 600; 310; 30; 20 INJECTION, SOLUTION INTRAVENOUS at 09:09

## 2024-03-27 RX ADMIN — LIDOCAINE HYDROCHLORIDE 60 MG: 20 INJECTION, SOLUTION INFILTRATION; PERINEURAL at 09:25

## 2024-03-27 NOTE — ANESTHESIA POSTPROCEDURE EVALUATION
Patient: Babs Felton    Procedure Summary       Date: 03/27/24 Room / Location: [facility] ENDOSCOPY 10 / [facility] ENDOSCOPY    Anesthesia Start: 0922 Anesthesia Stop: 1000    Procedure: COLONOSCOPY TO CECUM Diagnosis:       Personal history of malignant neoplasm of rectum, rectosigmoid junction, and anus      (Personal history of malignant neoplasm of rectum, rectosigmoid junction, and anus [Z85.048])    Surgeons: Jennifer Amaya MD Provider: Gabe Brand MD    Anesthesia Type: MAC ASA Status: 3            Anesthesia Type: MAC    Vitals  Vitals Value Taken Time   /77 03/27/24 1023   Temp     Pulse 77 03/27/24 1026   Resp 7 03/27/24 1016   SpO2 98 % 03/27/24 1025   Vitals shown include unfiled device data.        Post Anesthesia Care and Evaluation    Patient location during evaluation: PHASE II  Patient participation: complete - patient participated  Level of consciousness: awake and alert  Pain management: adequate    Airway patency: patent  Anesthetic complications: No anesthetic complications  PONV Status: none  Cardiovascular status: acceptable and hemodynamically stable  Respiratory status: acceptable, nonlabored ventilation and spontaneous ventilation  Hydration status: acceptable

## 2024-03-27 NOTE — H&P
"Babs Felton is a 63 y.o. female  who is referred by Araceli Rose MD for a colonoscopy. She   has an indications: previous rectal cancer. S/p miller - active surveillance    She denies any change in bowel function, melena, or hematochezia.    Past Medical History:   Diagnosis Date    Abdominal cramping 07/01/2022    Anemia     Bloating 07/01/2022    Bloody diarrhea 07/01/2022    Chronic back pain     Colon polyps     FOLLOWED BY DR. BEVERLY MENDEZ    COVID 08/15/2023    Cystitis     BURNING WITH URINATION SINCE RADIATION    Depression     Early cataract     Gastric polyps     FOLLOWED BY DR. BEVERLY MENDEZ    GERD (gastroesophageal reflux disease)     Hearing loss     Hiatal hernia 09/2021    3 CM, FOLLOWED BY DR. BEVERLY MENDEZ    History of chemotherapy     LAST TREATMENT 2/10/2023    History of depression     History of radiation therapy     Hyperlipidemia     NO MEDS    IBS (irritable bowel syndrome)     Migraines     HX    Rectal bleeding 1983    Off and on for years    Rectal cancer 09/2022    INVASIVE MODERATELY DIFFERENTIATED ADENOCARCINOMA ARISING FROM TRADITIONAL SERRATED ADENOMA WITH HGD    Sciatica 09/2015    Tear of medial meniscus of knee 07/2021    RIGHT KNEE    Vitamin D deficiency        Past Surgical History:   Procedure Laterality Date    COLONOSCOPY N/A 1986    COLONOSCOPY W/ POLYPECTOMY N/A 09/01/2022    3 MM TUBULAR ADENOMA POLYP IN ASCENDING, 6 MM TUBULAR ADENOMA POLYP IN CECUM, 30 MM MASS IN RECTUM, PATH:INVASIVE MODERATELY DIFFERENTIATED ADENOCARCINOMA ARISING FROM TRADITIONAL SERRATED ADENOMA WITH HGD, HYPERTROPHIED ANAL PAPILLA, DR. BEVERLY MENDEZ AT [facility]    ENDOSCOPY N/A 09/01/2022    MULTIPLE BENIGN GASTRIC POLYPS, 2 SQUAMOUS PAPILLAS IN ESOPHAGUS, 3 CM HIATAL HERNIA,    EXTERNAL EAR SURGERY  1966    MULTIPLE \"LANCES\", DR. ACE IN [facility]    EYE SURGERY Right 1967    DR. CASTANEDA IN [facility]    KNEE ARTHRODESIS Right 07/27/2021    RIGHT KNEE " ARTHROCENTESIS, DR. ALBERT GILMAN    KNEE SURGERY Left 1985    DR. PEREZ AT [place]    SHOULDER MANIPULATION Left 01/07/2013    FOR FROZEN SHOULDER, DR. ALBERT GILMAN AT [facility]    SIGMOIDOSCOPY N/A 09/21/2022    AN INFILTRATIVE NON OBSTRUCTING MASS IN MID RECTUM, AREA TATTOOED, DR. JENNIFER AMAYA AT [facility]    SIGMOIDOSCOPY N/A 03/08/2023    7 MM HEALED ULCERATION WHERE RECTAL CANCER WAS, SCAR TISSUE HEALTHY, BIOPSY SHOWED INFLAMMATION AND SCAR, NO RESIDUAL CANCER, FLEX SIG IN 12 MONTHS, DR. JENNIFER AMAYA AT [facility]    SIGMOIDOSCOPY N/A 07/13/2023    A HEALTHY SCAR WAS IN MID RECTUM, DR. JENNIFER AMAYA AT [facility]    SIGMOIDOSCOPY N/A 11/06/2023    A HEALTHY APPEARING SCAR IN MID RECTUM, COLONOSCOPY IN 4 MONTHS, DR. JENNIFER AMAYA AT [facility]    SIGMOIDOSCOPY N/A 12/22/2023    A HEALTHY SCAR WAS FOUND IN MID RECTUM, BIOPSIES BENIGN, DR. JENNIFER AMAYA AT [facility]    TRANSRECTAL ULTRASOUND N/A 09/21/2022    Procedure: ULTRASOUND TRANSRECTAL;  Surgeon: Jennifer Amaya MD;  Location: [facility] ENDOSCOPY;  Service: General;  Laterality: N/A;    TRANSRECTAL ULTRASOUND N/A 12/22/2023    Procedure: ULTRASOUND TRANSRECTAL;  Surgeon: Jennifer Amaya MD;  Location: [facility] ENDOSCOPY;  Service: General;  Laterality: N/A;  pre: abnormal MRI  post: rectal ulceration    VENOUS ACCESS DEVICE (PORT) INSERTION Left 10/26/2022    Procedure: INSERTION OF PORTACATH;  Surgeon: Leonel Bridges MD;  Location: [facility] OR;  Service: General;  Laterality: Left;    WISDOM TOOTH EXTRACTION Bilateral        Medications Prior to Admission   Medication Sig Dispense Refill Last Dose    dicyclomine (BENTYL) 20 MG tablet Take 1 tablet by mouth Every 6 (Six) Hours As Needed (abd cramps). 90 tablet 1 Past Week    diphenoxylate-atropine (LOMOTIL) 2.5-0.025 MG per tablet TAKE ONE TABLET BY MOUTH FOUR TIMES A DAY AS NEEDED FOR DIARRHEA (Patient taking differently: Take 1 tablet by mouth 4 (Four) Times a Day As Needed.) 30 tablet 0 Past Week    ELDERBERRY PO Take 2 tablets by  mouth Daily. HOLD PRIOR TO SURG, GUMMIES   3/25/2024    hydrOXYzine (ATARAX) 10 MG tablet Take 1 tablet by mouth Every 8 (Eight) Hours As Needed for Itching. 30 tablet 0 Past Week    ondansetron (ZOFRAN) 8 MG tablet Take 1 tablet by mouth 3 (Three) Times a Day As Needed for Nausea or Vomiting. 30 tablet 5 Past Week    predniSONE (DELTASONE) 1 MG tablet Take 1 tablet by mouth Daily. Upper respiratory issues   3/13/2024    traMADol (ULTRAM) 50 MG tablet Take 1 tablet by mouth Every 6 (Six) Hours As Needed for Moderate Pain. 30 tablet 0 Past Month    cefdinir (OMNICEF) 300 MG capsule Take 1 capsule by mouth 2 (Two) Times a Day. COMPLETED   3/13/2024    loperamide (IMODIUM) 2 MG capsule Take 1 capsule by mouth As Needed for Diarrhea.   More than a month    loratadine (CLARITIN) 10 MG tablet Take 1 tablet by mouth As Needed. 1 daily around time of Neulasta OBI   More than a month    pantoprazole (PROTONIX) 40 MG EC tablet Take 1 tablet by mouth Daily. (Patient taking differently: Take 1 tablet by mouth Daily As Needed.) 30 tablet 3 More than a month       Allergies   Allergen Reactions    Adhesive Tape Rash     Patient has sensitive skin.     Levofloxacin Swelling    Sulfa Antibiotics Rash       Family History   Problem Relation Age of Onset    Colon polyps Mother     Asthma Mother     COPD Mother     Lung disease Mother     Migraines Mother     Cancer Father         liver cancer    Colon polyps Father     Heart disease Father     Diabetes Father     Kidney disease Father     Angina Father     Liver cancer Father     Hepatitis Father     Hearing loss Father     Cancer Sister         Breast cancer    Arthritis Sister     Colon polyps Sister     Breast cancer Sister     Diabetes Sister     Colon polyps Sister     Alcohol abuse Maternal Uncle     Colon cancer Neg Hx     Crohn's disease Neg Hx     Irritable bowel syndrome Neg Hx     Ulcerative colitis Neg Hx     Malig Hyperthermia Neg Hx        Social History      Socioeconomic History    Marital status: Single   Tobacco Use    Smoking status: Never     Passive exposure: Never    Smokeless tobacco: Never   Vaping Use    Vaping status: Never Used   Substance and Sexual Activity    Alcohol use: Not Currently    Drug use: Never    Sexual activity: Defer       Review of Systems   Gastrointestinal:  Negative for abdominal pain, nausea and vomiting.   All other systems reviewed and are negative.      Vitals:    03/27/24 0901   BP: 144/95   Pulse: 86   Resp: 13   SpO2: 97%         Physical Exam      Assessment & Plan      indications: previous colon cancer         I recommend colonoscopy.  I described risks, benefits of the procedure with the patient including but not limited to bleeding, infection, possibility of perforation and possible polypectomy. All of the patient's questions were answered and they would like to proceed with the above recommendations.

## 2024-03-27 NOTE — ANESTHESIA PREPROCEDURE EVALUATION
Anesthesia Evaluation     Patient summary reviewed and Nursing notes reviewed   NPO Solid Status: > 8 hours  NPO Liquid Status: > 2 hours           Airway   Mallampati: II  TM distance: >3 FB  Neck ROM: full  No difficulty expected  Dental - normal exam     Pulmonary - normal exam   Cardiovascular - normal exam    (+) hyperlipidemia      Neuro/Psych  (+) headaches, numbness, psychiatric history Depression    ROS Comment: Migraines  GI/Hepatic/Renal/Endo    (+) obesity, hiatal hernia, GERD, GI bleeding     Musculoskeletal     (+) back pain, chronic pain  Abdominal   (+) obese   Substance History      OB/GYN          Other      history of cancer      Other Comment: Hx rectal CA, s/p chemo and radiation only                Anesthesia Plan    ASA 3     MAC     intravenous induction     Anesthetic plan, risks, benefits, and alternatives have been provided, discussed and informed consent has been obtained with: patient.      CODE STATUS:

## 2024-04-04 ENCOUNTER — INFUSION (OUTPATIENT)
Dept: ONCOLOGY | Facility: HOSPITAL | Age: 64
End: 2024-04-04
Payer: COMMERCIAL

## 2024-04-04 ENCOUNTER — TELEPHONE (OUTPATIENT)
Dept: RADIATION ONCOLOGY | Facility: HOSPITAL | Age: 64
End: 2024-04-04
Payer: COMMERCIAL

## 2024-04-04 DIAGNOSIS — Z45.2 ENCOUNTER FOR FITTING AND ADJUSTMENT OF VASCULAR CATHETER: Primary | ICD-10-CM

## 2024-04-04 PROCEDURE — 96523 IRRIG DRUG DELIVERY DEVICE: CPT

## 2024-04-04 PROCEDURE — 25010000002 HEPARIN LOCK FLUSH PER 10 UNITS: Performed by: INTERNAL MEDICINE

## 2024-04-04 RX ORDER — HEPARIN SODIUM (PORCINE) LOCK FLUSH IV SOLN 100 UNIT/ML 100 UNIT/ML
500 SOLUTION INTRAVENOUS AS NEEDED
OUTPATIENT
Start: 2024-04-04

## 2024-04-04 RX ORDER — SODIUM CHLORIDE 0.9 % (FLUSH) 0.9 %
10 SYRINGE (ML) INJECTION AS NEEDED
Status: DISCONTINUED | OUTPATIENT
Start: 2024-04-04 | End: 2024-04-04 | Stop reason: HOSPADM

## 2024-04-04 RX ORDER — SODIUM CHLORIDE 0.9 % (FLUSH) 0.9 %
10 SYRINGE (ML) INJECTION AS NEEDED
OUTPATIENT
Start: 2024-04-04

## 2024-04-04 RX ORDER — HEPARIN SODIUM (PORCINE) LOCK FLUSH IV SOLN 100 UNIT/ML 100 UNIT/ML
500 SOLUTION INTRAVENOUS AS NEEDED
Status: DISCONTINUED | OUTPATIENT
Start: 2024-04-04 | End: 2024-04-04 | Stop reason: HOSPADM

## 2024-04-04 RX ADMIN — Medication 10 ML: at 13:09

## 2024-04-04 RX ADMIN — Medication 500 UNITS: at 13:09

## 2024-04-05 ENCOUNTER — OFFICE VISIT (OUTPATIENT)
Dept: RADIATION ONCOLOGY | Facility: HOSPITAL | Age: 64
End: 2024-04-05
Payer: COMMERCIAL

## 2024-04-05 VITALS
HEART RATE: 75 BPM | DIASTOLIC BLOOD PRESSURE: 68 MMHG | WEIGHT: 252 LBS | BODY MASS INDEX: 37.21 KG/M2 | OXYGEN SATURATION: 95 % | SYSTOLIC BLOOD PRESSURE: 113 MMHG

## 2024-04-05 DIAGNOSIS — C20 RECTAL CANCER: Primary | ICD-10-CM

## 2024-04-05 NOTE — PROGRESS NOTES
[facility] Radiation Oncology   Follow Up    Chief Complaint  Rectal cancer        Diagnosis: Adenocarcinoma of the rectum     Overall Stage IIA     cT3: Per rectal MRI  cN0: Per rectal MRI  cM0: Per CT chest abdomen pelvis        Radiation Completion Date: 10/22/2022        Prescription:      Site: Rectum  Laterality: N/A  Total Dose: 2500cGy  Dose per Fraction: 500cGy  Total Fractions: 5  Daily or BID: Daily  Modality: Photon  Technique: IMRT/VMAT/Rapid Arc  Bolus: No        Interval History:     Babs Felton presents for regularly scheduled follow-up approximately 18 months after completion of short course radiation therapy as part of treatment for a locally advanced adenocarcinoma of the rectum.  Patient ultimately underwent nonoperative management and has undergone continued surveillance with Dr. Weber and Dr. Amaya.      Since she was last seen the patient underwent  flexible sigmoidoscopy in November without concerning findings.  In December she had imaging findings concerning for possible recurrence and again went underwent flexible sigmoidoscopy with biopsy.  Ultimately, pathology was benign.  She underwent colonoscopy in March 2024 without concerning findings.    The patient continues to struggle somewhat with urinary frequency and urgency, occasional pain.  She did go through pelvic physical therapy which she does not feel helped her much.  She is also struggled with some urgency and frequency with bowel movements.  She thinks this is marginally better.  She has no other new or concerning complaints.       Imaging:      CT CAP 12/7/2024    IMPRESSION:     Previously noted asymmetric wall thickening of the rectum is less  conspicuous. Otherwise, no significant change. Continued follow-up  advised as indicated.      MRI Pelvis 12/8/2024    IMPRESSION:  1. There is an area of abnormal mass-like enhancement in the region of  the primary tumor that measures on the order of 2.6 cm in greatest  dimension and  appears to extend 5 mm outside of the posterior muscularis  propria. There is also some stranding within the posterior mesorectum  that abuts the posterior mesorectal fascia that overlies the sacrum.  This is in the same region as the primary lesion seen on 09/26/2022. No  adenopathy is appreciated. The imaging features raise concern for  recurrence of malignancy, possibly a T3c lesion with possible mesorectal  involvement versus posttreatment changes. However, the appearance is  different than seen on the prior MRI dated 06/07/2023 and is suspicious  for local recurrence. Clinical follow-up is recommended. Surgical and/or  tissue evaluation should be considered.      PET CT 12/18/2024    FINDINGS: Mediastinal blood pool has a maximal SUV of 3.3.  1. There is nonspecific low-level activity throughout the rectosigmoid  colon and more intense activity at the distal rectum and perianal soft  tissues with a maximal SUV of 8.0. This is nonspecific and may be  radiation related and should be correlated with endoscopy.     2. There is no hypermetabolic lymphadenopathy within the pelvis or  abdomen. There is no suspicious liver activity and there are no new or  suspicious pulmonary opacities or nodules, given constraints of  breathing artifact. There is no convincing evidence for distant  metastases.      Pathology:      Rectal Biopsy Pathology 12/22/2023    Final Diagnosis   1.  Rectal biopsy: Colonic mucosa with               A.  Focal changes suggesting developing hyperplastic polyp.               B.  No active inflammation or granulomatous inflammation identified.               C.  No glandular dysplasia nor malignancy identified.         Labs:    Lab Results   Component Value Date    CREATININE 0.63 02/22/2024       CMP          9/6/2023    10:23 11/29/2023    12:52 2/22/2024    13:11   CMP   Glucose 109  82  87    BUN 10  12  13    Creatinine 0.68  0.71  0.63    EGFR 98.0  95.7  99.2    Sodium 144  142  139     Potassium 3.9  3.9  4.1    Chloride 107  104  104    Calcium 9.2  9.0  9.8    Total Protein 6.3  6.9  7.2    Albumin 4.1  4.1  4.4    Globulin 2.2  2.8  2.8    Total Bilirubin 0.4  0.5  0.5    Alkaline Phosphatase 77  81  93    AST (SGOT) 17  19  20    ALT (SGPT) 6  6  18    Albumin/Globulin Ratio 1.9  1.5  1.6    BUN/Creatinine Ratio 14.7  16.9  20.6    Anion Gap 13.6  11.9  9.6      CBC          9/6/2023    10:23 11/29/2023    12:52 2/22/2024    12:55   CBC   WBC 4.77  5.61  6.06    RBC 4.12  4.25  4.34    Hemoglobin 12.1  12.5  12.4    Hematocrit 36.8  37.8  37.6    MCV 89.3  88.9  86.6    MCH 29.4  29.4  28.6    MCHC 32.9  33.1  33.0    RDW 14.1  13.2  13.7    Platelets 203  223  255      Lab Results   Component Value Date    CEA 1.58 02/22/2024             Problem List:  Patient Active Problem List   Diagnosis    Diarrhea    Bloody diarrhea    Abdominal cramping    Bloating    Gastroesophageal reflux disease without esophagitis    Rectal cancer    Encounter for fitting and adjustment of vascular catheter    Personal history of malignant neoplasm of rectum, rectosigmoid junction, and anus    Fecal urgency    Fecal smearing          Medications:  Current Outpatient Medications on File Prior to Visit   Medication Sig Dispense Refill    cefdinir (OMNICEF) 300 MG capsule Take 1 capsule by mouth 2 (Two) Times a Day. COMPLETED      dicyclomine (BENTYL) 20 MG tablet Take 1 tablet by mouth Every 6 (Six) Hours As Needed (abd cramps). 90 tablet 1    diphenoxylate-atropine (LOMOTIL) 2.5-0.025 MG per tablet TAKE ONE TABLET BY MOUTH FOUR TIMES A DAY AS NEEDED FOR DIARRHEA (Patient taking differently: Take 1 tablet by mouth 4 (Four) Times a Day As Needed.) 30 tablet 0    ELDERBERRY PO Take 2 tablets by mouth Daily. HOLD PRIOR TO SURG, GUMMIES      hydrOXYzine (ATARAX) 10 MG tablet Take 1 tablet by mouth Every 8 (Eight) Hours As Needed for Itching. 30 tablet 0    loperamide (IMODIUM) 2 MG capsule Take 1 capsule by mouth As  "Needed for Diarrhea.      loratadine (CLARITIN) 10 MG tablet Take 1 tablet by mouth As Needed. 1 daily around time of Neulasta OBI      ondansetron (ZOFRAN) 8 MG tablet Take 1 tablet by mouth 3 (Three) Times a Day As Needed for Nausea or Vomiting. 30 tablet 5    pantoprazole (PROTONIX) 40 MG EC tablet Take 1 tablet by mouth Daily. (Patient taking differently: Take 1 tablet by mouth Daily As Needed.) 30 tablet 3    predniSONE (DELTASONE) 1 MG tablet Take 1 tablet by mouth Daily. Upper respiratory issues      traMADol (ULTRAM) 50 MG tablet Take 1 tablet by mouth Every 6 (Six) Hours As Needed for Moderate Pain. 30 tablet 0     Current Facility-Administered Medications on File Prior to Visit   Medication Dose Route Frequency Provider Last Rate Last Admin    [DISCONTINUED] heparin injection 500 Units  500 Units Intravenous PRN Tristan Weber MD   500 Units at 04/04/24 1309    [DISCONTINUED] sodium chloride 0.9 % flush 10 mL  10 mL Intravenous PRN Tristan Weber MD   10 mL at 04/04/24 1309          Allergies:  Allergies   Allergen Reactions    Adhesive Tape Rash     Patient has sensitive skin.     Levofloxacin Swelling    Sulfa Antibiotics Rash           Vital Signs:  /68   Pulse 75   Wt 114 kg (252 lb)   SpO2 95%   BMI 37.21 kg/m²   Estimated body mass index is 37.21 kg/m² as calculated from the following:    Height as of 3/26/24: 175.3 cm (69\").    Weight as of this encounter: 114 kg (252 lb).  Pain Score    04/05/24 1203   PainSc: 0-No pain         ECOG: Fully active, able to carry on all pre-disease performance without restriction = 0    Physical Exam  Vitals reviewed.   Constitutional:       General: She is not in acute distress.     Appearance: Normal appearance.   HENT:      Head: Normocephalic and atraumatic.   Eyes:      Extraocular Movements: Extraocular movements intact.      Pupils: Pupils are equal, round, and reactive to light.   Pulmonary:      Effort: Pulmonary effort is normal. "   Abdominal:      General: Abdomen is flat.      Palpations: Abdomen is soft.   Musculoskeletal:      Cervical back: Normal range of motion.   Skin:     General: Skin is warm and dry.   Neurological:      General: No focal deficit present.      Mental Status: She is alert and oriented to person, place, and time.   Psychiatric:         Mood and Affect: Mood normal.         Behavior: Behavior normal.          Result Review :  The following data was reviewed by: Jacques Freeman MD on 04/05/2024:  Labs: Last Creatinine, CBC, CMP, CEA  Data reviewed : Radiologic studies CT CAP, PET CT, MRI Pelvis             Diagnoses and all orders for this visit:    1. Rectal cancer (Primary)        Assessment:    Babs Felton presents for regularly scheduled follow-up approximately 18 months after completion of short course radiation therapy as part of treatment for a locally advanced adenocarcinoma of the rectum.  Patient ultimately underwent nonoperative management and has undergone continued surveillance with Dr. Weber and Dr. Amaya.      Since she was last seen the patient underwent  flexible sigmoidoscopy in November without concerning findings.  In December she had imaging findings concerning for possible recurrence and again went underwent flexible sigmoidoscopy with biopsy.  Ultimately, pathology was benign.  She underwent colonoscopy in March 2024 without concerning findings.    The patient continues to struggle somewhat with urinary frequency and urgency, occasional pain.  She did go through pelvic physical therapy which she does not feel helped her much.  She is also struggled with some urgency and frequency with bowel movements.  She thinks this is marginally better.  She has no other new or concerning complaints.    I discussed her recent imaging workup and biopsy pathology in detail.  I discussed potential referral to behavioral health to help with ongoing anxiety.  Patient wished to do this and we have placed  referral.  I discussed potential referral to urology for ongoing bladder issues, patient declined.  I discussed referral to survivorship clinic, patient was very interested and is interested in pursuing potential support group work. She can follow up with me in 6 months.      Plan:    -Referral to survivorship and behavioral health  -Follow-up in 6 months       I spent 30 minutes caring for Babs on this date of service. This time includes time spent by me in the following activities:preparing for the visit, reviewing tests, obtaining and/or reviewing a separately obtained history, documenting information in the medical record, independently interpreting results and communicating that information with the patient/family/caregiver, and care coordination  Follow Up   No follow-ups on file.  Patient was given instructions and counseling regarding her condition or for health maintenance advice. Please see specific information pulled into the AVS if appropriate.     Jacques Freeman MD

## 2024-04-22 ENCOUNTER — TELEPHONE (OUTPATIENT)
Dept: OTHER | Facility: HOSPITAL | Age: 64
End: 2024-04-22
Payer: COMMERCIAL

## 2024-04-22 ENCOUNTER — TELEPHONE (OUTPATIENT)
Dept: PSYCHIATRY | Facility: HOSPITAL | Age: 64
End: 2024-04-22
Payer: COMMERCIAL

## 2024-04-22 NOTE — TELEPHONE ENCOUNTER
[facility] MULTIDISCIPLINARY CLINIC  SURVIVORSHIP SERVICES CARE COORDINATION NOTE  PHONE      Call placed to patient  RE: open referral for survivorship care plan and treatment summary visit.    Left voice mail x 2    Introduced myself and reviewed purpose and goals of survivorship visit as well as what to expect..    Patient mailed name and contact information for Survivorship Clinic [phone]

## 2024-04-22 NOTE — TELEPHONE ENCOUNTER
Referral received from  regarding an appt with our Supportive Oncology Service.  LVM x 2 with no return call from patient.  If patient wishes to schedule an appt in the future they can call [phone]./SP

## 2024-04-23 ENCOUNTER — PREP FOR SURGERY (OUTPATIENT)
Dept: OTHER | Facility: HOSPITAL | Age: 64
End: 2024-04-23
Payer: COMMERCIAL

## 2024-04-23 DIAGNOSIS — Z85.048 HISTORY OF RECTAL CANCER: Primary | ICD-10-CM

## 2024-04-25 PROBLEM — Z85.048 HISTORY OF RECTAL CANCER: Status: ACTIVE | Noted: 2024-04-23

## 2024-05-16 ENCOUNTER — HOSPITAL ENCOUNTER (OUTPATIENT)
Dept: CT IMAGING | Facility: HOSPITAL | Age: 64
Discharge: HOME | End: 2024-05-16
Admitting: INTERNAL MEDICINE
Payer: COMMERCIAL

## 2024-05-16 ENCOUNTER — OFFICE VISIT (OUTPATIENT)
Dept: PSYCHIATRY | Facility: HOSPITAL | Age: 64
End: 2024-05-16
Payer: COMMERCIAL

## 2024-05-16 ENCOUNTER — INFUSION (OUTPATIENT)
Dept: ONCOLOGY | Facility: HOSPITAL | Age: 64
End: 2024-05-16
Payer: COMMERCIAL

## 2024-05-16 DIAGNOSIS — F43.21 GRIEF REACTION: ICD-10-CM

## 2024-05-16 DIAGNOSIS — C20 RECTAL CANCER: ICD-10-CM

## 2024-05-16 DIAGNOSIS — G47.01 INSOMNIA DUE TO MEDICAL CONDITION: Primary | ICD-10-CM

## 2024-05-16 LAB
ALBUMIN SERPL-MCNC: 4.3 G/DL (ref 3.5–5.2)
ALBUMIN/GLOB SERPL: 1.7 G/DL
ALP SERPL-CCNC: 83 U/L (ref 39–117)
ALT SERPL W P-5'-P-CCNC: 15 U/L (ref 1–33)
ANION GAP SERPL CALCULATED.3IONS-SCNC: 9.2 MMOL/L (ref 5–15)
AST SERPL-CCNC: 20 U/L (ref 1–32)
BASOPHILS # BLD AUTO: 0.02 10*3/MM3 (ref 0–0.2)
BASOPHILS NFR BLD AUTO: 0.4 % (ref 0–1.5)
BILIRUB SERPL-MCNC: 0.5 MG/DL (ref 0–1.2)
BUN SERPL-MCNC: 12 MG/DL (ref 8–23)
BUN/CREAT SERPL: 17.6 (ref 7–25)
CALCIUM SPEC-SCNC: 9.6 MG/DL (ref 8.6–10.5)
CEA SERPL-MCNC: 1.44 NG/ML
CHLORIDE SERPL-SCNC: 105 MMOL/L (ref 98–107)
CO2 SERPL-SCNC: 25.8 MMOL/L (ref 22–29)
CREAT SERPL-MCNC: 0.68 MG/DL (ref 0.57–1)
DEPRECATED RDW RBC AUTO: 43.3 FL (ref 37–54)
EGFRCR SERPLBLD CKD-EPI 2021: 97.4 ML/MIN/1.73
EOSINOPHIL # BLD AUTO: 0.21 10*3/MM3 (ref 0–0.4)
EOSINOPHIL NFR BLD AUTO: 4.5 % (ref 0.3–6.2)
ERYTHROCYTE [DISTWIDTH] IN BLOOD BY AUTOMATED COUNT: 13.4 % (ref 12.3–15.4)
GLOBULIN UR ELPH-MCNC: 2.6 GM/DL
GLUCOSE SERPL-MCNC: 92 MG/DL (ref 65–99)
HCT VFR BLD AUTO: 38.4 % (ref 34–46.6)
HGB BLD-MCNC: 12.7 G/DL (ref 12–15.9)
IMM GRANULOCYTES # BLD AUTO: 0.02 10*3/MM3 (ref 0–0.05)
IMM GRANULOCYTES NFR BLD AUTO: 0.4 % (ref 0–0.5)
LYMPHOCYTES # BLD AUTO: 1.44 10*3/MM3 (ref 0.7–3.1)
LYMPHOCYTES NFR BLD AUTO: 31 % (ref 19.6–45.3)
MCH RBC QN AUTO: 29 PG (ref 26.6–33)
MCHC RBC AUTO-ENTMCNC: 33.1 G/DL (ref 31.5–35.7)
MCV RBC AUTO: 87.7 FL (ref 79–97)
MONOCYTES # BLD AUTO: 0.35 10*3/MM3 (ref 0.1–0.9)
MONOCYTES NFR BLD AUTO: 7.5 % (ref 5–12)
NEUTROPHILS NFR BLD AUTO: 2.6 10*3/MM3 (ref 1.7–7)
NEUTROPHILS NFR BLD AUTO: 56.2 % (ref 42.7–76)
NRBC BLD AUTO-RTO: 0 /100 WBC (ref 0–0.2)
PLATELET # BLD AUTO: 237 10*3/MM3 (ref 140–450)
PMV BLD AUTO: 9.8 FL (ref 6–12)
POTASSIUM SERPL-SCNC: 4.2 MMOL/L (ref 3.5–5.2)
PROT SERPL-MCNC: 6.9 G/DL (ref 6–8.5)
RBC # BLD AUTO: 4.38 10*6/MM3 (ref 3.77–5.28)
SODIUM SERPL-SCNC: 140 MMOL/L (ref 136–145)
WBC NRBC COR # BLD AUTO: 4.64 10*3/MM3 (ref 3.4–10.8)

## 2024-05-16 PROCEDURE — 85025 COMPLETE CBC W/AUTO DIFF WBC: CPT | Performed by: INTERNAL MEDICINE

## 2024-05-16 PROCEDURE — 25510000001 IOPAMIDOL 61 % SOLUTION: Performed by: INTERNAL MEDICINE

## 2024-05-16 PROCEDURE — 74177 CT ABD & PELVIS W/CONTRAST: CPT

## 2024-05-16 PROCEDURE — 71260 CT THORAX DX C+: CPT

## 2024-05-16 PROCEDURE — 82378 CARCINOEMBRYONIC ANTIGEN: CPT | Performed by: INTERNAL MEDICINE

## 2024-05-16 PROCEDURE — 80053 COMPREHEN METABOLIC PANEL: CPT | Performed by: INTERNAL MEDICINE

## 2024-05-16 RX ADMIN — IOPAMIDOL 90 ML: 612 INJECTION, SOLUTION INTRAVENOUS at 09:06

## 2024-05-16 NOTE — LETTER
"May 18, 2024     Jacques Freeman MD  [address] [facility]  [address] [place], [place] [address]    Patient: Babs Felton   YOB: 1960   Date of Visit: 5/16/2024     Dear Dr. Lydia MD:    Thank you for referring Babs Felton to me for evaluation. Below are the relevant portions of my assessment and plan of care.    If you have questions, please do not hesitate to call me. I look forward to following Babs along with you.         Sincerely,        YAMILEX Sagastume        CC: YAMILEX Reyna      Progress Notes:  New psychiatric evaluation    The primary office location is [facility]. The provider is seeing the patient in person for this visit.    Chief Complaint: Overwhelmed, waiting for the other shoe to drop, and trying to figure out new role as a cancer survivor, and grieving losses of loved ones    Subjective  Patient ID: Babs Felton is a 63 y.o. female who presents for initial consultation through the [facility] at the request of YAMILEX Dobson, who is following her for rectal cancer.       HPI: This is an initial evaluation of anxiety, mood, and psychotropic medication management. The patient reports that she has been on quite a cancer journey over the last 18 months. She admits to feeling she has been on an \"emotional roller coaster,\" mainly in context with her diagnostic studies, sometimes having good results and other times surprisingly bad results, but then later good again. She admits that this has caused some anticipatory anxiety related to having reoccurring testing every 3 months and wondering when the other \"shoe is going to drop.\"  However, she does feel that this is situational and appropriate for her circumstances. She has also been grappling with acceptance of her new sense of normal through radical acceptance. She has come to embrace her circumstances and has found healing through bonding with others going " "through similar circumstances.      She endorses some ongoing issues with poor sleep, resulting in lack of energy the following day. 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 She is also in a survivorship transition and has a deep desire to connect with her community on a more personal level, sharing her story and being a source of support to others going through similar circumstances.  She expresses having a desire to share with people that they are not alone and that they belong, but is unsure of how to make this happen. Additionally, May has been a tough time of year for her due to it being the month of her mother's birthday and Mother's Day.  She has also lost her father and has been grieving his loss, having been very close with him.     She reports no issues with sleep initiation, but has issues with fragmentation and poor quality overall. She continues to have interest and motivation in things she previously enjoyed. She does have some decreased energy. However, because she is a performer, she knows how to \"flip the switch\" to push through what she needs to and when she needs to. She is able to focus and concentrate without any difficulty. Her appetite fluctuates. She feels her mood is \"good\" overall. She admits to situational anxiety, but feels this is manageable and appropriate. She any past or present passive or active SI, plan or intent, self-harming behaviors, HI, or AVH. She denies any past formal mental health history other than some brief counseling after her father passed away.  She denies any inpatient mental health treatment or psychotropic medication trials.  She feels she has wonderful support with several close friends.    Records were reviewed.    PHQ-9 Total Score: 3  Reports a score of \"0\" to question 9.    GAD7 Total Score: 0    Social History  Marital Status: single  Children: " 0  Support Community: Siblings and coworkers, and friends  Career: continues to work as a performer at [employer]  Tobacco Use: The patient denies current or previous tobacco use.  Alcohol Use: does not drink  Marijuana/ Other drug Use: denied.  Spiritual: Islam - strong belief and value    Medical History  Psychiatric History: None    Medical History:   Past Medical History:   Diagnosis Date   • Abdominal cramping 07/01/2022   • Anemia    • Bloating 07/01/2022   • Bloody diarrhea 07/01/2022   • Chronic back pain    • Colon polyps     FOLLOWED BY DR. BEVERLY MENDEZ   • COVID 08/15/2023   • Cystitis     BURNING WITH URINATION SINCE RADIATION   • Depression    • Early cataract    • Gastric polyps     FOLLOWED BY DR. BEVERLY MENDEZ   • GERD (gastroesophageal reflux disease)    • Hearing loss    • Hiatal hernia 09/2021    3 CM, FOLLOWED BY DR. BEVERLY MENDEZ   • History of chemotherapy     LAST TREATMENT 2/10/2023   • History of depression    • History of radiation therapy    • Hyperlipidemia     NO MEDS   • IBS (irritable bowel syndrome)    • Migraines     HX   • Rectal bleeding 1983    Off and on for years   • Rectal cancer 09/2022    INVASIVE MODERATELY DIFFERENTIATED ADENOCARCINOMA ARISING FROM TRADITIONAL SERRATED ADENOMA WITH HGD   • Sciatica 09/2015   • Tear of medial meniscus of knee 07/2021    RIGHT KNEE   • Vitamin D deficiency         Family History  Family Psychiatric History: Maternal Uncle - alcohol abuse    Family Cancer History: Father - liver cancer, Sister - breast cancer, Father, mother, and x2 sisters - colon polyps    The following portions of the patient's history were reviewed and updated as appropriate: She  has a past medical history of Abdominal cramping (07/01/2022), Anemia, Bloating (07/01/2022), Bloody diarrhea (07/01/2022), Chronic back pain, Colon polyps, COVID (08/15/2023), Cystitis, Depression, Early cataract, Gastric polyps, GERD (gastroesophageal reflux  disease), Hearing loss, Hiatal hernia (09/2021), History of chemotherapy, History of depression, History of radiation therapy, Hyperlipidemia, IBS (irritable bowel syndrome), Migraines, Rectal bleeding (1983), Rectal cancer (09/2022), Sciatica (09/2015), Tear of medial meniscus of knee (07/2021), and Vitamin D deficiency.  She does not have any pertinent problems on file.  She  has a past surgical history that includes Shoulder Manipulation (Left, 01/07/2013); Knee surgery (Left, 1985); Eye surgery (Right, 1967); [place] tooth extraction (Bilateral); Colonoscopy (N/A, 1986); Esophagogastroduodenoscopy (N/A, 09/01/2022); Colonoscopy w/ polypectomy (N/A, 09/01/2022); External ear surgery (1966); Knee arthrodesis (Right, 07/27/2021); transrectal ultrasound (N/A, 09/21/2022); Sigmoidoscopy (N/A, 09/21/2022); Venous Access Device (Port) (Left, 10/26/2022); Sigmoidoscopy (N/A, 03/08/2023); Sigmoidoscopy (N/A, 07/13/2023); Sigmoidoscopy (N/A, 11/06/2023); transrectal ultrasound (N/A, 12/22/2023); Sigmoidoscopy (N/A, 12/22/2023); and Colonoscopy (N/A, 03/27/2024).  Her family history includes Alcohol abuse in her maternal uncle; Angina in her father; Arthritis in her sister; Asthma in her mother; Breast cancer in her sister; COPD in her mother; Cancer in her father and sister; Colon polyps in her father, mother, sister, and sister; Diabetes in her father and sister; Hearing loss in her father; Heart disease in her father; Hepatitis in her father; Kidney disease in her father; Liver cancer in her father; Lung disease in her mother; Migraines in her mother.  She  reports that she has never smoked. She has never been exposed to tobacco smoke. She has never used smokeless tobacco. She reports that she does not currently use alcohol. She reports that she does not use drugs.  Current Outpatient Medications on File Prior to Visit   Medication Sig   • cefdinir (OMNICEF) 300 MG capsule Take 1 capsule by mouth 2 (Two) Times a Day.  COMPLETED   • dicyclomine (BENTYL) 20 MG tablet Take 1 tablet by mouth Every 6 (Six) Hours As Needed (abd cramps).   • diphenoxylate-atropine (LOMOTIL) 2.5-0.025 MG per tablet TAKE ONE TABLET BY MOUTH FOUR TIMES A DAY AS NEEDED FOR DIARRHEA (Patient taking differently: Take 1 tablet by mouth 4 (Four) Times a Day As Needed.)   • ELDERBERRY PO Take 2 tablets by mouth Daily. HOLD PRIOR TO SURG, GUMMIES   • hydrOXYzine (ATARAX) 10 MG tablet Take 1 tablet by mouth Every 8 (Eight) Hours As Needed for Itching.   • loperamide (IMODIUM) 2 MG capsule Take 1 capsule by mouth As Needed for Diarrhea.   • loratadine (CLARITIN) 10 MG tablet Take 1 tablet by mouth As Needed. 1 daily around time of Neulasta OBI   • ondansetron (ZOFRAN) 8 MG tablet Take 1 tablet by mouth 3 (Three) Times a Day As Needed for Nausea or Vomiting.   • pantoprazole (PROTONIX) 40 MG EC tablet Take 1 tablet by mouth Daily. (Patient taking differently: Take 1 tablet by mouth Daily As Needed.)   • predniSONE (DELTASONE) 1 MG tablet Take 1 tablet by mouth Daily. Upper respiratory issues   • traMADol (ULTRAM) 50 MG tablet Take 1 tablet by mouth Every 6 (Six) Hours As Needed for Moderate Pain.     Current Documented Allergies & Reactions  Allergies   Allergen Reactions   • Adhesive Tape Rash     Patient has sensitive skin.    • Levofloxacin Swelling   • Sulfa Antibiotics Rash       Objective   Mental Status Exam  Appearance:  clean and casually dressed, appropriate and neat   Attitude toward clinician:  cooperative and agreeable , good eye contact  Speech:    Rate:  regular rate and rhythm   Volume:  normal  Motor:  no abnormal movements present  Mood: Euthymic  Affect:  Full range, mood congruent  Thought Processes:  linear, logical, and goal directed and associations intact  Thought Content:  normal  Suicidal Thoughts:  absent  Homicidal Thoughts:  absent  Perceptual Disturbance: no perceptual disturbance  Attention and Concentration:  good  Insight and  Judgement:  good  Memory:  memory appears to be intact    Gait: Within normal limits  Assistive devices: None    Lab Review: Patient is followed and treated by her oncology team.  All values are within normal limits.  Her last recorded TSH, free T4, folate, and B12 were May 27, 2022 and were both within normal limits.  Infusion on 05/16/2024   Component Date Value   • Glucose 05/16/2024 92    • BUN 05/16/2024 12    • Creatinine 05/16/2024 0.68    • Sodium 05/16/2024 140    • Potassium 05/16/2024 4.2    • Chloride 05/16/2024 105    • CO2 05/16/2024 25.8    • Calcium 05/16/2024 9.6    • Total Protein 05/16/2024 6.9    • Albumin 05/16/2024 4.3    • ALT (SGPT) 05/16/2024 15    • AST (SGOT) 05/16/2024 20    • Alkaline Phosphatase 05/16/2024 83    • Total Bilirubin 05/16/2024 0.5    • Globulin 05/16/2024 2.6    • A/G Ratio 05/16/2024 1.7    • BUN/Creatinine Ratio 05/16/2024 17.6    • Anion Gap 05/16/2024 9.2    • eGFR 05/16/2024 97.4    • CEA 05/16/2024 1.44    • WBC 05/16/2024 4.64    • RBC 05/16/2024 4.38    • Hemoglobin 05/16/2024 12.7    • Hematocrit 05/16/2024 38.4    • MCV 05/16/2024 87.7    • MCH 05/16/2024 29.0    • MCHC 05/16/2024 33.1    • RDW 05/16/2024 13.4    • RDW-SD 05/16/2024 43.3    • MPV 05/16/2024 9.8    • Platelets 05/16/2024 237    • Neutrophil % 05/16/2024 56.2    • Lymphocyte % 05/16/2024 31.0    • Monocyte % 05/16/2024 7.5    • Eosinophil % 05/16/2024 4.5    • Basophil % 05/16/2024 0.4    • Immature Grans % 05/16/2024 0.4    • Neutrophils, Absolute 05/16/2024 2.60    • Lymphocytes, Absolute 05/16/2024 1.44    • Monocytes, Absolute 05/16/2024 0.35    • Eosinophils, Absolute 05/16/2024 0.21    • Basophils, Absolute 05/16/2024 0.02    • Immature Grans, Absolute 05/16/2024 0.02    • nRBC 05/16/2024 0.0        Medications Reviewed:  Current Psychotropic Medications:  She is not currently taking any psychotropic medications.    Plan of Care  1) Discussed medications and treatment options through shared  decision making. She is not interested in taking any medications for her mood or for anxiety at this time, although several options were discussed with her. She will keep this in mind for the future. The patient would like to focus on improving her sleep quality and quantity. She has tried advil pm and melatonin in the past. The advil pm works off and on. We discussed the use of Gabapentin to help with her pain, anxiety, and sleep. However, she is not interested in taking this medication. She would like to do a low dose trial of Trazodone. She denies any issues with her mood, including anxiety or depression. She admits to situational mood fluctuations, but feels this is tolerable, situational, and a normal response to her situation.  2) We discussed taking Trazodone 50 mg, starting at 1/2 tablet and increasing by 1/2 tablet as needed, up to 2 tablets (100 mg nightly), to gauge tolerability, effectiveness, and to avoid seratogenic effects. We discussed risks, benefits, and side effects of the medication and how to take it appropriately.  3) She is scheduled to see YAMILEX Lopez, next Tuesday for survivorship needs and was encouraged to keep this appointment to help with her role transition. We also discussed that YAMILEX Kaur, also provides CBT-I, and was encouraged to discuss this with her, since she continues to have ongoing issues with her sleep.   4) Although she only scored a 2 on her distress scale, she did admit to some ongoing financial strain, wanting to connect in her community to share her story and be a source of support to others as they go through cancer treatment, and inquiring how to get involved. We discussed friends for [employer] and [employer] and discussed what these resources entailed. Will send a  referral to see how they might be able to help as well.  5) She may also benefit from a nurse navigator referral since she will continue to have ongoing care over the next 2-3  years, per her report. Will send a referral to nurse navigation for this reason.  6) Provided supportive talk therapy through active listening, empathy, reflection, normalization of feelings, and positive reframing. Discussed current methods of coping. Encouraged talk therapy with this provider or a trusted source of support. Given resources for [employer] and [employer] for additional support. Will provide reference material in the after visit summary patient instructions:  [employer]    https://[email or web address]/    [employer]  [email or web address]/    7) Notify provider if having any side effects, issues, or concerns      - Follow up with YAMILEX Sagastume 2-4 weeks and as needed    Diagnoses and all orders for this visit:    1. Insomnia due to medical condition (Primary)  -     traZODone (DESYREL) 50 MG tablet; Initially, take 1/2 to 1 tablet by mouth at bedtime. If no effect, increase by 1/2 tablet per night, up to 2 tablets per night (100 mg). If causing drowsiness in the AM, decrease by half a tablet and/or take the medication sooner, between dinner and bedtime.  Dispense: 45 tablet; Refill: 2    2. Grief reaction    3. Rectal cancer  -     Ambulatory Referral to Oncology Nurse Navigator  -     Ambulatory Referral to ONC Social Work    - R/O major depressive disorder  - Rule out generalized anxiety disorder

## 2024-05-16 NOTE — PROGRESS NOTES
"New psychiatric evaluation    The primary office location is [facility]. The provider is seeing the patient in person for this visit.    Chief Complaint: Overwhelmed, waiting for the other shoe to drop, and trying to figure out new role as a cancer survivor, and grieving losses of loved ones    Subjective  Patient ID: Babs Felton is a 63 y.o. female who presents for initial consultation through the [facility] at the request of YAMILEX Dobson, who is following her for rectal cancer.       HPI: This is an initial evaluation of anxiety, mood, and psychotropic medication management. The patient reports that she has been on quite a cancer journey over the last 18 months. She admits to feeling she has been on an \"emotional roller coaster,\" mainly in context with her diagnostic studies, sometimes having good results and other times surprisingly bad results, but then later good again. She admits that this has caused some anticipatory anxiety related to having reoccurring testing every 3 months and wondering when the other \"shoe is going to drop.\"  However, she does feel that this is situational and appropriate for her circumstances. She has also been grappling with acceptance of her new sense of normal through radical acceptance. She has come to embrace her circumstances and has found healing through bonding with others going through similar circumstances.     She endorses some ongoing issues with poor sleep, resulting in lack of energy the following day. 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 She is also in a survivorship transition and has a deep desire to connect with her community on a more personal level, sharing her story and being a source of support to others going through similar circumstances.  She expresses having a desire to " "share with people that they are not alone and that they belong, but is unsure of how to make this happen. Additionally, May has been a tough time of year for her due to it being the month of her mother's birthday and Mother's Day.  She has also lost her father and has been grieving his loss, having been very close with him. She discusses and express an appropriate and manageable grief response with moments of sadness, but also reflecting on fond memories and continuing to live her life in a productive and healthy manner.    She reports no issues with sleep initiation, but has issues with fragmentation and poor quality overall. She continues to have interest and motivation in things she previously enjoyed. She does have some decreased energy. However, because she is a performer, she knows how to \"flip the switch\" to push through what she needs to and when she needs to. She is able to focus and concentrate without any difficulty. Her appetite fluctuates. She feels her mood is \"good\" overall. She admits to situational anxiety, but feels this is manageable and appropriate. She any past or present passive or active SI, plan or intent, self-harming behaviors, HI, or AVH. She denies any past formal mental health history other than some brief counseling after her father passed away.  She denies any inpatient mental health treatment or psychotropic medication trials.  She feels she has wonderful support with several close friends.    Records were reviewed.    PHQ-9 Total Score: 3  Reports a score of \"0\" to question 9.    GAD7 Total Score: 0    Social History  Marital Status: single  Children: 0  Support Community: Siblings and coworkers, and friends  Career: continues to work as a performer at [employer]  Tobacco Use: The patient denies current or previous tobacco use.  Alcohol Use: does not drink  Marijuana/ Other drug Use: denied.  Spiritual: Alevism - strong belief and value    Medical " History  Psychiatric History: None    Medical History:   Past Medical History:   Diagnosis Date    Abdominal cramping 07/01/2022    Anemia     Bloating 07/01/2022    Bloody diarrhea 07/01/2022    Chronic back pain     Colon polyps     FOLLOWED BY DR. BEVERLY MENDEZ    COVID 08/15/2023    Cystitis     BURNING WITH URINATION SINCE RADIATION    Depression     Early cataract     Gastric polyps     FOLLOWED BY DR. BEVERLY MENDEZ    GERD (gastroesophageal reflux disease)     Hearing loss     Hiatal hernia 09/2021    3 CM, FOLLOWED BY DR. BEVERLY MENDEZ    History of chemotherapy     LAST TREATMENT 2/10/2023    History of depression     History of radiation therapy     Hyperlipidemia     NO MEDS    IBS (irritable bowel syndrome)     Migraines     HX    Rectal bleeding 1983    Off and on for years    Rectal cancer 09/2022    INVASIVE MODERATELY DIFFERENTIATED ADENOCARCINOMA ARISING FROM TRADITIONAL SERRATED ADENOMA WITH HGD    Sciatica 09/2015    Tear of medial meniscus of knee 07/2021    RIGHT KNEE    Vitamin D deficiency         Family History  Family Psychiatric History: Maternal Uncle - alcohol abuse    Family Cancer History: Father - liver cancer, Sister - breast cancer, Father, mother, and x2 sisters - colon polyps    The following portions of the patient's history were reviewed and updated as appropriate: She  has a past medical history of Abdominal cramping (07/01/2022), Anemia, Bloating (07/01/2022), Bloody diarrhea (07/01/2022), Chronic back pain, Colon polyps, COVID (08/15/2023), Cystitis, Depression, Early cataract, Gastric polyps, GERD (gastroesophageal reflux disease), Hearing loss, Hiatal hernia (09/2021), History of chemotherapy, History of depression, History of radiation therapy, Hyperlipidemia, IBS (irritable bowel syndrome), Migraines, Rectal bleeding (1983), Rectal cancer (09/2022), Sciatica (09/2015), Tear of medial meniscus of knee (07/2021), and Vitamin D deficiency.  She does not have any  pertinent problems on file.  She  has a past surgical history that includes Shoulder Manipulation (Left, 01/07/2013); Knee surgery (Left, 1985); Eye surgery (Right, 1967); [place] tooth extraction (Bilateral); Colonoscopy (N/A, 1986); Esophagogastroduodenoscopy (N/A, 09/01/2022); Colonoscopy w/ polypectomy (N/A, 09/01/2022); External ear surgery (1966); Knee arthrodesis (Right, 07/27/2021); transrectal ultrasound (N/A, 09/21/2022); Sigmoidoscopy (N/A, 09/21/2022); Venous Access Device (Port) (Left, 10/26/2022); Sigmoidoscopy (N/A, 03/08/2023); Sigmoidoscopy (N/A, 07/13/2023); Sigmoidoscopy (N/A, 11/06/2023); transrectal ultrasound (N/A, 12/22/2023); Sigmoidoscopy (N/A, 12/22/2023); and Colonoscopy (N/A, 03/27/2024).  Her family history includes Alcohol abuse in her maternal uncle; Angina in her father; Arthritis in her sister; Asthma in her mother; Breast cancer in her sister; COPD in her mother; Cancer in her father and sister; Colon polyps in her father, mother, sister, and sister; Diabetes in her father and sister; Hearing loss in her father; Heart disease in her father; Hepatitis in her father; Kidney disease in her father; Liver cancer in her father; Lung disease in her mother; Migraines in her mother.  She  reports that she has never smoked. She has never been exposed to tobacco smoke. She has never used smokeless tobacco. She reports that she does not currently use alcohol. She reports that she does not use drugs.  Current Outpatient Medications on File Prior to Visit   Medication Sig    cefdinir (OMNICEF) 300 MG capsule Take 1 capsule by mouth 2 (Two) Times a Day. COMPLETED    dicyclomine (BENTYL) 20 MG tablet Take 1 tablet by mouth Every 6 (Six) Hours As Needed (abd cramps).    diphenoxylate-atropine (LOMOTIL) 2.5-0.025 MG per tablet TAKE ONE TABLET BY MOUTH FOUR TIMES A DAY AS NEEDED FOR DIARRHEA (Patient taking differently: Take 1 tablet by mouth 4 (Four) Times a Day As Needed.)    ELDERBERRY PO Take 2  tablets by mouth Daily. HOLD PRIOR TO SURG, GUMMIES    hydrOXYzine (ATARAX) 10 MG tablet Take 1 tablet by mouth Every 8 (Eight) Hours As Needed for Itching.    loperamide (IMODIUM) 2 MG capsule Take 1 capsule by mouth As Needed for Diarrhea.    loratadine (CLARITIN) 10 MG tablet Take 1 tablet by mouth As Needed. 1 daily around time of Neulasta OBI    ondansetron (ZOFRAN) 8 MG tablet Take 1 tablet by mouth 3 (Three) Times a Day As Needed for Nausea or Vomiting.    pantoprazole (PROTONIX) 40 MG EC tablet Take 1 tablet by mouth Daily. (Patient taking differently: Take 1 tablet by mouth Daily As Needed.)    predniSONE (DELTASONE) 1 MG tablet Take 1 tablet by mouth Daily. Upper respiratory issues    traMADol (ULTRAM) 50 MG tablet Take 1 tablet by mouth Every 6 (Six) Hours As Needed for Moderate Pain.     Current Documented Allergies & Reactions  Allergies   Allergen Reactions    Adhesive Tape Rash     Patient has sensitive skin.     Levofloxacin Swelling    Sulfa Antibiotics Rash       Objective   Mental Status Exam  Appearance:  clean and casually dressed, appropriate and neat   Attitude toward clinician:  cooperative and agreeable , good eye contact  Speech:    Rate:  regular rate and rhythm   Volume:  normal  Motor:  no abnormal movements present  Mood: Euthymic  Affect:  Full range, mood congruent  Thought Processes:  linear, logical, and goal directed and associations intact  Thought Content:  normal  Suicidal Thoughts:  absent  Homicidal Thoughts:  absent  Perceptual Disturbance: no perceptual disturbance  Attention and Concentration:  good  Insight and Judgement:  good  Memory:  memory appears to be intact    Gait: Within normal limits  Assistive devices: None    Lab Review: Patient is followed and treated by her oncology team.  All values are within normal limits.  Her last recorded TSH, free T4, folate, and B12 were May 27, 2022 and were both within normal limits.  Infusion on 05/16/2024   Component Date Value     Glucose 05/16/2024 92     BUN 05/16/2024 12     Creatinine 05/16/2024 0.68     Sodium 05/16/2024 140     Potassium 05/16/2024 4.2     Chloride 05/16/2024 105     CO2 05/16/2024 25.8     Calcium 05/16/2024 9.6     Total Protein 05/16/2024 6.9     Albumin 05/16/2024 4.3     ALT (SGPT) 05/16/2024 15     AST (SGOT) 05/16/2024 20     Alkaline Phosphatase 05/16/2024 83     Total Bilirubin 05/16/2024 0.5     Globulin 05/16/2024 2.6     A/G Ratio 05/16/2024 1.7     BUN/Creatinine Ratio 05/16/2024 17.6     Anion Gap 05/16/2024 9.2     eGFR 05/16/2024 97.4     CEA 05/16/2024 1.44     WBC 05/16/2024 4.64     RBC 05/16/2024 4.38     Hemoglobin 05/16/2024 12.7     Hematocrit 05/16/2024 38.4     MCV 05/16/2024 87.7     MCH 05/16/2024 29.0     MCHC 05/16/2024 33.1     RDW 05/16/2024 13.4     RDW-SD 05/16/2024 43.3     MPV 05/16/2024 9.8     Platelets 05/16/2024 237     Neutrophil % 05/16/2024 56.2     Lymphocyte % 05/16/2024 31.0     Monocyte % 05/16/2024 7.5     Eosinophil % 05/16/2024 4.5     Basophil % 05/16/2024 0.4     Immature Grans % 05/16/2024 0.4     Neutrophils, Absolute 05/16/2024 2.60     Lymphocytes, Absolute 05/16/2024 1.44     Monocytes, Absolute 05/16/2024 0.35     Eosinophils, Absolute 05/16/2024 0.21     Basophils, Absolute 05/16/2024 0.02     Immature Grans, Absolute 05/16/2024 0.02     nRBC 05/16/2024 0.0        Medications Reviewed:  Current Psychotropic Medications:  She is not currently taking any psychotropic medications.    Plan of Care  1) Discussed medications and treatment options through shared decision making. She is not interested in taking any medications for her mood or for anxiety at this time, although several options were discussed with her. She will keep this in mind for the future. The patient would like to focus on improving her sleep quality and quantity. She has tried advil pm and melatonin in the past. The advil pm works off and on. We discussed the use of Gabapentin to help with her  pain, anxiety, and sleep. However, she is not interested in taking this medication.  She would like to do a low dose trial of Trazodone. She denies any issues with her mood, including anxiety or depression. She admits to situational mood fluctuations, but feels this is tolerable, situational, and a normal response to her situation.  2) We discussed taking Trazodone 50 mg, starting at 1/2 tablet and increasing by 1/2 tablet as needed, up to 2 tablets (100 mg nightly), to gauge tolerability, effectiveness, and to avoid seratogenic effects. We discussed risks, benefits, and side effects of the medication and how to take it appropriately.  3) She is scheduled to see YAMILEX Lopez, next Tuesday for survivorship needs and was encouraged to keep this appointment to help with her role transition. We also discussed that YAMILEX Kaur, also provides CBT-I, and was encouraged to discuss this with her, since she continues to have ongoing issues with her sleep.   4) Although she only scored a 2 on her distress scale, she did admit to some ongoing financial strain, wanting to connect in her community to share her story and be a source of support to others as they go through cancer treatment, and inquiring how to get involved. We discussed friends for life and [employer]'s eshtery and discussed what these resources entailed. Will send a  referral to see how they might be able to help as well.  5) She may also benefit from a nurse navigator referral since she will continue to have ongoing care over the next 2-3 years, per her report. Will send a referral to nurse navigation for this reason.  6) Provided supportive talk therapy through active listening, empathy, reflection, normalization of feelings, and positive reframing. Discussed current methods of coping. Encouraged talk therapy with this provider or a trusted source of support. Given resources for [employer] and Friend's for Life for additional support. Will  provide reference material in the after visit summary patient instructions:  [employer]    https://www.gck.org/    Friend's for Life  https://www.glkuxm6vyrz.[email or web address]    7) Notify provider if having any side effects, issues, or concerns      - Follow up with YAMILEX Sagastume 2-4 weeks and as needed    Diagnoses and all orders for this visit:    1. Insomnia due to medical condition (Primary)  -     traZODone (DESYREL) 50 MG tablet; Initially, take 1/2 to 1 tablet by mouth at bedtime. If no effect, increase by 1/2 tablet per night, up to 2 tablets per night (100 mg). If causing drowsiness in the AM, decrease by half a tablet and/or take the medication sooner, between dinner and bedtime.  Dispense: 45 tablet; Refill: 2    2. Grief reaction    3. Rectal cancer  -     Ambulatory Referral to Oncology Nurse Navigator  -     Ambulatory Referral to ONC Social Work      - R/O major depressive disorder  - Rule out generalized anxiety disorder

## 2024-05-17 RX ORDER — TRAZODONE HYDROCHLORIDE 50 MG/1
TABLET ORAL
Qty: 45 TABLET | Refills: 2 | Status: SHIPPED | OUTPATIENT
Start: 2024-05-17

## 2024-05-17 NOTE — PATIENT INSTRUCTIONS
[facility]  [address]  [place], [place]   [phone]    Please see below and attached for additional resources:  Group, individual (for the patient and family members), and family therapy    [employer]ela[employer]    https://www.[employer].org/    Phone number: [phone]    Support for patients: matched with someone with a similar diagnosis and in survivorship    Friend's for Life  https://www.[email or web address]/    Self-referrals for wanting a primary care doctor or a specialist  PCP [phone]  Specialist [phone]  Bereavement Support Groups in the [place] Area  Grief Share: https://www.griefshare.org/countries/[place]/[facility]/[place]/[facility]/[place]  *If you go to the website, you can click on the specific group date and it will give additional info about the groups*  Therapy resources  Chronic Illness counseling center  www.[facility]  [address] , [address]  [place] [address]  [facility]: phone # [phone]    Two Locations:    [address]  [place] [address]      [address], [address]  [place] [address]  FREE Massages  Patients are allowed some massages for FREE through the supportive oncology department. Massages at the cancer center and are done by Charlene. She does her own scheduling.  Office:  [phone]    Mobile:  [phone]  FREE exercise program  Free exercise program [employer] program through the [employer]:  https://www.[employer].org/what-we-do/healthy-living/fitness/livestrong  Chiropractic services ([facility])  [facility]  [phone]  Accupuncture  Dr. Lavinia Guy does Accupuncture  [facility]  [address] [place] [address] [phone] phone  [phone] fax    Suicide / Crisis Hotline  Call: 988    OR   Go online and use their text / chat function - 988 Suicide & Crisis Lifeline - Call. Text. Chat.  (988Incanthera.org)  Unified Protocol Safety Plan lesson  National Suicide Hotline Number (612-312-0501)  National safety text line (Text HOME to 465561)    [facility] Alcohol / Substance Use and Dual Diagnosis Management  [address] [place] [address], [place], [address]  [phone]  OR (728) [phone] https://edulio/locations-and-payment/  Domestic Violence  0(497) 726-6670 (SAFE)                            www.kcadv.org   Sexual Assault Crisis Centers (for children and adults)  0(627) 650-9942 (HOPE)  Genesight testing  https://[employer]/gene-test-mental-health-medications/?utm_source=anmol&utm_medium=cpc&utm_campaign=760737456&utm_content=5296155980615561&utm_term=genesight%20testing&utm_source=anmol&utm_medium=cpc&utm_campaign=branded&utm_content=9162531446758131&utm_term=genesight%20testing&wtjpxbb=[id number]

## 2024-05-20 ENCOUNTER — PATIENT OUTREACH (OUTPATIENT)
Dept: OTHER | Facility: HOSPITAL | Age: 64
End: 2024-05-20
Payer: COMMERCIAL

## 2024-05-21 ENCOUNTER — OFFICE VISIT (OUTPATIENT)
Dept: OTHER | Facility: HOSPITAL | Age: 64
End: 2024-05-21
Payer: COMMERCIAL

## 2024-05-21 VITALS
HEART RATE: 79 BPM | DIASTOLIC BLOOD PRESSURE: 79 MMHG | WEIGHT: 240.1 LBS | RESPIRATION RATE: 16 BRPM | OXYGEN SATURATION: 97 % | SYSTOLIC BLOOD PRESSURE: 131 MMHG | BODY MASS INDEX: 35.46 KG/M2

## 2024-05-21 DIAGNOSIS — C20 RECTAL CANCER: Primary | ICD-10-CM

## 2024-05-21 DIAGNOSIS — F51.01 PRIMARY INSOMNIA: ICD-10-CM

## 2024-05-21 PROCEDURE — G0463 HOSPITAL OUTPT CLINIC VISIT: HCPCS | Performed by: NURSE PRACTITIONER

## 2024-05-21 NOTE — LETTER
May 21, 2024       No Recipients    Patient: Babs Felton   YOB: 1960   Date of Visit: 5/21/2024       Dear Jacques Freeman MD,    Thank you for referring Babs Felton to me for evaluation. Below is a copy of my consult note.    If you have questions, please do not hesitate to call me. I look forward to following Babs along with you.         Sincerely,        YAMILEX Reyna        CC:   No Recipients    [facility] MULTIDISCIPLINARY CLINIC   IN CLINIC Initial Visit  Survivorship Care Plan Treatment Summary      Babs Felton is a pleasant 63 y.o. female being followed by Tristan Weber MD for rectal cancer. Reviewed today in Multidisciplinary Clinic, for initial Survivorship Care Plan Treatment Summary    HPI  63 year old patient who is status post nonoperative management for her locally advanced adenocarcinoma of the rectum.  She received 5 fractions of radiation completing this in October 2022.  She then went on to complete chemotherapy in February 2023.  She continues to have no evidence of disease with colonoscopy in March 2024 without concerning findings    She continues to have intermittent bouts of diarrhea or postprandial fecal urgency continues to learn which foods are tolerable and which to avoid.      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  Very hard to pinpoint a regular schedule.      Insomnia symptoms she thinks began around 2009 when traveling frequently to [place] to visit mom when she was in poor health.  Worsened again in 20 15-20 16 during caregiving activities for dad.  Typically reports sleeping 2 to 3 hours before waking up typically up for 30 minutes or more and is able to go back to sleep for another hour or 2 after the emesis.  She did meet with YAMILEX Paulino who initiated trazodone.  The patient took the trazodone the last 2 nights.  She reports her first sleep was  extended to 3-1/2 to 4 hours but she still woke up and was up for the typical amount of time with the same difficulties getting back to sleep.    Distress: 0  PHQ-9: 2 1-4 (minimal depression sx)  PENNY-7: 0 0-4 (minimal anxiety sx)    TREATMENT HISTORY:     Oncology/Hematology History Overview Note   CEA  2/22/24 1.58  11/29/23: 1.59  9/6/23: 1.65  6/14/23: 1.87  3/22/23: 1.31  2/8/23: 1.54  9/8/22: 3.51     Rectal cancer   9/1/2022 Biopsy    Final Diagnosis   1. Small Bowel, Biopsy:                A. Small bowel mucosa with a normal villous architecture and no significant histopathologic changes.     2. Stomach, Polyp, Biopsy:                A. Fundic gland polyps.               B. Negative for dysplasia.     3. Esophagus, Mid, Biopsy:                A. Squamous papilloma.     4. Esophagus, Proximal, Biopsy:                A. Squamous papilloma with increased intraepithelial eosinophils (see comment).               B. Separate fragment of gastric oxyntic mucosa with no significant histopathologic changes.     5. Cecum, Polyp, Polypectomy:               A. Tubular adenoma.     6. Ascending Colon, Polyp, Polypectomy:               A. Tubular adenoma.     7. Right Colon, Random, Biopsy:   A. Colonic mucosa with no significant histopathologic changes.               B. No histologic evidence of microscopic colitis.      8. Left Colon, Random, Biopsy:   A. Colonic mucosa with no significant histopathologic changes.               B. No histologic evidence of microscopic colitis.      9. Rectum, Polyp, Polypectomy:               A. INVASIVE MODERATELY DIFFERENTIATED ADENOCARCINOMA ARISING OUT OF A       TRADITIONAL SERRATED ADENOMA WITH HIGH GRADE DYSPLASIA.           9/13/2022 Imaging    Exam: CT of the chest, abdomen, and pelvis with contrast.     HISTORY: 61-year-old premalignant polyps and adenocarcinoma of the  rectum on recent colonoscopy 09/01/2022.     TECHNIQUE: Radiation dose reduction techniques were utilized,  including  automated exposure control and exposure modulation based on body size.   3 mm images were obtained through the chest, abdomen, and pelvis after  the administration of IV contrast.      COMPARISON: None     FINDINGS:  Thoracic inlet: Within normal limits     Heart and great vessels: The heart size is within normal limits.  Atherosclerosis including coronary calcifications. The study is not  optimized for vascular evaluation however, normal enhancement is present  centrally     Lymphatics: Subcentimeter mediastinal nodes some of which are coarsely  calcified suggesting prior granulomatous disease     Lung parenchyma and pleural space: Patent central airway. No focal  consolidations, effusions, or pneumothorax. Calcified granulomas. Sub-6  mm micronodules with an index nodule in the right middle lobe (series 3  image 49 and 54), right lower lobe (image images 70/71), left lower lobe  (image 80).           Abdomen/pelvis:     Liver: Infiltration of the liver.     Biliary tract: Within normal limits.     Spleen: Within normal limits.     Pancreas: Within normal limits.     Adrenals: Within normal limits.     Kidneys: Too centimeter hypodensity in the right upper pole favoring a  cyst. Left kidney within normal limits. No hydronephrosis.     Bowel:  No bowel loops are normal in caliber. Normal appendix. The colon  is stool-filled somewhat limiting evaluation. Scattered colonic  diverticula without acute diverticulitis. Thickening of the rectum which  is incompletely distended limiting evaluation for known rectal neoplasm.     Peritoneum: No free fluid or free air.     Vasculature:    Minimal calcific atherosclerosis.     Lymph Nodes:  Scattered subcentimeter abdominal and pelvic nodes, not  enlarged by CT criteria. An index node at the pelvic brim measuring up  to 4 mm in short axis.                                                            Pelvic organs: Within normal limits.     Abdominal/Pelvic Wall: Tiny  fat-containing umbilical hernia.     Bones: Multilevel degenerative changes thoracolumbar spine and pelvis.  Probable hemangioma T10 vertebral body..     IMPRESSION:  1.  Rectal neoplasm not well visualized on this exam, please refer to  the prior colonoscopy for further detail and further evaluation with MRI  or PET/CT could be considered if clinically indicated.  2.  Calcified and noncalcified sub-6 mm pulmonary nodules likely the  sequela of prior granulomatous disease however, given history recommend  6 month surveillance.  3.  Scattered colonic diverticulosis.  4.  Mildly prominent but not pathologically enlarged pelvic nodes  measuring up to 4 mm.  5.  Please see above for additional findings.        9/16/2022 Initial Diagnosis    Rectal cancer (HCC)     9/26/2022 Imaging    MRI PELVIS WITHOUT CONTRAST/RECTAL MRI     HISTORY: 61-year-old female with rectal cancer. Staging.     TECHNIQUE: Routine staging rectum MRI was performed without contrast.  Compared with CT of the abdomen and pelvis 09/13/2022.     FINDINGS: There is an approximately 4 cm mass with the inferior margin  of the mass 9 cm proximal to the anorectal junction. The mass effaces  the muscularis propria along the right wall of the rectum and at the  posterior wall, there is extension of the mass just beyond the  muscularis propria, less than 5 mm. There is no evidence for extension  of the tumor beyond the muscularis propria at the anterior aspect of the  mass. There are no pathologically enlarged perirectal nodes. There are  shotty nodes superiorly at the mesorectum in the presacral space with  one of the largest nodes measuring 0.7 x 0.5 cm. The smallest distance  between the mass and the mesorectal fascia is 0.7 cm and this is along  the right posterior margin of the mass.     IMPRESSION:  High rectal mass with effacement of the muscularis propria  along the right wall and extension beyond the muscularis propria at the  posterior wall, T3  lesion. There is no evidence for involvement of the  mesorectal fascia. There are no pathologically enlarged nodes, but the  high mesorectal nodes are indeterminate.     10/12/2022 Biopsy    Guardant 360: biomarkers identified with no associated FDA approved therapies     10/17/2022 - 10/21/2022 Radiation    Radiation OncologyTreatment Course:  Babs Felton received 2500 cGy in 5 fractions to rectum     11/2/2022 - 2/8/2023 Chemotherapy    OP COLON [id number] OXALIplatin / Leucovorin / Fluorouracil  1/11/2023: Cycle 6 5-FU/LV.  Oxaliplatin dropped due to neuropathy.  2/8/2023: Cycle 8 5-FU     1/11/2023 Imaging    MRI OF THE PELVIS WITHOUT CONTRAST.     HISTORY: Restaging rectal adenocarcinoma (T3 N0 M0, stage IIa) status  post chemotherapy.     TECHNIQUE: MRI of the pelvis according to a protocol tailored for  evaluation of the rectum without IV contrast. Lack of contrast limits  evaluation of solid, visceral, and vascular structures. Sensitivity for  underlying lesions and infection decreased. 5 sequences submitted.     COMPARISON: 09/26/2022     FINDINGS:     Significant decrease in size of the previously described 4 cm rectal  mass with residual 2.7 cm with residual bowel wall thickening measuring  up to 1.3 cm to the right of midline in low T2 signal along the right  lateral wall extending into the subjacent mesentery suggesting  posttreatment change/fibrosis (series 6 image 19). Along the inferior  margin of this mass there is focal stenosis without obstruction.  Thickening of the mid to distal rectum which may be the sequela of  posttreatment change/proctitis.     A previously measured presacral node is smaller at 0.4 x 0.5 cm  (previously 0.5 x 0.7 cm). Stable 3 mm right perirectal node Uterus in  situ. Small fat-containing umbilical hernia. Noncontrast imaging of the  osseous structures within normal limits. Hypertrophic degenerative  changes are again noted in thoracolumbar spine. Bone scan could  be  considered for further evaluation if clinically indicated.     IMPRESSION:  1.  Good response to therapy with near complete resolution of the  previously described rectal mass with residual rectal thickening and low  T2 signal/fibrosis at the site of primary neoplasm.  2.  Pelvic nodes are smaller or unchanged.  3.  Please see above for additional findings.        3/8/2023 Biopsy    Final Diagnosis   1. Rectum, Biopsy:               A. Colorectal mucosa with mild increase in lamina propria chronic inflammation and focal scarring.               B. Negative for carcinoma.         3/22/2023 Biopsy    Guardant Reveal: ctDNA not detected     4/4/2023 Imaging    Examination: CT of the chest, abdomen, and pelvis with contrast     TECHNIQUE: CT of the chest, abdomen, and pelvis after the uneventful  administration of nonionic intravenous contrast per protocol. Radiation  dose reduction techniques were utilized, including automated exposure  control and exposure modulation based on body size.     HISTORY:Rectal cancer     COMPARISON:09/13/2022     FINDINGS:Chest:     Old granulomatous disease is seen in the lungs. There is dependent  atelectasis. There are stable small pulmonary nodules, for example in  the right lower lobe on series 3, images 57 and 71. No consolidation,  pleural effusion, new or suspicious pulmonary nodule or mass are seen.  The central airways are patent. There is a small right-sided  fat-containing Bochdalek hernia.     No enlarged supraclavicular, axillary, mediastinal, or hilar lymph nodes  are seen. The mediastinal vasculature is normal in caliber. A left  internal jugular venous Port-A-Cath terminates in the superior vena  cava. Coronary calcifications are seen. The heart is normal in size and  configuration, without pericardial effusion.     Abdomen and pelvis:     A low-attenuation right renal lesion is technically indeterminate,  likely cyst.     The liver, gallbladder, spleen, adrenal glands,  left kidney, pancreas,  stomach, small bowel, large bowel, urinary bladder, uterus, adnexal  structures, and abdominal vasculature are normal. There is perirectal  stranding with asymmetric rectal thickening measuring 1.3 cm tc width on  series 2, image 201. No intraperitoneal fluid collection or free gas are  seen. No enlarged lymph nodes are demonstrated.     Bone windows demonstrate degenerative changes, without suspicious  osseous lesion seen.     IMPRESSION:  1. Stable tiny pulmonary nodules, very likely benign. Recommend  attention on follow-up  2. Asymmetric rectal thickening, possibly reflecting the patient's  rectal neoplasm, difficult to compare because of differences in rectal  distention  3. Incidental findings as above, without convincing evidence of  metastatic disease.     6/7/2023 Imaging    Examination: MRI of the pelvis without and with contrast     TECHNIQUE: MRI of the pelvis was obtained before and after the  uneventful administration of gadolinium base contrast according to the  rectal cancer protocol     HISTORY: Rectal cancer     COMPARISON: 01/07/2023     FINDINGS: There is near-complete resolution of the previously seen  rectal mass, with minimal irregularity around series 10, image 9  measuring 1.3 x 1.5 cm. This region is located approximately 6 cm from  the anorectal junction and 11 cm from the anal verge. It is above the  peritoneal reflection and levator musculature. There is minimal  stranding posterior to this region, stable. No enlarged lymph nodes are  seen. No suspicious osseous lesions are demonstrated. Visualized  portions of the small bowel, urinary bladder, uterus, and abdominal  vasculature are normal.     IMPRESSION:  Near complete response to therapy of the rectal mass.     12/7/2023 Imaging    CT CHEST W CONTRAST DIAGNOSTIC-, CT ABDOMEN W CONTRAST-     INDICATIONS: Rectal cancer, follow-up radiation dose reduction  techniques were utilized, including automated exposure  control and  exposure modulation based on body size.     TECHNIQUE: Enhanced CT of the chest, abdomen, pelvis     COMPARISON: 4/4/2023     FINDINGS:     Chest CT:     The heart size is normal without pericardial effusion. Mild coronary  arterial calcification is present. Left chest port extends to the  superior vena cava. A few small subcentimeter short axis mediastinal  lymph nodes are seen that are not significant by size criteria.     The airways appear clear.     No pleural effusion or pneumothorax.     The lungs show no focal pulmonary consolidation or mass. Small right  lower lobe pulmonary nodules are again demonstrated and appear stable,  for example axial image 58-61, 74. Stable small lingular nodule, image  68, and small left lower lobe nodules, image 69, 82. Old granulomatous  disease is present.        Abdomen pelvis CT:     Right renal cyst is present.     Fatty infiltration of the pancreatic head is seen.     Otherwise unremarkable appearance of the liver, gallbladder, spleen,  adrenal glands, pancreas, kidneys, bladder.     No bowel obstruction. Previously noted asymmetric wall thickening of the  rectum is less conspicuous. The appendix does not appear inflamed.        No free intraperitoneal gas or free fluid. Minimal umbilical hernia of  fat is noted.     Scattered small mesenteric and para-aortic lymph nodes are seen that are  not significant by size criteria.     Abdominal aorta is not aneurysmal. Aortic and other arterial  calcifications are present.     Degenerative and chronic changes are seen in the spine. No acute  fracture is identified.           IMPRESSION:     Previously noted asymmetric wall thickening of the rectum is less  conspicuous. Otherwise, no significant change. Continued follow-up  advised as indicated.     12/8/2023 Imaging    EXAMINATION: MRI OF THE PELVIS WITHOUT AND WITH CONTRAST     HISTORY: 62-year-old female with a diagnosis of rectal carcinoma status  post therapy  undergoing follow-up evaluation.     TECHNIQUE: Multiplanar and multisequence images of the pelvis were  obtained pre and postintravenous administration of gadolinium.     COMPARISON: MRI of the pelvis without and with contrast, 06/07/2023, MRI  of the pelvis without contrast, 01/07/2023 and 09/26/2022, and CT of the  abdomen and pelvis with contrast, 12/07/2023.     FINDINGS: Seen on the sagittal postcontrast images on series 13, images  42 through 46 near the junction of the mid to high rectum with the  inferior margin on the order of 11.9 cm from the anal verge and 7.6 cm  from the anorectal junction, there is an area of irregular  C-shaped/semicircumferential mass-like thickening of the rectum that is  centered at the 4 o'clock position on the axial postcontrast images,  series 11 images 42 through 46. This area is located on the order of 3  cm superior to the anterior peritoneal reflection and measures on the  order of 2.1 cm in the superior to inferior dimension, 2.6 cm in the  medial to lateral dimension, and 2.3 cm in anterior to posterior  dimension with a semicircumferential appearance. The area appears  intermediate in signal intensity on the T2 weighted sequence images  without any suggestion of associated mucinous material. This area is in  the region of the previously noted primary lesion seen on the MRI  examination dated 09/26/2022.     There is localized increased enhancement seen in the described region  that extends 5 mm outside of the posterior muscularis propria. This is  best seen on series 11 image 45. There is also some ill-defined hazy  enhancement in the posterior mesorectum in this region that can be seen  on series 11 image 45 and on series 13 image 43. The posterior margin of  the lesion that apparently extends outside of the muscularis propria  extends to within 0.5 cm of the posterior mesorectal fascia, but the  nonspecific stranding abuts the mesorectal fascia immediately  overlying  the sacrum. No mesorectal fascial thickening or abnormal enhancement is  appreciated.     The pelvic floor muscles appear normal. No abnormal signal intensity is  seen within the sacrum. There is no evidence for adenopathy in the  pelvis. No free fluid is appreciated.     IMPRESSION:  1. There is an area of abnormal mass-like enhancement in the region of  the primary tumor that measures on the order of 2.6 cm in greatest  dimension and appears to extend 5 mm outside of the posterior muscularis  propria. There is also some stranding within the posterior mesorectum  that abuts the posterior mesorectal fascia that overlies the sacrum.  This is in the same region as the primary lesion seen on 09/26/2022. No  adenopathy is appreciated. The imaging features raise concern for  recurrence of malignancy, possibly a T3c lesion with possible mesorectal  involvement versus posttreatment changes. However, the appearance is  different than seen on the prior MRI dated 06/07/2023 and is suspicious  for local recurrence. Clinical follow-up is recommended. Surgical and/or  tissue evaluation should be considered.     12/18/2023 Imaging    F-18 FDG PET SKULL BASE TO MID THIGH WITH PET CT FUSION.     HISTORY: 62-year-old female with rectal cancer treated with radiotherapy  and chemotherapy. Restaging.     TECHNIQUE: Radiation dose reduction techniques were utilized, including  automated exposure control and exposure modulation based on body size.   Blood glucose level at time of injection was 86 mg/dL. 6.4 mCi of F-18  FDG were injected and PET was performed from skull base to mid thigh. CT  was obtained for localization and attenuation correction. Time at  injection 12:44 p.m. PET start time 2:09 p.m. Compared with multiple  priors including pelvic MRI 12/08/2023, CTs 12/07/2023.     FINDINGS: Mediastinal blood pool has a maximal SUV of 3.3.  1. There is nonspecific low-level activity throughout the rectosigmoid  colon and  more intense activity at the distal rectum and perianal soft  tissues with a maximal SUV of 8.0. This is nonspecific and may be  radiation related and should be correlated with endoscopy.     2. There is no hypermetabolic lymphadenopathy within the pelvis or  abdomen. There is no suspicious liver activity and there are no new or  suspicious pulmonary opacities or nodules, given constraints of  breathing artifact. There is no convincing evidence for distant  metastases.     12/22/2023 Biopsy    Final Diagnosis   1.  Rectal biopsy: Colonic mucosa with               A.  Focal changes suggesting developing hyperplastic polyp.               B.  No active inflammation or granulomatous inflammation identified.               C.  No glandular dysplasia nor malignancy identified.        2/22/2024 Biopsy    Guardant reveal: ctDNA not detected         Past Medical History:   Diagnosis Date   • Abdominal cramping 07/01/2022   • Anemia    • Bloating 07/01/2022   • Bloody diarrhea 07/01/2022   • Chronic back pain    • Colon polyps     FOLLOWED BY DR. BEVERLY MENDEZ   • COVID 08/15/2023   • Cystitis     BURNING WITH URINATION SINCE RADIATION   • Depression    • Early cataract    • Gastric polyps     FOLLOWED BY DR. BEVERLY MENDEZ   • GERD (gastroesophageal reflux disease)    • Hearing loss    • Hiatal hernia 09/2021    3 CM, FOLLOWED BY DR. BEVERLY MENDEZ   • History of chemotherapy     LAST TREATMENT 2/10/2023   • History of depression    • History of radiation therapy    • Hyperlipidemia     NO MEDS   • IBS (irritable bowel syndrome)    • Migraines     HX   • Rectal bleeding 1983    Off and on for years   • Rectal cancer 09/2022    INVASIVE MODERATELY DIFFERENTIATED ADENOCARCINOMA ARISING FROM TRADITIONAL SERRATED ADENOMA WITH HGD   • Sciatica 09/2015   • Tear of medial meniscus of knee 07/2021    RIGHT KNEE   • Vitamin D deficiency        Past Surgical History:   Procedure Laterality Date   • COLONOSCOPY N/A 1986   •  "COLONOSCOPY N/A 03/27/2024    A HEALTHY SCAR WAS FOUND IN MID RECTUM, FLEX SIG IN 6 MONTHS, DR. JENNIFER AMAYA AT [facility]   • COLONOSCOPY W/ POLYPECTOMY N/A 09/01/2022    3 MM TUBULAR ADENOMA POLYP IN ASCENDING, 6 MM TUBULAR ADENOMA POLYP IN CECUM, 30 MM MASS IN RECTUM, PATH:INVASIVE MODERATELY DIFFERENTIATED ADENOCARCINOMA ARISING FROM TRADITIONAL SERRATED ADENOMA WITH HGD, HYPERTROPHIED ANAL PAPILLA, DR. BEVERLY MENDEZ AT [facility]   • ENDOSCOPY N/A 09/01/2022    MULTIPLE BENIGN GASTRIC POLYPS, 2 SQUAMOUS PAPILLAS IN ESOPHAGUS, 3 CM HIATAL HERNIA,   • EXTERNAL EAR SURGERY  1966    MULTIPLE \"LANCES\", DR. ACE IN [facility]   • EYE SURGERY Right 1967    DR. CASTANEDA IN [facility]   • KNEE ARTHRODESIS Right 07/27/2021    RIGHT KNEE ARTHROCENTESIS, DR. ALBERT GILMAN   • KNEE SURGERY Left 1985    DR. PEREZ AT [place]   • SHOULDER MANIPULATION Left 01/07/2013    FOR FROZEN SHOULDER, DR. ALBERT GILMAN AT [facility]   • SIGMOIDOSCOPY N/A 09/21/2022    AN INFILTRATIVE NON OBSTRUCTING MASS IN MID RECTUM, AREA TATTOOED, DR. JENNIFER AMAYA AT [facility]   • SIGMOIDOSCOPY N/A 03/08/2023    7 MM HEALED ULCERATION WHERE RECTAL CANCER WAS, SCAR TISSUE HEALTHY, BIOPSY SHOWED INFLAMMATION AND SCAR, NO RESIDUAL CANCER, FLEX SIG IN 12 MONTHS, DR. JENNIFER AMAYA AT [facility]   • SIGMOIDOSCOPY N/A 07/13/2023    A HEALTHY SCAR WAS IN MID RECTUM, DR. JENNIFER AMAYA AT [facility]   • SIGMOIDOSCOPY N/A 11/06/2023    A HEALTHY APPEARING SCAR IN MID RECTUM, COLONOSCOPY IN 4 MONTHS, DR. JENNIFER AMAYA AT [facility]   • SIGMOIDOSCOPY N/A 12/22/2023    A HEALTHY SCAR WAS FOUND IN MID RECTUM, BIOPSIES BENIGN, DR. JENNIFER AMAYA AT [facility]   • TRANSRECTAL ULTRASOUND N/A 09/21/2022    Procedure: ULTRASOUND TRANSRECTAL;  Surgeon: Jennifer Amaya MD;  Location: [facility] ENDOSCOPY;  Service: General;  Laterality: N/A;   • TRANSRECTAL ULTRASOUND N/A 12/22/2023    Procedure: ULTRASOUND TRANSRECTAL;  Surgeon: Jennifer Amaya MD;  Location: [facility] ENDOSCOPY;  Service: General;  " "Laterality: N/A;  pre: abnormal MRI  post: rectal ulceration   • VENOUS ACCESS DEVICE (PORT) INSERTION Left 10/26/2022    Procedure: INSERTION OF PORTACATH;  Surgeon: Leonel Bridges MD;  Location: [facility] OR;  Service: General;  Laterality: Left;   • WISDOM TOOTH EXTRACTION Bilateral        Social History     Socioeconomic History   • Marital status: Single   Tobacco Use   • Smoking status: Never     Passive exposure: Never   • Smokeless tobacco: Never   Vaping Use   • Vaping status: Never Used   Substance and Sexual Activity   • Alcohol use: Not Currently   • Drug use: Never   • Sexual activity: Defer         No results found for: \"LDH\", \"URICACID\"      Lab Results   Component Value Date    GLUCOSE 92 05/16/2024    BUN 12 05/16/2024    CREATININE 0.68 05/16/2024    BCR 17.6 05/16/2024    K 4.2 05/16/2024    CO2 25.8 05/16/2024    CALCIUM 9.6 05/16/2024    PROTENTOTREF 6.8 05/27/2022    ALBUMIN 4.3 05/16/2024    LABIL2 2.1 05/27/2022    AST 20 05/16/2024    ALT 15 05/16/2024       CBC w/diff          11/29/2023    12:52 2/22/2024    12:55 5/16/2024    08:22   CBC w/Diff   WBC 5.61  6.06  4.64    RBC 4.25  4.34  4.38    Hemoglobin 12.5  12.4  12.7    Hematocrit 37.8  37.6  38.4    MCV 88.9  86.6  87.7    MCH 29.4  28.6  29.0    MCHC 33.1  33.0  33.1    RDW 13.2  13.7  13.4    Platelets 223  255  237    Neutrophil Rel % 61.6  61.1  56.2    Immature Granulocyte Rel % 0.7  0.5  0.4    Lymphocyte Rel % 27.5  28.9  31.0    Monocyte Rel % 6.1  5.6  7.5    Eosinophil Rel % 3.7  3.6  4.5    Basophil Rel % 0.4  0.3  0.4        Allergies as of 05/21/2024 - Reviewed 05/17/2024   Allergen Reaction Noted   • Adhesive tape Rash 11/02/2022   • Levofloxacin Swelling 09/30/2015   • Sulfa antibiotics Rash 09/30/2015        MEDICATIONS:  Patient medication list reviewed today    Review of Systems   Constitutional:  Positive for fatigue. Negative for activity change and appetite change.   Gastrointestinal:  Positive for " constipation and diarrhea.   Genitourinary:  Negative for dysuria.   Neurological:  Negative for numbness.   Psychiatric/Behavioral:  Positive for decreased concentration and sleep disturbance. Negative for dysphoric mood. The patient is not nervous/anxious.        /79   Pulse 79   Resp 16   Wt 109 kg (240 lb 1.6 oz)   LMP  (LMP Unknown)   SpO2 97%   BMI 35.46 kg/m²     Wt Readings from Last 3 Encounters:   05/21/24 109 kg (240 lb 1.6 oz)   04/05/24 114 kg (252 lb)   03/26/24 109 kg (240 lb)        Pain Score    05/21/24 0913   PainSc:   3   PainLoc: Hip  Comment: and feet           Physical Exam  Constitutional:       Appearance: Normal appearance. She is well-groomed.   HENT:      Head: Normocephalic and atraumatic.   Cardiovascular:      Rate and Rhythm: Normal rate and regular rhythm.   Pulmonary:      Effort: No tachypnea or respiratory distress.   Abdominal:      General: Abdomen is flat. There is no distension.   Musculoskeletal:      Right ankle: No swelling. No tenderness. Normal pulse.      Left ankle: No swelling. No tenderness. Normal pulse.   Skin:     General: Skin is warm and dry.   Neurological:      Mental Status: She is alert and oriented to person, place, and time.   Psychiatric:         Attention and Perception: Attention and perception normal.         Mood and Affect: Mood and affect normal.         Speech: Speech normal.         Behavior: Behavior normal. Behavior is cooperative.         Thought Content: Thought content normal.           Advance Care Planning    Patient does have advance care planning complete, we do not have a copy on file.    Patient has not designated a healthcare surrogate:     Arrangements for further assistance with advance care planning can be made by scheduling an appointment with myself or another advance care planning facilitator at their convenience. Patients may also call the toll free [facility] ACP Help Line at [phone].           DISCUSSION  HELD TODAY:   Discussed NCCN recommendations for all cancer survivors of 150 minutes/week moderate intensity exercise, achieve and maintain a healthy weight, plants-based whole-foods diet, avoid tobacco and second hand smoke, avoid alcohol or minimize alcohol intake - no more than 1 drink in a day for adults.     A copy of the Survivorship Treatment Summary & Care Plan for Ms. Felton was provided to and forwarded to the providers identified on the care team.    Reviewed normal sleep stages and function of healthy restorative sleep.   Reviewed four factor model of insomnia with predisposing factors including perhaps a disposition towards anxiousness, precipitating factors including ongoing treatment for breast cancer recurrence, and perpetuating factors including things like sleep effort - getting in bed earlier to compensate, use of medications, marijuana, watcing TV in bed  Discussed primary components of CBTI including sleep restriction and stimulus control, and can also include review of sleep hygiene strategies, and relaxation training.  Patient does meet criteria for primary insomnia - discussed treatment options include continued medication management which can have benefits typically that are exhausted after 3-4 months, CBTI in the clinic over 6-8 weeks which is more likely to have a durable response and tools to utilize in future episodes of sleep disruption, self help with CBTI vanda, referral to sleep medicine.  Discussed participant treatment drop out rates trend towards 50% with self-management (use vanda) vs drop out rate of 30% for clinician directed therapy     Plan and recommendations:  She would like to start with self-directed CBT-I using the vanda to best evaluate where she might form some consistency around the sleep scheduling required for treatment  Reviewed sleep hygiene strategies  Provided information about [facility], [employer] Colon cancer, [email or web address], friend for [employer]  Encouraged continue gradual return to exercise  Follow up arranged 6/11 at 2pm to evaluate progress and discuss next appropriate steps  Call my office as needed at any point for additional information, resources or support at [phone]      Diagnoses and all orders for this visit:    1. Rectal cancer (Primary)    2. Primary insomnia            I spent 40 minutes caring for this patient on this date of service by face-to-face counseling. This time includes time spent by me in the following activities: preparing for the visit, reviewing tests, obtaining and/or reviewing a separately obtained history, performing a medically appropriate examination and/or evaluation, counseling and educating the patient/family/caregiver, documenting information in the medical record, and care coordination    no

## 2024-05-21 NOTE — PROGRESS NOTES
[facility] MULTIDISCIPLINARY CLINIC   IN CLINIC Initial Visit  Survivorship Care Plan Treatment Summary      Babs Felton is a pleasant 63 y.o. female being followed by Tristan Weber MD for rectal cancer. Reviewed today in Multidisciplinary Clinic, for initial Survivorship Care Plan Treatment Summary    HPI  63 year old patient who is status post nonoperative management for her locally advanced adenocarcinoma of the rectum.  She received 5 fractions of radiation completing this in October 2022.  She then went on to complete chemotherapy in February 2023.  She continues to have no evidence of disease with colonoscopy in March 2024 without concerning findings    She continues to have intermittent bouts of diarrhea or postprandial fecal urgency continues to learn which foods are tolerable and which to avoid.      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  Very hard to pinpoint a regular schedule.      Insomnia symptoms she thinks began around 2009 when traveling frequently to [place] to visit mom when she was in poor health.  Worsened again in 20 15-20 16 during caregiving activities for dad.  Typically reports sleeping 2 to 3 hours before waking up typically up for 30 minutes or more and is able to go back to sleep for another hour or 2 after the emesis.  She did meet with YAMILEX Paulino who initiated trazodone.  The patient took the trazodone the last 2 nights.  She reports her first sleep was extended to 3-1/2 to 4 hours but she still woke up and was up for the typical amount of time with the same difficulties getting back to sleep.    Distress: 0  PHQ-9: 2 1-4 (minimal depression sx)  PENNY-7: 0 0-4 (minimal anxiety sx)    TREATMENT HISTORY:     Oncology/Hematology History Overview Note   CEA  2/22/24 1.58  11/29/23: 1.59  9/6/23: 1.65  6/14/23: 1.87  3/22/23: 1.31  2/8/23: 1.54  9/8/22: 3.51     Rectal cancer   9/1/2022  Biopsy    Final Diagnosis   1. Small Bowel, Biopsy:                A. Small bowel mucosa with a normal villous architecture and no significant histopathologic changes.     2. Stomach, Polyp, Biopsy:                A. Fundic gland polyps.               B. Negative for dysplasia.     3. Esophagus, Mid, Biopsy:                A. Squamous papilloma.     4. Esophagus, Proximal, Biopsy:                A. Squamous papilloma with increased intraepithelial eosinophils (see comment).               B. Separate fragment of gastric oxyntic mucosa with no significant histopathologic changes.     5. Cecum, Polyp, Polypectomy:               A. Tubular adenoma.     6. Ascending Colon, Polyp, Polypectomy:               A. Tubular adenoma.     7. Right Colon, Random, Biopsy:   A. Colonic mucosa with no significant histopathologic changes.               B. No histologic evidence of microscopic colitis.      8. Left Colon, Random, Biopsy:   A. Colonic mucosa with no significant histopathologic changes.               B. No histologic evidence of microscopic colitis.      9. Rectum, Polyp, Polypectomy:               A. INVASIVE MODERATELY DIFFERENTIATED ADENOCARCINOMA ARISING OUT OF A       TRADITIONAL SERRATED ADENOMA WITH HIGH GRADE DYSPLASIA.           9/13/2022 Imaging    Exam: CT of the chest, abdomen, and pelvis with contrast.     HISTORY: 61-year-old premalignant polyps and adenocarcinoma of the  rectum on recent colonoscopy 09/01/2022.     TECHNIQUE: Radiation dose reduction techniques were utilized, including  automated exposure control and exposure modulation based on body size.   3 mm images were obtained through the chest, abdomen, and pelvis after  the administration of IV contrast.      COMPARISON: None     FINDINGS:  Thoracic inlet: Within normal limits     Heart and great vessels: The heart size is within normal limits.  Atherosclerosis including coronary calcifications. The study is not  optimized for vascular evaluation  however, normal enhancement is present  centrally     Lymphatics: Subcentimeter mediastinal nodes some of which are coarsely  calcified suggesting prior granulomatous disease     Lung parenchyma and pleural space: Patent central airway. No focal  consolidations, effusions, or pneumothorax. Calcified granulomas. Sub-6  mm micronodules with an index nodule in the right middle lobe (series 3  image 49 and 54), right lower lobe (image images 70/71), left lower lobe  (image 80).           Abdomen/pelvis:     Liver: Infiltration of the liver.     Biliary tract: Within normal limits.     Spleen: Within normal limits.     Pancreas: Within normal limits.     Adrenals: Within normal limits.     Kidneys: Too centimeter hypodensity in the right upper pole favoring a  cyst. Left kidney within normal limits. No hydronephrosis.     Bowel:  No bowel loops are normal in caliber. Normal appendix. The colon  is stool-filled somewhat limiting evaluation. Scattered colonic  diverticula without acute diverticulitis. Thickening of the rectum which  is incompletely distended limiting evaluation for known rectal neoplasm.     Peritoneum: No free fluid or free air.     Vasculature:    Minimal calcific atherosclerosis.     Lymph Nodes:  Scattered subcentimeter abdominal and pelvic nodes, not  enlarged by CT criteria. An index node at the pelvic brim measuring up  to 4 mm in short axis.                                                            Pelvic organs: Within normal limits.     Abdominal/Pelvic Wall: Tiny fat-containing umbilical hernia.     Bones: Multilevel degenerative changes thoracolumbar spine and pelvis.  Probable hemangioma T10 vertebral body..     IMPRESSION:  1.  Rectal neoplasm not well visualized on this exam, please refer to  the prior colonoscopy for further detail and further evaluation with MRI  or PET/CT could be considered if clinically indicated.  2.  Calcified and noncalcified sub-6 mm pulmonary nodules likely  the  sequela of prior granulomatous disease however, given history recommend  6 month surveillance.  3.  Scattered colonic diverticulosis.  4.  Mildly prominent but not pathologically enlarged pelvic nodes  measuring up to 4 mm.  5.  Please see above for additional findings.        9/16/2022 Initial Diagnosis    Rectal cancer (HCC)     9/26/2022 Imaging    MRI PELVIS WITHOUT CONTRAST/RECTAL MRI     HISTORY: 61-year-old female with rectal cancer. Staging.     TECHNIQUE: Routine staging rectum MRI was performed without contrast.  Compared with CT of the abdomen and pelvis 09/13/2022.     FINDINGS: There is an approximately 4 cm mass with the inferior margin  of the mass 9 cm proximal to the anorectal junction. The mass effaces  the muscularis propria along the right wall of the rectum and at the  posterior wall, there is extension of the mass just beyond the  muscularis propria, less than 5 mm. There is no evidence for extension  of the tumor beyond the muscularis propria at the anterior aspect of the  mass. There are no pathologically enlarged perirectal nodes. There are  shotty nodes superiorly at the mesorectum in the presacral space with  one of the largest nodes measuring 0.7 x 0.5 cm. The smallest distance  between the mass and the mesorectal fascia is 0.7 cm and this is along  the right posterior margin of the mass.     IMPRESSION:  High rectal mass with effacement of the muscularis propria  along the right wall and extension beyond the muscularis propria at the  posterior wall, T3 lesion. There is no evidence for involvement of the  mesorectal fascia. There are no pathologically enlarged nodes, but the  high mesorectal nodes are indeterminate.     10/12/2022 Biopsy    Guardant 360: biomarkers identified with no associated FDA approved therapies     10/17/2022 - 10/21/2022 Radiation    Radiation OncologyTreatment Course:  Babs Felton received 2500 cGy in 5 fractions to rectum     11/2/2022 - 2/8/2023  Chemotherapy    OP COLON [id number] OXALIplatin / Leucovorin / Fluorouracil  1/11/2023: Cycle 6 5-FU/LV.  Oxaliplatin dropped due to neuropathy.  2/8/2023: Cycle 8 5-FU     1/11/2023 Imaging    MRI OF THE PELVIS WITHOUT CONTRAST.     HISTORY: Restaging rectal adenocarcinoma (T3 N0 M0, stage IIa) status  post chemotherapy.     TECHNIQUE: MRI of the pelvis according to a protocol tailored for  evaluation of the rectum without IV contrast. Lack of contrast limits  evaluation of solid, visceral, and vascular structures. Sensitivity for  underlying lesions and infection decreased. 5 sequences submitted.     COMPARISON: 09/26/2022     FINDINGS:     Significant decrease in size of the previously described 4 cm rectal  mass with residual 2.7 cm with residual bowel wall thickening measuring  up to 1.3 cm to the right of midline in low T2 signal along the right  lateral wall extending into the subjacent mesentery suggesting  posttreatment change/fibrosis (series 6 image 19). Along the inferior  margin of this mass there is focal stenosis without obstruction.  Thickening of the mid to distal rectum which may be the sequela of  posttreatment change/proctitis.     A previously measured presacral node is smaller at 0.4 x 0.5 cm  (previously 0.5 x 0.7 cm). Stable 3 mm right perirectal node Uterus in  situ. Small fat-containing umbilical hernia. Noncontrast imaging of the  osseous structures within normal limits. Hypertrophic degenerative  changes are again noted in thoracolumbar spine. Bone scan could be  considered for further evaluation if clinically indicated.     IMPRESSION:  1.  Good response to therapy with near complete resolution of the  previously described rectal mass with residual rectal thickening and low  T2 signal/fibrosis at the site of primary neoplasm.  2.  Pelvic nodes are smaller or unchanged.  3.  Please see above for additional findings.        3/8/2023 Biopsy    Final Diagnosis   1. Rectum, Biopsy:                A. Colorectal mucosa with mild increase in lamina propria chronic inflammation and focal scarring.               B. Negative for carcinoma.         3/22/2023 Biopsy    Guardant Reveal: ctDNA not detected     4/4/2023 Imaging    Examination: CT of the chest, abdomen, and pelvis with contrast     TECHNIQUE: CT of the chest, abdomen, and pelvis after the uneventful  administration of nonionic intravenous contrast per protocol. Radiation  dose reduction techniques were utilized, including automated exposure  control and exposure modulation based on body size.     HISTORY:Rectal cancer     COMPARISON:09/13/2022     FINDINGS:Chest:     Old granulomatous disease is seen in the lungs. There is dependent  atelectasis. There are stable small pulmonary nodules, for example in  the right lower lobe on series 3, images 57 and 71. No consolidation,  pleural effusion, new or suspicious pulmonary nodule or mass are seen.  The central airways are patent. There is a small right-sided  fat-containing Bochdalek hernia.     No enlarged supraclavicular, axillary, mediastinal, or hilar lymph nodes  are seen. The mediastinal vasculature is normal in caliber. A left  internal jugular venous Port-A-Cath terminates in the superior vena  cava. Coronary calcifications are seen. The heart is normal in size and  configuration, without pericardial effusion.     Abdomen and pelvis:     A low-attenuation right renal lesion is technically indeterminate,  likely cyst.     The liver, gallbladder, spleen, adrenal glands, left kidney, pancreas,  stomach, small bowel, large bowel, urinary bladder, uterus, adnexal  structures, and abdominal vasculature are normal. There is perirectal  stranding with asymmetric rectal thickening measuring 1.3 cm tc width on  series 2, image 201. No intraperitoneal fluid collection or free gas are  seen. No enlarged lymph nodes are demonstrated.     Bone windows demonstrate degenerative changes, without suspicious  osseous  lesion seen.     IMPRESSION:  1. Stable tiny pulmonary nodules, very likely benign. Recommend  attention on follow-up  2. Asymmetric rectal thickening, possibly reflecting the patient's  rectal neoplasm, difficult to compare because of differences in rectal  distention  3. Incidental findings as above, without convincing evidence of  metastatic disease.     6/7/2023 Imaging    Examination: MRI of the pelvis without and with contrast     TECHNIQUE: MRI of the pelvis was obtained before and after the  uneventful administration of gadolinium base contrast according to the  rectal cancer protocol     HISTORY: Rectal cancer     COMPARISON: 01/07/2023     FINDINGS: There is near-complete resolution of the previously seen  rectal mass, with minimal irregularity around series 10, image 9  measuring 1.3 x 1.5 cm. This region is located approximately 6 cm from  the anorectal junction and 11 cm from the anal verge. It is above the  peritoneal reflection and levator musculature. There is minimal  stranding posterior to this region, stable. No enlarged lymph nodes are  seen. No suspicious osseous lesions are demonstrated. Visualized  portions of the small bowel, urinary bladder, uterus, and abdominal  vasculature are normal.     IMPRESSION:  Near complete response to therapy of the rectal mass.     12/7/2023 Imaging    CT CHEST W CONTRAST DIAGNOSTIC-, CT ABDOMEN W CONTRAST-     INDICATIONS: Rectal cancer, follow-up radiation dose reduction  techniques were utilized, including automated exposure control and  exposure modulation based on body size.     TECHNIQUE: Enhanced CT of the chest, abdomen, pelvis     COMPARISON: 4/4/2023     FINDINGS:     Chest CT:     The heart size is normal without pericardial effusion. Mild coronary  arterial calcification is present. Left chest port extends to the  superior vena cava. A few small subcentimeter short axis mediastinal  lymph nodes are seen that are not significant by size criteria.      The airways appear clear.     No pleural effusion or pneumothorax.     The lungs show no focal pulmonary consolidation or mass. Small right  lower lobe pulmonary nodules are again demonstrated and appear stable,  for example axial image 58-61, 74. Stable small lingular nodule, image  68, and small left lower lobe nodules, image 69, 82. Old granulomatous  disease is present.        Abdomen pelvis CT:     Right renal cyst is present.     Fatty infiltration of the pancreatic head is seen.     Otherwise unremarkable appearance of the liver, gallbladder, spleen,  adrenal glands, pancreas, kidneys, bladder.     No bowel obstruction. Previously noted asymmetric wall thickening of the  rectum is less conspicuous. The appendix does not appear inflamed.        No free intraperitoneal gas or free fluid. Minimal umbilical hernia of  fat is noted.     Scattered small mesenteric and para-aortic lymph nodes are seen that are  not significant by size criteria.     Abdominal aorta is not aneurysmal. Aortic and other arterial  calcifications are present.     Degenerative and chronic changes are seen in the spine. No acute  fracture is identified.           IMPRESSION:     Previously noted asymmetric wall thickening of the rectum is less  conspicuous. Otherwise, no significant change. Continued follow-up  advised as indicated.     12/8/2023 Imaging    EXAMINATION: MRI OF THE PELVIS WITHOUT AND WITH CONTRAST     HISTORY: 62-year-old female with a diagnosis of rectal carcinoma status  post therapy undergoing follow-up evaluation.     TECHNIQUE: Multiplanar and multisequence images of the pelvis were  obtained pre and postintravenous administration of gadolinium.     COMPARISON: MRI of the pelvis without and with contrast, 06/07/2023, MRI  of the pelvis without contrast, 01/07/2023 and 09/26/2022, and CT of the  abdomen and pelvis with contrast, 12/07/2023.     FINDINGS: Seen on the sagittal postcontrast images on series 13, images  42  through 46 near the junction of the mid to high rectum with the  inferior margin on the order of 11.9 cm from the anal verge and 7.6 cm  from the anorectal junction, there is an area of irregular  C-shaped/semicircumferential mass-like thickening of the rectum that is  centered at the 4 o'clock position on the axial postcontrast images,  series 11 images 42 through 46. This area is located on the order of 3  cm superior to the anterior peritoneal reflection and measures on the  order of 2.1 cm in the superior to inferior dimension, 2.6 cm in the  medial to lateral dimension, and 2.3 cm in anterior to posterior  dimension with a semicircumferential appearance. The area appears  intermediate in signal intensity on the T2 weighted sequence images  without any suggestion of associated mucinous material. This area is in  the region of the previously noted primary lesion seen on the MRI  examination dated 09/26/2022.     There is localized increased enhancement seen in the described region  that extends 5 mm outside of the posterior muscularis propria. This is  best seen on series 11 image 45. There is also some ill-defined hazy  enhancement in the posterior mesorectum in this region that can be seen  on series 11 image 45 and on series 13 image 43. The posterior margin of  the lesion that apparently extends outside of the muscularis propria  extends to within 0.5 cm of the posterior mesorectal fascia, but the  nonspecific stranding abuts the mesorectal fascia immediately overlying  the sacrum. No mesorectal fascial thickening or abnormal enhancement is  appreciated.     The pelvic floor muscles appear normal. No abnormal signal intensity is  seen within the sacrum. There is no evidence for adenopathy in the  pelvis. No free fluid is appreciated.     IMPRESSION:  1. There is an area of abnormal mass-like enhancement in the region of  the primary tumor that measures on the order of 2.6 cm in greatest  dimension and appears  to extend 5 mm outside of the posterior muscularis  propria. There is also some stranding within the posterior mesorectum  that abuts the posterior mesorectal fascia that overlies the sacrum.  This is in the same region as the primary lesion seen on 09/26/2022. No  adenopathy is appreciated. The imaging features raise concern for  recurrence of malignancy, possibly a T3c lesion with possible mesorectal  involvement versus posttreatment changes. However, the appearance is  different than seen on the prior MRI dated 06/07/2023 and is suspicious  for local recurrence. Clinical follow-up is recommended. Surgical and/or  tissue evaluation should be considered.     12/18/2023 Imaging    F-18 FDG PET SKULL BASE TO MID THIGH WITH PET CT FUSION.     HISTORY: 62-year-old female with rectal cancer treated with radiotherapy  and chemotherapy. Restaging.     TECHNIQUE: Radiation dose reduction techniques were utilized, including  automated exposure control and exposure modulation based on body size.   Blood glucose level at time of injection was 86 mg/dL. 6.4 mCi of F-18  FDG were injected and PET was performed from skull base to mid thigh. CT  was obtained for localization and attenuation correction. Time at  injection 12:44 p.m. PET start time 2:09 p.m. Compared with multiple  priors including pelvic MRI 12/08/2023, CTs 12/07/2023.     FINDINGS: Mediastinal blood pool has a maximal SUV of 3.3.  1. There is nonspecific low-level activity throughout the rectosigmoid  colon and more intense activity at the distal rectum and perianal soft  tissues with a maximal SUV of 8.0. This is nonspecific and may be  radiation related and should be correlated with endoscopy.     2. There is no hypermetabolic lymphadenopathy within the pelvis or  abdomen. There is no suspicious liver activity and there are no new or  suspicious pulmonary opacities or nodules, given constraints of  breathing artifact. There is no convincing evidence for  distant  metastases.     12/22/2023 Biopsy    Final Diagnosis   1.  Rectal biopsy: Colonic mucosa with               A.  Focal changes suggesting developing hyperplastic polyp.               B.  No active inflammation or granulomatous inflammation identified.               C.  No glandular dysplasia nor malignancy identified.        2/22/2024 Biopsy    Guardant reveal: ctDNA not detected         Past Medical History:   Diagnosis Date    Abdominal cramping 07/01/2022    Anemia     Bloating 07/01/2022    Bloody diarrhea 07/01/2022    Chronic back pain     Colon polyps     FOLLOWED BY DR. BEVERLY MENDEZ    COVID 08/15/2023    Cystitis     BURNING WITH URINATION SINCE RADIATION    Depression     Early cataract     Gastric polyps     FOLLOWED BY DR. BEVERLY MENDEZ    GERD (gastroesophageal reflux disease)     Hearing loss     Hiatal hernia 09/2021    3 CM, FOLLOWED BY DR. BEVERLY MENDEZ    History of chemotherapy     LAST TREATMENT 2/10/2023    History of depression     History of radiation therapy     Hyperlipidemia     NO MEDS    IBS (irritable bowel syndrome)     Migraines     HX    Rectal bleeding 1983    Off and on for years    Rectal cancer 09/2022    INVASIVE MODERATELY DIFFERENTIATED ADENOCARCINOMA ARISING FROM TRADITIONAL SERRATED ADENOMA WITH HGD    Sciatica 09/2015    Tear of medial meniscus of knee 07/2021    RIGHT KNEE    Vitamin D deficiency        Past Surgical History:   Procedure Laterality Date    COLONOSCOPY N/A 1986    COLONOSCOPY N/A 03/27/2024    A HEALTHY SCAR WAS FOUND IN MID RECTUM, FLEX SIG IN 6 MONTHS, DR. TEMO OJEDA AT [facility]    COLONOSCOPY W/ POLYPECTOMY N/A 09/01/2022    3 MM TUBULAR ADENOMA POLYP IN ASCENDING, 6 MM TUBULAR ADENOMA POLYP IN CECUM, 30 MM MASS IN RECTUM, PATH:INVASIVE MODERATELY DIFFERENTIATED ADENOCARCINOMA ARISING FROM TRADITIONAL SERRATED ADENOMA WITH HGD, HYPERTROPHIED ANAL PAPILLA, DR. BEVERLY MENEDZ AT [facility]    ENDOSCOPY N/A 09/01/2022    MULTIPLE BENIGN  "GASTRIC POLYPS, 2 SQUAMOUS PAPILLAS IN ESOPHAGUS, 3 CM HIATAL HERNIA,    EXTERNAL EAR SURGERY  1966    MULTIPLE \"LANCES\", DR. ACE IN [facility]    EYE SURGERY Right 1967    DR. CASTANEDA IN [facility]    KNEE ARTHRODESIS Right 07/27/2021    RIGHT KNEE ARTHROCENTESIS, DR. ALBERT GILMAN    KNEE SURGERY Left 1985    DR. PEREZ AT [place]    SHOULDER MANIPULATION Left 01/07/2013    FOR FROZEN SHOULDER, DR. ALBERT GILMAN AT [facility]    SIGMOIDOSCOPY N/A 09/21/2022    AN INFILTRATIVE NON OBSTRUCTING MASS IN MID RECTUM, AREA TATTOOED, DR. JENNIFER AMAYA AT [facility]    SIGMOIDOSCOPY N/A 03/08/2023    7 MM HEALED ULCERATION WHERE RECTAL CANCER WAS, SCAR TISSUE HEALTHY, BIOPSY SHOWED INFLAMMATION AND SCAR, NO RESIDUAL CANCER, FLEX SIG IN 12 MONTHS, DR. JENNIFER AMAYA AT [facility]    SIGMOIDOSCOPY N/A 07/13/2023    A HEALTHY SCAR WAS IN MID RECTUM, DR. JENNIFER AMAYA AT [facility]    SIGMOIDOSCOPY N/A 11/06/2023    A HEALTHY APPEARING SCAR IN MID RECTUM, COLONOSCOPY IN 4 MONTHS, DR. JENNIFER AMAYA AT [facility]    SIGMOIDOSCOPY N/A 12/22/2023    A HEALTHY SCAR WAS FOUND IN MID RECTUM, BIOPSIES BENIGN, DR. JENNIFER AMAYA AT [facility]    TRANSRECTAL ULTRASOUND N/A 09/21/2022    Procedure: ULTRASOUND TRANSRECTAL;  Surgeon: Jennifer Amaya MD;  Location: [facility] ENDOSCOPY;  Service: General;  Laterality: N/A;    TRANSRECTAL ULTRASOUND N/A 12/22/2023    Procedure: ULTRASOUND TRANSRECTAL;  Surgeon: Jennifer Amaya MD;  Location: [facility] ENDOSCOPY;  Service: General;  Laterality: N/A;  pre: abnormal MRI  post: rectal ulceration    VENOUS ACCESS DEVICE (PORT) INSERTION Left 10/26/2022    Procedure: INSERTION OF PORTACATH;  Surgeon: Leonel Bridges MD;  Location: [facility] MAIN OR;  Service: General;  Laterality: Left;    WISDOM TOOTH EXTRACTION Bilateral        Social History     Socioeconomic History    Marital status: Single   Tobacco Use    Smoking status: Never     Passive exposure: Never    Smokeless tobacco: Never   Vaping Use    Vaping status: Never " "Used   Substance and Sexual Activity    Alcohol use: Not Currently    Drug use: Never    Sexual activity: Defer         No results found for: \"LDH\", \"URICACID\"      Lab Results   Component Value Date    GLUCOSE 92 05/16/2024    BUN 12 05/16/2024    CREATININE 0.68 05/16/2024    BCR 17.6 05/16/2024    K 4.2 05/16/2024    CO2 25.8 05/16/2024    CALCIUM 9.6 05/16/2024    PROTENTOTREF 6.8 05/27/2022    ALBUMIN 4.3 05/16/2024    LABIL2 2.1 05/27/2022    AST 20 05/16/2024    ALT 15 05/16/2024       CBC w/diff          11/29/2023    12:52 2/22/2024    12:55 5/16/2024    08:22   CBC w/Diff   WBC 5.61  6.06  4.64    RBC 4.25  4.34  4.38    Hemoglobin 12.5  12.4  12.7    Hematocrit 37.8  37.6  38.4    MCV 88.9  86.6  87.7    MCH 29.4  28.6  29.0    MCHC 33.1  33.0  33.1    RDW 13.2  13.7  13.4    Platelets 223  255  237    Neutrophil Rel % 61.6  61.1  56.2    Immature Granulocyte Rel % 0.7  0.5  0.4    Lymphocyte Rel % 27.5  28.9  31.0    Monocyte Rel % 6.1  5.6  7.5    Eosinophil Rel % 3.7  3.6  4.5    Basophil Rel % 0.4  0.3  0.4        Allergies as of 05/21/2024 - Reviewed 05/17/2024   Allergen Reaction Noted    Adhesive tape Rash 11/02/2022    Levofloxacin Swelling 09/30/2015    Sulfa antibiotics Rash 09/30/2015        MEDICATIONS:  Patient medication list reviewed today    Review of Systems   Constitutional:  Positive for fatigue. Negative for activity change and appetite change.   Gastrointestinal:  Positive for constipation and diarrhea.   Genitourinary:  Negative for dysuria.   Neurological:  Negative for numbness.   Psychiatric/Behavioral:  Positive for decreased concentration and sleep disturbance. Negative for dysphoric mood. The patient is not nervous/anxious.        /79   Pulse 79   Resp 16   Wt 109 kg (240 lb 1.6 oz)   LMP  (LMP Unknown)   SpO2 97%   BMI 35.46 kg/m²     Wt Readings from Last 3 Encounters:   05/21/24 109 kg (240 lb 1.6 oz)   04/05/24 114 kg (252 lb)   03/26/24 109 kg (240 lb)    "     Pain Score    05/21/24 0913   PainSc:   3   PainLoc: Hip  Comment: and feet           Physical Exam  Constitutional:       Appearance: Normal appearance. She is well-groomed.   HENT:      Head: Normocephalic and atraumatic.   Cardiovascular:      Rate and Rhythm: Normal rate and regular rhythm.   Pulmonary:      Effort: No tachypnea or respiratory distress.   Abdominal:      General: Abdomen is flat. There is no distension.   Musculoskeletal:      Right ankle: No swelling. No tenderness. Normal pulse.      Left ankle: No swelling. No tenderness. Normal pulse.   Skin:     General: Skin is warm and dry.   Neurological:      Mental Status: She is alert and oriented to person, place, and time.   Psychiatric:         Attention and Perception: Attention and perception normal.         Mood and Affect: Mood and affect normal.         Speech: Speech normal.         Behavior: Behavior normal. Behavior is cooperative.         Thought Content: Thought content normal.           Advance Care Planning     Patient does have advance care planning complete, we do not have a copy on file.    Patient has not designated a healthcare surrogate:     Arrangements for further assistance with advance care planning can be made by scheduling an appointment with myself or another advance care planning facilitator at their convenience. Patients may also call the toll free [facility] ACP Help Line at [phone].           DISCUSSION HELD TODAY:   Discussed NCCN recommendations for all cancer survivors of 150 minutes/week moderate intensity exercise, achieve and maintain a healthy weight, plants-based whole-foods diet, avoid tobacco and second hand smoke, avoid alcohol or minimize alcohol intake - no more than 1 drink in a day for adults.     A copy of the Survivorship Treatment Summary & Care Plan for Ms. Felton was provided to and forwarded to the providers identified on the care team.    Reviewed normal sleep stages and function of  healthy restorative sleep.   Reviewed four factor model of insomnia with predisposing factors including perhaps a disposition towards anxiousness, precipitating factors including ongoing treatment for breast cancer recurrence, and perpetuating factors including things like sleep effort - getting in bed earlier to compensate, use of medications, marijuana, watcing TV in bed  Discussed primary components of CBTI including sleep restriction and stimulus control, and can also include review of sleep hygiene strategies, and relaxation training.  Patient does meet criteria for primary insomnia - discussed treatment options include continued medication management which can have benefits typically that are exhausted after 3-4 months, CBTI in the clinic over 6-8 weeks which is more likely to have a durable response and tools to utilize in future episodes of sleep disruption, self help with CBTI vanda, referral to sleep medicine.  Discussed participant treatment drop out rates trend towards 50% with self-management (use vanda) vs drop out rate of 30% for clinician directed therapy     Plan and recommendations:  She would like to start with self-directed CBT-I using the vanda to best evaluate where she might form some consistency around the sleep scheduling required for treatment  Reviewed sleep hygiene strategies  Provided information about Colorectal cancer alliance, FIGHT Colon cancer, [employer], [employer] for life, [employer]  Encouraged continue gradual return to exercise  Follow up arranged 6/11 at 2pm to evaluate progress and discuss next appropriate steps  Call my office as needed at any point for additional information, resources or support at [phone]      Diagnoses and all orders for this visit:    1. Rectal cancer (Primary)    2. Primary insomnia            I spent 40 minutes caring for this patient on this date of service by face-to-face counseling. This time includes time spent by me in the following activities:  preparing for the visit, reviewing tests, obtaining and/or reviewing a separately obtained history, performing a medically appropriate examination and/or evaluation, counseling and educating the patient/family/caregiver, documenting information in the medical record, and care coordination

## 2024-05-22 ENCOUNTER — OFFICE VISIT (OUTPATIENT)
Dept: ONCOLOGY | Facility: CLINIC | Age: 64
End: 2024-05-22
Payer: COMMERCIAL

## 2024-05-22 ENCOUNTER — INFUSION (OUTPATIENT)
Dept: ONCOLOGY | Facility: HOSPITAL | Age: 64
End: 2024-05-22
Payer: COMMERCIAL

## 2024-05-22 VITALS
SYSTOLIC BLOOD PRESSURE: 146 MMHG | BODY MASS INDEX: 36.95 KG/M2 | TEMPERATURE: 98.2 F | HEIGHT: 69 IN | DIASTOLIC BLOOD PRESSURE: 84 MMHG | OXYGEN SATURATION: 97 % | RESPIRATION RATE: 16 BRPM | HEART RATE: 78 BPM | WEIGHT: 249.5 LBS

## 2024-05-22 DIAGNOSIS — C20 RECTAL CANCER: ICD-10-CM

## 2024-05-22 RX ORDER — DIPHENOXYLATE HYDROCHLORIDE AND ATROPINE SULFATE 2.5; .025 MG/1; MG/1
1 TABLET ORAL 4 TIMES DAILY PRN
Qty: 60 TABLET | Refills: 3 | Status: SHIPPED | OUTPATIENT
Start: 2024-05-22

## 2024-05-22 RX ORDER — DICYCLOMINE HCL 20 MG
20 TABLET ORAL EVERY 6 HOURS PRN
Qty: 90 TABLET | Refills: 3 | Status: SHIPPED | OUTPATIENT
Start: 2024-05-22

## 2024-05-22 RX ORDER — ONDANSETRON HYDROCHLORIDE 8 MG/1
8 TABLET, FILM COATED ORAL 3 TIMES DAILY PRN
Qty: 60 TABLET | Refills: 5 | Status: SHIPPED | OUTPATIENT
Start: 2024-05-22

## 2024-05-22 NOTE — PROGRESS NOTES
[facility]    CONSULTING IN BLOOD DISORDERS & CANCER    This was an audio and video enabled telemedicine encounter.      REASON FOR CONSULTATION/CHIEF COMPLAINT:     Evaluation and management for rectal adenocarcinoma                             REQUESTING PHYSICIAN: No ref. provider found  RECORDS OBTAINED:  Records of the patients history including those from the electronic medical record were reviewed and summarized in detail.    HISTORY OF PRESENT ILLNESS:    The patient is a 63 y.o. year old female with medical history significant for migraines, depression/anxiety, and IBS had presented with epigastric discomfort in September 2022.      An EGD and colonoscopy performed by SHOAIB Esteves on 9/1/2022 demonstrated a 3 cm mass in the proximal rectum around 13 cm from the anal verge.  The polyp was multilobulated and somewhat fixed to the underlying tissue.  Friable with contact bleeding.  Other smaller polyps were found in the cecum and ascending colon which were removed.  The EGD revealed mucosal nodule in the esophagus and multiple gastric polyps.  No evidence of bleeding.     The pathology from the rectal mass came back as invasive moderately differentiated adenocarcinoma arising out of traditional serrated adenoma with high-grade dysplasia.    Patient was subsequently referred to Dr. Amaya, colorectal surgery who performed a flexible sigmoidoscopy on 9/21/2022, revealing an infiltrative nonobstructing mass in the mid rectum.  The mass was partially circumferential involving one third of the lumen circumference.     9/13/2022: CT C/A/P with contrast noted rectal neoplasm not well visualized on this exam.  Calcified and noncalcified sub-6mm pulmonary nodules likely the sequela of prior granulomatous disease.  Scattered colonic diverticulosis.  Mildly prominent, but not pathologically enlarged pelvic nodes measuring up to 4 mm.    9/26/2022: MRI pelvis showed high rectal mass with effacement of the muscularis  propria along the right wall and extended beyond the muscularis propria at the posterior wall, T3 lesion. There is no evidence for involvement of the mesorectal fascia. There are no pathologically enlarged nodes.     Patient has been seen by , rad-onc & being planned for neoadjuvant radiation. Patient has come to this clinic to discuss systemic therapy options.    Guardant 360: MSI stable.  Low TMB.    10/17- 10/22/2022: Short course of radiotherapy to the rectal mass.    11/2/2022: Cycle 1 FOLFOX  12/14/2022: Cycle 4 FOLFOX  12/28/2022: Cycle 5 FOLFOX  1/7/2022: MRI pelvis shows marked response to treatment  1/11/2023: Cycle 6 5-FU/LV.  Oxaliplatin dropped due to neuropathy.  2/8/2023: Cycle 8 5-FU    3/6/2023: Clara sigmoidoscopy and surface biopsy.  No evidence of residual disease.  Case discussed with Dr. Amaya and Dr. Freeman.  Plan made for active surveillance.    March 2023: Circulating DNA negative.    April 2023: CT c/a/p with no evidence of disease recurrence.  6/7/23: MRI pelvis with near complete response to therapy of the rectal mass.  7/13/2023: Flex sigmoidoscopy by Dr. Amaya noted scar in the mid rectum.  No evidence of disease recurrence.  11/6/23: Flex sig showed no evidence of disease recurrence  March 2024: Colonoscopy with no evidence of disease recurrence.    INTERIM HISTORY:  Patient returns to the clinic for a follow-up visit.  No new symptoms. Has persistent fatigue, intermittent abdominal cramps & loose stools.  No blood in stool.  The neuropathy and nail changes stable. Appetite and weight has been stable.  She is thinking of getting weight loss medications.  Says she is busy at work. Started playing piano and singing. She does pelvic floor exercises.    Past Medical History:   Diagnosis Date    Abdominal cramping 07/01/2022    Anemia     Bloating 07/01/2022    Bloody diarrhea 07/01/2022    Chronic back pain     Colon polyps     FOLLOWED BY DR. BEVERLY HOUGH 08/15/2023     "Cystitis     BURNING WITH URINATION SINCE RADIATION    Depression     Early cataract     Gastric polyps     FOLLOWED BY DR. BEVERLY MENDEZ    GERD (gastroesophageal reflux disease)     Hearing loss     Hiatal hernia 09/2021    3 CM, FOLLOWED BY DR. BEVERLY MENDEZ    History of chemotherapy     LAST TREATMENT 2/10/2023    History of depression     History of radiation therapy     Hyperlipidemia     NO MEDS    IBS (irritable bowel syndrome)     Migraines     HX    Rectal bleeding 1983    Off and on for years    Rectal cancer 09/2022    INVASIVE MODERATELY DIFFERENTIATED ADENOCARCINOMA ARISING FROM TRADITIONAL SERRATED ADENOMA WITH HGD    Sciatica 09/2015    Tear of medial meniscus of knee 07/2021    RIGHT KNEE    Vitamin D deficiency      Past Surgical History:   Procedure Laterality Date    COLONOSCOPY N/A 1986    COLONOSCOPY N/A 03/27/2024    A HEALTHY SCAR WAS FOUND IN MID RECTUM, FLEX SIG IN 6 MONTHS, DR. TEMO OJEDA AT [facility]    COLONOSCOPY W/ POLYPECTOMY N/A 09/01/2022    3 MM TUBULAR ADENOMA POLYP IN ASCENDING, 6 MM TUBULAR ADENOMA POLYP IN CECUM, 30 MM MASS IN RECTUM, PATH:INVASIVE MODERATELY DIFFERENTIATED ADENOCARCINOMA ARISING FROM TRADITIONAL SERRATED ADENOMA WITH HGD, HYPERTROPHIED ANAL PAPILLA, DR. BEVERLY MENDEZ AT  [facility]    ENDOSCOPY N/A 09/01/2022    MULTIPLE BENIGN GASTRIC POLYPS, 2 SQUAMOUS PAPILLAS IN ESOPHAGUS, 3 CM HIATAL HERNIA,    EXTERNAL EAR SURGERY  1966    MULTIPLE \"LANCES\", DR. ACE IN [facility]    EYE SURGERY Right 1967    DR. CASTANEDA IN [facility]    KNEE ARTHRODESIS Right 07/27/2021    RIGHT KNEE ARTHROCENTESIS, DR. ALBERT GILMAN    KNEE SURGERY Left 1985    DR. PEREZ AT [place]    SHOULDER MANIPULATION Left 01/07/2013    FOR FROZEN SHOULDER, DR. ALBERT GILMAN AT [facility]    SIGMOIDOSCOPY N/A 09/21/2022    AN INFILTRATIVE NON OBSTRUCTING MASS IN MID RECTUM, AREA TATTOOED, DR. TEMO OJEDA AT [facility]    SIGMOIDOSCOPY N/A 03/08/2023    7 MM HEALED ULCERATION WHERE " RECTAL CANCER WAS, SCAR TISSUE HEALTHY, BIOPSY SHOWED INFLAMMATION AND SCAR, NO RESIDUAL CANCER, FLEX SIG IN 12 MONTHS, DR. JENNIFER OJEDA AT [facility]    SIGMOIDOSCOPY N/A 07/13/2023    A HEALTHY SCAR WAS IN MID RECTUM, DR. JENNIFER OJEDA AT [facility]    SIGMOIDOSCOPY N/A 11/06/2023    A HEALTHY APPEARING SCAR IN MID RECTUM, COLONOSCOPY IN 4 MONTHS, DR. JENNIFER OJEDA AT [facility]    SIGMOIDOSCOPY N/A 12/22/2023    A HEALTHY SCAR WAS FOUND IN MID RECTUM, BIOPSIES BENIGN, DR. JENNIFER OJEDA AT [facility]    TRANSRECTAL ULTRASOUND N/A 09/21/2022    Procedure: ULTRASOUND TRANSRECTAL;  Surgeon: Jennifer Ojeda MD;  Location: [facility] ENDOSCOPY;  Service: General;  Laterality: N/A;    TRANSRECTAL ULTRASOUND N/A 12/22/2023    Procedure: ULTRASOUND TRANSRECTAL;  Surgeon: Jennifer Ojeda MD;  Location: [facility] ENDOSCOPY;  Service: General;  Laterality: N/A;  pre: abnormal MRI  post: rectal ulceration    VENOUS ACCESS DEVICE (PORT) INSERTION Left 10/26/2022    Procedure: INSERTION OF PORTACATH;  Surgeon: Leonel Bridges MD;  Location: [facility] MAIN OR;  Service: General;  Laterality: Left;    WISDOM TOOTH EXTRACTION Bilateral        MEDICATIONS    Current Outpatient Medications:     dicyclomine (BENTYL) 20 MG tablet, Take 1 tablet by mouth Every 6 (Six) Hours As Needed (abd cramps)., Disp: 90 tablet, Rfl: 3    diphenoxylate-atropine (LOMOTIL) 2.5-0.025 MG per tablet, Take 1 tablet by mouth 4 (Four) Times a Day As Needed for Diarrhea., Disp: 60 tablet, Rfl: 3    ELDERBERRY PO, Take 2 tablets by mouth Daily. HOLD PRIOR TO SURG, GUMMIES, Disp: , Rfl:     hydrOXYzine (ATARAX) 10 MG tablet, Take 1 tablet by mouth Every 8 (Eight) Hours As Needed for Itching., Disp: 30 tablet, Rfl: 0    loperamide (IMODIUM) 2 MG capsule, Take 1 capsule by mouth As Needed for Diarrhea., Disp: , Rfl:     ondansetron (ZOFRAN) 8 MG tablet, Take 1 tablet by mouth 3 (Three) Times a Day As Needed for Nausea or Vomiting., Disp: 60 tablet, Rfl: 5    pantoprazole (PROTONIX) 40 MG EC  tablet, Take 1 tablet by mouth Daily. (Patient taking differently: Take 1 tablet by mouth Daily As Needed.), Disp: 30 tablet, Rfl: 3    traMADol (ULTRAM) 50 MG tablet, Take 1 tablet by mouth Every 6 (Six) Hours As Needed for Moderate Pain., Disp: 30 tablet, Rfl: 0    traZODone (DESYREL) 50 MG tablet, Initially, take 1/2 to 1 tablet by mouth at bedtime. If no effect, increase by 1/2 tablet per night, up to 2 tablets per night (100 mg). If causing drowsiness in the AM, decrease by half a tablet and/or take the medication sooner, between dinner and bedtime., Disp: 45 tablet, Rfl: 2    cefdinir (OMNICEF) 300 MG capsule, Take 1 capsule by mouth 2 (Two) Times a Day. COMPLETED, Disp: , Rfl:     loratadine (CLARITIN) 10 MG tablet, Take 1 tablet by mouth As Needed. 1 daily around time of Neulasta OBI, Disp: , Rfl:     predniSONE (DELTASONE) 1 MG tablet, Take 1 tablet by mouth Daily. Upper respiratory issues, Disp: , Rfl:   All current medications reviewed and updated as appropriate for today's encounter on 05/22/2024.     ALLERGIES:     Allergies   Allergen Reactions    Adhesive Tape Rash     Patient has sensitive skin.     Levofloxacin Swelling    Sulfa Antibiotics Rash       SOCIAL HISTORY:       Social History     Socioeconomic History    Marital status: Single   Tobacco Use    Smoking status: Never     Passive exposure: Never    Smokeless tobacco: Never   Vaping Use    Vaping status: Never Used   Substance and Sexual Activity    Alcohol use: Not Currently    Drug use: Never    Sexual activity: Defer         FAMILY HISTORY:  Family History   Problem Relation Age of Onset    Colon polyps Mother     Asthma Mother     COPD Mother     Lung disease Mother     Migraines Mother     Cancer Father         liver cancer    Colon polyps Father     Heart disease Father     Diabetes Father     Kidney disease Father     Angina Father     Liver cancer Father     Hepatitis Father     Hearing loss Father     Cancer Sister         Breast  "cancer    Arthritis Sister     Colon polyps Sister     Breast cancer Sister     Diabetes Sister     Colon polyps Sister     Alcohol abuse Maternal Uncle     Colon cancer Neg Hx     Crohn's disease Neg Hx     Irritable bowel syndrome Neg Hx     Ulcerative colitis Neg Hx     Malig Hyperthermia Neg Hx        REVIEW OF SYSTEMS:  As per HPI         Vitals:    05/22/24 1442   BP: 146/84   Pulse: 78   Resp: 16   Temp: 98.2 °F (36.8 °C)   TempSrc: Oral   SpO2: 97%   Weight: 113 kg (249 lb 8 oz)   Height: 175.3 cm (69\")   PainSc: 0-No pain           5/22/2024     2:41 PM   Current Status   ECOG score 0     CONSTITUTIONAL:  Vital signs reviewed.  No distress, looks comfortable.  EYES:  Conjunctiva and lids unremarkable.    EARS,NOSE,MOUTH,THROAT:  Ears and nose appear unremarkable.    RESPIRATORY:  Normal respiratory effort.  Lungs clear to auscultation bilaterally.  CARDIOVASCULAR:  Normal S1, S2.  No murmurs rubs or gallops.  No significant lower extremity edema.  GASTROINTESTINAL: Abdomen appears unremarkable.  Nondistended   LYMPHATIC:  No cervical, supraclavicular lymphadenopathy.  SKIN:  Warm.  No rashes.  PSYCHIATRIC:  Normal judgment and insight.  Normal mood and affect.  NEURO: AAOx3, no obvious focal deficits.    RECENT LABS:  Reviewed old    ASSESSMENT:   is a pleasant 64 y/o WF with medical history significant for migraines, depression/anxiety, and IBS comes for rectal adenocarcinoma evaluation & management.     # Rectal adenocarcinoma, T3N0M0, Stage IIA:  Diagnosed on a c-scope performed for gastric discomfort in September 2022.   The flex sig noted a an infiltrative nonobstructing mass in the mid rectum.  The mass was partially circumferential involving one third of the lumen circumference. CT c/a/p negative for mets. MRI pelvis most consistent with T3N0 disease, with no evidence of involvement of mesorectal fascia.  Reviewed various management strategies for localized rectal cancer with chemotherapy, " radiation and surgical resection.  Patient met with Dr. Amaya, colorectal surgery and is being tentatively planned for low anterior resection after neoadjuvant chemotherapy and radiation.  She received neoadjuvant short course RT by Dr. Freeman, radiation oncology from 10/17- 10/22/2022.   Received neoadjuvant chemotherapy with 8 cycles of FOLFOX between 11/2/2022 - 2/8/2023.  Oxaliplatin dropped with last 2 cycles.  Post treatment flexible sigmoidoscopy and rectal biopsies performed 3/8/2023 showed no evidence of residual disease.  Case discussed with Dr. Amaya and Lydia.  I extensively discussed risks/benefits of surgical resection versus active surveillance.  Informed patient that active surveillance is currently not the standard of care but is an active area of research in patients who have complete response.  Also discussed surveillance strategies.  Patient expressed understanding of the risks and opted for active surveillance.    3/22/2023: Recovering well after recent chemotherapy.  Discussed plan for active surveillance.  The CEA and guardant reveal circulating tumor DNA negative.  A CT C/A/P showed asymetric rectal thickening.  Plan to repeat MRI pelvis in 3 months and flexible sigmoidoscopy in 6 months time.    A MRI pelvis from June 2023 showed near complete resolution of the rectal mass with minimal irregularity measuring 1.3 x 1.5 cm.  Patient subsequently underwent a flex ex which showed scar in the mid rectum.  Circulating tumor DNA from June 2023 negative as well.    A MRI pelvis from December 2023 raised concern for possible disease recurrence.  PET abnormal as well.  Patient underwent flex sig which only showed old scar.  Continued on surveillance.  Colonoscopy from March 2024 and CT C/A/P from May 2024  5/22/2024: Patient overall asymptomatic.  CEA normal well.  Guardant reveal From today pending.  Reviewed recent CT scan and colonoscopy findings which showed no evidence of disease recurrence.  Will  continue active surveillance.patient has been scheduled for repeat colonoscopy in September 2024.  I will obtain repeat CT C/A/P and MRI pelvis and 6 months time.  Patient wishes to keep the port for now, however wants to get next blood drawn through her veins.  If able to do that, tentatively remove port after that.  Advised close f/u with rad-onc & colorectal surgery.   Will see her back in 6 months or sooner if needed    #GERD  Significant epigastric discomfort and reflux with initiation of chemo  Continue Protonix 40 mg nightly    #Diarrhea  Initially began following radiation and was exacerbated with chemotherapy  Utilizing Imodium and Lomotil as needed.  Is planning to follow-up with GI again.    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  On multivitamin supplements    PLAN:   Continue active surveillance.  Guardant reveal from today pending  MRI pelvis and CT chest/abd in 6 months. If negative, repeat in 6 months.   Flex sig/colonoscopy per   Follow-up every 6 weeks for port flush, and in 6 months for CT c/a/P, MRI pelvis and MD review or sooner if needed  Call/ return sooner should he develop any new concerns or problems.  Orders Placed This Encounter   Procedures    CT Abdomen Pelvis With Contrast     Standing Status:   Future     Standing Expiration Date:   5/23/2025     Order Specific Question:   Will Oral Contrast be needed for this procedure?     Answer:   No     Order Specific Question:   Release to patient     Answer:   Routine Release [[id number]]     Order Specific Question:   Reason for Exam:     Answer:   rectal cancer f/u    CT Chest With Contrast Diagnostic     Standing Status:   Future     Standing Expiration Date:   5/23/2025     Order Specific Question:   Release to patient     Answer:   Routine Release [[id number]]     Order Specific Question:   Reason for Exam:     Answer:   rectal cancer f/u    MRI Pelvis With & Without Contrast     Standing Status:   Future     Standing  Expiration Date:   5/23/2025     Order Specific Question:   Reason for Exam:     Answer:   rectal cancer f/u     Order Specific Question:   Release to patient     Answer:   Routine Release [[id number]]    Comprehensive Metabolic Panel     Standing Status:   Future     Standing Expiration Date:   5/23/2025     Order Specific Question:   Release to patient     Answer:   Routine Release [[id number]]    CEA     Standing Status:   Future     Standing Expiration Date:   5/23/2025     Order Specific Question:   Release to patient     Answer:   Routine Release [[id number]]    CBC & Differential     Standing Status:   Future     Standing Expiration Date:   5/23/2025     Order Specific Question:   Manual Differential     Answer:   No     Order Specific Question:   Release to patient     Answer:   Routine Release [[id number]]   Total time spent during this patient encounter is 45 minutes. The total time spent with the patient includes: preparing to see the patient by reviewing of tests, prior notes or other relevant information, performing appropriate independent examination & evaluation, counseling, ordering of medications, tests or procedures, documenting clinic information in the electronic medical records or other health records, independently interpreting results of tests and communicating the results to the patient/family or caregiver.

## 2024-05-24 ENCOUNTER — TELEPHONE (OUTPATIENT)
Dept: PSYCHIATRY | Facility: HOSPITAL | Age: 64
End: 2024-05-24
Payer: COMMERCIAL

## 2024-05-24 NOTE — TELEPHONE ENCOUNTER
Oncology Support Services    OSW called patient to follow-up on YAMILEX Sagastume, juanita. The patient is interested in volunteering with one of the organizations that assists cancer patients as she is now a survivor.  We briefly discussed a couple of the organizations, but it was a busy time at work for her.  The patient requested to call me next week, after she has had some time this weekend to look over information that Gabrielle and Mehreen provided her in their appointments.  OSW provided contact information and will assist the patient further as needed.      Roberto Carlos Wilkinson MSW, hospitalsW  Oncology Social Worker

## 2024-05-29 LAB — REF LAB TEST METHOD: NORMAL

## 2024-05-30 ENCOUNTER — OFFICE VISIT (OUTPATIENT)
Dept: PSYCHIATRY | Facility: HOSPITAL | Age: 64
End: 2024-05-30
Payer: COMMERCIAL

## 2024-05-30 ENCOUNTER — PATIENT OUTREACH (OUTPATIENT)
Dept: OTHER | Facility: HOSPITAL | Age: 64
End: 2024-05-30
Payer: COMMERCIAL

## 2024-05-30 DIAGNOSIS — G47.01 INSOMNIA DUE TO MEDICAL CONDITION: Primary | ICD-10-CM

## 2024-05-30 DIAGNOSIS — C20 RECTAL CANCER: ICD-10-CM

## 2024-05-30 NOTE — PROGRESS NOTES
Nurse Initial Navigator Assessment: Received referral from [facility]/behavioral health APRN to follow patient regarding diagnosis and psychosocial support. Patient has h/o rectal adenocarcinoma and has completed neoadjuvant radiation and systemic treatment with FOLFOX.. She is under active surveillance as of 3/2023 and is being followed by Dr. Weber and Dr. Amaya. Patient has met with Crystal/CNS for initial survivorship visit. Oncology nurse navigator will not follow due to patient not receiving active cancer treatment. Will sign off.....Katie Sanchez RN, Oncology Nurse Navigator

## 2024-05-30 NOTE — PATIENT INSTRUCTIONS
YAMILEX Sagastume    [facility]  [address]  [place], [place]   [phone]    Please see below and attached for additional resources:  Group, individual (for the patient and family members), and family therapy    [employer]    [email or web address] [email or web address]/    Phone number: [phone]    Support for patients: matched with someone with a similar diagnosis and in survivorship    [employer] [facility]  https://[email or web address]/    Self-referrals for wanting a primary care doctor or a specialist  PCP [phone]  Specialist [phone]  Bereavement Support Groups in the [place]  Grief Share: https://www.griefshare.org/countries/[place]/[facility]/[place]/[facility]/[place]  *If you go to the website, you can click on the specific group date and it will give additional info about the groups*  Therapy resources  Chronic Illness counseling center  www.[facility]ronicB[facility] Center[facility]Improveit! 360.SupplyBetter  [address] , [address]  [place] [address]  [facility]: phone # [phone]    Two Locations:    [address]  [place] [address]      [address], [address]  [place] [address]  FREE Massages  Patients are allowed some massages for FREE through the supportive oncology department. Massages at the cancer center and are done by Charlene. She does her own scheduling.  Office:  [phone]    Mobile:  [phone]  FREE exercise program  Free exercise program [employer] program through the [employer]:  https://www.[employer].org/what-we-do/healthy-living/fitness/livestrong  Chiropractic services ([facility])  [employer]lyBetter  ([phone]  Accupuncture  Dr. Lavinia Guy does Accupuncture  [facility]  [address] [place], [place] [id number] [phone] phone  [phone] fax    Suicide / Crisis Hotline  Call: 988    OR   Go online and use their text / chat function - 988 Suicide & Crisis Lifeline - Call.  Text. Chat. ([employer]CrowdFanatic)  Unified Protocol Safety Plan lesson  National Suicide Hotline Number (785-627-6562)  National safety text line (Text HOME to 809580)    [facility]: Alcohol / Substance Use and Dual Diagnosis Management  [address] [place] [address], [place], [place], [address]  [phone]  OR (725) [phone] https://SyringeTech/locations-and-payment/  Domestic Violence  2(141) 948-1228 (SAFE)                            www.kcadv.org   Sexual Assault Crisis Centers (for children and adults)  2(131) 780-0564 (HOPE)  Genesight testing  https://enosiX/gene-test-mental-health-medications/?utm_source=anmol&utm_medium=cpc&utm_campaign=916475806&utm_content=3155866712561497&utm_term=genesight%20testing&utm_source=anmol&utm_medium=cpc&utm_campaign=branded&utm_content=1620884539201640&utm_term=genesight%20testing&hfocgpk=[id number]

## 2024-05-30 NOTE — PROGRESS NOTES
"Supportive Oncology Services  In person follow up session - The primary office location is [facility].     Subjective  Patient ID: Babs Felton is a 63 y.o. female is seen face to face in the Supportive Oncology Services ([facility]) Clinic. The patient is currently in survivorship of rectal cancer  and is followed by Dr. Jacques Freeman.    CC: Follow up medication and symptom management    Last seen in person: 5/16/2024    HPI/ Interval History: This is a follow-up visit for medication management and reevaluation of mood, and therapeutic interventions as an added layer of support.  The patient reports that she is physically been not feeling well over the last several days due to GI upset and dizziness.  She is concerned that she might be coming down with something and physically \"just has not been feeling right.\"    She admits that prior to the last several days she had been sleeping fairly well and restfully.  However, over the last several days, her sleep has become fragmented and nonrestorative upon awakening. She admits to some ruminative worry and difficulty shutting her mind off at bedtime. She takes scheduled naps on Sundays, but otherwise does not typically nap. She also endorses frequent caffeine intake to get through her days and recognizes this as a likely contributing factor to her disrupted sleep and the need to have better sleep hygiene. She recently saw YAMILEX Lopez, for CBT I evaluation and is continuing to go through the materials she was provided.  She admits to this experience being an eye opener for her and recognizes a need for some major pattern changes.  She has utilized trazodone, 50 mg, 1 or 2 times and has not found a lot of benefit, but has been concerned about drowsing effects interfering with her work schedule, so she has not increased her dosage yet, although she denies that she has had any side effects.      She endorses trying to listen to " "her body, taking breaks where she is able and budgeting her energy when possible.  She continues to have interest in doing things she previously enjoyed, but admits to decreased motivation and having to push herself to get through her days when she has low energy. She endorses having some difficulty with concentration and attention, feeling that chemo fog has started to kick in, but is able to complete tasks with some effort. Her appetite continues to fluctuate. She describes her mood as \"not feeling great\" today, but attributes this to her physical symptoms. She admits to ongoing situational anxiety, but feels this remains manageable and appropriate. She any past or present passive or active SI, plan or intent, self-harming behaviors, HI, or AVH.  Additionally, she continues to deny armando, hypomania, delusional thinking, or psychosis. She continues to endorse having a lot of support in her local community and with her friends.    She continues to try to embrace her new role in survivorship.  She expresses an ongoing desire to connect with others in her community on a more personal level.  She continues to grieve the loss of her parents, but has continued manage being constructive and keeping herself busy, while still appropriately reflecting and embracing her loss.    Records reviewed.    PHQ-9 Total Score: 2 (previously scored 3 at last visit on 5/16/2024)  Reports a score of \"0\" to question 9.    Objective   Mental Status Exam  Appearance:  clean and casually dressed, appropriate and neat   Attitude toward clinician:  cooperative and agreeable , good eye contact  Speech:    Rate:  regular rate and rhythm   Volume:  normal  Motor:  no abnormal movements present  Mood:  \"Not feeling great\"  Affect: Tired, mood congruent, full range  Thought Processes:  linear, logical, and goal directed and associations intact  Thought Content:  normal  Suicidal Thoughts:  absent  Homicidal Thoughts:  absent  Perceptual " Disturbance: no perceptual disturbance, no evidence of responding to internal stimuli  Attention and Concentration:  good  Insight and Judgement:  good  Memory:  memory appears to be intact    Gait: Within normal limits.     Assistive devices: None.     Exam: As above    Current Documented Allergies & Reactions  Allergies   Allergen Reactions    Adhesive Tape Rash     Patient has sensitive skin.     Levofloxacin Swelling    Sulfa Antibiotics Rash       Current Psychotropic Medications:    traZODone (DESYREL) 50 MG tablet, 1 tablet by mouth at bedtime. If no effect, increase by 1/2 tablet per night, up to 2 tablets per night (100 mg). If causing drowsiness in the AM, decrease by half a tablet and/or take the medication sooner, between dinner and bedtime.  Trazodone    Diagnoses and all orders for this visit:    1. Insomnia due to medical condition (Primary)    2. Rectal cancer    - R/O major depressive disorder  - R/O bipolar disorder  - Rule out generalized anxiety disorder    Plan of Care/ Medical Decision Making  1) We discussed continued titration of her trazodone for efficacy, encouraging her to try 2 tablets, totaling 100 mg, as needed per night.  We revisited risks, benefits, and side effects of medications. Continue current treatment plan otherwise.  2) She was encouraged to continue CBT I with YAMILEX Lopez, in the future and follow her treatment plan as she has directed.  2) Provided supportive talk therapy through active listening, empathy, reflection, normalization of feelings, and positive reframing. Discussed current methods of coping.   3) Encouraged ongoing talk therapy with this provider or a trusted source of support. Will continue to provide additional resources in the after visit summary, patient instructions.  4) Notify provider if having any questions, concerns, or issues.    Follow up with YAMILEX Sagastume, 2-4 weeks and as needed    I spent 54 minutes caring for Babs on this date  of service. This time includes time spent by me in the following activities: preparing for the visit, obtaining and/or reviewing a separately obtained history, performing a medically appropriate examination and/or evaluation, counseling and educating the patient/family/caregiver, ordering medications, tests, or procedures, documenting information in the medical record, and care coordination.     Of the 54 total minutes, 41 minutes were spent providing supportive therapy through active listening and therapeutic alliance. Addressed the patient's current stressors and feelings. Affirmed and encouraged ongoing communication with sources of support and treatment team.  Discussed current methods of coping. Provided additional options for sources of support through community resources and alternative treatment options.

## 2024-06-11 ENCOUNTER — OFFICE VISIT (OUTPATIENT)
Dept: OTHER | Facility: HOSPITAL | Age: 64
End: 2024-06-11
Payer: COMMERCIAL

## 2024-06-11 VITALS
DIASTOLIC BLOOD PRESSURE: 82 MMHG | RESPIRATION RATE: 16 BRPM | HEART RATE: 78 BPM | OXYGEN SATURATION: 95 % | SYSTOLIC BLOOD PRESSURE: 148 MMHG

## 2024-06-11 DIAGNOSIS — C20 RECTAL CANCER: Primary | ICD-10-CM

## 2024-06-11 DIAGNOSIS — R29.898 MUSCULAR DECONDITIONING: ICD-10-CM

## 2024-06-11 DIAGNOSIS — M25.552 BILATERAL HIP PAIN: ICD-10-CM

## 2024-06-11 DIAGNOSIS — F51.01 PRIMARY INSOMNIA: ICD-10-CM

## 2024-06-11 DIAGNOSIS — M25.551 BILATERAL HIP PAIN: ICD-10-CM

## 2024-06-11 PROCEDURE — G0463 HOSPITAL OUTPT CLINIC VISIT: HCPCS | Performed by: NURSE PRACTITIONER

## 2024-06-11 NOTE — PROGRESS NOTES
"[facility] MULTIDISCIPLINARY CLINIC   IN CLINIC Follow-Up Visit  Management of Residual Effects of Cancer Treatment      Babs Felton is a pleasant 63 y.o. female reviewed today in Multidisciplinary Clinic, for follow-up Management of Residual Effects of Cancer Treatment    HPI  Patient returns today for ongoing review of posttreatment needs.    She has had some time to evaluate the materials about insomnia and CBT-I from her last appointment.  She was able to download the CBT-I  vanda and has found it surprisingly informative.  She was able to identify several behaviors that she was able to modify resulting in substantial improvements in sleep quality and feelings of being refreshed.  She thinks that she is experiencing much less \"microsleep\" she thinks she would like to continue in this manner utilizing the vanda for education and behavioral strategies as she does not feel she can adhere to the sleep restriction component of CBT-I right now due to the unpredictable nature of her work her schedule and her life.    Right foot plantar fasciitis and right hip bursa pain are current barriers to increasing physical activity.  She is trying some new more supportive shoes and is hopeful that will help mitigate some of her plantar fasciitis pain.  The right bursa pain she states started about 10 years ago but she does feel that pain has worsened as well as some left hip pain and discomfort since her radiation treatments.    TREATMENT HISTORY:     Oncology/Hematology History Overview Note   CEA  2/22/24 1.58  11/29/23: 1.59  9/6/23: 1.65  6/14/23: 1.87  3/22/23: 1.31  2/8/23: 1.54  9/8/22: 3.51     Rectal cancer   9/1/2022 Biopsy    Final Diagnosis   1. Small Bowel, Biopsy:                A. Small bowel mucosa with a normal villous architecture and no significant histopathologic changes.     2. Stomach, Polyp, Biopsy:                A. Fundic gland polyps.               B. Negative for dysplasia.     3. " Esophagus, Mid, Biopsy:                A. Squamous papilloma.     4. Esophagus, Proximal, Biopsy:                A. Squamous papilloma with increased intraepithelial eosinophils (see comment).               B. Separate fragment of gastric oxyntic mucosa with no significant histopathologic changes.     5. Cecum, Polyp, Polypectomy:               A. Tubular adenoma.     6. Ascending Colon, Polyp, Polypectomy:               A. Tubular adenoma.     7. Right Colon, Random, Biopsy:   A. Colonic mucosa with no significant histopathologic changes.               B. No histologic evidence of microscopic colitis.      8. Left Colon, Random, Biopsy:   A. Colonic mucosa with no significant histopathologic changes.               B. No histologic evidence of microscopic colitis.      9. Rectum, Polyp, Polypectomy:               A. INVASIVE MODERATELY DIFFERENTIATED ADENOCARCINOMA ARISING OUT OF A       TRADITIONAL SERRATED ADENOMA WITH HIGH GRADE DYSPLASIA.           9/13/2022 Imaging    Exam: CT of the chest, abdomen, and pelvis with contrast.     HISTORY: 61-year-old premalignant polyps and adenocarcinoma of the  rectum on recent colonoscopy 09/01/2022.     TECHNIQUE: Radiation dose reduction techniques were utilized, including  automated exposure control and exposure modulation based on body size.   3 mm images were obtained through the chest, abdomen, and pelvis after  the administration of IV contrast.      COMPARISON: None     FINDINGS:  Thoracic inlet: Within normal limits     Heart and great vessels: The heart size is within normal limits.  Atherosclerosis including coronary calcifications. The study is not  optimized for vascular evaluation however, normal enhancement is present  centrally     Lymphatics: Subcentimeter mediastinal nodes some of which are coarsely  calcified suggesting prior granulomatous disease     Lung parenchyma and pleural space: Patent central airway. No focal  consolidations, effusions, or  pneumothorax. Calcified granulomas. Sub-6  mm micronodules with an index nodule in the right middle lobe (series 3  image 49 and 54), right lower lobe (image images 70/71), left lower lobe  (image 80).           Abdomen/pelvis:     Liver: Infiltration of the liver.     Biliary tract: Within normal limits.     Spleen: Within normal limits.     Pancreas: Within normal limits.     Adrenals: Within normal limits.     Kidneys: Too centimeter hypodensity in the right upper pole favoring a  cyst. Left kidney within normal limits. No hydronephrosis.     Bowel:  No bowel loops are normal in caliber. Normal appendix. The colon  is stool-filled somewhat limiting evaluation. Scattered colonic  diverticula without acute diverticulitis. Thickening of the rectum which  is incompletely distended limiting evaluation for known rectal neoplasm.     Peritoneum: No free fluid or free air.     Vasculature:    Minimal calcific atherosclerosis.     Lymph Nodes:  Scattered subcentimeter abdominal and pelvic nodes, not  enlarged by CT criteria. An index node at the pelvic brim measuring up  to 4 mm in short axis.                                                            Pelvic organs: Within normal limits.     Abdominal/Pelvic Wall: Tiny fat-containing umbilical hernia.     Bones: Multilevel degenerative changes thoracolumbar spine and pelvis.  Probable hemangioma T10 vertebral body..     IMPRESSION:  1.  Rectal neoplasm not well visualized on this exam, please refer to  the prior colonoscopy for further detail and further evaluation with MRI  or PET/CT could be considered if clinically indicated.  2.  Calcified and noncalcified sub-6 mm pulmonary nodules likely the  sequela of prior granulomatous disease however, given history recommend  6 month surveillance.  3.  Scattered colonic diverticulosis.  4.  Mildly prominent but not pathologically enlarged pelvic nodes  measuring up to 4 mm.  5.  Please see above for additional findings.         9/16/2022 Initial Diagnosis    Rectal cancer (HCC)     9/26/2022 Imaging    MRI PELVIS WITHOUT CONTRAST/RECTAL MRI     HISTORY: 61-year-old female with rectal cancer. Staging.     TECHNIQUE: Routine staging rectum MRI was performed without contrast.  Compared with CT of the abdomen and pelvis 09/13/2022.     FINDINGS: There is an approximately 4 cm mass with the inferior margin  of the mass 9 cm proximal to the anorectal junction. The mass effaces  the muscularis propria along the right wall of the rectum and at the  posterior wall, there is extension of the mass just beyond the  muscularis propria, less than 5 mm. There is no evidence for extension  of the tumor beyond the muscularis propria at the anterior aspect of the  mass. There are no pathologically enlarged perirectal nodes. There are  shotty nodes superiorly at the mesorectum in the presacral space with  one of the largest nodes measuring 0.7 x 0.5 cm. The smallest distance  between the mass and the mesorectal fascia is 0.7 cm and this is along  the right posterior margin of the mass.     IMPRESSION:  High rectal mass with effacement of the muscularis propria  along the right wall and extension beyond the muscularis propria at the  posterior wall, T3 lesion. There is no evidence for involvement of the  mesorectal fascia. There are no pathologically enlarged nodes, but the  high mesorectal nodes are indeterminate.     10/12/2022 Biopsy    Guardant 360: biomarkers identified with no associated FDA approved therapies     10/17/2022 - 10/21/2022 Radiation    Radiation OncologyTreatment Course:  Babs Felton received 2500 cGy in 5 fractions to rectum     11/2/2022 - 2/8/2023 Chemotherapy    OP COLON [id number] OXALIplatin / Leucovorin / Fluorouracil  1/11/2023: Cycle 6 5-FU/LV.  Oxaliplatin dropped due to neuropathy.  2/8/2023: Cycle 8 5-FU     1/11/2023 Imaging    MRI OF THE PELVIS WITHOUT CONTRAST.     HISTORY: Restaging rectal adenocarcinoma (T3 N0 M0,  stage IIa) status  post chemotherapy.     TECHNIQUE: MRI of the pelvis according to a protocol tailored for  evaluation of the rectum without IV contrast. Lack of contrast limits  evaluation of solid, visceral, and vascular structures. Sensitivity for  underlying lesions and infection decreased. 5 sequences submitted.     COMPARISON: 09/26/2022     FINDINGS:     Significant decrease in size of the previously described 4 cm rectal  mass with residual 2.7 cm with residual bowel wall thickening measuring  up to 1.3 cm to the right of midline in low T2 signal along the right  lateral wall extending into the subjacent mesentery suggesting  posttreatment change/fibrosis (series 6 image 19). Along the inferior  margin of this mass there is focal stenosis without obstruction.  Thickening of the mid to distal rectum which may be the sequela of  posttreatment change/proctitis.     A previously measured presacral node is smaller at 0.4 x 0.5 cm  (previously 0.5 x 0.7 cm). Stable 3 mm right perirectal node Uterus in  situ. Small fat-containing umbilical hernia. Noncontrast imaging of the  osseous structures within normal limits. Hypertrophic degenerative  changes are again noted in thoracolumbar spine. Bone scan could be  considered for further evaluation if clinically indicated.     IMPRESSION:  1.  Good response to therapy with near complete resolution of the  previously described rectal mass with residual rectal thickening and low  T2 signal/fibrosis at the site of primary neoplasm.  2.  Pelvic nodes are smaller or unchanged.  3.  Please see above for additional findings.        3/8/2023 Biopsy    Final Diagnosis   1. Rectum, Biopsy:               A. Colorectal mucosa with mild increase in lamina propria chronic inflammation and focal scarring.               B. Negative for carcinoma.         3/22/2023 Biopsy    Guardant Reveal: ctDNA not detected     4/4/2023 Imaging    Examination: CT of the chest, abdomen, and pelvis with  contrast     TECHNIQUE: CT of the chest, abdomen, and pelvis after the uneventful  administration of nonionic intravenous contrast per protocol. Radiation  dose reduction techniques were utilized, including automated exposure  control and exposure modulation based on body size.     HISTORY:Rectal cancer     COMPARISON:09/13/2022     FINDINGS:Chest:     Old granulomatous disease is seen in the lungs. There is dependent  atelectasis. There are stable small pulmonary nodules, for example in  the right lower lobe on series 3, images 57 and 71. No consolidation,  pleural effusion, new or suspicious pulmonary nodule or mass are seen.  The central airways are patent. There is a small right-sided  fat-containing Bochdalek hernia.     No enlarged supraclavicular, axillary, mediastinal, or hilar lymph nodes  are seen. The mediastinal vasculature is normal in caliber. A left  internal jugular venous Port-A-Cath terminates in the superior vena  cava. Coronary calcifications are seen. The heart is normal in size and  configuration, without pericardial effusion.     Abdomen and pelvis:     A low-attenuation right renal lesion is technically indeterminate,  likely cyst.     The liver, gallbladder, spleen, adrenal glands, left kidney, pancreas,  stomach, small bowel, large bowel, urinary bladder, uterus, adnexal  structures, and abdominal vasculature are normal. There is perirectal  stranding with asymmetric rectal thickening measuring 1.3 cm tc width on  series 2, image 201. No intraperitoneal fluid collection or free gas are  seen. No enlarged lymph nodes are demonstrated.     Bone windows demonstrate degenerative changes, without suspicious  osseous lesion seen.     IMPRESSION:  1. Stable tiny pulmonary nodules, very likely benign. Recommend  attention on follow-up  2. Asymmetric rectal thickening, possibly reflecting the patient's  rectal neoplasm, difficult to compare because of differences in rectal  distention  3. Incidental  findings as above, without convincing evidence of  metastatic disease.     6/7/2023 Imaging    Examination: MRI of the pelvis without and with contrast     TECHNIQUE: MRI of the pelvis was obtained before and after the  uneventful administration of gadolinium base contrast according to the  rectal cancer protocol     HISTORY: Rectal cancer     COMPARISON: 01/07/2023     FINDINGS: There is near-complete resolution of the previously seen  rectal mass, with minimal irregularity around series 10, image 9  measuring 1.3 x 1.5 cm. This region is located approximately 6 cm from  the anorectal junction and 11 cm from the anal verge. It is above the  peritoneal reflection and levator musculature. There is minimal  stranding posterior to this region, stable. No enlarged lymph nodes are  seen. No suspicious osseous lesions are demonstrated. Visualized  portions of the small bowel, urinary bladder, uterus, and abdominal  vasculature are normal.     IMPRESSION:  Near complete response to therapy of the rectal mass.     12/7/2023 Imaging    CT CHEST W CONTRAST DIAGNOSTIC-, CT ABDOMEN W CONTRAST-     INDICATIONS: Rectal cancer, follow-up radiation dose reduction  techniques were utilized, including automated exposure control and  exposure modulation based on body size.     TECHNIQUE: Enhanced CT of the chest, abdomen, pelvis     COMPARISON: 4/4/2023     FINDINGS:     Chest CT:     The heart size is normal without pericardial effusion. Mild coronary  arterial calcification is present. Left chest port extends to the  superior vena cava. A few small subcentimeter short axis mediastinal  lymph nodes are seen that are not significant by size criteria.     The airways appear clear.     No pleural effusion or pneumothorax.     The lungs show no focal pulmonary consolidation or mass. Small right  lower lobe pulmonary nodules are again demonstrated and appear stable,  for example axial image 58-61, 74. Stable small lingular nodule,  image  68, and small left lower lobe nodules, image 69, 82. Old granulomatous  disease is present.        Abdomen pelvis CT:     Right renal cyst is present.     Fatty infiltration of the pancreatic head is seen.     Otherwise unremarkable appearance of the liver, gallbladder, spleen,  adrenal glands, pancreas, kidneys, bladder.     No bowel obstruction. Previously noted asymmetric wall thickening of the  rectum is less conspicuous. The appendix does not appear inflamed.        No free intraperitoneal gas or free fluid. Minimal umbilical hernia of  fat is noted.     Scattered small mesenteric and para-aortic lymph nodes are seen that are  not significant by size criteria.     Abdominal aorta is not aneurysmal. Aortic and other arterial  calcifications are present.     Degenerative and chronic changes are seen in the spine. No acute  fracture is identified.           IMPRESSION:     Previously noted asymmetric wall thickening of the rectum is less  conspicuous. Otherwise, no significant change. Continued follow-up  advised as indicated.     12/8/2023 Imaging    EXAMINATION: MRI OF THE PELVIS WITHOUT AND WITH CONTRAST     HISTORY: 62-year-old female with a diagnosis of rectal carcinoma status  post therapy undergoing follow-up evaluation.     TECHNIQUE: Multiplanar and multisequence images of the pelvis were  obtained pre and postintravenous administration of gadolinium.     COMPARISON: MRI of the pelvis without and with contrast, 06/07/2023, MRI  of the pelvis without contrast, 01/07/2023 and 09/26/2022, and CT of the  abdomen and pelvis with contrast, 12/07/2023.     FINDINGS: Seen on the sagittal postcontrast images on series 13, images  42 through 46 near the junction of the mid to high rectum with the  inferior margin on the order of 11.9 cm from the anal verge and 7.6 cm  from the anorectal junction, there is an area of irregular  C-shaped/semicircumferential mass-like thickening of the rectum that is  centered  at the 4 o'clock position on the axial postcontrast images,  series 11 images 42 through 46. This area is located on the order of 3  cm superior to the anterior peritoneal reflection and measures on the  order of 2.1 cm in the superior to inferior dimension, 2.6 cm in the  medial to lateral dimension, and 2.3 cm in anterior to posterior  dimension with a semicircumferential appearance. The area appears  intermediate in signal intensity on the T2 weighted sequence images  without any suggestion of associated mucinous material. This area is in  the region of the previously noted primary lesion seen on the MRI  examination dated 09/26/2022.     There is localized increased enhancement seen in the described region  that extends 5 mm outside of the posterior muscularis propria. This is  best seen on series 11 image 45. There is also some ill-defined hazy  enhancement in the posterior mesorectum in this region that can be seen  on series 11 image 45 and on series 13 image 43. The posterior margin of  the lesion that apparently extends outside of the muscularis propria  extends to within 0.5 cm of the posterior mesorectal fascia, but the  nonspecific stranding abuts the mesorectal fascia immediately overlying  the sacrum. No mesorectal fascial thickening or abnormal enhancement is  appreciated.     The pelvic floor muscles appear normal. No abnormal signal intensity is  seen within the sacrum. There is no evidence for adenopathy in the  pelvis. No free fluid is appreciated.     IMPRESSION:  1. There is an area of abnormal mass-like enhancement in the region of  the primary tumor that measures on the order of 2.6 cm in greatest  dimension and appears to extend 5 mm outside of the posterior muscularis  propria. There is also some stranding within the posterior mesorectum  that abuts the posterior mesorectal fascia that overlies the sacrum.  This is in the same region as the primary lesion seen on 09/26/2022. No  adenopathy  is appreciated. The imaging features raise concern for  recurrence of malignancy, possibly a T3c lesion with possible mesorectal  involvement versus posttreatment changes. However, the appearance is  different than seen on the prior MRI dated 06/07/2023 and is suspicious  for local recurrence. Clinical follow-up is recommended. Surgical and/or  tissue evaluation should be considered.     12/18/2023 Imaging    F-18 FDG PET SKULL BASE TO MID THIGH WITH PET CT FUSION.     HISTORY: 62-year-old female with rectal cancer treated with radiotherapy  and chemotherapy. Restaging.     TECHNIQUE: Radiation dose reduction techniques were utilized, including  automated exposure control and exposure modulation based on body size.   Blood glucose level at time of injection was 86 mg/dL. 6.4 mCi of F-18  FDG were injected and PET was performed from skull base to mid thigh. CT  was obtained for localization and attenuation correction. Time at  injection 12:44 p.m. PET start time 2:09 p.m. Compared with multiple  priors including pelvic MRI 12/08/2023, CTs 12/07/2023.     FINDINGS: Mediastinal blood pool has a maximal SUV of 3.3.  1. There is nonspecific low-level activity throughout the rectosigmoid  colon and more intense activity at the distal rectum and perianal soft  tissues with a maximal SUV of 8.0. This is nonspecific and may be  radiation related and should be correlated with endoscopy.     2. There is no hypermetabolic lymphadenopathy within the pelvis or  abdomen. There is no suspicious liver activity and there are no new or  suspicious pulmonary opacities or nodules, given constraints of  breathing artifact. There is no convincing evidence for distant  metastases.     12/22/2023 Biopsy    Final Diagnosis   1.  Rectal biopsy: Colonic mucosa with               A.  Focal changes suggesting developing hyperplastic polyp.               B.  No active inflammation or granulomatous inflammation identified.               C.  No  "glandular dysplasia nor malignancy identified.        2/22/2024 Biopsy    Guardant reveal: ctDNA not detected         Past Medical History:   Diagnosis Date    Abdominal cramping 07/01/2022    Anemia     Bloating 07/01/2022    Bloody diarrhea 07/01/2022    Chronic back pain     Colon polyps     FOLLOWED BY DR. BEVERLY MENDEZ    COVID 08/15/2023    Cystitis     BURNING WITH URINATION SINCE RADIATION    Depression     Early cataract     Gastric polyps     FOLLOWED BY DR. BEVERLY MENDEZ    GERD (gastroesophageal reflux disease)     Hearing loss     Hiatal hernia 09/2021    3 CM, FOLLOWED BY DR. BEVERLY MENDEZ    History of chemotherapy     LAST TREATMENT 2/10/2023    History of depression     History of radiation therapy     Hyperlipidemia     NO MEDS    IBS (irritable bowel syndrome)     Migraines     HX    Rectal bleeding 1983    Off and on for years    Rectal cancer 09/2022    INVASIVE MODERATELY DIFFERENTIATED ADENOCARCINOMA ARISING FROM TRADITIONAL SERRATED ADENOMA WITH HGD    Sciatica 09/2015    Tear of medial meniscus of knee 07/2021    RIGHT KNEE    Vitamin D deficiency        Past Surgical History:   Procedure Laterality Date    COLONOSCOPY N/A 1986    COLONOSCOPY N/A 03/27/2024    A HEALTHY SCAR WAS FOUND IN MID RECTUM, FLEX SIG IN 6 MONTHS, DR. TEMO OJEDA AT [facility]    COLONOSCOPY W/ POLYPECTOMY N/A 09/01/2022    3 MM TUBULAR ADENOMA POLYP IN ASCENDING, 6 MM TUBULAR ADENOMA POLYP IN CECUM, 30 MM MASS IN RECTUM, PATH:INVASIVE MODERATELY DIFFERENTIATED ADENOCARCINOMA ARISING FROM TRADITIONAL SERRATED ADENOMA WITH HGD, HYPERTROPHIED ANAL PAPILLA, DR. BEVERLY MENDEZ AT [facility]    ENDOSCOPY N/A 09/01/2022    MULTIPLE BENIGN GASTRIC POLYPS, 2 SQUAMOUS PAPILLAS IN ESOPHAGUS, 3 CM HIATAL HERNIA,    EXTERNAL EAR SURGERY  1966    MULTIPLE \"LANCES\", DR. ACE IN [facility]    EYE SURGERY Right 1967    DR. CASTANEDA IN [facility]    KNEE ARTHRODESIS Right 07/27/2021    RIGHT KNEE " "ARTHROCENTESIS, DR. ALBERT GILMAN    KNEE SURGERY Left 1985    DR. PEREZ AT [facility]    SHOULDER MANIPULATION Left 01/07/2013    FOR FROZEN SHOULDER, DR. ALBERT GILMAN AT [facility]    SIGMOIDOSCOPY N/A 09/21/2022    AN INFILTRATIVE NON OBSTRUCTING MASS IN MID RECTUM, AREA TATTOOED, DR. JENNIFER AMAYA AT [facility]    SIGMOIDOSCOPY N/A 03/08/2023    7 MM HEALED ULCERATION WHERE RECTAL CANCER WAS, SCAR TISSUE HEALTHY, BIOPSY SHOWED INFLAMMATION AND SCAR, NO RESIDUAL CANCER, FLEX SIG IN 12 MONTHS, DR. JENNIFER AMAYA AT [facility]    SIGMOIDOSCOPY N/A 07/13/2023    A HEALTHY SCAR WAS IN MID RECTUM, DR. JENNIFER AMAYA AT [facility]    SIGMOIDOSCOPY N/A 11/06/2023    A HEALTHY APPEARING SCAR IN MID RECTUM, COLONOSCOPY IN 4 MONTHS, DR. JENNIFER AMAYA AT [facility]    SIGMOIDOSCOPY N/A 12/22/2023    A HEALTHY SCAR WAS FOUND IN MID RECTUM, BIOPSIES BENIGN, DR. JENNIFER AMAYA AT [facility]    TRANSRECTAL ULTRASOUND N/A 09/21/2022    Procedure: ULTRASOUND TRANSRECTAL;  Surgeon: Jennifer Amaya MD;  Location: [facility] ENDOSCOPY;  Service: General;  Laterality: N/A;    TRANSRECTAL ULTRASOUND N/A 12/22/2023    Procedure: ULTRASOUND TRANSRECTAL;  Surgeon: Jennifer Amaya MD;  Location: [facility] ENDOSCOPY;  Service: General;  Laterality: N/A;  pre: abnormal MRI  post: rectal ulceration    VENOUS ACCESS DEVICE (PORT) INSERTION Left 10/26/2022    Procedure: INSERTION OF PORTACATH;  Surgeon: Leonel Bridges MD;  Location: [facility] OR;  Service: General;  Laterality: Left;    WISDOM TOOTH EXTRACTION Bilateral        Social History     Socioeconomic History    Marital status: Single   Tobacco Use    Smoking status: Never     Passive exposure: Never    Smokeless tobacco: Never   Vaping Use    Vaping status: Never Used   Substance and Sexual Activity    Alcohol use: Not Currently    Drug use: Never    Sexual activity: Defer         No results found for: \"LDH\", \"URICACID\"      Lab Results   Component Value Date    GLUCOSE 92 05/16/2024    BUN 12 05/16/2024    CREATININE 0.68 " 05/16/2024    BCR 17.6 05/16/2024    K 4.2 05/16/2024    CO2 25.8 05/16/2024    CALCIUM 9.6 05/16/2024    PROTENTOTREF 6.8 05/27/2022    ALBUMIN 4.3 05/16/2024    LABIL2 2.1 05/27/2022    AST 20 05/16/2024    ALT 15 05/16/2024       CBC w/diff          11/29/2023    12:52 2/22/2024    12:55 5/16/2024    08:22   CBC w/Diff   WBC 5.61  6.06  4.64    RBC 4.25  4.34  4.38    Hemoglobin 12.5  12.4  12.7    Hematocrit 37.8  37.6  38.4    MCV 88.9  86.6  87.7    MCH 29.4  28.6  29.0    MCHC 33.1  33.0  33.1    RDW 13.2  13.7  13.4    Platelets 223  255  237    Neutrophil Rel % 61.6  61.1  56.2    Immature Granulocyte Rel % 0.7  0.5  0.4    Lymphocyte Rel % 27.5  28.9  31.0    Monocyte Rel % 6.1  5.6  7.5    Eosinophil Rel % 3.7  3.6  4.5    Basophil Rel % 0.4  0.3  0.4        Allergies as of 06/11/2024 - Reviewed 05/30/2024   Allergen Reaction Noted    Adhesive tape Rash 11/02/2022    Levofloxacin Swelling 09/30/2015    Sulfa antibiotics Rash 09/30/2015        MEDICATIONS:  Patient medication list reviewed today    Review of Systems   Constitutional:  Positive for fatigue (improving). Negative for activity change and appetite change.   Respiratory:  Negative for shortness of breath.    Musculoskeletal:         Right plantar fasciitis; Bilateral hip pains   Psychiatric/Behavioral:  Positive for sleep disturbance (somewhat improved). Negative for decreased concentration and dysphoric mood. The patient is not nervous/anxious.        /82   Pulse 78   Resp 16   LMP  (LMP Unknown)   SpO2 95%     Wt Readings from Last 3 Encounters:   05/22/24 113 kg (249 lb 8 oz)   05/21/24 109 kg (240 lb 1.6 oz)   04/05/24 114 kg (252 lb)        Pain Score    06/11/24 1412   PainSc: 0-No pain           Physical Exam  Constitutional:       Appearance: Normal appearance. She is well-groomed.   HENT:      Head: Normocephalic and atraumatic.   Cardiovascular:      Rate and Rhythm: Normal rate and regular rhythm.   Pulmonary:      Effort:  No tachypnea or respiratory distress.   Abdominal:      General: Abdomen is flat. There is no distension.   Musculoskeletal:      Right ankle: No swelling. No tenderness. Normal pulse.      Left ankle: No swelling. No tenderness. Normal pulse.   Skin:     General: Skin is warm and dry.   Neurological:      Mental Status: She is alert and oriented to person, place, and time.   Psychiatric:         Attention and Perception: Attention and perception normal.         Mood and Affect: Mood and affect normal.         Speech: Speech normal.         Behavior: Behavior normal. Behavior is cooperative.         Thought Content: Thought content normal.         DISCUSSION HELD TODAY:     Plan and recommendations:  Continue self-directed CBT-I with vanda  Referral to Franko Daigle at 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 Rectal cancer (Primary)  -     Ambulatory Referral to Physical Therapy for Evaluation & Treatment    2. Primary insomnia  -     Ambulatory Referral to Physical Therapy for Evaluation & Treatment    3. Bilateral hip pain  -     Ambulatory Referral to Physical Therapy for Evaluation & Treatment    4. Muscular deconditioning  -     Ambulatory Referral to Physical Therapy for Evaluation & Treatment            I spent 40 minutes caring for this patient on this date of service by face-to-face counseling. This time includes time spent by me in the following activities: preparing for the visit, reviewing tests, obtaining and/or reviewing a separately obtained history, performing a medically appropriate examination and/or evaluation,  counseling and educating the patient/family/caregiver, documenting information in the medical record, and care coordination

## 2024-06-14 ENCOUNTER — TELEPHONE (OUTPATIENT)
Dept: ONCOLOGY | Facility: CLINIC | Age: 64
End: 2024-06-14
Payer: COMMERCIAL

## 2024-06-14 NOTE — TELEPHONE ENCOUNTER
Caller: Babs Felton    Relationship: Self    Best call back number: [phone]    What is the best time to reach you: ANYTIME    Who are you requesting to speak with (clinical staff, provider,  specific staff member): CLINICAL         What was the call regarding:     FEB 23RD 2023 WAS LAST CHEMO APPOINTMENT AND WANTED TO KNOW IF HAD DOCTORS BLESSING TO BE ABLE TO DONATE BLOOD     Is it okay if the provider responds through MyChart: NO

## 2024-06-24 ENCOUNTER — OFFICE VISIT (OUTPATIENT)
Dept: PSYCHIATRY | Facility: HOSPITAL | Age: 64
End: 2024-06-24
Payer: COMMERCIAL

## 2024-06-24 DIAGNOSIS — C20 RECTAL CANCER: ICD-10-CM

## 2024-06-24 DIAGNOSIS — G47.01 INSOMNIA DUE TO MEDICAL CONDITION: Primary | ICD-10-CM

## 2024-06-24 NOTE — PATIENT INSTRUCTIONS
[facility]  [address]  [place], [place]   [phone]    Please see below and attached for additional resources:  Group, individual (for the patient and family members), and family therapy    [employer]    [email or web address]    Phone number: [phone]    Support for patients: matched with someone with a similar diagnosis and in survivorship    [employer] [facility]  https://www.cvogol0jmoc.org/    Self-referrals for wanting a primary care doctor or a specialist  PCP [phone]  Specialist [phone]  Bereavement Support Groups in the [place] Area  Grief Share: https://www.griefshare.org/countries/us/[facility]/ky/[facility]/[place]  *If you go to the website, you can click on the specific group date and it will give additional info about the groups*  Therapy resources  Chronic Illness counseling center  [facility]  [address] , [address]  [place] [address]  [facility]: phone # [phone]    Two Locations:    [address]  [place] [address]      [address], [address]  [place] [address]  FREE Massages  Patients are allowed some massages for FREE through the supportive oncology department. Massages at the cancer center and are done by Charlene. She does her own scheduling.  Office:  [phone]    Mobile:  [phone]  FREE exercise program  Free exercise program Livestrong program through the [employer]:  https://www.[employer].org/what-we-do/healthy-living/fitness/livestrong  Chiropractic services ([facility])  [facility]  [phone]  Accupuncture  Dr. Lavinia Guy does Accupuncture  [facility]  [address] [place] [address] [phone] phone  [phone] fax    Suicide / Crisis Hotline  Call: 988    OR   Go online and use their text / chat function - 988 Suicide & Crisis Lifeline - Call. Text. Chat.  (988[employer] [place].org)  Unified Protocol Safety Plan lesson  National Suicide Hotline Number (404-852-5823)  National safety text line (Text HOME to 317666)    [facility]: Alcohol / Substance Use and Dual Diagnosis Management  [address] [address], [place], [place], [address]  [phone]  OR (421) [phone] https://[employer]/locations-and-payment/  Domestic Violence  1(193) 255-1475 (SAFE)                            www.kcadv.org   Sexual Assault Crisis Centers (for children and adults)  9(001) 632-9996 (HOPE)  Genesight testing  https://[employer]/gene-test-mental-health-medications/?utm_source=anmol&utm_medium=cpc&utm_campaign=902612680&utm_content=6473360269719694&utm_term=genesight%20testing&utm_source=anmol&utm_medium=cpc&utm_campaign=branded&utm_content=7117039433698662&utm_term=genesight%20testing&ugimkag=[id number]

## 2024-06-24 NOTE — PROGRESS NOTES
Supportive Oncology Services  In person follow up session - The primary office location is [facility].     Subjective  Patient ID: Babs Felton is a 63 y.o. female is seen face to face in the Supportive Oncology Services ([facility]) Clinic. The patient is currently in survivorship of rectal cancer and is followed by Dr. Jacques Freeman.    CC: Follow up medication and symptom management    Last seen in person: 2024    HPI/ Interval History: This is a follow-up visit for medication management and reevaluation of mood.  The patient endorses that she has been burning the candle at both ends and is looking forward to taking some personal time off this next week after participating in a  for some close friends and family.  She continues to endorse multiple life stressors and wanting to make time for herself and do the things that are important to her.  She is starting to do this by scheduling time for herself and doing things with intentionality.    She reports that her sleep has been improving.  She has been sleeping a little bit longer and has been more aware of her sleep habits. She continues to try and utilize tools that were given to her in her session with YAMILEX Lopez, which she has found helpful. She has been wary of utilizing trazodone, feeling like 1 tablet was not enough and 2 tablets was too much, feeling overly drowsy and unable to function.  She states cutting 1 tablet in half so she is able to take 1.5 tablets was too much work right now, but thinks that might be the sweet spot.  She endorses having to be able to get up very early and having to work long hours.  She is planning to retry trazodone when she is on her vacation to see if she can get used to it.      Her level of interest and motivation remains unchanged and is variable, still having to push herself when her energy is low, but still participating in things that are meaningful to her, such  "as going out to eat with friends this past .  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  Her energy fluctuates. She continues to try to listen to her body, still taking breaks where she is able and budgeting her energy as needed. She continues to have some difficulty with concentration and attention, attributing her symptoms to chemo fog and feels frustrated with the loss of her photographic memory. She admits to still being able to complete tasks some effort. Her appetite continues to fluctuate.    She describes her mood as \"tired and worn out\" today. She admits to some situational appropriate sadness due to some recent losses. She reflects on some memories with her friend, Vikas, his family, how they met, and a song she wrote for him for his . She continues to deny any past or present passive or active SI, plan or intent, self-harming behaviors, HI, or AVH.  She denies any armando, hypomania, delusional thinking, or psychosis. She continues to endorse having a lot of support in her local community and with her friends. She is doing her best to embrace her new role in survivorship and expresses some frustration and demoralization.  She continues to admit to an ongoing desire to connect with others in her community on a more personal level. She shared some forms of coping that she utilizes, such as \"prayer breaths\" and visual imagery. We closed out our session with a prayer led by the patient at her request.    Records reviewed.    PHQ-9 Total Score: (P) 2 (previously scored 2 at last visit on 2024)  Reports a score of \"0\" to question 9.    Objective   Mental Status Exam  Appearance:  clean and casually dressed, appropriate and neat   Attitude toward clinician:  cooperative and agreeable , good eye contact  Speech:    Rate:  regular rate and rhythm   Volume:  " "normal  Motor:  no abnormal movements present  Mood:  \"tired and worn out\"  Affect:  mildly blunted, congruent and full range  Thought Processes:  linear, logical, and goal directed and associations intact  Thought Content:  normal  Suicidal Thoughts:  absent  Homicidal Thoughts:  absent  Perceptual Disturbance: no perceptual disturbance, no evidence of responding to internal stimuli  Attention and Concentration:  fair  Insight and Judgement:  good  Memory:  memory appears to be intact      Gait: Within normal limits.    Assistive devices: None.      Exam: As above    Recent Labs Reviewed: The patient continues to be followed and managed by their oncology team.    Infusion on 05/16/2024   Component Date Value    Glucose 05/16/2024 92     BUN 05/16/2024 12     Creatinine 05/16/2024 0.68     Sodium 05/16/2024 140     Potassium 05/16/2024 4.2     Chloride 05/16/2024 105     CO2 05/16/2024 25.8     Calcium 05/16/2024 9.6     Total Protein 05/16/2024 6.9     Albumin 05/16/2024 4.3     ALT (SGPT) 05/16/2024 15     AST (SGOT) 05/16/2024 20     Alkaline Phosphatase 05/16/2024 83     Total Bilirubin 05/16/2024 0.5     Globulin 05/16/2024 2.6     A/G Ratio 05/16/2024 1.7     BUN/Creatinine Ratio 05/16/2024 17.6     Anion Gap 05/16/2024 9.2     eGFR 05/16/2024 97.4     CEA 05/16/2024 1.44     WBC 05/16/2024 4.64     RBC 05/16/2024 4.38     Hemoglobin 05/16/2024 12.7     Hematocrit 05/16/2024 38.4     MCV 05/16/2024 87.7     MCH 05/16/2024 29.0     MCHC 05/16/2024 33.1     RDW 05/16/2024 13.4     RDW-SD 05/16/2024 43.3     MPV 05/16/2024 9.8     Platelets 05/16/2024 237     Neutrophil % 05/16/2024 56.2     Lymphocyte % 05/16/2024 31.0     Monocyte % 05/16/2024 7.5     Eosinophil % 05/16/2024 4.5     Basophil % 05/16/2024 0.4     Immature Grans % 05/16/2024 0.4     Neutrophils, Absolute 05/16/2024 2.60     Lymphocytes, Absolute 05/16/2024 1.44     Monocytes, Absolute 05/16/2024 0.35     Eosinophils, Absolute 05/16/2024 0.21     " Basophils, Absolute 05/16/2024 0.02     Immature Grans, Absolute 05/16/2024 0.02     nRBC 05/16/2024 0.0      Current Documented Allergies & Reactions  Allergies   Allergen Reactions    Adhesive Tape Rash     Patient has sensitive skin.     Levofloxacin Swelling    Sulfa Antibiotics Rash       Current Psychotropic Medications:    traZODone (DESYREL) 50 MG tablet, Initially, take 1/2 to 1 tablet by mouth at bedtime. If no effect, increase by 1/2 tablet per night, up to 2 tablets per night (100 mg). If causing drowsiness in the AM, decrease by half a tablet and/or take the medication sooner, between dinner and bedtime.    Diagnoses and all orders for this visit:    1. Insomnia due to medical condition (Primary)    2. Rectal cancer      - R/O major depressive disorder  - R/O bipolar disorder  - Rule out generalized anxiety disorder    Plan of Care/ Medical Decision Making  1) Continue current medication regimen and treatment plan. The patient is going to trial her sleep aid more consistently this next week while on vacation. Continue non-pharmacological interventions such as making her own music, making time for herself and the things that are important to her, prayer breaths, visualization meditations, and engaging with her community as she desires and is able.  2) Provided supportive talk therapy through active listening, empathy, reflection, normalization of feelings, and positive reframing. Discussed current methods of coping.   3) Continue at home CBTI training and management, as well as follow up with YAMILEX Lopez, as needed for further sleep support.  Encouraged ongoing talk therapy with trusted sources of support. Will continue to provide additional resources in the after visit summary, patient instructions. The patient understand that this provider will be taking a leave of absence and she is agreeable to doing follow up medication management with YAMILEX Mayer, as needed and Amy  RAJENDRA Ang, for therapeutic support through her ongoing cancer journey. She continues to also be encouraged to engage with [employer] for an added layer of support as needed and desired.   4) Notify provider if having any questions, concerns, or issues.    Follow up with YAMILEX Mayer as needed    During my visit with Babs on this date of service, the following activities were incorporated in their care  preparing for the visit, obtaining and/or reviewing a separately obtained history, performing a medically appropriate examination and/or evaluation, counseling and educating the patient/family/caregiver, referring and communicating with other health care professionals, documenting information in the medical record, and care coordination.    Psychotherapy time - 47 minutes were spent providing supportive therapy through active listening and therapeutic alliance. Addressed the patient's current stressors and feelings. Affirmed and encouraged ongoing communication with sources of support and treatment team.  Discussed current methods of coping. Provided additional options for sources of support through community resources and alternative treatment options.

## 2024-06-30 PROBLEM — G47.01 INSOMNIA DUE TO MEDICAL CONDITION: Status: ACTIVE | Noted: 2024-06-30

## 2024-07-10 ENCOUNTER — INFUSION (OUTPATIENT)
Dept: ONCOLOGY | Facility: HOSPITAL | Age: 64
End: 2024-07-10
Payer: COMMERCIAL

## 2024-07-10 DIAGNOSIS — Z45.2 ENCOUNTER FOR FITTING AND ADJUSTMENT OF VASCULAR CATHETER: Primary | ICD-10-CM

## 2024-07-10 PROCEDURE — 96523 IRRIG DRUG DELIVERY DEVICE: CPT

## 2024-07-10 PROCEDURE — 25010000002 HEPARIN LOCK FLUSH PER 10 UNITS: Performed by: INTERNAL MEDICINE

## 2024-07-10 RX ORDER — SODIUM CHLORIDE 0.9 % (FLUSH) 0.9 %
10 SYRINGE (ML) INJECTION AS NEEDED
OUTPATIENT
Start: 2024-07-10

## 2024-07-10 RX ORDER — HEPARIN SODIUM (PORCINE) LOCK FLUSH IV SOLN 100 UNIT/ML 100 UNIT/ML
500 SOLUTION INTRAVENOUS AS NEEDED
Status: DISCONTINUED | OUTPATIENT
Start: 2024-07-10 | End: 2024-07-10 | Stop reason: HOSPADM

## 2024-07-10 RX ORDER — SODIUM CHLORIDE 0.9 % (FLUSH) 0.9 %
10 SYRINGE (ML) INJECTION AS NEEDED
Status: DISCONTINUED | OUTPATIENT
Start: 2024-07-10 | End: 2024-07-10 | Stop reason: HOSPADM

## 2024-07-10 RX ORDER — HEPARIN SODIUM (PORCINE) LOCK FLUSH IV SOLN 100 UNIT/ML 100 UNIT/ML
500 SOLUTION INTRAVENOUS AS NEEDED
OUTPATIENT
Start: 2024-07-10

## 2024-07-10 RX ADMIN — Medication 500 UNITS: at 13:31

## 2024-07-10 RX ADMIN — Medication 10 ML: at 13:31

## 2024-07-30 ENCOUNTER — TELEPHONE (OUTPATIENT)
Dept: PSYCHIATRY | Facility: HOSPITAL | Age: 64
End: 2024-07-30
Payer: COMMERCIAL

## 2024-07-30 NOTE — TELEPHONE ENCOUNTER
LVM for patient to call and schedule F/U with [facility] supportive care service for additional med management refills.[phone].

## 2024-08-15 ENCOUNTER — TELEPHONE (OUTPATIENT)
Dept: URGENT CARE | Facility: CLINIC | Age: 64
End: 2024-08-15
Payer: COMMERCIAL

## 2024-08-15 ENCOUNTER — DOCUMENTATION (OUTPATIENT)
Dept: URGENT CARE | Facility: CLINIC | Age: 64
End: 2024-08-15
Payer: COMMERCIAL

## 2024-08-15 DIAGNOSIS — U07.1 COVID-19: Primary | ICD-10-CM

## 2024-08-15 NOTE — PROGRESS NOTES
Pt with low grade fevers, chills, myalgias, congestion and cough for 24 hours. Has two positive home COVID tests this morning.  Requesting Paxlovid. Tolerated well last COVID infection.

## 2024-08-20 ENCOUNTER — TELEPHONE (OUTPATIENT)
Dept: ONCOLOGY | Facility: CLINIC | Age: 64
End: 2024-08-20

## 2024-08-22 ENCOUNTER — INFUSION (OUTPATIENT)
Dept: ONCOLOGY | Facility: HOSPITAL | Age: 64
End: 2024-08-22
Payer: COMMERCIAL

## 2024-08-22 DIAGNOSIS — Z45.2 ENCOUNTER FOR FITTING AND ADJUSTMENT OF VASCULAR CATHETER: Primary | ICD-10-CM

## 2024-08-22 PROCEDURE — 25010000002 HEPARIN LOCK FLUSH PER 10 UNITS: Performed by: INTERNAL MEDICINE

## 2024-08-22 PROCEDURE — 96523 IRRIG DRUG DELIVERY DEVICE: CPT

## 2024-08-22 RX ORDER — HEPARIN SODIUM (PORCINE) LOCK FLUSH IV SOLN 100 UNIT/ML 100 UNIT/ML
500 SOLUTION INTRAVENOUS AS NEEDED
Status: DISCONTINUED | OUTPATIENT
Start: 2024-08-22 | End: 2024-08-22 | Stop reason: HOSPADM

## 2024-08-22 RX ORDER — SODIUM CHLORIDE 0.9 % (FLUSH) 0.9 %
10 SYRINGE (ML) INJECTION AS NEEDED
Status: DISCONTINUED | OUTPATIENT
Start: 2024-08-22 | End: 2024-08-22 | Stop reason: HOSPADM

## 2024-08-22 RX ORDER — HEPARIN SODIUM (PORCINE) LOCK FLUSH IV SOLN 100 UNIT/ML 100 UNIT/ML
500 SOLUTION INTRAVENOUS AS NEEDED
OUTPATIENT
Start: 2024-08-22

## 2024-08-22 RX ORDER — SODIUM CHLORIDE 0.9 % (FLUSH) 0.9 %
10 SYRINGE (ML) INJECTION AS NEEDED
OUTPATIENT
Start: 2024-08-22

## 2024-08-22 RX ADMIN — Medication 500 UNITS: at 11:36

## 2024-08-22 RX ADMIN — Medication 10 ML: at 11:36

## 2024-08-27 ENCOUNTER — OFFICE VISIT (OUTPATIENT)
Dept: FAMILY MEDICINE CLINIC | Facility: CLINIC | Age: 64
End: 2024-08-27
Payer: COMMERCIAL

## 2024-08-27 VITALS
BODY MASS INDEX: 37.41 KG/M2 | OXYGEN SATURATION: 96 % | SYSTOLIC BLOOD PRESSURE: 134 MMHG | DIASTOLIC BLOOD PRESSURE: 86 MMHG | HEART RATE: 86 BPM | HEIGHT: 69 IN | WEIGHT: 252.6 LBS

## 2024-08-27 DIAGNOSIS — R19.7 DIARRHEA, UNSPECIFIED TYPE: Primary | ICD-10-CM

## 2024-08-27 PROCEDURE — 99214 OFFICE O/P EST MOD 30 MIN: CPT | Performed by: INTERNAL MEDICINE

## 2024-08-27 RX ORDER — COLESEVELAM 180 1/1
TABLET ORAL
Qty: 180 TABLET | Refills: 1 | Status: SHIPPED | OUTPATIENT
Start: 2024-08-27

## 2024-08-27 NOTE — PROGRESS NOTES
"Answers submitted by the patient for this visit:  Other (Submitted on 8/25/2024)  Please describe your symptoms.: Chrinic diarrhea  Have you had these symptoms before?: Yes  How long have you been having these symptoms?: Greater than 2 weeks  Please list any medications you are currently taking for this condition.: Lomotil, Bentyl  Please describe any probable cause for these symptoms. : Result of cancer treatment  Primary Reason for Visit (Submitted on 8/25/2024)  What is the primary reason for your visit?: Other  Chief Complaint  Diarrhea (C/o chronic diarrhea. H/o Cancer with Rad and chemo rx)    Subjective        Babs Felton presents to [facility]  History of Present Illness  PMH of rectal carcinoma dx 18 mo ago followed by Dr. Weber and Dr. Jennifer Amaya and Dr. Freeman XRT.  She received CTX and XRT but declined surgery since her scans and labs looked good.  She has surveillance with labs, CT scans and endoscopy every 3-6 months.  She sees Dr. Beto CRAMER and his APRN Genie Rose.  She c/o frequent diarrhea since 1/2022.  Has tried additional fiber w/o benefit.  She takes bentyl and lomotil prn if she has to be out in public to keep from having as many BM's.  Normally has BM 4-5 x day which are brown watery to mushy soft. Some cramping with BM.  Rarely has a BM during the night.  Appetite is good but doesn't eat like a \"normal person\"  --salads, fruit, veggies all make her have more diarrhea.  She eats more pasta, bread, potatoes which has led to weight gain.   Avoids coffee.    In January 2022, she started having daily diarrhea x 6 weeks and that is when she got into see me and referred for CN with dx of rectal cancer.  Diarrhea has never improved unless she takes imodium or lomotil or bentyl.    Objective   Vital Signs:  /86 (BP Location: Left arm, Patient Position: Sitting, Cuff Size: Large Adult)   Pulse 86   Ht 175.3 cm (69.02\")   Wt 115 kg (252 lb 9.6 oz)   SpO2 " "96%   BMI 37.29 kg/m²   Estimated body mass index is 37.29 kg/m² as calculated from the following:    Height as of this encounter: 175.3 cm (69.02\").    Weight as of this encounter: 115 kg (252 lb 9.6 oz).            Physical Exam  Vitals and nursing note reviewed.   Constitutional:       Appearance: Normal appearance. She is well-developed.   HENT:      Head: Normocephalic and atraumatic.      Right Ear: External ear normal.      Left Ear: External ear normal.   Eyes:      Extraocular Movements: Extraocular movements intact.      Conjunctiva/sclera: Conjunctivae normal.   Neck:      Vascular: No carotid bruit.   Cardiovascular:      Rate and Rhythm: Normal rate and regular rhythm.      Heart sounds: Normal heart sounds.      Comments: No bruits  Pulmonary:      Effort: Pulmonary effort is normal. No respiratory distress.      Breath sounds: Normal breath sounds. No stridor. No wheezing, rhonchi or rales.   Chest:      Chest wall: No tenderness.   Abdominal:      General: Bowel sounds are normal. There is no distension.      Palpations: Abdomen is soft. There is no mass.      Tenderness: There is no abdominal tenderness. There is no guarding or rebound.      Hernia: No hernia is present.   Musculoskeletal:      Cervical back: Neck supple.      Right lower leg: No edema.      Left lower leg: No edema.   Lymphadenopathy:      Cervical: No cervical adenopathy.   Skin:     General: Skin is warm.      Capillary Refill: Capillary refill takes less than 2 seconds.   Neurological:      General: No focal deficit present.      Mental Status: She is alert and oriented to person, place, and time. Mental status is at baseline.   Psychiatric:         Mood and Affect: Mood normal.         Behavior: Behavior normal.         Thought Content: Thought content normal.         Judgment: Judgment normal.        Result Review :              CT Abdomen Pelvis With Contrast (05/16/2024 09:38)   Assessment and Plan   Diagnoses and all " orders for this visit:    1. Diarrhea, unspecified type (Primary)    Other orders  -     colesevelam (Welchol) 625 MG tablet; One tablet twice a day  Dispense: 180 tablet; Refill: 1    Patient has had persistent diarrhea without resolution.  She is using over-the-counter's as well as some prescription medications to help control her symptoms.  She does occasionally have blood in the stool and she can have associated abdominal pain.  Currently she denies any blood in her stool no fever.  She has an upcoming flexible sigmoidoscopy in September with Dr. Amaya.  I have spoken with Dr. Amaya and she will have her office reach out to the patient and do a colonoscopy instead so that biopsies of the colon can be obtained.  I am concerned that she may have either microscopic colitis or inflammatory bowel disease.  I think WelChol would be an option to help with her stools.  I am somewhat reluctant to start that until we rule out colitis.  Her last abdominal CT was in May.  Stable.  She has a follow-up CT scheduled through oncology's office.  I have reviewed her labs which look stable.  I would like her to return for a physical where we can do a more comprehensive exam and evaluation.  I also discussed that she may want to eliminate dairy gluten and red meat from her diet for 3 to 4 weeks to see if her symptoms improve.  I wonder if she might have a food intolerance.  Small bowel biopsies in 2022 were negative for celiac.  I have asked her to start the WelChol once a day initially and if she sees any blood in her stool to stop it immediately.  Once she tolerates the daily dose, she can increase to 1 tablet twice a day.         Follow Up   No follow-ups on file.  Patient was given instructions and counseling regarding her condition or for health maintenance advice. Please see specific information pulled into the AVS if appropriate.

## 2024-08-28 ENCOUNTER — PREP FOR SURGERY (OUTPATIENT)
Dept: OTHER | Facility: HOSPITAL | Age: 64
End: 2024-08-28
Payer: COMMERCIAL

## 2024-08-28 DIAGNOSIS — C20 RECTAL CANCER: Primary | ICD-10-CM

## 2024-09-23 ENCOUNTER — TELEPHONE (OUTPATIENT)
Dept: ONCOLOGY | Facility: CLINIC | Age: 64
End: 2024-09-23

## 2024-09-23 NOTE — TELEPHONE ENCOUNTER
Caller: Babs Felton    Relationship to patient: Self    Best call back number: [phone]    Chief complaint: PATIENT CALLED TO RESCHEDULE     Type of visit: PORT FLUSH     Requested date: 9-25-24 AFTER 11:00     If rescheduling, when is the original appointment: 9-26-24

## 2024-09-24 ENCOUNTER — TELEPHONE (OUTPATIENT)
Dept: ONCOLOGY | Facility: CLINIC | Age: 64
End: 2024-09-24

## 2024-09-24 NOTE — TELEPHONE ENCOUNTER
Caller: Babs Felton    Relationship: Self    Best call back number: [phone], PT OK WITH VM    What is the best time to reach you: AFTER 1PM    Who are you requesting to speak with (clinical staff, provider,  specific staff member): SCHEDULING      What was the call regarding: PT HAS COLONOSCOPY AT [facility] ON 9/25, WANTS HER PORT FLUSH APPT 9/25 MOVED FROM [facility] TO [facility] AND POSSIBLY MOVED TO 10:30 AFTER HER PROCEDURE

## 2024-09-25 ENCOUNTER — ANESTHESIA (OUTPATIENT)
Dept: GASTROENTEROLOGY | Facility: HOSPITAL | Age: 64
End: 2024-09-25
Payer: COMMERCIAL

## 2024-09-25 ENCOUNTER — INFUSION (OUTPATIENT)
Dept: ONCOLOGY | Facility: HOSPITAL | Age: 64
End: 2024-09-25
Payer: COMMERCIAL

## 2024-09-25 ENCOUNTER — ANESTHESIA EVENT (OUTPATIENT)
Dept: GASTROENTEROLOGY | Facility: HOSPITAL | Age: 64
End: 2024-09-25
Payer: COMMERCIAL

## 2024-09-25 ENCOUNTER — HOSPITAL ENCOUNTER (OUTPATIENT)
Facility: HOSPITAL | Age: 64
Setting detail: HOSPITAL OUTPATIENT SURGERY
Discharge: HOME | End: 2024-09-25
Attending: COLON & RECTAL SURGERY | Admitting: COLON & RECTAL SURGERY
Payer: COMMERCIAL

## 2024-09-25 VITALS
RESPIRATION RATE: 9 BRPM | WEIGHT: 246 LBS | OXYGEN SATURATION: 91 % | DIASTOLIC BLOOD PRESSURE: 82 MMHG | SYSTOLIC BLOOD PRESSURE: 129 MMHG | HEART RATE: 61 BPM | BODY MASS INDEX: 35.22 KG/M2 | HEIGHT: 70 IN

## 2024-09-25 DIAGNOSIS — Z45.2 ENCOUNTER FOR FITTING AND ADJUSTMENT OF VASCULAR CATHETER: Primary | ICD-10-CM

## 2024-09-25 PROCEDURE — 25010000002 PROPOFOL 1000 MG/100ML EMULSION: Performed by: NURSE ANESTHETIST, CERTIFIED REGISTERED

## 2024-09-25 PROCEDURE — 25010000002 HEPARIN LOCK FLUSH PER 10 UNITS: Performed by: INTERNAL MEDICINE

## 2024-09-25 PROCEDURE — 25810000003 LACTATED RINGERS PER 1000 ML: Performed by: COLON & RECTAL SURGERY

## 2024-09-25 PROCEDURE — 25010000002 PROPOFOL 200 MG/20ML EMULSION: Performed by: NURSE ANESTHETIST, CERTIFIED REGISTERED

## 2024-09-25 PROCEDURE — 96523 IRRIG DRUG DELIVERY DEVICE: CPT

## 2024-09-25 RX ORDER — PROPOFOL 10 MG/ML
INJECTION, EMULSION INTRAVENOUS CONTINUOUS PRN
Status: DISCONTINUED | OUTPATIENT
Start: 2024-09-25 | End: 2024-09-25 | Stop reason: SURG

## 2024-09-25 RX ORDER — PROPOFOL 10 MG/ML
INJECTION, EMULSION INTRAVENOUS AS NEEDED
Status: DISCONTINUED | OUTPATIENT
Start: 2024-09-25 | End: 2024-09-25 | Stop reason: SURG

## 2024-09-25 RX ORDER — LIDOCAINE HYDROCHLORIDE 20 MG/ML
INJECTION, SOLUTION INFILTRATION; PERINEURAL AS NEEDED
Status: DISCONTINUED | OUTPATIENT
Start: 2024-09-25 | End: 2024-09-25 | Stop reason: SURG

## 2024-09-25 RX ORDER — HEPARIN SODIUM (PORCINE) LOCK FLUSH IV SOLN 100 UNIT/ML 100 UNIT/ML
500 SOLUTION INTRAVENOUS AS NEEDED
Status: DISCONTINUED | OUTPATIENT
Start: 2024-09-25 | End: 2024-09-25 | Stop reason: HOSPADM

## 2024-09-25 RX ORDER — SODIUM CHLORIDE, SODIUM LACTATE, POTASSIUM CHLORIDE, CALCIUM CHLORIDE 600; 310; 30; 20 MG/100ML; MG/100ML; MG/100ML; MG/100ML
30 INJECTION, SOLUTION INTRAVENOUS CONTINUOUS PRN
Status: DISCONTINUED | OUTPATIENT
Start: 2024-09-25 | End: 2024-09-25 | Stop reason: HOSPADM

## 2024-09-25 RX ORDER — SODIUM CHLORIDE 0.9 % (FLUSH) 0.9 %
10 SYRINGE (ML) INJECTION AS NEEDED
Status: DISCONTINUED | OUTPATIENT
Start: 2024-09-25 | End: 2024-09-25 | Stop reason: HOSPADM

## 2024-09-25 RX ORDER — HEPARIN SODIUM (PORCINE) LOCK FLUSH IV SOLN 100 UNIT/ML 100 UNIT/ML
500 SOLUTION INTRAVENOUS AS NEEDED
OUTPATIENT
Start: 2024-09-25

## 2024-09-25 RX ORDER — SODIUM CHLORIDE 0.9 % (FLUSH) 0.9 %
10 SYRINGE (ML) INJECTION AS NEEDED
OUTPATIENT
Start: 2024-09-25

## 2024-09-25 RX ADMIN — Medication 500 UNITS: at 10:43

## 2024-09-25 RX ADMIN — PROPOFOL 200 MCG/KG/MIN: 10 INJECTION, EMULSION INTRAVENOUS at 08:42

## 2024-09-25 RX ADMIN — SODIUM CHLORIDE, POTASSIUM CHLORIDE, SODIUM LACTATE AND CALCIUM CHLORIDE 30 ML/HR: 600; 310; 30; 20 INJECTION, SOLUTION INTRAVENOUS at 08:04

## 2024-09-25 RX ADMIN — LIDOCAINE HYDROCHLORIDE 60 MG: 20 INJECTION, SOLUTION INFILTRATION; PERINEURAL at 08:42

## 2024-09-25 RX ADMIN — Medication 10 ML: at 10:43

## 2024-09-25 RX ADMIN — PROPOFOL INJECTABLE EMULSION 10 MG: 10 INJECTION, EMULSION INTRAVENOUS at 08:54

## 2024-09-25 RX ADMIN — PROPOFOL INJECTABLE EMULSION 40 MG: 10 INJECTION, EMULSION INTRAVENOUS at 08:47

## 2024-09-25 RX ADMIN — PROPOFOL INJECTABLE EMULSION 50 MG: 10 INJECTION, EMULSION INTRAVENOUS at 08:44

## 2024-09-25 NOTE — H&P
"Babs Felton is a 63 y.o. female  who is referred by Temo Amaya MD for a colonoscopy. She   has an indications: previous rectal cancer.  Did LUIS CARLOS and now doing active surveillance    She denies any change in bowel function, melena, or hematochezia.    Past Medical History:   Diagnosis Date    Abdominal cramping 07/01/2022    Anemia     Bloating 07/01/2022    Bloody diarrhea 07/01/2022    Chronic back pain     Colon polyps     FOLLOWED BY DR. BEVERLY MENDEZ    COVID 08/15/2023    Cystitis     BURNING WITH URINATION SINCE RADIATION    Depression     Early cataract     Gastric polyps     FOLLOWED BY DR. BEVERLY MENDEZ    GERD (gastroesophageal reflux disease)     Hearing loss     Hiatal hernia 09/2021    3 CM, FOLLOWED BY DR. BEVERLY MENDEZ    History of chemotherapy     LAST TREATMENT 2/10/2023    History of depression     History of radiation therapy     Hyperlipidemia     NO MEDS    IBS (irritable bowel syndrome)     Migraines     HX    Rectal bleeding 1983    Off and on for years    Rectal cancer 09/2022    INVASIVE MODERATELY DIFFERENTIATED ADENOCARCINOMA ARISING FROM TRADITIONAL SERRATED ADENOMA WITH HGD    Sciatica 09/2015    Tear of medial meniscus of knee 07/2021    RIGHT KNEE    Vitamin D deficiency        Past Surgical History:   Procedure Laterality Date    COLONOSCOPY N/A 1986    COLONOSCOPY N/A 03/27/2024    A HEALTHY SCAR WAS FOUND IN MID RECTUM, FLEX SIG IN 6 MONTHS, DR. TEMO AMAYA AT [facility]    COLONOSCOPY W/ POLYPECTOMY N/A 09/01/2022    3 MM TUBULAR ADENOMA POLYP IN ASCENDING, 6 MM TUBULAR ADENOMA POLYP IN CECUM, 30 MM MASS IN RECTUM, PATH:INVASIVE MODERATELY DIFFERENTIATED ADENOCARCINOMA ARISING FROM TRADITIONAL SERRATED ADENOMA WITH HGD, HYPERTROPHIED ANAL PAPILLA, DR. BEVERLY MENDEZ AT [facility]    ENDOSCOPY N/A 09/01/2022    MULTIPLE BENIGN GASTRIC POLYPS, 2 SQUAMOUS PAPILLAS IN ESOPHAGUS, 3 CM HIATAL HERNIA,    EXTERNAL EAR SURGERY  1966    MULTIPLE \"LANCES\", DR. ACE IN " [facility]    EYE SURGERY Right 1967    DR. CASTANEDA IN [facility]    KNEE ARTHRODESIS Right 07/27/2021    RIGHT KNEE ARTHROCENTESIS, DR. ALBERT GILMAN    KNEE SURGERY Left 1985    DR. PEREZ AT [facility]    SHOULDER MANIPULATION Left 01/07/2013    FOR FROZEN SHOULDER, DR. ALBERT GILMAN AT [facility]    SIGMOIDOSCOPY N/A 09/21/2022    AN INFILTRATIVE NON OBSTRUCTING MASS IN MID RECTUM, AREA TATTOOED, DR. TEMO AMAYA AT [facility]    SIGMOIDOSCOPY N/A 03/08/2023    7 MM HEALED ULCERATION WHERE RECTAL CANCER WAS, SCAR TISSUE HEALTHY, BIOPSY SHOWED INFLAMMATION AND SCAR, NO RESIDUAL CANCER, FLEX SIG IN 12 MONTHS, DR. TEMO AMAYA AT [facility]    SIGMOIDOSCOPY N/A 07/13/2023    A HEALTHY SCAR WAS IN MID RECTUM, DR. TEMO AMAYA AT [facility]    SIGMOIDOSCOPY N/A 11/06/2023    A HEALTHY APPEARING SCAR IN MID RECTUM, COLONOSCOPY IN 4 MONTHS, DR. TEMO AMAYA AT [facility]    SIGMOIDOSCOPY N/A 12/22/2023    A HEALTHY SCAR WAS FOUND IN MID RECTUM, BIOPSIES BENIGN, DR. TEMO AMAYA AT [facility]    TRANSRECTAL ULTRASOUND N/A 09/21/2022    Procedure: ULTRASOUND TRANSRECTAL;  Surgeon: Temo Amaya MD;  Location: [facility] ENDOSCOPY;  Service: General;  Laterality: N/A;    TRANSRECTAL ULTRASOUND N/A 12/22/2023    Procedure: ULTRASOUND TRANSRECTAL;  Surgeon: Temo Amaya MD;  Location: [facility] ENDOSCOPY;  Service: General;  Laterality: N/A;  pre: abnormal MRI  post: rectal ulceration    VENOUS ACCESS DEVICE (PORT) INSERTION Left 10/26/2022    Procedure: INSERTION OF PORTACATH;  Surgeon: Leonel Bridges MD;  Location: [facility] MAIN OR;  Service: General;  Laterality: Left;    WISDOM TOOTH EXTRACTION Bilateral        Medications Prior to Admission   Medication Sig Dispense Refill Last Dose    dicyclomine (BENTYL) 20 MG tablet Take 1 tablet by mouth Every 6 (Six) Hours As Needed (abd cramps). 90 tablet 3 9/24/2024    diphenoxylate-atropine (LOMOTIL) 2.5-0.025 MG per tablet Take 1 tablet by mouth 4 (Four) Times a Day As Needed for Diarrhea. 60 tablet  3 9/21/2024    ELDERBERRY PO Take 2 tablets by mouth Daily. HOLD PRIOR TO SURG, GUMMIES   9/22/2024    hydrOXYzine (ATARAX) 10 MG tablet Take 1 tablet by mouth Every 8 (Eight) Hours As Needed for Itching. 30 tablet 0 Past Month    ondansetron (ZOFRAN) 8 MG tablet Take 1 tablet by mouth 3 (Three) Times a Day As Needed for Nausea or Vomiting. 60 tablet 5 9/22/2024    colesevelam (Welchol) 625 MG tablet One tablet twice a day 180 tablet 1     loperamide (IMODIUM) 2 MG capsule Take 1 capsule by mouth As Needed for Diarrhea.   More than a month    loratadine (CLARITIN) 10 MG tablet Take 1 tablet by mouth As Needed. 1 daily around time of Neulasta OBI       pantoprazole (PROTONIX) 40 MG EC tablet Take 1 tablet by mouth Daily. (Patient taking differently: Take 1 tablet by mouth Daily As Needed.) 30 tablet 3     traMADol (ULTRAM) 50 MG tablet Take 1 tablet by mouth Every 6 (Six) Hours As Needed for Moderate Pain. 30 tablet 0 More than a month       Allergies   Allergen Reactions    Adhesive Tape Rash     Patient has sensitive skin.     Levofloxacin Swelling    Sulfa Antibiotics Rash       Family History   Problem Relation Age of Onset    Colon polyps Mother     Asthma Mother     COPD Mother     Lung disease Mother     Migraines Mother     Cancer Father         liver cancer    Colon polyps Father     Heart disease Father     Diabetes Father     Kidney disease Father     Angina Father     Liver cancer Father     Hepatitis Father     Hearing loss Father     Cancer Sister         Breast cancer    Arthritis Sister     Colon polyps Sister     Breast cancer Sister     Diabetes Sister     Colon polyps Sister     Alcohol abuse Maternal Uncle     Colon cancer Neg Hx     Crohn's disease Neg Hx     Irritable bowel syndrome Neg Hx     Ulcerative colitis Neg Hx     Malig Hyperthermia Neg Hx        Social History     Socioeconomic History    Marital status: Single   Tobacco Use    Smoking status: Never     Passive exposure: Never     Smokeless tobacco: Never   Vaping Use    Vaping status: Never Used   Substance and Sexual Activity    Alcohol use: Not Currently    Drug use: Never    Sexual activity: Defer       ROS    Vitals:    09/25/24 0755   BP: 144/88   Pulse: 64   Resp: 18   SpO2: 97%         Physical Exam  Constitutional:       General: She is not in acute distress.     Appearance: She is well-developed.   HENT:      Head: Normocephalic and atraumatic.   Abdominal:      General: There is no distension.      Palpations: Abdomen is soft.   Musculoskeletal:         General: Normal range of motion.   Neurological:      Mental Status: She is alert.   Psychiatric:         Thought Content: Thought content normal.           Assessment & Plan      indications: previous rectal  cancer.  Doing active surveillance        I recommend colonoscopy.  I described risks, benefits of the procedure with the patient including but not limited to bleeding, infection, possibility of perforation and possible polypectomy. All of the patient's questions were answered and they would like to proceed with the above recommendations.

## 2024-10-01 ENCOUNTER — OFFICE VISIT (OUTPATIENT)
Dept: OTHER | Facility: HOSPITAL | Age: 64
End: 2024-10-01
Payer: COMMERCIAL

## 2024-10-01 VITALS
WEIGHT: 246.6 LBS | BODY MASS INDEX: 35.89 KG/M2 | RESPIRATION RATE: 16 BRPM | OXYGEN SATURATION: 97 % | SYSTOLIC BLOOD PRESSURE: 154 MMHG | HEART RATE: 76 BPM | DIASTOLIC BLOOD PRESSURE: 91 MMHG

## 2024-10-01 DIAGNOSIS — M25.551 BILATERAL HIP PAIN: ICD-10-CM

## 2024-10-01 DIAGNOSIS — M25.552 BILATERAL HIP PAIN: ICD-10-CM

## 2024-10-01 DIAGNOSIS — C20 RECTAL CANCER: Primary | ICD-10-CM

## 2024-10-01 DIAGNOSIS — F51.01 PRIMARY INSOMNIA: ICD-10-CM

## 2024-10-01 DIAGNOSIS — R29.898 MUSCULAR DECONDITIONING: ICD-10-CM

## 2024-10-01 PROCEDURE — G0463 HOSPITAL OUTPT CLINIC VISIT: HCPCS | Performed by: NURSE PRACTITIONER

## 2024-10-01 NOTE — LETTER
October 1, 2024       No Recipients    Patient: Babs Felton   YOB: 1960   Date of Visit: 10/1/2024       Dear Araceli Rose MD    Babs Felton was in my office today. Below is a copy of my note.    If you have questions, please do not hesitate to call me. I look forward to following Basb along with you.         Sincerely,        YAMILEX Reyna        CC:   No Recipients    [facility]ULTIDISCIPLIN[facility]   IN CLINIC Follow-Up Visit  Management of Residual Effects of Cancer Treatment      Babs Felton is a pleasant 63 y.o. female reviewed today in Multidisciplinary Clinic, for follow-up Management of Residual Effects of Cancer Treatment      ***Plan and recommendations:  Continue self-directed CBT-I with vanda  Referral to Franko Daigle at 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 Amaya without any abnormalities identified, flexible sigmoidoscopy recommended in 6 months        TREATMENT HISTORY:     Oncology/Hematology History Overview Note   CEA  2/22/24 1.58  11/29/23: 1.59  9/6/23: 1.65  6/14/23: 1.87  3/22/23: 1.31  2/8/23: 1.54  9/8/22: 3.51     Rectal cancer   9/1/2022 Biopsy    Final Diagnosis   1. Small Bowel, Biopsy:                A. Small bowel mucosa with a normal villous architecture and no significant histopathologic changes.     2. Stomach, Polyp, Biopsy:                A. Fundic gland polyps.               B. Negative for dysplasia.     3. Esophagus, Mid, Biopsy:                A. Squamous papilloma.     4. Esophagus, Proximal, Biopsy:                A. Squamous papilloma with increased intraepithelial eosinophils (see comment).               B. Separate  fragment of gastric oxyntic mucosa with no significant histopathologic changes.     5. Cecum, Polyp, Polypectomy:               A. Tubular adenoma.     6. Ascending Colon, Polyp, Polypectomy:               A. Tubular adenoma.     7. Right Colon, Random, Biopsy:   A. Colonic mucosa with no significant histopathologic changes.               B. No histologic evidence of microscopic colitis.      8. Left Colon, Random, Biopsy:   A. Colonic mucosa with no significant histopathologic changes.               B. No histologic evidence of microscopic colitis.      9. Rectum, Polyp, Polypectomy:               A. INVASIVE MODERATELY DIFFERENTIATED ADENOCARCINOMA ARISING OUT OF A       TRADITIONAL SERRATED ADENOMA WITH HIGH GRADE DYSPLASIA.           9/13/2022 Imaging    Exam: CT of the chest, abdomen, and pelvis with contrast.     HISTORY: 61-year-old premalignant polyps and adenocarcinoma of the  rectum on recent colonoscopy 09/01/2022.     TECHNIQUE: Radiation dose reduction techniques were utilized, including  automated exposure control and exposure modulation based on body size.   3 mm images were obtained through the chest, abdomen, and pelvis after  the administration of IV contrast.      COMPARISON: None     FINDINGS:  Thoracic inlet: Within normal limits     Heart and great vessels: The heart size is within normal limits.  Atherosclerosis including coronary calcifications. The study is not  optimized for vascular evaluation however, normal enhancement is present  centrally     Lymphatics: Subcentimeter mediastinal nodes some of which are coarsely  calcified suggesting prior granulomatous disease     Lung parenchyma and pleural space: Patent central airway. No focal  consolidations, effusions, or pneumothorax. Calcified granulomas. Sub-6  mm micronodules with an index nodule in the right middle lobe (series 3  image 49 and 54), right lower lobe (image images 70/71), left lower lobe  (image 80).            Abdomen/pelvis:     Liver: Infiltration of the liver.     Biliary tract: Within normal limits.     Spleen: Within normal limits.     Pancreas: Within normal limits.     Adrenals: Within normal limits.     Kidneys: Too centimeter hypodensity in the right upper pole favoring a  cyst. Left kidney within normal limits. No hydronephrosis.     Bowel:  No bowel loops are normal in caliber. Normal appendix. The colon  is stool-filled somewhat limiting evaluation. Scattered colonic  diverticula without acute diverticulitis. Thickening of the rectum which  is incompletely distended limiting evaluation for known rectal neoplasm.     Peritoneum: No free fluid or free air.     Vasculature:    Minimal calcific atherosclerosis.     Lymph Nodes:  Scattered subcentimeter abdominal and pelvic nodes, not  enlarged by CT criteria. An index node at the pelvic brim measuring up  to 4 mm in short axis.                                                            Pelvic organs: Within normal limits.     Abdominal/Pelvic Wall: Tiny fat-containing umbilical hernia.     Bones: Multilevel degenerative changes thoracolumbar spine and pelvis.  Probable hemangioma T10 vertebral body..     IMPRESSION:  1.  Rectal neoplasm not well visualized on this exam, please refer to  the prior colonoscopy for further detail and further evaluation with MRI  or PET/CT could be considered if clinically indicated.  2.  Calcified and noncalcified sub-6 mm pulmonary nodules likely the  sequela of prior granulomatous disease however, given history recommend  6 month surveillance.  3.  Scattered colonic diverticulosis.  4.  Mildly prominent but not pathologically enlarged pelvic nodes  measuring up to 4 mm.  5.  Please see above for additional findings.        9/16/2022 Initial Diagnosis    Rectal cancer (HCC)     9/26/2022 Imaging    MRI PELVIS WITHOUT CONTRAST/RECTAL MRI     HISTORY: 61-year-old female with rectal cancer. Staging.     TECHNIQUE: Routine staging  rectum MRI was performed without contrast.  Compared with CT of the abdomen and pelvis 09/13/2022.     FINDINGS: There is an approximately 4 cm mass with the inferior margin  of the mass 9 cm proximal to the anorectal junction. The mass effaces  the muscularis propria along the right wall of the rectum and at the  posterior wall, there is extension of the mass just beyond the  muscularis propria, less than 5 mm. There is no evidence for extension  of the tumor beyond the muscularis propria at the anterior aspect of the  mass. There are no pathologically enlarged perirectal nodes. There are  shotty nodes superiorly at the mesorectum in the presacral space with  one of the largest nodes measuring 0.7 x 0.5 cm. The smallest distance  between the mass and the mesorectal fascia is 0.7 cm and this is along  the right posterior margin of the mass.     IMPRESSION:  High rectal mass with effacement of the muscularis propria  along the right wall and extension beyond the muscularis propria at the  posterior wall, T3 lesion. There is no evidence for involvement of the  mesorectal fascia. There are no pathologically enlarged nodes, but the  high mesorectal nodes are indeterminate.     10/12/2022 Biopsy    Guardant 360: biomarkers identified with no associated FDA approved therapies     10/17/2022 - 10/21/2022 Radiation    Radiation OncologyTreatment Course:  Babs Felton received 2500 cGy in 5 fractions to rectum     11/2/2022 - 2/8/2023 Chemotherapy    OP COLON [id number] OXALIplatin / Leucovorin / Fluorouracil  1/11/2023: Cycle 6 5-FU/LV.  Oxaliplatin dropped due to neuropathy.  2/8/2023: Cycle 8 5-FU     1/11/2023 Imaging    MRI OF THE PELVIS WITHOUT CONTRAST.     HISTORY: Restaging rectal adenocarcinoma (T3 N0 M0, stage IIa) status  post chemotherapy.     TECHNIQUE: MRI of the pelvis according to a protocol tailored for  evaluation of the rectum without IV contrast. Lack of contrast limits  evaluation of solid, visceral,  and vascular structures. Sensitivity for  underlying lesions and infection decreased. 5 sequences submitted.     COMPARISON: 09/26/2022     FINDINGS:     Significant decrease in size of the previously described 4 cm rectal  mass with residual 2.7 cm with residual bowel wall thickening measuring  up to 1.3 cm to the right of midline in low T2 signal along the right  lateral wall extending into the subjacent mesentery suggesting  posttreatment change/fibrosis (series 6 image 19). Along the inferior  margin of this mass there is focal stenosis without obstruction.  Thickening of the mid to distal rectum which may be the sequela of  posttreatment change/proctitis.     A previously measured presacral node is smaller at 0.4 x 0.5 cm  (previously 0.5 x 0.7 cm). Stable 3 mm right perirectal node Uterus in  situ. Small fat-containing umbilical hernia. Noncontrast imaging of the  osseous structures within normal limits. Hypertrophic degenerative  changes are again noted in thoracolumbar spine. Bone scan could be  considered for further evaluation if clinically indicated.     IMPRESSION:  1.  Good response to therapy with near complete resolution of the  previously described rectal mass with residual rectal thickening and low  T2 signal/fibrosis at the site of primary neoplasm.  2.  Pelvic nodes are smaller or unchanged.  3.  Please see above for additional findings.        3/8/2023 Biopsy    Final Diagnosis   1. Rectum, Biopsy:               A. Colorectal mucosa with mild increase in lamina propria chronic inflammation and focal scarring.               B. Negative for carcinoma.         3/22/2023 Biopsy    Guardant Reveal: ctDNA not detected     4/4/2023 Imaging    Examination: CT of the chest, abdomen, and pelvis with contrast     TECHNIQUE: CT of the chest, abdomen, and pelvis after the uneventful  administration of nonionic intravenous contrast per protocol. Radiation  dose reduction techniques were utilized, including  automated exposure  control and exposure modulation based on body size.     HISTORY:Rectal cancer     COMPARISON:09/13/2022     FINDINGS:Chest:     Old granulomatous disease is seen in the lungs. There is dependent  atelectasis. There are stable small pulmonary nodules, for example in  the right lower lobe on series 3, images 57 and 71. No consolidation,  pleural effusion, new or suspicious pulmonary nodule or mass are seen.  The central airways are patent. There is a small right-sided  fat-containing Bochdalek hernia.     No enlarged supraclavicular, axillary, mediastinal, or hilar lymph nodes  are seen. The mediastinal vasculature is normal in caliber. A left  internal jugular venous Port-A-Cath terminates in the superior vena  cava. Coronary calcifications are seen. The heart is normal in size and  configuration, without pericardial effusion.     Abdomen and pelvis:     A low-attenuation right renal lesion is technically indeterminate,  likely cyst.     The liver, gallbladder, spleen, adrenal glands, left kidney, pancreas,  stomach, small bowel, large bowel, urinary bladder, uterus, adnexal  structures, and abdominal vasculature are normal. There is perirectal  stranding with asymmetric rectal thickening measuring 1.3 cm tc width on  series 2, image 201. No intraperitoneal fluid collection or free gas are  seen. No enlarged lymph nodes are demonstrated.     Bone windows demonstrate degenerative changes, without suspicious  osseous lesion seen.     IMPRESSION:  1. Stable tiny pulmonary nodules, very likely benign. Recommend  attention on follow-up  2. Asymmetric rectal thickening, possibly reflecting the patient's  rectal neoplasm, difficult to compare because of differences in rectal  distention  3. Incidental findings as above, without convincing evidence of  metastatic disease.     6/7/2023 Imaging    Examination: MRI of the pelvis without and with contrast     TECHNIQUE: MRI of the pelvis was obtained before  and after the  uneventful administration of gadolinium base contrast according to the  rectal cancer protocol     HISTORY: Rectal cancer     COMPARISON: 01/07/2023     FINDINGS: There is near-complete resolution of the previously seen  rectal mass, with minimal irregularity around series 10, image 9  measuring 1.3 x 1.5 cm. This region is located approximately 6 cm from  the anorectal junction and 11 cm from the anal verge. It is above the  peritoneal reflection and levator musculature. There is minimal  stranding posterior to this region, stable. No enlarged lymph nodes are  seen. No suspicious osseous lesions are demonstrated. Visualized  portions of the small bowel, urinary bladder, uterus, and abdominal  vasculature are normal.     IMPRESSION:  Near complete response to therapy of the rectal mass.     12/7/2023 Imaging    CT CHEST W CONTRAST DIAGNOSTIC-, CT ABDOMEN W CONTRAST-     INDICATIONS: Rectal cancer, follow-up radiation dose reduction  techniques were utilized, including automated exposure control and  exposure modulation based on body size.     TECHNIQUE: Enhanced CT of the chest, abdomen, pelvis     COMPARISON: 4/4/2023     FINDINGS:     Chest CT:     The heart size is normal without pericardial effusion. Mild coronary  arterial calcification is present. Left chest port extends to the  superior vena cava. A few small subcentimeter short axis mediastinal  lymph nodes are seen that are not significant by size criteria.     The airways appear clear.     No pleural effusion or pneumothorax.     The lungs show no focal pulmonary consolidation or mass. Small right  lower lobe pulmonary nodules are again demonstrated and appear stable,  for example axial image 58-61, 74. Stable small lingular nodule, image  68, and small left lower lobe nodules, image 69, 82. Old granulomatous  disease is present.        Abdomen pelvis CT:     Right renal cyst is present.     Fatty infiltration of the pancreatic head is  seen.     Otherwise unremarkable appearance of the liver, gallbladder, spleen,  adrenal glands, pancreas, kidneys, bladder.     No bowel obstruction. Previously noted asymmetric wall thickening of the  rectum is less conspicuous. The appendix does not appear inflamed.        No free intraperitoneal gas or free fluid. Minimal umbilical hernia of  fat is noted.     Scattered small mesenteric and para-aortic lymph nodes are seen that are  not significant by size criteria.     Abdominal aorta is not aneurysmal. Aortic and other arterial  calcifications are present.     Degenerative and chronic changes are seen in the spine. No acute  fracture is identified.           IMPRESSION:     Previously noted asymmetric wall thickening of the rectum is less  conspicuous. Otherwise, no significant change. Continued follow-up  advised as indicated.     12/8/2023 Imaging    EXAMINATION: MRI OF THE PELVIS WITHOUT AND WITH CONTRAST     HISTORY: 62-year-old female with a diagnosis of rectal carcinoma status  post therapy undergoing follow-up evaluation.     TECHNIQUE: Multiplanar and multisequence images of the pelvis were  obtained pre and postintravenous administration of gadolinium.     COMPARISON: MRI of the pelvis without and with contrast, 06/07/2023, MRI  of the pelvis without contrast, 01/07/2023 and 09/26/2022, and CT of the  abdomen and pelvis with contrast, 12/07/2023.     FINDINGS: Seen on the sagittal postcontrast images on series 13, images  42 through 46 near the junction of the mid to high rectum with the  inferior margin on the order of 11.9 cm from the anal verge and 7.6 cm  from the anorectal junction, there is an area of irregular  C-shaped/semicircumferential mass-like thickening of the rectum that is  centered at the 4 o'clock position on the axial postcontrast images,  series 11 images 42 through 46. This area is located on the order of 3  cm superior to the anterior peritoneal reflection and measures on  the  order of 2.1 cm in the superior to inferior dimension, 2.6 cm in the  medial to lateral dimension, and 2.3 cm in anterior to posterior  dimension with a semicircumferential appearance. The area appears  intermediate in signal intensity on the T2 weighted sequence images  without any suggestion of associated mucinous material. This area is in  the region of the previously noted primary lesion seen on the MRI  examination dated 09/26/2022.     There is localized increased enhancement seen in the described region  that extends 5 mm outside of the posterior muscularis propria. This is  best seen on series 11 image 45. There is also some ill-defined hazy  enhancement in the posterior mesorectum in this region that can be seen  on series 11 image 45 and on series 13 image 43. The posterior margin of  the lesion that apparently extends outside of the muscularis propria  extends to within 0.5 cm of the posterior mesorectal fascia, but the  nonspecific stranding abuts the mesorectal fascia immediately overlying  the sacrum. No mesorectal fascial thickening or abnormal enhancement is  appreciated.     The pelvic floor muscles appear normal. No abnormal signal intensity is  seen within the sacrum. There is no evidence for adenopathy in the  pelvis. No free fluid is appreciated.     IMPRESSION:  1. There is an area of abnormal mass-like enhancement in the region of  the primary tumor that measures on the order of 2.6 cm in greatest  dimension and appears to extend 5 mm outside of the posterior muscularis  propria. There is also some stranding within the posterior mesorectum  that abuts the posterior mesorectal fascia that overlies the sacrum.  This is in the same region as the primary lesion seen on 09/26/2022. No  adenopathy is appreciated. The imaging features raise concern for  recurrence of malignancy, possibly a T3c lesion with possible mesorectal  involvement versus posttreatment changes. However, the appearance  is  different than seen on the prior MRI dated 06/07/2023 and is suspicious  for local recurrence. Clinical follow-up is recommended. Surgical and/or  tissue evaluation should be considered.     12/18/2023 Imaging    F-18 FDG PET SKULL BASE TO MID THIGH WITH PET CT FUSION.     HISTORY: 62-year-old female with rectal cancer treated with radiotherapy  and chemotherapy. Restaging.     TECHNIQUE: Radiation dose reduction techniques were utilized, including  automated exposure control and exposure modulation based on body size.   Blood glucose level at time of injection was 86 mg/dL. 6.4 mCi of F-18  FDG were injected and PET was performed from skull base to mid thigh. CT  was obtained for localization and attenuation correction. Time at  injection 12:44 p.m. PET start time 2:09 p.m. Compared with multiple  priors including pelvic MRI 12/08/2023, CTs 12/07/2023.     FINDINGS: Mediastinal blood pool has a maximal SUV of 3.3.  1. There is nonspecific low-level activity throughout the rectosigmoid  colon and more intense activity at the distal rectum and perianal soft  tissues with a maximal SUV of 8.0. This is nonspecific and may be  radiation related and should be correlated with endoscopy.     2. There is no hypermetabolic lymphadenopathy within the pelvis or  abdomen. There is no suspicious liver activity and there are no new or  suspicious pulmonary opacities or nodules, given constraints of  breathing artifact. There is no convincing evidence for distant  metastases.     12/22/2023 Biopsy    Final Diagnosis   1.  Rectal biopsy: Colonic mucosa with               A.  Focal changes suggesting developing hyperplastic polyp.               B.  No active inflammation or granulomatous inflammation identified.               C.  No glandular dysplasia nor malignancy identified.        2/22/2024 Biopsy    Guardant reveal: ctDNA not detected         Past Medical History:   Diagnosis Date   • Abdominal cramping 07/01/2022   •  "Anemia    • Bloating 07/01/2022   • Bloody diarrhea 07/01/2022   • Chronic back pain    • Colon polyps     FOLLOWED BY DR. BEVERLY MENDEZ   • COVID 08/15/2023   • Cystitis     BURNING WITH URINATION SINCE RADIATION   • Depression    • Early cataract    • Gastric polyps     FOLLOWED BY DR. BEVERLY MENDEZ   • GERD (gastroesophageal reflux disease)    • Hearing loss    • Hiatal hernia 09/2021    3 CM, FOLLOWED BY DR. BEVERLY MENDEZ   • History of chemotherapy     LAST TREATMENT 2/10/2023   • History of depression    • History of radiation therapy    • Hyperlipidemia     NO MEDS   • IBS (irritable bowel syndrome)    • Migraines     HX   • Rectal bleeding 1983    Off and on for years   • Rectal cancer 09/2022    INVASIVE MODERATELY DIFFERENTIATED ADENOCARCINOMA ARISING FROM TRADITIONAL SERRATED ADENOMA WITH HGD   • Sciatica 09/2015   • Tear of medial meniscus of knee 07/2021    RIGHT KNEE   • Vitamin D deficiency        Past Surgical History:   Procedure Laterality Date   • COLONOSCOPY N/A 1986   • COLONOSCOPY N/A 03/27/2024    A HEALTHY SCAR WAS FOUND IN MID RECTUM, FLEX SIG IN 6 MONTHS, DR. JENNIFER AMAYA AT [facility]   • COLONOSCOPY N/A 9/25/2024    Procedure: COLONOSCOPY to cecum;  Surgeon: Jennifer Amaya MD;  Location: [facility] ENDOSCOPY;  Service: General;  Laterality: N/A;  PRE: Hx of Rectal Cancer  post: Tortuous Colon   • COLONOSCOPY W/ POLYPECTOMY N/A 09/01/2022    3 MM TUBULAR ADENOMA POLYP IN ASCENDING, 6 MM TUBULAR ADENOMA POLYP IN CECUM, 30 MM MASS IN RECTUM, PATH:INVASIVE MODERATELY DIFFERENTIATED ADENOCARCINOMA ARISING FROM TRADITIONAL SERRATED ADENOMA WITH HGD, HYPERTROPHIED ANAL PAPILLA, DR. BEVERLY MENDEZ AT [facility]   • ENDOSCOPY N/A 09/01/2022    MULTIPLE BENIGN GASTRIC POLYPS, 2 SQUAMOUS PAPILLAS IN ESOPHAGUS, 3 CM HIATAL HERNIA,   • EXTERNAL EAR SURGERY  1966    MULTIPLE \"LANCES\", DR. ACE IN [facility]   • EYE SURGERY Right 1967    DR. CASTANEDA IN [facility]   • KNEE " "ARTHRODESIS Right 07/27/2021    RIGHT KNEE ARTHROCENTESIS, DR. ALBERT GILMAN   • KNEE SURGERY Left 1985    DR. PEREZ AT [place]   • SHOULDER MANIPULATION Left 01/07/2013    FOR FROZEN SHOULDER, DR. ALBERT GILMAN AT [facility]   • SIGMOIDOSCOPY N/A 09/21/2022    AN INFILTRATIVE NON OBSTRUCTING MASS IN MID RECTUM, AREA TATTOOED, DR. JENNIFER AMAYA AT [facility]   • SIGMOIDOSCOPY N/A 03/08/2023    7 MM HEALED ULCERATION WHERE RECTAL CANCER WAS, SCAR TISSUE HEALTHY, BIOPSY SHOWED INFLAMMATION AND SCAR, NO RESIDUAL CANCER, FLEX SIG IN 12 MONTHS, DR. JENNIFER AMAYA AT [facility]   • SIGMOIDOSCOPY N/A 07/13/2023    A HEALTHY SCAR WAS IN MID RECTUM, DR. JENNIFER AMAYA AT [facility]   • SIGMOIDOSCOPY N/A 11/06/2023    A HEALTHY APPEARING SCAR IN MID RECTUM, COLONOSCOPY IN 4 MONTHS, DR. JENNIFER AMAYA AT [facility]   • SIGMOIDOSCOPY N/A 12/22/2023    A HEALTHY SCAR WAS FOUND IN MID RECTUM, BIOPSIES BENIGN, DR. JENNIFER AMAYA AT [facility]   • TRANSRECTAL ULTRASOUND N/A 09/21/2022    Procedure: ULTRASOUND TRANSRECTAL;  Surgeon: Jennifer Amaya MD;  Location: [facility] ENDOSCOPY;  Service: General;  Laterality: N/A;   • TRANSRECTAL ULTRASOUND N/A 12/22/2023    Procedure: ULTRASOUND TRANSRECTAL;  Surgeon: Jennifer Amaya MD;  Location: [facility] ENDOSCOPY;  Service: General;  Laterality: N/A;  pre: abnormal MRI  post: rectal ulceration   • VENOUS ACCESS DEVICE (PORT) INSERTION Left 10/26/2022    Procedure: INSERTION OF PORTACATH;  Surgeon: Leonel Bridges MD;  Location: [facility] OR;  Service: General;  Laterality: Left;   • WISDOM TOOTH EXTRACTION Bilateral        Social History     Socioeconomic History   • Marital status: Single   Tobacco Use   • Smoking status: Never     Passive exposure: Never   • Smokeless tobacco: Never   Vaping Use   • Vaping status: Never Used   Substance and Sexual Activity   • Alcohol use: Not Currently   • Drug use: Never   • Sexual activity: Defer         No results found for: \"LDH\", \"URICACID\"      Lab Results   Component Value Date    GLUCOSE " 92 05/16/2024    BUN 12 05/16/2024    CREATININE 0.68 05/16/2024    BCR 17.6 05/16/2024    K 4.2 05/16/2024    CO2 25.8 05/16/2024    CALCIUM 9.6 05/16/2024    PROTENTOTREF 6.8 05/27/2022    ALBUMIN 4.3 05/16/2024    LABIL2 2.1 05/27/2022    AST 20 05/16/2024    ALT 15 05/16/2024       CBC w/diff          11/29/2023    12:52 2/22/2024    12:55 5/16/2024    08:22   CBC w/Diff   WBC 5.61  6.06  4.64    RBC 4.25  4.34  4.38    Hemoglobin 12.5  12.4  12.7    Hematocrit 37.8  37.6  38.4    MCV 88.9  86.6  87.7    MCH 29.4  28.6  29.0    MCHC 33.1  33.0  33.1    RDW 13.2  13.7  13.4    Platelets 223  255  237    Neutrophil Rel % 61.6  61.1  56.2    Immature Granulocyte Rel % 0.7  0.5  0.4    Lymphocyte Rel % 27.5  28.9  31.0    Monocyte Rel % 6.1  5.6  7.5    Eosinophil Rel % 3.7  3.6  4.5    Basophil Rel % 0.4  0.3  0.4        Allergies as of 10/01/2024 - Reviewed 09/25/2024   Allergen Reaction Noted   • Adhesive tape Rash 11/02/2022   • Levofloxacin Swelling 09/30/2015   • Sulfa antibiotics Rash 09/30/2015        MEDICATIONS:  Patient medication list reviewed today    Review of Systems    LMP  (LMP Unknown)     Wt Readings from Last 3 Encounters:   09/25/24 112 kg (246 lb)   08/27/24 115 kg (252 lb 9.6 oz)   05/22/24 113 kg (249 lb 8 oz)        There were no vitals filed for this visit.        Physical Exam      Advance Care Planning    Patient does have advance care planning complete - a POA document, scanned into the medical record and dated 9/13/2022.    Patient has designated a healthcare surrogate: Amy Diggs as primary and Dipika Samaniego as alternate    Arrangements for further assistance with advance care planning can be made by scheduling an appointment with myself or another advance care planning facilitator at their convenience. Patients may also call the toll free [facility] ACP Help Line at [phone].           DISCUSSION HELD TODAY:   {discussion:35383}    Plan and recommendations:  ***  {CFL  Survivorship FU Plan:49989}  Call my office as needed at any point for additional information, resources or support at [phone]      Diagnoses and all orders for this visit:    1. Rectal cancer (Primary)    2. Primary insomnia    3. Bilateral hip pain    4. Muscular deconditioning            I spent *** minutes caring for this patient on this date of service by face-to-face counseling. This time includes time spent by me in the following activities: {TIMEACTIVITIES:45735}

## 2024-10-01 NOTE — LETTER
October 1, 2024       No Recipients    Patient: Babs Felton   YOB: 1960   Date of Visit: 10/1/2024       Dear Araceli Rose MD    Babs Felton was in my office today. Below is a copy of my note.    If you have questions, please do not hesitate to call me. I look forward to following Babs along with you.         Sincerely,        YAMILEX Reyna        CC:   No Recipients    [facility]   IN CLINIC Follow-Up Visit  Management of Residual Effects of Cancer Treatment      Babs Fetlon is a pleasant 63 y.o. female reviewed today in Multidisciplinary Clinic, for follow-up Management of Residual Effects of Cancer Treatment      HPI  Patient returns today for follow up evaluation of functional and supportive care needs.     Continues follow up with Dr Weber. Guardant reveal completed 5/16/2024 showed circulating tumor DNA not detected.  CEA 1.44.    Continues to have persistent diarrhea, discussed with primary care Dr. Rose who sent patient for colonoscopy completed 9/25/2024 with Dr. Amaya without any abnormalities identified, no biopsy specimens were taken.  Flexible sigmoidoscopy recommended in 6 months.     At this time Dr. Rose has recommended patient make dietary modifications, she has eliminated red meat, dairy, gluten and while the diarrhea had not improved she did notice improvements in her bilateral hip pain.  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  She reports her sleep has returned to its sort of previous state of disruptions sleeping between 2-5 hours at a time.  She has not utilized the CBT-[facility] vanda any further.    She continues to play and write music and explore future possibilities that allow her to share her gifts  with others.    TREATMENT HISTORY:     Oncology/Hematology History Overview Note   CEA  2/22/24 1.58  11/29/23: 1.59  9/6/23: 1.65  6/14/23: 1.87  3/22/23: 1.31  2/8/23: 1.54  9/8/22: 3.51     Rectal cancer   9/1/2022 Biopsy    Final Diagnosis   1. Small Bowel, Biopsy:                A. Small bowel mucosa with a normal villous architecture and no significant histopathologic changes.     2. Stomach, Polyp, Biopsy:                A. Fundic gland polyps.               B. Negative for dysplasia.     3. Esophagus, Mid, Biopsy:                A. Squamous papilloma.     4. Esophagus, Proximal, Biopsy:                A. Squamous papilloma with increased intraepithelial eosinophils (see comment).               B. Separate fragment of gastric oxyntic mucosa with no significant histopathologic changes.     5. Cecum, Polyp, Polypectomy:               A. Tubular adenoma.     6. Ascending Colon, Polyp, Polypectomy:               A. Tubular adenoma.     7. Right Colon, Random, Biopsy:   A. Colonic mucosa with no significant histopathologic changes.               B. No histologic evidence of microscopic colitis.      8. Left Colon, Random, Biopsy:   A. Colonic mucosa with no significant histopathologic changes.               B. No histologic evidence of microscopic colitis.      9. Rectum, Polyp, Polypectomy:               A. INVASIVE MODERATELY DIFFERENTIATED ADENOCARCINOMA ARISING OUT OF A       TRADITIONAL SERRATED ADENOMA WITH HIGH GRADE DYSPLASIA.           9/13/2022 Imaging    Exam: CT of the chest, abdomen, and pelvis with contrast.     HISTORY: 61-year-old premalignant polyps and adenocarcinoma of the  rectum on recent colonoscopy 09/01/2022.     TECHNIQUE: Radiation dose reduction techniques were utilized, including  automated exposure control and exposure modulation based on body size.   3 mm images were obtained through the chest, abdomen, and pelvis after  the administration of IV contrast.      COMPARISON: None      FINDINGS:  Thoracic inlet: Within normal limits     Heart and great vessels: The heart size is within normal limits.  Atherosclerosis including coronary calcifications. The study is not  optimized for vascular evaluation however, normal enhancement is present  centrally     Lymphatics: Subcentimeter mediastinal nodes some of which are coarsely  calcified suggesting prior granulomatous disease     Lung parenchyma and pleural space: Patent central airway. No focal  consolidations, effusions, or pneumothorax. Calcified granulomas. Sub-6  mm micronodules with an index nodule in the right middle lobe (series 3  image 49 and 54), right lower lobe (image images 70/71), left lower lobe  (image 80).           Abdomen/pelvis:     Liver: Infiltration of the liver.     Biliary tract: Within normal limits.     Spleen: Within normal limits.     Pancreas: Within normal limits.     Adrenals: Within normal limits.     Kidneys: Too centimeter hypodensity in the right upper pole favoring a  cyst. Left kidney within normal limits. No hydronephrosis.     Bowel:  No bowel loops are normal in caliber. Normal appendix. The colon  is stool-filled somewhat limiting evaluation. Scattered colonic  diverticula without acute diverticulitis. Thickening of the rectum which  is incompletely distended limiting evaluation for known rectal neoplasm.     Peritoneum: No free fluid or free air.     Vasculature:    Minimal calcific atherosclerosis.     Lymph Nodes:  Scattered subcentimeter abdominal and pelvic nodes, not  enlarged by CT criteria. An index node at the pelvic brim measuring up  to 4 mm in short axis.                                                            Pelvic organs: Within normal limits.     Abdominal/Pelvic Wall: Tiny fat-containing umbilical hernia.     Bones: Multilevel degenerative changes thoracolumbar spine and pelvis.  Probable hemangioma T10 vertebral body..     IMPRESSION:  1.  Rectal neoplasm not well visualized on this  exam, please refer to  the prior colonoscopy for further detail and further evaluation with MRI  or PET/CT could be considered if clinically indicated.  2.  Calcified and noncalcified sub-6 mm pulmonary nodules likely the  sequela of prior granulomatous disease however, given history recommend  6 month surveillance.  3.  Scattered colonic diverticulosis.  4.  Mildly prominent but not pathologically enlarged pelvic nodes  measuring up to 4 mm.  5.  Please see above for additional findings.        9/16/2022 Initial Diagnosis    Rectal cancer (HCC)     9/26/2022 Imaging    MRI PELVIS WITHOUT CONTRAST/RECTAL MRI     HISTORY: 61-year-old female with rectal cancer. Staging.     TECHNIQUE: Routine staging rectum MRI was performed without contrast.  Compared with CT of the abdomen and pelvis 09/13/2022.     FINDINGS: There is an approximately 4 cm mass with the inferior margin  of the mass 9 cm proximal to the anorectal junction. The mass effaces  the muscularis propria along the right wall of the rectum and at the  posterior wall, there is extension of the mass just beyond the  muscularis propria, less than 5 mm. There is no evidence for extension  of the tumor beyond the muscularis propria at the anterior aspect of the  mass. There are no pathologically enlarged perirectal nodes. There are  shotty nodes superiorly at the mesorectum in the presacral space with  one of the largest nodes measuring 0.7 x 0.5 cm. The smallest distance  between the mass and the mesorectal fascia is 0.7 cm and this is along  the right posterior margin of the mass.     IMPRESSION:  High rectal mass with effacement of the muscularis propria  along the right wall and extension beyond the muscularis propria at the  posterior wall, T3 lesion. There is no evidence for involvement of the  mesorectal fascia. There are no pathologically enlarged nodes, but the  high mesorectal nodes are indeterminate.     10/12/2022 Biopsy    Guardant 360: biomarkers  identified with no associated FDA approved therapies     10/17/2022 - 10/21/2022 Radiation    Radiation OncologyTreatment Course:  Babs Felton received 2500 cGy in 5 fractions to rectum     11/2/2022 - 2/8/2023 Chemotherapy    OP COLON [id number] OXALIplatin / Leucovorin / Fluorouracil  1/11/2023: Cycle 6 5-FU/LV.  Oxaliplatin dropped due to neuropathy.  2/8/2023: Cycle 8 5-FU     1/11/2023 Imaging    MRI OF THE PELVIS WITHOUT CONTRAST.     HISTORY: Restaging rectal adenocarcinoma (T3 N0 M0, stage IIa) status  post chemotherapy.     TECHNIQUE: MRI of the pelvis according to a protocol tailored for  evaluation of the rectum without IV contrast. Lack of contrast limits  evaluation of solid, visceral, and vascular structures. Sensitivity for  underlying lesions and infection decreased. 5 sequences submitted.     COMPARISON: 09/26/2022     FINDINGS:     Significant decrease in size of the previously described 4 cm rectal  mass with residual 2.7 cm with residual bowel wall thickening measuring  up to 1.3 cm to the right of midline in low T2 signal along the right  lateral wall extending into the subjacent mesentery suggesting  posttreatment change/fibrosis (series 6 image 19). Along the inferior  margin of this mass there is focal stenosis without obstruction.  Thickening of the mid to distal rectum which may be the sequela of  posttreatment change/proctitis.     A previously measured presacral node is smaller at 0.4 x 0.5 cm  (previously 0.5 x 0.7 cm). Stable 3 mm right perirectal node Uterus in  situ. Small fat-containing umbilical hernia. Noncontrast imaging of the  osseous structures within normal limits. Hypertrophic degenerative  changes are again noted in thoracolumbar spine. Bone scan could be  considered for further evaluation if clinically indicated.     IMPRESSION:  1.  Good response to therapy with near complete resolution of the  previously described rectal mass with residual rectal thickening and low  T2  signal/fibrosis at the site of primary neoplasm.  2.  Pelvic nodes are smaller or unchanged.  3.  Please see above for additional findings.        3/8/2023 Biopsy    Final Diagnosis   1. Rectum, Biopsy:               A. Colorectal mucosa with mild increase in lamina propria chronic inflammation and focal scarring.               B. Negative for carcinoma.         3/22/2023 Biopsy    Guardant Reveal: ctDNA not detected     4/4/2023 Imaging    Examination: CT of the chest, abdomen, and pelvis with contrast     TECHNIQUE: CT of the chest, abdomen, and pelvis after the uneventful  administration of nonionic intravenous contrast per protocol. Radiation  dose reduction techniques were utilized, including automated exposure  control and exposure modulation based on body size.     HISTORY:Rectal cancer     COMPARISON:09/13/2022     FINDINGS:Chest:     Old granulomatous disease is seen in the lungs. There is dependent  atelectasis. There are stable small pulmonary nodules, for example in  the right lower lobe on series 3, images 57 and 71. No consolidation,  pleural effusion, new or suspicious pulmonary nodule or mass are seen.  The central airways are patent. There is a small right-sided  fat-containing Bochdalek hernia.     No enlarged supraclavicular, axillary, mediastinal, or hilar lymph nodes  are seen. The mediastinal vasculature is normal in caliber. A left  internal jugular venous Port-A-Cath terminates in the superior vena  cava. Coronary calcifications are seen. The heart is normal in size and  configuration, without pericardial effusion.     Abdomen and pelvis:     A low-attenuation right renal lesion is technically indeterminate,  likely cyst.     The liver, gallbladder, spleen, adrenal glands, left kidney, pancreas,  stomach, small bowel, large bowel, urinary bladder, uterus, adnexal  structures, and abdominal vasculature are normal. There is perirectal  stranding with asymmetric rectal thickening measuring 1.3  cm tc width on  series 2, image 201. No intraperitoneal fluid collection or free gas are  seen. No enlarged lymph nodes are demonstrated.     Bone windows demonstrate degenerative changes, without suspicious  osseous lesion seen.     IMPRESSION:  1. Stable tiny pulmonary nodules, very likely benign. Recommend  attention on follow-up  2. Asymmetric rectal thickening, possibly reflecting the patient's  rectal neoplasm, difficult to compare because of differences in rectal  distention  3. Incidental findings as above, without convincing evidence of  metastatic disease.     6/7/2023 Imaging    Examination: MRI of the pelvis without and with contrast     TECHNIQUE: MRI of the pelvis was obtained before and after the  uneventful administration of gadolinium base contrast according to the  rectal cancer protocol     HISTORY: Rectal cancer     COMPARISON: 01/07/2023     FINDINGS: There is near-complete resolution of the previously seen  rectal mass, with minimal irregularity around series 10, image 9  measuring 1.3 x 1.5 cm. This region is located approximately 6 cm from  the anorectal junction and 11 cm from the anal verge. It is above the  peritoneal reflection and levator musculature. There is minimal  stranding posterior to this region, stable. No enlarged lymph nodes are  seen. No suspicious osseous lesions are demonstrated. Visualized  portions of the small bowel, urinary bladder, uterus, and abdominal  vasculature are normal.     IMPRESSION:  Near complete response to therapy of the rectal mass.     12/7/2023 Imaging    CT CHEST W CONTRAST DIAGNOSTIC-, CT ABDOMEN W CONTRAST-     INDICATIONS: Rectal cancer, follow-up radiation dose reduction  techniques were utilized, including automated exposure control and  exposure modulation based on body size.     TECHNIQUE: Enhanced CT of the chest, abdomen, pelvis     COMPARISON: 4/4/2023     FINDINGS:     Chest CT:     The heart size is normal without pericardial effusion.  Mild coronary  arterial calcification is present. Left chest port extends to the  superior vena cava. A few small subcentimeter short axis mediastinal  lymph nodes are seen that are not significant by size criteria.     The airways appear clear.     No pleural effusion or pneumothorax.     The lungs show no focal pulmonary consolidation or mass. Small right  lower lobe pulmonary nodules are again demonstrated and appear stable,  for example axial image 58-61, 74. Stable small lingular nodule, image  68, and small left lower lobe nodules, image 69, 82. Old granulomatous  disease is present.        Abdomen pelvis CT:     Right renal cyst is present.     Fatty infiltration of the pancreatic head is seen.     Otherwise unremarkable appearance of the liver, gallbladder, spleen,  adrenal glands, pancreas, kidneys, bladder.     No bowel obstruction. Previously noted asymmetric wall thickening of the  rectum is less conspicuous. The appendix does not appear inflamed.        No free intraperitoneal gas or free fluid. Minimal umbilical hernia of  fat is noted.     Scattered small mesenteric and para-aortic lymph nodes are seen that are  not significant by size criteria.     Abdominal aorta is not aneurysmal. Aortic and other arterial  calcifications are present.     Degenerative and chronic changes are seen in the spine. No acute  fracture is identified.           IMPRESSION:     Previously noted asymmetric wall thickening of the rectum is less  conspicuous. Otherwise, no significant change. Continued follow-up  advised as indicated.     12/8/2023 Imaging    EXAMINATION: MRI OF THE PELVIS WITHOUT AND WITH CONTRAST     HISTORY: 62-year-old female with a diagnosis of rectal carcinoma status  post therapy undergoing follow-up evaluation.     TECHNIQUE: Multiplanar and multisequence images of the pelvis were  obtained pre and postintravenous administration of gadolinium.     COMPARISON: MRI of the pelvis without and with  contrast, 06/07/2023, MRI  of the pelvis without contrast, 01/07/2023 and 09/26/2022, and CT of the  abdomen and pelvis with contrast, 12/07/2023.     FINDINGS: Seen on the sagittal postcontrast images on series 13, images  42 through 46 near the junction of the mid to high rectum with the  inferior margin on the order of 11.9 cm from the anal verge and 7.6 cm  from the anorectal junction, there is an area of irregular  C-shaped/semicircumferential mass-like thickening of the rectum that is  centered at the 4 o'clock position on the axial postcontrast images,  series 11 images 42 through 46. This area is located on the order of 3  cm superior to the anterior peritoneal reflection and measures on the  order of 2.1 cm in the superior to inferior dimension, 2.6 cm in the  medial to lateral dimension, and 2.3 cm in anterior to posterior  dimension with a semicircumferential appearance. The area appears  intermediate in signal intensity on the T2 weighted sequence images  without any suggestion of associated mucinous material. This area is in  the region of the previously noted primary lesion seen on the MRI  examination dated 09/26/2022.     There is localized increased enhancement seen in the described region  that extends 5 mm outside of the posterior muscularis propria. This is  best seen on series 11 image 45. There is also some ill-defined hazy  enhancement in the posterior mesorectum in this region that can be seen  on series 11 image 45 and on series 13 image 43. The posterior margin of  the lesion that apparently extends outside of the muscularis propria  extends to within 0.5 cm of the posterior mesorectal fascia, but the  nonspecific stranding abuts the mesorectal fascia immediately overlying  the sacrum. No mesorectal fascial thickening or abnormal enhancement is  appreciated.     The pelvic floor muscles appear normal. No abnormal signal intensity is  seen within the sacrum. There is no evidence for  adenopathy in the  pelvis. No free fluid is appreciated.     IMPRESSION:  1. There is an area of abnormal mass-like enhancement in the region of  the primary tumor that measures on the order of 2.6 cm in greatest  dimension and appears to extend 5 mm outside of the posterior muscularis  propria. There is also some stranding within the posterior mesorectum  that abuts the posterior mesorectal fascia that overlies the sacrum.  This is in the same region as the primary lesion seen on 09/26/2022. No  adenopathy is appreciated. The imaging features raise concern for  recurrence of malignancy, possibly a T3c lesion with possible mesorectal  involvement versus posttreatment changes. However, the appearance is  different than seen on the prior MRI dated 06/07/2023 and is suspicious  for local recurrence. Clinical follow-up is recommended. Surgical and/or  tissue evaluation should be considered.     12/18/2023 Imaging    F-18 FDG PET SKULL BASE TO MID THIGH WITH PET CT FUSION.     HISTORY: 62-year-old female with rectal cancer treated with radiotherapy  and chemotherapy. Restaging.     TECHNIQUE: Radiation dose reduction techniques were utilized, including  automated exposure control and exposure modulation based on body size.   Blood glucose level at time of injection was 86 mg/dL. 6.4 mCi of F-18  FDG were injected and PET was performed from skull base to mid thigh. CT  was obtained for localization and attenuation correction. Time at  injection 12:44 p.m. PET start time 2:09 p.m. Compared with multiple  priors including pelvic MRI 12/08/2023, CTs 12/07/2023.     FINDINGS: Mediastinal blood pool has a maximal SUV of 3.3.  1. There is nonspecific low-level activity throughout the rectosigmoid  colon and more intense activity at the distal rectum and perianal soft  tissues with a maximal SUV of 8.0. This is nonspecific and may be  radiation related and should be correlated with endoscopy.     2. There is no hypermetabolic  lymphadenopathy within the pelvis or  abdomen. There is no suspicious liver activity and there are no new or  suspicious pulmonary opacities or nodules, given constraints of  breathing artifact. There is no convincing evidence for distant  metastases.     12/22/2023 Biopsy    Final Diagnosis   1.  Rectal biopsy: Colonic mucosa with               A.  Focal changes suggesting developing hyperplastic polyp.               B.  No active inflammation or granulomatous inflammation identified.               C.  No glandular dysplasia nor malignancy identified.        2/22/2024 Biopsy    Guardant reveal: ctDNA not detected         Past Medical History:   Diagnosis Date   • Abdominal cramping 07/01/2022   • Anemia    • Bloating 07/01/2022   • Bloody diarrhea 07/01/2022   • Chronic back pain    • Colon polyps     FOLLOWED BY DR. BEVERLY MENDEZ   • COVID 08/15/2023   • Cystitis     BURNING WITH URINATION SINCE RADIATION   • Depression    • Early cataract    • Gastric polyps     FOLLOWED BY DR. BEVERLY MENDEZ   • GERD (gastroesophageal reflux disease)    • Hearing loss    • Hiatal hernia 09/2021    3 CM, FOLLOWED BY DR. BEVERLY MENDEZ   • History of chemotherapy     LAST TREATMENT 2/10/2023   • History of depression    • History of radiation therapy    • Hyperlipidemia     NO MEDS   • IBS (irritable bowel syndrome)    • Migraines     HX   • Rectal bleeding 1983    Off and on for years   • Rectal cancer 09/2022    INVASIVE MODERATELY DIFFERENTIATED ADENOCARCINOMA ARISING FROM TRADITIONAL SERRATED ADENOMA WITH HGD   • Sciatica 09/2015   • Tear of medial meniscus of knee 07/2021    RIGHT KNEE   • Vitamin D deficiency        Past Surgical History:   Procedure Laterality Date   • COLONOSCOPY N/A 1986   • COLONOSCOPY N/A 03/27/2024    A HEALTHY SCAR WAS FOUND IN MID RECTUM, FLEX SIG IN 6 MONTHS, DR. JENNIFER OJEDA AT [facility]   • COLONOSCOPY N/A 9/25/2024    Procedure: COLONOSCOPY to cecum;  Surgeon: Jennifer Ojeda MD;  Location:   "[facility];  Service: General;  Laterality: N/A;  PRE: Hx of Rectal Cancer  post: Tortuous Colon   • COLONOSCOPY W/ POLYPECTOMY N/A 09/01/2022    3 MM TUBULAR ADENOMA POLYP IN ASCENDING, 6 MM TUBULAR ADENOMA POLYP IN CECUM, 30 MM MASS IN RECTUM, PATH:INVASIVE MODERATELY DIFFERENTIATED ADENOCARCINOMA ARISING FROM TRADITIONAL SERRATED ADENOMA WITH HGD, HYPERTROPHIED ANAL PAPILLA, DR. BEVERLY MENDEZ AT [facility]   • ENDOSCOPY N/A 09/01/2022    MULTIPLE BENIGN GASTRIC POLYPS, 2 SQUAMOUS PAPILLAS IN ESOPHAGUS, 3 CM HIATAL HERNIA,   • EXTERNAL EAR SURGERY  1966    MULTIPLE \"LANCES\", DR. ACE IN [facility]   • EYE SURGERY Right 1967    DR. CASTANEDA IN [facility]   • KNEE ARTHRODESIS Right 07/27/2021    RIGHT KNEE ARTHROCENTESIS, DR. ALBERT GILMAN   • KNEE SURGERY Left 1985    DR. PEREZ AT [place]   • SHOULDER MANIPULATION Left 01/07/2013    FOR FROZEN SHOULDER, [facility]   • SIGMOIDOSCOPY N/A 09/21/2022    AN INFILTRATIVE NON OBSTRUCTING MASS IN MID RECTUM, AREA TATTOOED, DR[facility] TEMO [facility]   • SIGMOIDOSCOPY N/A 03/08/2023    7 MM HEALED ULCERATION WHERE RECTAL CANCER WAS, SCAR TISSUE HEALTHY, BIOPSY SHOWED INFLAMMATION AND SCAR, NO RESIDUAL CANCER, FLEX SIG IN 12 MONTHS, DR[facility] TEMO [facility]   • SIGMOIDOSCOPY N/A 07/13/2023    A HEALTHY SCAR WAS IN MID RECTUM, DR. TEMO [facility]YA [facility]   • SIGMOIDOSCOPY N/A 11/06/2023    A HEALTHY APPEARING SCAR IN MID RECTUM, COLONOSCOPY IN 4 MONTHS, DR[facility] TEMO [facility]YA [facility]   • SIGMOIDOSCOPY N/A 12/22/2023    A HEALTHY SCAR WAS FOUND IN MID RECTUM, BIOPSIES BENIGN, DR. TEMO [facility]YA [facility]   • TRANSRECTAL ULTRASOUND N/A 09/21/2022    Procedure: ULTRASOUND TRANSRECTAL;  Surgeon: Temo Amaya MD;  Location:  [facility];  Service: General;  Laterality: N/A;   • TRANSRECTAL ULTRASOUND N/A 12/22/2023    Procedure: ULTRASOUND TRANSRECTAL;  Surgeon: Temo Amaya MD;  Location: [facility];  Service: General;  Laterality: N/A;  " "pre: abnormal MRI  post: rectal ulceration   • VENOUS ACCESS DEVICE (PORT) INSERTION Left 10/26/2022    Procedure: INSERTION OF PORTACATH;  Surgeon: Leonel Bridges MD;  Location: [facility] OR;  Service: General;  Laterality: Left;   • WISDOM TOOTH EXTRACTION Bilateral        Social History     Socioeconomic History   • Marital status: Single   Tobacco Use   • Smoking status: Never     Passive exposure: Never   • Smokeless tobacco: Never   Vaping Use   • Vaping status: Never Used   Substance and Sexual Activity   • Alcohol use: Not Currently   • Drug use: Never   • Sexual activity: Defer         No results found for: \"LDH\", \"URICACID\"      Lab Results   Component Value Date    GLUCOSE 92 05/16/2024    BUN 12 05/16/2024    CREATININE 0.68 05/16/2024    BCR 17.6 05/16/2024    K 4.2 05/16/2024    CO2 25.8 05/16/2024    CALCIUM 9.6 05/16/2024    PROTENTOTREF 6.8 05/27/2022    ALBUMIN 4.3 05/16/2024    LABIL2 2.1 05/27/2022    AST 20 05/16/2024    ALT 15 05/16/2024       CBC w/diff          11/29/2023    12:52 2/22/2024    12:55 5/16/2024    08:22   CBC w/Diff   WBC 5.61  6.06  4.64    RBC 4.25  4.34  4.38    Hemoglobin 12.5  12.4  12.7    Hematocrit 37.8  37.6  38.4    MCV 88.9  86.6  87.7    MCH 29.4  28.6  29.0    MCHC 33.1  33.0  33.1    RDW 13.2  13.7  13.4    Platelets 223  255  237    Neutrophil Rel % 61.6  61.1  56.2    Immature Granulocyte Rel % 0.7  0.5  0.4    Lymphocyte Rel % 27.5  28.9  31.0    Monocyte Rel % 6.1  5.6  7.5    Eosinophil Rel % 3.7  3.6  4.5    Basophil Rel % 0.4  0.3  0.4        Allergies as of 10/01/2024 - Reviewed 09/25/2024   Allergen Reaction Noted   • Adhesive tape Rash 11/02/2022   • Levofloxacin Swelling 09/30/2015   • Sulfa antibiotics Rash 09/30/2015        MEDICATIONS:  Patient medication list reviewed today    Review of Systems   Constitutional:  Negative for activity change, appetite change and fatigue.   Respiratory:  Negative for shortness of breath.    Gastrointestinal:  " Positive for diarrhea.   Musculoskeletal:  Positive for arthralgias (bilateral hips).   Psychiatric/Behavioral:  Positive for sleep disturbance. Negative for decreased concentration and dysphoric mood. The patient is not nervous/anxious.        /91   Pulse 76   Resp 16   Wt 112 kg (246 lb 9.6 oz)   LMP  (LMP Unknown)   SpO2 97% Comment: room air  BMI 35.89 kg/m²     Wt Readings from Last 3 Encounters:   10/01/24 112 kg (246 lb 9.6 oz)   09/25/24 112 kg (246 lb)   08/27/24 115 kg (252 lb 9.6 oz)        Pain Score    10/01/24 1508   PainSc: 0-No pain           Physical Exam  Constitutional:       Appearance: She is well-developed.   Pulmonary:      Effort: Pulmonary effort is normal. No respiratory distress.   Neurological:      Mental Status: She is alert and oriented to person, place, and time.   Psychiatric:         Attention and Perception: Attention and perception normal.         Mood and Affect: Mood and affect normal.         Speech: Speech normal.         Behavior: Behavior is cooperative.         Thought Content: Thought content normal.           Advance Care Planning    Patient does have advance care planning complete - a POA document, scanned into the medical record and dated 9/13/2022.    Patient has designated a healthcare surrogate: Amy Diggs as primary and Dipika Samaniego as alternate    Arrangements for further assistance with advance care planning can be made by scheduling an appointment with myself or another advance care planning facilitator at their convenience. Patients may also call the toll free [facility] ACP Help Line at [phone].           DISCUSSION HELD TODAY:   For now her schedule is pretty full with extra hours at work, not much time for additional appointments for physical therapy or CBT-I.  She feels she is coping adequately and she does see a light at the end of the tunnel.    Met with Dr. Rose primary care, she has a wellness visit scheduled in  December.  She states her last mammogram was approximately in 2022.  She did cannot recall when her last Pap was greater than 10 years ago.  She plans to discuss the screenings with Dr. Rose at her next appointment.      Plan and recommendations:  Flexible sigmoidoscopy planned in 6 months with Dr. Amaya  Last mammogram approximately 2022; last cervical cancer screening greater than 10 years ago and she will discuss with Dr. Rose.  Follow-up Dr. Freeman 10/10/2024  CT and MRI scheduled 11/13/2024  Labs and follow-up with Dr. Weber 11/20/2024  No formal follow up arranged for now. Return to clinic as needed.  Call my office as needed at any point for additional information, resources or support at [phone]      Diagnoses and all orders for this visit:    1. Rectal cancer (Primary)    2. Primary insomnia    3. Bilateral hip pain    4. Muscular deconditioning            I spent 40 minutes caring for this patient on this date of service by face-to-face counseling. This time includes time spent by me in the following activities: preparing for the visit, reviewing tests, performing a medically appropriate examination and/or evaluation, counseling and educating the patient/family/caregiver, referring and communicating with other health care professionals, documenting information in the medical record, independently interpreting results and communicating that information with the patient/family/caregiver, care coordination, obtaining a separately obtained history, and reviewing a separately obtained history

## 2024-10-01 NOTE — PROGRESS NOTES
[facility] MULTIDISCIPLINARY CLINIC   IN CLINIC Follow-Up Visit  Management of Residual Effects of Cancer Treatment      Babs Felton is a pleasant 63 y.o. female reviewed today in Multidisciplinary Clinic, for follow-up Management of Residual Effects of Cancer Treatment      HPI  Patient returns today for follow up evaluation of functional and supportive care needs.     Continues follow up with Dr Weber. Guardant reveal completed 5/16/2024 showed circulating tumor DNA not detected.  CEA 1.44.    Continues to have persistent diarrhea, discussed with primary care Dr. Rose who sent patient for colonoscopy completed 9/25/2024 with Dr. Amaya without any abnormalities identified, no biopsy specimens were taken.  Flexible sigmoidoscopy recommended in 6 months.     At this time Dr. Rose has recommended patient make dietary modifications, she has eliminated red meat, dairy, gluten and while the diarrhea had not improved she did notice improvements in her bilateral hip pain.  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  She reports her sleep has returned to its sort of previous state of disruptions sleeping between 2-5 hours at a time.  She has not utilized the CBT-I vanda any further.    She continues to play and write music and explore future possibilities that allow her to share her gifts with others.    TREATMENT HISTORY:     Oncology/Hematology History Overview Note   CEA  2/22/24 1.58  11/29/23: 1.59  9/6/23: 1.65  6/14/23: 1.87  3/22/23: 1.31  2/8/23: 1.54  9/8/22: 3.51     Rectal cancer   9/1/2022 Biopsy    Final Diagnosis   1. Small Bowel, Biopsy:                A. Small bowel mucosa with a normal villous architecture and no significant histopathologic changes.     2. Stomach, Polyp,  Biopsy:                A. Fundic gland polyps.               B. Negative for dysplasia.     3. Esophagus, Mid, Biopsy:                A. Squamous papilloma.     4. Esophagus, Proximal, Biopsy:                A. Squamous papilloma with increased intraepithelial eosinophils (see comment).               B. Separate fragment of gastric oxyntic mucosa with no significant histopathologic changes.     5. Cecum, Polyp, Polypectomy:               A. Tubular adenoma.     6. Ascending Colon, Polyp, Polypectomy:               A. Tubular adenoma.     7. Right Colon, Random, Biopsy:   A. Colonic mucosa with no significant histopathologic changes.               B. No histologic evidence of microscopic colitis.      8. Left Colon, Random, Biopsy:   A. Colonic mucosa with no significant histopathologic changes.               B. No histologic evidence of microscopic colitis.      9. Rectum, Polyp, Polypectomy:               A. INVASIVE MODERATELY DIFFERENTIATED ADENOCARCINOMA ARISING OUT OF A       TRADITIONAL SERRATED ADENOMA WITH HIGH GRADE DYSPLASIA.           9/13/2022 Imaging    Exam: CT of the chest, abdomen, and pelvis with contrast.     HISTORY: 61-year-old premalignant polyps and adenocarcinoma of the  rectum on recent colonoscopy 09/01/2022.     TECHNIQUE: Radiation dose reduction techniques were utilized, including  automated exposure control and exposure modulation based on body size.   3 mm images were obtained through the chest, abdomen, and pelvis after  the administration of IV contrast.      COMPARISON: None     FINDINGS:  Thoracic inlet: Within normal limits     Heart and great vessels: The heart size is within normal limits.  Atherosclerosis including coronary calcifications. The study is not  optimized for vascular evaluation however, normal enhancement is present  centrally     Lymphatics: Subcentimeter mediastinal nodes some of which are coarsely  calcified suggesting prior granulomatous disease     Lung  parenchyma and pleural space: Patent central airway. No focal  consolidations, effusions, or pneumothorax. Calcified granulomas. Sub-6  mm micronodules with an index nodule in the right middle lobe (series 3  image 49 and 54), right lower lobe (image images 70/71), left lower lobe  (image 80).           Abdomen/pelvis:     Liver: Infiltration of the liver.     Biliary tract: Within normal limits.     Spleen: Within normal limits.     Pancreas: Within normal limits.     Adrenals: Within normal limits.     Kidneys: Too centimeter hypodensity in the right upper pole favoring a  cyst. Left kidney within normal limits. No hydronephrosis.     Bowel:  No bowel loops are normal in caliber. Normal appendix. The colon  is stool-filled somewhat limiting evaluation. Scattered colonic  diverticula without acute diverticulitis. Thickening of the rectum which  is incompletely distended limiting evaluation for known rectal neoplasm.     Peritoneum: No free fluid or free air.     Vasculature:    Minimal calcific atherosclerosis.     Lymph Nodes:  Scattered subcentimeter abdominal and pelvic nodes, not  enlarged by CT criteria. An index node at the pelvic brim measuring up  to 4 mm in short axis.                                                            Pelvic organs: Within normal limits.     Abdominal/Pelvic Wall: Tiny fat-containing umbilical hernia.     Bones: Multilevel degenerative changes thoracolumbar spine and pelvis.  Probable hemangioma T10 vertebral body..     IMPRESSION:  1.  Rectal neoplasm not well visualized on this exam, please refer to  the prior colonoscopy for further detail and further evaluation with MRI  or PET/CT could be considered if clinically indicated.  2.  Calcified and noncalcified sub-6 mm pulmonary nodules likely the  sequela of prior granulomatous disease however, given history recommend  6 month surveillance.  3.  Scattered colonic diverticulosis.  4.  Mildly prominent but not pathologically  enlarged pelvic nodes  measuring up to 4 mm.  5.  Please see above for additional findings.        9/16/2022 Initial Diagnosis    Rectal cancer (HCC)     9/26/2022 Imaging    MRI PELVIS WITHOUT CONTRAST/RECTAL MRI     HISTORY: 61-year-old female with rectal cancer. Staging.     TECHNIQUE: Routine staging rectum MRI was performed without contrast.  Compared with CT of the abdomen and pelvis 09/13/2022.     FINDINGS: There is an approximately 4 cm mass with the inferior margin  of the mass 9 cm proximal to the anorectal junction. The mass effaces  the muscularis propria along the right wall of the rectum and at the  posterior wall, there is extension of the mass just beyond the  muscularis propria, less than 5 mm. There is no evidence for extension  of the tumor beyond the muscularis propria at the anterior aspect of the  mass. There are no pathologically enlarged perirectal nodes. There are  shotty nodes superiorly at the mesorectum in the presacral space with  one of the largest nodes measuring 0.7 x 0.5 cm. The smallest distance  between the mass and the mesorectal fascia is 0.7 cm and this is along  the right posterior margin of the mass.     IMPRESSION:  High rectal mass with effacement of the muscularis propria  along the right wall and extension beyond the muscularis propria at the  posterior wall, T3 lesion. There is no evidence for involvement of the  mesorectal fascia. There are no pathologically enlarged nodes, but the  high mesorectal nodes are indeterminate.     10/12/2022 Biopsy    Guardant 360: biomarkers identified with no associated FDA approved therapies     10/17/2022 - 10/21/2022 Radiation    Radiation OncologyTreatment Course:  Babs Felton received 2500 cGy in 5 fractions to rectum     11/2/2022 - 2/8/2023 Chemotherapy    OP COLON [id number] OXALIplatin / Leucovorin / Fluorouracil  1/11/2023: Cycle 6 5-FU/LV.  Oxaliplatin dropped due to neuropathy.  2/8/2023: Cycle 8 5-FU     1/11/2023 Imaging     MRI OF THE PELVIS WITHOUT CONTRAST.     HISTORY: Restaging rectal adenocarcinoma (T3 N0 M0, stage IIa) status  post chemotherapy.     TECHNIQUE: MRI of the pelvis according to a protocol tailored for  evaluation of the rectum without IV contrast. Lack of contrast limits  evaluation of solid, visceral, and vascular structures. Sensitivity for  underlying lesions and infection decreased. 5 sequences submitted.     COMPARISON: 09/26/2022     FINDINGS:     Significant decrease in size of the previously described 4 cm rectal  mass with residual 2.7 cm with residual bowel wall thickening measuring  up to 1.3 cm to the right of midline in low T2 signal along the right  lateral wall extending into the subjacent mesentery suggesting  posttreatment change/fibrosis (series 6 image 19). Along the inferior  margin of this mass there is focal stenosis without obstruction.  Thickening of the mid to distal rectum which may be the sequela of  posttreatment change/proctitis.     A previously measured presacral node is smaller at 0.4 x 0.5 cm  (previously 0.5 x 0.7 cm). Stable 3 mm right perirectal node Uterus in  situ. Small fat-containing umbilical hernia. Noncontrast imaging of the  osseous structures within normal limits. Hypertrophic degenerative  changes are again noted in thoracolumbar spine. Bone scan could be  considered for further evaluation if clinically indicated.     IMPRESSION:  1.  Good response to therapy with near complete resolution of the  previously described rectal mass with residual rectal thickening and low  T2 signal/fibrosis at the site of primary neoplasm.  2.  Pelvic nodes are smaller or unchanged.  3.  Please see above for additional findings.        3/8/2023 Biopsy    Final Diagnosis   1. Rectum, Biopsy:               A. Colorectal mucosa with mild increase in lamina propria chronic inflammation and focal scarring.               B. Negative for carcinoma.         3/22/2023 Biopsy    Guardant Reveal:  ctDNA not detected     4/4/2023 Imaging    Examination: CT of the chest, abdomen, and pelvis with contrast     TECHNIQUE: CT of the chest, abdomen, and pelvis after the uneventful  administration of nonionic intravenous contrast per protocol. Radiation  dose reduction techniques were utilized, including automated exposure  control and exposure modulation based on body size.     HISTORY:Rectal cancer     COMPARISON:09/13/2022     FINDINGS:Chest:     Old granulomatous disease is seen in the lungs. There is dependent  atelectasis. There are stable small pulmonary nodules, for example in  the right lower lobe on series 3, images 57 and 71. No consolidation,  pleural effusion, new or suspicious pulmonary nodule or mass are seen.  The central airways are patent. There is a small right-sided  fat-containing Bochdalek hernia.     No enlarged supraclavicular, axillary, mediastinal, or hilar lymph nodes  are seen. The mediastinal vasculature is normal in caliber. A left  internal jugular venous Port-A-Cath terminates in the superior vena  cava. Coronary calcifications are seen. The heart is normal in size and  configuration, without pericardial effusion.     Abdomen and pelvis:     A low-attenuation right renal lesion is technically indeterminate,  likely cyst.     The liver, gallbladder, spleen, adrenal glands, left kidney, pancreas,  stomach, small bowel, large bowel, urinary bladder, uterus, adnexal  structures, and abdominal vasculature are normal. There is perirectal  stranding with asymmetric rectal thickening measuring 1.3 cm tc width on  series 2, image 201. No intraperitoneal fluid collection or free gas are  seen. No enlarged lymph nodes are demonstrated.     Bone windows demonstrate degenerative changes, without suspicious  osseous lesion seen.     IMPRESSION:  1. Stable tiny pulmonary nodules, very likely benign. Recommend  attention on follow-up  2. Asymmetric rectal thickening, possibly reflecting the  patient's  rectal neoplasm, difficult to compare because of differences in rectal  distention  3. Incidental findings as above, without convincing evidence of  metastatic disease.     6/7/2023 Imaging    Examination: MRI of the pelvis without and with contrast     TECHNIQUE: MRI of the pelvis was obtained before and after the  uneventful administration of gadolinium base contrast according to the  rectal cancer protocol     HISTORY: Rectal cancer     COMPARISON: 01/07/2023     FINDINGS: There is near-complete resolution of the previously seen  rectal mass, with minimal irregularity around series 10, image 9  measuring 1.3 x 1.5 cm. This region is located approximately 6 cm from  the anorectal junction and 11 cm from the anal verge. It is above the  peritoneal reflection and levator musculature. There is minimal  stranding posterior to this region, stable. No enlarged lymph nodes are  seen. No suspicious osseous lesions are demonstrated. Visualized  portions of the small bowel, urinary bladder, uterus, and abdominal  vasculature are normal.     IMPRESSION:  Near complete response to therapy of the rectal mass.     12/7/2023 Imaging    CT CHEST W CONTRAST DIAGNOSTIC-, CT ABDOMEN W CONTRAST-     INDICATIONS: Rectal cancer, follow-up radiation dose reduction  techniques were utilized, including automated exposure control and  exposure modulation based on body size.     TECHNIQUE: Enhanced CT of the chest, abdomen, pelvis     COMPARISON: 4/4/2023     FINDINGS:     Chest CT:     The heart size is normal without pericardial effusion. Mild coronary  arterial calcification is present. Left chest port extends to the  superior vena cava. A few small subcentimeter short axis mediastinal  lymph nodes are seen that are not significant by size criteria.     The airways appear clear.     No pleural effusion or pneumothorax.     The lungs show no focal pulmonary consolidation or mass. Small right  lower lobe pulmonary nodules are  again demonstrated and appear stable,  for example axial image 58-61, 74. Stable small lingular nodule, image  68, and small left lower lobe nodules, image 69, 82. Old granulomatous  disease is present.        Abdomen pelvis CT:     Right renal cyst is present.     Fatty infiltration of the pancreatic head is seen.     Otherwise unremarkable appearance of the liver, gallbladder, spleen,  adrenal glands, pancreas, kidneys, bladder.     No bowel obstruction. Previously noted asymmetric wall thickening of the  rectum is less conspicuous. The appendix does not appear inflamed.        No free intraperitoneal gas or free fluid. Minimal umbilical hernia of  fat is noted.     Scattered small mesenteric and para-aortic lymph nodes are seen that are  not significant by size criteria.     Abdominal aorta is not aneurysmal. Aortic and other arterial  calcifications are present.     Degenerative and chronic changes are seen in the spine. No acute  fracture is identified.           IMPRESSION:     Previously noted asymmetric wall thickening of the rectum is less  conspicuous. Otherwise, no significant change. Continued follow-up  advised as indicated.     12/8/2023 Imaging    EXAMINATION: MRI OF THE PELVIS WITHOUT AND WITH CONTRAST     HISTORY: 62-year-old female with a diagnosis of rectal carcinoma status  post therapy undergoing follow-up evaluation.     TECHNIQUE: Multiplanar and multisequence images of the pelvis were  obtained pre and postintravenous administration of gadolinium.     COMPARISON: MRI of the pelvis without and with contrast, 06/07/2023, MRI  of the pelvis without contrast, 01/07/2023 and 09/26/2022, and CT of the  abdomen and pelvis with contrast, 12/07/2023.     FINDINGS: Seen on the sagittal postcontrast images on series 13, images  42 through 46 near the junction of the mid to high rectum with the  inferior margin on the order of 11.9 cm from the anal verge and 7.6 cm  from the anorectal junction, there is  an area of irregular  C-shaped/semicircumferential mass-like thickening of the rectum that is  centered at the 4 o'clock position on the axial postcontrast images,  series 11 images 42 through 46. This area is located on the order of 3  cm superior to the anterior peritoneal reflection and measures on the  order of 2.1 cm in the superior to inferior dimension, 2.6 cm in the  medial to lateral dimension, and 2.3 cm in anterior to posterior  dimension with a semicircumferential appearance. The area appears  intermediate in signal intensity on the T2 weighted sequence images  without any suggestion of associated mucinous material. This area is in  the region of the previously noted primary lesion seen on the MRI  examination dated 09/26/2022.     There is localized increased enhancement seen in the described region  that extends 5 mm outside of the posterior muscularis propria. This is  best seen on series 11 image 45. There is also some ill-defined hazy  enhancement in the posterior mesorectum in this region that can be seen  on series 11 image 45 and on series 13 image 43. The posterior margin of  the lesion that apparently extends outside of the muscularis propria  extends to within 0.5 cm of the posterior mesorectal fascia, but the  nonspecific stranding abuts the mesorectal fascia immediately overlying  the sacrum. No mesorectal fascial thickening or abnormal enhancement is  appreciated.     The pelvic floor muscles appear normal. No abnormal signal intensity is  seen within the sacrum. There is no evidence for adenopathy in the  pelvis. No free fluid is appreciated.     IMPRESSION:  1. There is an area of abnormal mass-like enhancement in the region of  the primary tumor that measures on the order of 2.6 cm in greatest  dimension and appears to extend 5 mm outside of the posterior muscularis  propria. There is also some stranding within the posterior mesorectum  that abuts the posterior mesorectal fascia that  overlies the sacrum.  This is in the same region as the primary lesion seen on 09/26/2022. No  adenopathy is appreciated. The imaging features raise concern for  recurrence of malignancy, possibly a T3c lesion with possible mesorectal  involvement versus posttreatment changes. However, the appearance is  different than seen on the prior MRI dated 06/07/2023 and is suspicious  for local recurrence. Clinical follow-up is recommended. Surgical and/or  tissue evaluation should be considered.     12/18/2023 Imaging    F-18 FDG PET SKULL BASE TO MID THIGH WITH PET CT FUSION.     HISTORY: 62-year-old female with rectal cancer treated with radiotherapy  and chemotherapy. Restaging.     TECHNIQUE: Radiation dose reduction techniques were utilized, including  automated exposure control and exposure modulation based on body size.   Blood glucose level at time of injection was 86 mg/dL. 6.4 mCi of F-18  FDG were injected and PET was performed from skull base to mid thigh. CT  was obtained for localization and attenuation correction. Time at  injection 12:44 p.m. PET start time 2:09 p.m. Compared with multiple  priors including pelvic MRI 12/08/2023, CTs 12/07/2023.     FINDINGS: Mediastinal blood pool has a maximal SUV of 3.3.  1. There is nonspecific low-level activity throughout the rectosigmoid  colon and more intense activity at the distal rectum and perianal soft  tissues with a maximal SUV of 8.0. This is nonspecific and may be  radiation related and should be correlated with endoscopy.     2. There is no hypermetabolic lymphadenopathy within the pelvis or  abdomen. There is no suspicious liver activity and there are no new or  suspicious pulmonary opacities or nodules, given constraints of  breathing artifact. There is no convincing evidence for distant  metastases.     12/22/2023 Biopsy    Final Diagnosis   1.  Rectal biopsy: Colonic mucosa with               A.  Focal changes suggesting developing hyperplastic polyp.                B.  No active inflammation or granulomatous inflammation identified.               C.  No glandular dysplasia nor malignancy identified.        2/22/2024 Biopsy    Guardant reveal: ctDNA not detected         Past Medical History:   Diagnosis Date    Abdominal cramping 07/01/2022    Anemia     Bloating 07/01/2022    Bloody diarrhea 07/01/2022    Chronic back pain     Colon polyps     FOLLOWED BY DR. BEVERLY MENDEZ    COVID 08/15/2023    Cystitis     BURNING WITH URINATION SINCE RADIATION    Depression     Early cataract     Gastric polyps     FOLLOWED BY DR. BEVERYL MENDEZ    GERD (gastroesophageal reflux disease)     Hearing loss     Hiatal hernia 09/2021    3 CM, FOLLOWED BY DR. BEVERLY MENDEZ    History of chemotherapy     LAST TREATMENT 2/10/2023    History of depression     History of radiation therapy     Hyperlipidemia     NO MEDS    IBS (irritable bowel syndrome)     Migraines     HX    Rectal bleeding 1983    Off and on for years    Rectal cancer 09/2022    INVASIVE MODERATELY DIFFERENTIATED ADENOCARCINOMA ARISING FROM TRADITIONAL SERRATED ADENOMA WITH HGD    Sciatica 09/2015    Tear of medial meniscus of knee 07/2021    RIGHT KNEE    Vitamin D deficiency        Past Surgical History:   Procedure Laterality Date    COLONOSCOPY N/A 1986    COLONOSCOPY N/A 03/27/2024    A HEALTHY SCAR WAS FOUND IN MID RECTUM, FLEX SIG IN 6 MONTHS, DR. JENNIFER OJEDA AT [facility]    COLONOSCOPY N/A 9/25/2024    Procedure: COLONOSCOPY to cecum;  Surgeon: Jennifer Ojeda MD;  Location: [facility] ENDOSCOPY;  Service: General;  Laterality: N/A;  PRE: Hx of Rectal Cancer  post: Tortuous Colon    COLONOSCOPY W/ POLYPECTOMY N/A 09/01/2022    3 MM TUBULAR ADENOMA POLYP IN ASCENDING, 6 MM TUBULAR ADENOMA POLYP IN CECUM, 30 MM MASS IN RECTUM, PATH:INVASIVE MODERATELY DIFFERENTIATED ADENOCARCINOMA ARISING FROM TRADITIONAL SERRATED ADENOMA WITH HGD, HYPERTROPHIED ANAL PAPILLA, DR. BEVERLY MENDEZ AT [facility]    ENDOSCOPY N/A  "09/01/2022    MULTIPLE BENIGN GASTRIC POLYPS, 2 SQUAMOUS PAPILLAS IN ESOPHAGUS, 3 CM HIATAL HERNIA,    EXTERNAL EAR SURGERY  1966    MULTIPLE \"LANCES\", DR. ACE IN [facility]    EYE SURGERY Right 1967    DR. CASTANEDA IN [facility]    KNEE ARTHRODESIS Right 07/27/2021    RIGHT KNEE ARTHROCENTESIS, DR. ALBERT GILMAN    KNEE SURGERY Left 1985    DR. PEREZ AT [place]    SHOULDER MANIPULATION Left 01/07/2013    FOR FROZEN SHOULDER, DR. ALBERT GILMAN AT [facility]    SIGMOIDOSCOPY N/A 09/21/2022    AN INFILTRATIVE NON OBSTRUCTING MASS IN MID RECTUM, AREA TATTOOED, DR. JENNIFER AMAYA AT [facility]    SIGMOIDOSCOPY N/A 03/08/2023    7 MM HEALED ULCERATION WHERE RECTAL CANCER WAS, SCAR TISSUE HEALTHY, BIOPSY SHOWED INFLAMMATION AND SCAR, NO RESIDUAL CANCER, FLEX SIG IN 12 MONTHS, DR. JENNIFER AMAYA AT [facility]    SIGMOIDOSCOPY N/A 07/13/2023    A HEALTHY SCAR WAS IN MID RECTUM, DR. JENNIFER AMAYA AT [facility]    SIGMOIDOSCOPY N/A 11/06/2023    A HEALTHY APPEARING SCAR IN MID RECTUM, COLONOSCOPY IN 4 MONTHS, DR. JENNIFER AMAYA AT [facility]    SIGMOIDOSCOPY N/A 12/22/2023    A HEALTHY SCAR WAS FOUND IN MID RECTUM, BIOPSIES BENIGN, DR. JENNIFER AMAYA AT [facility]    TRANSRECTAL ULTRASOUND N/A 09/21/2022    Procedure: ULTRASOUND TRANSRECTAL;  Surgeon: Jennifer Amaay MD;  Location: [facility] ENDOSCOPY;  Service: General;  Laterality: N/A;    TRANSRECTAL ULTRASOUND N/A 12/22/2023    Procedure: ULTRASOUND TRANSRECTAL;  Surgeon: Jennifer Amaya MD;  Location: [facility] ENDOSCOPY;  Service: General;  Laterality: N/A;  pre: abnormal MRI  post: rectal ulceration    VENOUS ACCESS DEVICE (PORT) INSERTION Left 10/26/2022    Procedure: INSERTION OF PORTACATH;  Surgeon: Leonel Bridges MD;  Location: [facility] MAIN OR;  Service: General;  Laterality: Left;    WISDOM TOOTH EXTRACTION Bilateral        Social History     Socioeconomic History    Marital status: Single   Tobacco Use    Smoking status: Never     Passive exposure: Never    Smokeless tobacco: Never   Vaping " "Use    Vaping status: Never Used   Substance and Sexual Activity    Alcohol use: Not Currently    Drug use: Never    Sexual activity: Defer         No results found for: \"LDH\", \"URICACID\"      Lab Results   Component Value Date    GLUCOSE 92 05/16/2024    BUN 12 05/16/2024    CREATININE 0.68 05/16/2024    BCR 17.6 05/16/2024    K 4.2 05/16/2024    CO2 25.8 05/16/2024    CALCIUM 9.6 05/16/2024    PROTENTOTREF 6.8 05/27/2022    ALBUMIN 4.3 05/16/2024    LABIL2 2.1 05/27/2022    AST 20 05/16/2024    ALT 15 05/16/2024       CBC w/diff          11/29/2023    12:52 2/22/2024    12:55 5/16/2024    08:22   CBC w/Diff   WBC 5.61  6.06  4.64    RBC 4.25  4.34  4.38    Hemoglobin 12.5  12.4  12.7    Hematocrit 37.8  37.6  38.4    MCV 88.9  86.6  87.7    MCH 29.4  28.6  29.0    MCHC 33.1  33.0  33.1    RDW 13.2  13.7  13.4    Platelets 223  255  237    Neutrophil Rel % 61.6  61.1  56.2    Immature Granulocyte Rel % 0.7  0.5  0.4    Lymphocyte Rel % 27.5  28.9  31.0    Monocyte Rel % 6.1  5.6  7.5    Eosinophil Rel % 3.7  3.6  4.5    Basophil Rel % 0.4  0.3  0.4        Allergies as of 10/01/2024 - Reviewed 09/25/2024   Allergen Reaction Noted    Adhesive tape Rash 11/02/2022    Levofloxacin Swelling 09/30/2015    Sulfa antibiotics Rash 09/30/2015        MEDICATIONS:  Patient medication list reviewed today    Review of Systems   Constitutional:  Negative for activity change, appetite change and fatigue.   Respiratory:  Negative for shortness of breath.    Gastrointestinal:  Positive for diarrhea.   Musculoskeletal:  Positive for arthralgias (bilateral hips).   Psychiatric/Behavioral:  Positive for sleep disturbance. Negative for decreased concentration and dysphoric mood. The patient is not nervous/anxious.        /91   Pulse 76   Resp 16   Wt 112 kg (246 lb 9.6 oz)   LMP  (LMP Unknown)   SpO2 97% Comment: room air  BMI 35.89 kg/m²     Wt Readings from Last 3 Encounters:   10/01/24 112 kg (246 lb 9.6 oz)   09/25/24 " 112 kg (246 lb)   08/27/24 115 kg (252 lb 9.6 oz)        Pain Score    10/01/24 1508   PainSc: 0-No pain           Physical Exam  Constitutional:       Appearance: She is well-developed.   Pulmonary:      Effort: Pulmonary effort is normal. No respiratory distress.   Neurological:      Mental Status: She is alert and oriented to person, place, and time.   Psychiatric:         Attention and Perception: Attention and perception normal.         Mood and Affect: Mood and affect normal.         Speech: Speech normal.         Behavior: Behavior is cooperative.         Thought Content: Thought content normal.           Advance Care Planning     Patient does have advance care planning complete - a POA document, scanned into the medical record and dated 9/13/2022.    Patient has designated a healthcare surrogate: Amy Diggs as primary and Dipika Samaniego as alternate    Arrangements for further assistance with advance care planning can be made by scheduling an appointment with myself or another advance care planning facilitator at their convenience. Patients may also call the toll free [facility] ACP Help Line at [phone].           DISCUSSION HELD TODAY:   For now her schedule is pretty full with extra hours at work, not much time for additional appointments for physical therapy or CBT-I.  She feels she is coping adequately and she does see a light at the end of the tunnel.    Met with Dr. Rose primary care, she has a wellness visit scheduled in December.  She states her last mammogram was approximately in 2022.  She did cannot recall when her last Pap was greater than 10 years ago.  She plans to discuss the screenings with Dr. Rose at her next appointment.      Plan and recommendations:  Flexible sigmoidoscopy planned in 6 months with Dr. Amaya  Last mammogram approximately 2022; last cervical cancer screening greater than 10 years ago and she will discuss with Dr. Rose.  Follow-up Dr. Freeman  10/10/2024  CT and MRI scheduled 11/13/2024  Labs and follow-up with Dr. Weber 11/20/2024  No formal follow up arranged for now. Return to clinic as needed.  Call my office as needed at any point for additional information, resources or support at [phone]      Diagnoses and all orders for this visit:    1. Rectal cancer (Primary)    2. Primary insomnia    3. Bilateral hip pain    4. Muscular deconditioning            I spent 40 minutes caring for this patient on this date of service by face-to-face counseling. This time includes time spent by me in the following activities: preparing for the visit, reviewing tests, performing a medically appropriate examination and/or evaluation, counseling and educating the patient/family/caregiver, referring and communicating with other health care professionals, documenting information in the medical record, independently interpreting results and communicating that information with the patient/family/caregiver, care coordination, obtaining a separately obtained history, and reviewing a separately obtained history

## 2024-10-09 ENCOUNTER — TELEPHONE (OUTPATIENT)
Dept: RADIATION ONCOLOGY | Facility: HOSPITAL | Age: 64
End: 2024-10-09
Payer: COMMERCIAL

## 2024-10-10 ENCOUNTER — OFFICE VISIT (OUTPATIENT)
Dept: RADIATION ONCOLOGY | Facility: HOSPITAL | Age: 64
End: 2024-10-10
Payer: COMMERCIAL

## 2024-10-10 VITALS
SYSTOLIC BLOOD PRESSURE: 156 MMHG | BODY MASS INDEX: 36.1 KG/M2 | WEIGHT: 248 LBS | HEART RATE: 72 BPM | DIASTOLIC BLOOD PRESSURE: 86 MMHG | OXYGEN SATURATION: 95 %

## 2024-10-10 DIAGNOSIS — C20 RECTAL CANCER: Primary | ICD-10-CM

## 2024-10-10 PROCEDURE — G0463 HOSPITAL OUTPT CLINIC VISIT: HCPCS | Performed by: STUDENT IN AN ORGANIZED HEALTH CARE EDUCATION/TRAINING PROGRAM

## 2024-10-10 NOTE — PROGRESS NOTES
[facility] Radiation Oncology   Follow Up    Chief Complaint  Rectal cancer        Diagnosis: Adenocarcinoma of the rectum     Overall Stage IIA     cT3: Per rectal MRI  cN0: Per rectal MRI  cM0: Per CT chest abdomen pelvis        Radiation Completion Date: 10/22/2022        Prescription:      Site: Rectum  Laterality: N/A  Total Dose: 2500cGy  Dose per Fraction: 500cGy  Total Fractions: 5  Daily or BID: Daily  Modality: Photon  Technique: IMRT/VMAT/Rapid Arc  Bolus: No      Interval History:    Babs Felton presents for regularly scheduled follow-up approximately 2 years after completion of short course radiation therapy which ultimately led to nonoperative approach for management of adenocarcinoma the rectum.  The patient is continued with imaging surveillance, colonoscopy surveillance which is continued to show no evidence of recurrent disease.  She had struggled significantly with urinary frequency and dysuria, as well as diarrhea.  She reports that she has made dietary modifications which have limited her diarrhea.  Also, her frequency and dysuria have diminished significantly.  She still has some spikes in those symptoms but they are infrequent.  She has no other new or concerning complaint.      Imaging:      CT CAP 5/16/2024     IMPRESSION:     No significant change. Continued follow-up advised as indicated.        Pathology:      No new relevant pathology      Labs:    Lab Results   Component Value Date    CREATININE 0.68 05/16/2024             Problem List:  Patient Active Problem List   Diagnosis    Diarrhea    Bloody diarrhea    Abdominal cramping    Bloating    Gastroesophageal reflux disease without esophagitis    Rectal cancer    Encounter for fitting and adjustment of vascular catheter    Personal history of malignant neoplasm of rectum, rectosigmoid junction, and anus    Fecal urgency    Fecal smearing    History of rectal cancer    Insomnia due to medical condition          Medications:  Current  "Outpatient Medications on File Prior to Visit   Medication Sig Dispense Refill    dicyclomine (BENTYL) 20 MG tablet Take 1 tablet by mouth Every 6 (Six) Hours As Needed (abd cramps). 90 tablet 3    diphenoxylate-atropine (LOMOTIL) 2.5-0.025 MG per tablet Take 1 tablet by mouth 4 (Four) Times a Day As Needed for Diarrhea. 60 tablet 3    ELDERBERRY PO Take 2 tablets by mouth Daily. HOLD PRIOR TO SURG, GUMMIES      hydrOXYzine (ATARAX) 10 MG tablet Take 1 tablet by mouth Every 8 (Eight) Hours As Needed for Itching. 30 tablet 0    loperamide (IMODIUM) 2 MG capsule Take 1 capsule by mouth As Needed for Diarrhea.      ondansetron (ZOFRAN) 8 MG tablet Take 1 tablet by mouth 3 (Three) Times a Day As Needed for Nausea or Vomiting. 60 tablet 5    traMADol (ULTRAM) 50 MG tablet Take 1 tablet by mouth Every 6 (Six) Hours As Needed for Moderate Pain. 30 tablet 0     No current facility-administered medications on file prior to visit.          Allergies:  Allergies   Allergen Reactions    Adhesive Tape Rash     Patient has sensitive skin.     Levofloxacin Swelling    Sulfa Antibiotics Rash           Vital Signs:  /86   Pulse 72   Wt 112 kg (248 lb)   SpO2 95%   BMI 36.10 kg/m²   Estimated body mass index is 36.1 kg/m² as calculated from the following:    Height as of 9/25/24: 176.5 cm (69.5\").    Weight as of this encounter: 112 kg (248 lb).  Pain Score    10/10/24 0908   PainSc: 0-No pain         ECOG: Restricted in physically strenuous activity but ambulatory and able to carry out work of a light or sedentary nature, e.g., light house work, office work = 1    Physical Exam  Vitals reviewed.   Constitutional:       General: She is not in acute distress.     Appearance: Normal appearance.   HENT:      Head: Normocephalic and atraumatic.   Eyes:      Extraocular Movements: Extraocular movements intact.      Pupils: Pupils are equal, round, and reactive to light.   Pulmonary:      Effort: Pulmonary effort is normal. "   Abdominal:      General: Abdomen is flat.      Palpations: Abdomen is soft.   Musculoskeletal:      Cervical back: Normal range of motion.   Skin:     General: Skin is warm and dry.   Neurological:      General: No focal deficit present.      Mental Status: She is alert and oriented to person, place, and time.   Psychiatric:         Mood and Affect: Mood normal.         Behavior: Behavior normal.          Result Review :  The following data was reviewed by: Jacques Freeman MD on 10/10/2024:  Labs: Last Creatinine   Data reviewed : Radiologic studies CT CAP             Diagnoses and all orders for this visit:    1. Rectal cancer (Primary)      Assessment:    Babs Felton presents for regularly scheduled follow-up approximately 2 years after completion of short course radiation therapy which ultimately led to nonoperative approach for management of adenocarcinoma the rectum.  The patient is continued with imaging surveillance, colonoscopy surveillance which is continued to show no evidence of recurrent disease.  She had struggled significantly with urinary frequency and dysuria, as well as diarrhea.  She reports that she has made dietary modifications which have limited her diarrhea.  Also, her frequency and dysuria have diminished significantly.  She still has some spikes in those symptoms but they are infrequent.  She has no other new or concerning complaint.    I met with the patient and reviewed the results of her most recent imaging and colonoscopy.  Overall, now that she is 2 years posttreatment I think her odds of local recurrence are quite low.  She is very encouraged by this.  There are no new or concerning complaints that need to be managed from a radiation standpoint.  She has her next imaging follow-up already ordered by Dr. Weber.  I offered her follow-up  in 6 months versus as needed.  The patient prefers 6-month follow-up.      Plan:    -Follow-up in 6 months       I spent 30 minutes caring for  Babs on this date of service. This time includes time spent by me in the following activities:preparing for the visit, reviewing tests, obtaining and/or reviewing a separately obtained history, documenting information in the medical record, independently interpreting results and communicating that information with the patient/family/caregiver, and care coordination  Follow Up   No follow-ups on file.  Patient was given instructions and counseling regarding her condition or for health maintenance advice. Please see specific information pulled into the AVS if appropriate.     Jacques Freeman MD

## 2024-10-15 ENCOUNTER — PREP FOR SURGERY (OUTPATIENT)
Dept: OTHER | Facility: HOSPITAL | Age: 64
End: 2024-10-15
Payer: COMMERCIAL

## 2024-10-15 DIAGNOSIS — Z85.048 HISTORY OF RECTAL CANCER: Primary | ICD-10-CM

## 2024-11-13 ENCOUNTER — HOSPITAL ENCOUNTER (OUTPATIENT)
Dept: CT IMAGING | Facility: HOSPITAL | Age: 64
Discharge: HOME | End: 2024-11-13
Payer: COMMERCIAL

## 2024-11-13 ENCOUNTER — HOSPITAL ENCOUNTER (OUTPATIENT)
Dept: MRI IMAGING | Facility: HOSPITAL | Age: 64
Discharge: HOME | End: 2024-11-13
Payer: COMMERCIAL

## 2024-11-13 ENCOUNTER — LAB (OUTPATIENT)
Dept: ONCOLOGY | Facility: HOSPITAL | Age: 64
End: 2024-11-13
Payer: COMMERCIAL

## 2024-11-13 DIAGNOSIS — C20 RECTAL CANCER: ICD-10-CM

## 2024-11-13 LAB
ALBUMIN SERPL-MCNC: 4.2 G/DL (ref 3.5–5.2)
ALBUMIN/GLOB SERPL: 1.4 G/DL
ALP SERPL-CCNC: 93 U/L (ref 39–117)
ALT SERPL W P-5'-P-CCNC: 17 U/L (ref 1–33)
ANION GAP SERPL CALCULATED.3IONS-SCNC: 8.7 MMOL/L (ref 5–15)
AST SERPL-CCNC: 22 U/L (ref 1–32)
BASOPHILS # BLD AUTO: 0.03 10*3/MM3 (ref 0–0.2)
BASOPHILS NFR BLD AUTO: 0.7 % (ref 0–1.5)
BILIRUB SERPL-MCNC: 0.5 MG/DL (ref 0–1.2)
BUN SERPL-MCNC: 12 MG/DL (ref 8–23)
BUN/CREAT SERPL: 17.1 (ref 7–25)
CALCIUM SPEC-SCNC: 9.2 MG/DL (ref 8.6–10.5)
CEA SERPL-MCNC: 1.69 NG/ML
CHLORIDE SERPL-SCNC: 105 MMOL/L (ref 98–107)
CO2 SERPL-SCNC: 27.3 MMOL/L (ref 22–29)
CREAT SERPL-MCNC: 0.7 MG/DL (ref 0.57–1)
DEPRECATED RDW RBC AUTO: 42.8 FL (ref 37–54)
EGFRCR SERPLBLD CKD-EPI 2021: 96.7 ML/MIN/1.73
EOSINOPHIL # BLD AUTO: 0.18 10*3/MM3 (ref 0–0.4)
EOSINOPHIL NFR BLD AUTO: 3.9 % (ref 0.3–6.2)
ERYTHROCYTE [DISTWIDTH] IN BLOOD BY AUTOMATED COUNT: 13.4 % (ref 12.3–15.4)
GLOBULIN UR ELPH-MCNC: 2.9 GM/DL
GLUCOSE SERPL-MCNC: 97 MG/DL (ref 65–99)
HCT VFR BLD AUTO: 37.7 % (ref 34–46.6)
HGB BLD-MCNC: 12.6 G/DL (ref 12–15.9)
IMM GRANULOCYTES # BLD AUTO: 0.01 10*3/MM3 (ref 0–0.05)
IMM GRANULOCYTES NFR BLD AUTO: 0.2 % (ref 0–0.5)
LYMPHOCYTES # BLD AUTO: 1.37 10*3/MM3 (ref 0.7–3.1)
LYMPHOCYTES NFR BLD AUTO: 29.8 % (ref 19.6–45.3)
MCH RBC QN AUTO: 29.2 PG (ref 26.6–33)
MCHC RBC AUTO-ENTMCNC: 33.4 G/DL (ref 31.5–35.7)
MCV RBC AUTO: 87.5 FL (ref 79–97)
MONOCYTES # BLD AUTO: 0.3 10*3/MM3 (ref 0.1–0.9)
MONOCYTES NFR BLD AUTO: 6.5 % (ref 5–12)
NEUTROPHILS NFR BLD AUTO: 2.7 10*3/MM3 (ref 1.7–7)
NEUTROPHILS NFR BLD AUTO: 58.9 % (ref 42.7–76)
NRBC BLD AUTO-RTO: 0 /100 WBC (ref 0–0.2)
PLATELET # BLD AUTO: 226 10*3/MM3 (ref 140–450)
PMV BLD AUTO: 9.9 FL (ref 6–12)
POTASSIUM SERPL-SCNC: 4.3 MMOL/L (ref 3.5–5.2)
PROT SERPL-MCNC: 7.1 G/DL (ref 6–8.5)
RBC # BLD AUTO: 4.31 10*6/MM3 (ref 3.77–5.28)
SODIUM SERPL-SCNC: 141 MMOL/L (ref 136–145)
WBC NRBC COR # BLD AUTO: 4.59 10*3/MM3 (ref 3.4–10.8)

## 2024-11-13 PROCEDURE — 25510000001 IOPAMIDOL 61 % SOLUTION: Performed by: INTERNAL MEDICINE

## 2024-11-13 PROCEDURE — A9577 INJ MULTIHANCE: HCPCS | Performed by: INTERNAL MEDICINE

## 2024-11-13 PROCEDURE — 82378 CARCINOEMBRYONIC ANTIGEN: CPT | Performed by: INTERNAL MEDICINE

## 2024-11-13 PROCEDURE — 72197 MRI PELVIS W/O & W/DYE: CPT

## 2024-11-13 PROCEDURE — 80053 COMPREHEN METABOLIC PANEL: CPT | Performed by: INTERNAL MEDICINE

## 2024-11-13 PROCEDURE — 85025 COMPLETE CBC W/AUTO DIFF WBC: CPT | Performed by: INTERNAL MEDICINE

## 2024-11-13 PROCEDURE — 71260 CT THORAX DX C+: CPT

## 2024-11-13 PROCEDURE — 74177 CT ABD & PELVIS W/CONTRAST: CPT

## 2024-11-13 PROCEDURE — 25510000002 GADOBENATE DIMEGLUMINE 529 MG/ML SOLUTION: Performed by: INTERNAL MEDICINE

## 2024-11-13 PROCEDURE — 36415 COLL VENOUS BLD VENIPUNCTURE: CPT

## 2024-11-13 RX ORDER — IOPAMIDOL 612 MG/ML
100 INJECTION, SOLUTION INTRAVASCULAR
Status: COMPLETED | OUTPATIENT
Start: 2024-11-13 | End: 2024-11-13

## 2024-11-13 RX ADMIN — IOPAMIDOL 85 ML: 612 INJECTION, SOLUTION INTRAVENOUS at 10:24

## 2024-11-13 RX ADMIN — GADOBENATE DIMEGLUMINE 20 ML: 529 INJECTION, SOLUTION INTRAVENOUS at 11:06

## 2024-11-14 ENCOUNTER — TELEPHONE (OUTPATIENT)
Dept: ONCOLOGY | Facility: CLINIC | Age: 64
End: 2024-11-14

## 2024-11-14 NOTE — TELEPHONE ENCOUNTER
Caller: Babs Felton    Relationship to patient: Self    Best call back number: [phone]    Chief complaint: SCHEDULING    Type of visit: PORT FLUSH    Requested date: REQUESTING TO CANCEL HER PORT FLUSH ON 11-20, PT HAD HER PORT FLUSHED ON 11-13 DURING HER CT AND MRI

## 2024-11-20 ENCOUNTER — OFFICE VISIT (OUTPATIENT)
Dept: ONCOLOGY | Facility: CLINIC | Age: 64
End: 2024-11-20
Payer: COMMERCIAL

## 2024-11-20 VITALS
TEMPERATURE: 98.2 F | OXYGEN SATURATION: 98 % | HEART RATE: 70 BPM | SYSTOLIC BLOOD PRESSURE: 131 MMHG | BODY MASS INDEX: 35.82 KG/M2 | WEIGHT: 250.2 LBS | RESPIRATION RATE: 16 BRPM | HEIGHT: 70 IN | DIASTOLIC BLOOD PRESSURE: 83 MMHG

## 2024-11-20 DIAGNOSIS — C20 RECTAL CANCER: Primary | ICD-10-CM

## 2024-11-20 LAB — REF LAB TEST METHOD: NORMAL

## 2024-11-20 RX ORDER — LOPERAMIDE HYDROCHLORIDE 2 MG/1
2 CAPSULE ORAL AS NEEDED
Qty: 90 CAPSULE | Refills: 2 | Status: SHIPPED | OUTPATIENT
Start: 2024-11-20

## 2024-11-20 RX ORDER — DICYCLOMINE HCL 20 MG
20 TABLET ORAL EVERY 6 HOURS PRN
Qty: 90 TABLET | Refills: 3 | Status: SHIPPED | OUTPATIENT
Start: 2024-11-20

## 2024-11-20 RX ORDER — HYDROXYZINE HYDROCHLORIDE 10 MG/1
10 TABLET, FILM COATED ORAL EVERY 8 HOURS PRN
Qty: 90 TABLET | Refills: 0 | Status: SHIPPED | OUTPATIENT
Start: 2024-11-20

## 2024-11-20 RX ORDER — DIPHENOXYLATE HYDROCHLORIDE AND ATROPINE SULFATE 2.5; .025 MG/1; MG/1
1 TABLET ORAL 4 TIMES DAILY PRN
Qty: 60 TABLET | Refills: 3 | Status: SHIPPED | OUTPATIENT
Start: 2024-11-20

## 2024-11-20 NOTE — PROGRESS NOTES
[facility]    CONSULTING IN BLOOD DISORDERS & CANCER    REASON FOR CONSULTATION/CHIEF COMPLAINT:     Evaluation and management for rectal adenocarcinoma                             REQUESTING PHYSICIAN: No ref. provider found  RECORDS OBTAINED:  Records of the patients history including those from the electronic medical record were reviewed and summarized in detail.    HISTORY OF PRESENT ILLNESS:    The patient is a 63 y.o. year old female with medical history significant for migraines, depression/anxiety, and IBS had presented with epigastric discomfort in September 2022.      A colonoscopy performed by SHOAIB Esteves on 9/1/2022 demonstrated a 3 cm mass in the proximal rectum around 13 cm from the anal verge.  The polyp was multilobulated and somewhat fixed to the underlying tissue.  Friable with contact bleeding.  Other smaller polyps were found in the cecum and ascending colon which were removed.  The EGD revealed mucosal nodule in the esophagus and multiple gastric polyps.  No evidence of bleeding.     The pathology from the rectal mass came back as invasive moderately differentiated adenocarcinoma arising out of traditional serrated adenoma with high-grade dysplasia.    Patient was subsequently referred to Dr. Amaya, colorectal surgery who performed a flexible sigmoidoscopy on 9/21/2022, revealing an infiltrative nonobstructing mass in the mid rectum.  The mass was partially circumferential involving one third of the lumen circumference.     9/13/2022: CT C/A/P with contrast with no lizzette or distant mets    9/26/2022: MRI pelvis showed high rectal mass with effacement of the muscularis propria along the right wall and extended beyond the muscularis propria at the posterior wall, T3 lesion. There is no evidence for involvement of the mesorectal fascia. There are no pathologically enlarged nodes.     Patient has been seen by , rad-onc, who recommended short course XRT without chemo, followed by  chemo    Guardant 360: MSI stable.  Low TMB.    10/17- 10/22/2022: Short course of radiotherapy (without chemo) to the rectal mass.    11/2/2022: Cycle 1 FOLFOX  12/14/2022: Cycle 4 FOLFOX  12/28/2022: Cycle 5 FOLFOX  1/7/2022: MRI pelvis shows marked response to treatment  1/11/2023: Cycle 6 5-FU/LV.  Oxaliplatin dropped due to neuropathy.  2/8/2023: Cycle 8 5-FU    3/6/2023: Clara sigmoidoscopy and surface biopsy.  No evidence of residual disease.  Case discussed with Dr. Amaya and Dr. Freeman.  Plan made to defer surgical resection & active surveillance.    March 2023: Circulating DNA negative.    April 2023: CT c/a/p with no evidence of disease recurrence.  6/7/23: MRI pelvis with near complete response to therapy of the rectal mass.  7/13/2023: Flex sigmoidoscopy by Dr. Amaya noted scar in the mid rectum.  No evidence of disease recurrence.  11/6/23: Flex sig showed no evidence of disease recurrence  March and September 2024: Colonoscopy with no evidence of disease recurrence.  November 2024: CT and MRI pelvis showed no evidence of disease recurrence.    INTERIM HISTORY:  Patient returns to the clinic for a follow-up visit.  No new symptoms. Has persistent fatigue, intermittent abdominal cramps & loose stools.  No blood in stool.  The neuropathy and nail changes stable. Appetite and weight has been stable.   Says she is busy at work.  Her  recently left and she was doing her job.  Started playing piano and singing. She does pelvic floor exercises.  She recently made a music dedicating her recovery, while traveling to [facility].     Past Medical History:   Diagnosis Date    Abdominal cramping 07/01/2022    Anemia     Bloating 07/01/2022    Bloody diarrhea 07/01/2022    Chronic back pain     Colon polyps     FOLLOWED BY DR. BEVERLY MENDEZ    COVID 08/15/2023    Cystitis     BURNING WITH URINATION SINCE RADIATION    Depression     Early cataract     Gastric polyps     FOLLOWED BY DR. PAUL  "VANESSA    GERD (gastroesophageal reflux disease)     Hearing loss     Hiatal hernia 09/2021    3 CM, FOLLOWED BY DR. BEVERLY MENDEZ    History of chemotherapy     LAST TREATMENT 2/10/2023    History of depression     History of radiation therapy     Hyperlipidemia     NO MEDS    IBS (irritable bowel syndrome)     Migraines     HX    Rectal bleeding 1983    Off and on for years    Rectal cancer 09/2022    INVASIVE MODERATELY DIFFERENTIATED ADENOCARCINOMA ARISING FROM TRADITIONAL SERRATED ADENOMA WITH HGD    Sciatica 09/2015    Tear of medial meniscus of knee 07/2021    RIGHT KNEE    Vitamin D deficiency      Past Surgical History:   Procedure Laterality Date    COLONOSCOPY N/A 1986    COLONOSCOPY N/A 03/27/2024    A HEALTHY SCAR WAS FOUND IN MID RECTUM, FLEX SIG IN 6 MONTHS, DR. TEMO OJEDA AT [facility]    COLONOSCOPY N/A 09/25/2024    A HEALTHY SCAR IN RECTUM, OTHERWISE WNL, FLEX SIG IN 6 MONTHS, DR. TEMO OJEDA AT [facility]    COLONOSCOPY W/ POLYPECTOMY N/A 09/01/2022    3 MM TUBULAR ADENOMA POLYP IN ASCENDING, 6 MM TUBULAR ADENOMA POLYP IN CECUM, 30 MM MASS IN RECTUM, PATH:INVASIVE MODERATELY DIFFERENTIATED ADENOCARCINOMA ARISING FROM TRADITIONAL SERRATED ADENOMA WITH HGD, HYPERTROPHIED ANAL PAPILLA, [facility]    ENDOSCOPY N/A 09/01/2022    MULTIPLE BENIGN GASTRIC POLYPS, 2 SQUAMOUS PAPILLAS IN ESOPHAGUS, 3 CM HIATAL HERNIA,    EXTERNAL EAR SURGERY  1966    MULTIPLE \"LANCES\", DR. ACE IN [facility]    EYE SURGERY Right 1967    DR. CASTANEDA IN [facility]    KNEE ARTHRODESIS Right 07/27/2021    RIGHT KNEE ARTHROCENTESIS, DR. ALBETR GILMAN    KNEE SURGERY Left 1985    DR. PEREZ AT [place]    SHOULDER MANIPULATION Left 01/07/2013    FOR FROZEN SHOULDER, DR. ALBERT GILMAN AT [facility]    SIGMOIDOSCOPY N/A 09/21/2022    AN INFILTRATIVE NON OBSTRUCTING MASS IN MID RECTUM, AREA TATTOOED, DR. TEMO OJEDA AT [facility]    SIGMOIDOSCOPY N/A 03/08/2023    7 MM HEALED ULCERATION WHERE RECTAL CANCER " WAS, SCAR TISSUE HEALTHY, BIOPSY SHOWED INFLAMMATION AND SCAR, NO RESIDUAL CANCER, FLEX SIG IN 12 MONTHS, DR. JENNIFER OJEDA AT [facility]    SIGMOIDOSCOPY N/A 07/13/2023    A HEALTHY SCAR WAS IN MID RECTUM, DR. JENNIFER OJEDA AT [facility]    SIGMOIDOSCOPY N/A 11/06/2023    A HEALTHY APPEARING SCAR IN MID RECTUM, COLONOSCOPY IN 4 MONTHS, DR. JENNIFER OJEDA AT [facility]    SIGMOIDOSCOPY N/A 12/22/2023    A HEALTHY SCAR WAS FOUND IN MID RECTUM, BIOPSIES BENIGN, DR. JENNIFER OJEDA AT [facility]    TRANSRECTAL ULTRASOUND N/A 09/21/2022    Procedure: ULTRASOUND TRANSRECTAL;  Surgeon: Jennifer Ojeda MD;  Location: [facility] ENDOSCOPY;  Service: General;  Laterality: N/A;    TRANSRECTAL ULTRASOUND N/A 12/22/2023    Procedure: ULTRASOUND TRANSRECTAL;  Surgeon: Jennifer Ojeda MD;  Location: [facility] ENDOSCOPY;  Service: General;  Laterality: N/A;  pre: abnormal MRI  post: rectal ulceration    VENOUS ACCESS DEVICE (PORT) INSERTION Left 10/26/2022    Procedure: INSERTION OF PORTACATH;  Surgeon: Leonel Bridges MD;  Location: [facility] MAIN OR;  Service: General;  Laterality: Left;    WISDOM TOOTH EXTRACTION Bilateral        MEDICATIONS    Current Outpatient Medications:     dicyclomine (BENTYL) 20 MG tablet, Take 1 tablet by mouth Every 6 (Six) Hours As Needed (abd cramps)., Disp: 90 tablet, Rfl: 3    diphenoxylate-atropine (LOMOTIL) 2.5-0.025 MG per tablet, Take 1 tablet by mouth 4 (Four) Times a Day As Needed for Diarrhea., Disp: 60 tablet, Rfl: 3    ELDERBERRY PO, Take 2 tablets by mouth Daily. HOLD PRIOR TO SURG, GUMMIES, Disp: , Rfl:     hydrOXYzine (ATARAX) 10 MG tablet, Take 1 tablet by mouth Every 8 (Eight) Hours As Needed for Itching., Disp: 90 tablet, Rfl: 0    loperamide (IMODIUM) 2 MG capsule, Take 1 capsule by mouth As Needed for Diarrhea., Disp: 90 capsule, Rfl: 2    ondansetron (ZOFRAN) 8 MG tablet, Take 1 tablet by mouth 3 (Three) Times a Day As Needed for Nausea or Vomiting., Disp: 60 tablet, Rfl: 5    traMADol (ULTRAM) 50 MG tablet, Take 1  "tablet by mouth Every 6 (Six) Hours As Needed for Moderate Pain., Disp: 30 tablet, Rfl: 0  All current medications reviewed and updated as appropriate for today's encounter on 11/20/2024.     ALLERGIES:     Allergies   Allergen Reactions    Adhesive Tape Rash     Patient has sensitive skin.     Levofloxacin Swelling    Sulfa Antibiotics Rash       SOCIAL HISTORY:       Social History     Socioeconomic History    Marital status: Single   Tobacco Use    Smoking status: Never     Passive exposure: Never    Smokeless tobacco: Never   Vaping Use    Vaping status: Never Used   Substance and Sexual Activity    Alcohol use: Not Currently    Drug use: Never    Sexual activity: Defer         FAMILY HISTORY:  Family History   Problem Relation Age of Onset    Colon polyps Mother     Asthma Mother     COPD Mother     Lung disease Mother     Migraines Mother     Cancer Father         liver cancer    Colon polyps Father     Heart disease Father     Diabetes Father     Kidney disease Father     Angina Father     Liver cancer Father     Hepatitis Father     Hearing loss Father     Cancer Sister         Breast cancer    Arthritis Sister     Colon polyps Sister     Breast cancer Sister     Diabetes Sister     Colon polyps Sister     Alcohol abuse Maternal Uncle     Colon cancer Neg Hx     Crohn's disease Neg Hx     Irritable bowel syndrome Neg Hx     Ulcerative colitis Neg Hx     Malig Hyperthermia Neg Hx        REVIEW OF SYSTEMS:  As per HPI         Vitals:    11/20/24 0907   BP: 131/83   Pulse: 70   Resp: 16   Temp: 98.2 °F (36.8 °C)   TempSrc: Oral   SpO2: 98%   Weight: 113 kg (250 lb 3.2 oz)   Height: 177.8 cm (70\")   PainSc: 0-No pain           11/20/2024     9:09 AM   Current Status   ECOG score 0     CONSTITUTIONAL:  Vital signs reviewed.  No distress, looks comfortable.  EYES:  Conjunctiva and lids unremarkable.    EARS,NOSE,MOUTH,THROAT:  Ears and nose appear unremarkable.    RESPIRATORY:  Normal respiratory effort.  Lungs " clear to auscultation bilaterally.  CARDIOVASCULAR:  Normal S1, S2.  No murmurs rubs or gallops.  No significant lower extremity edema.  GASTROINTESTINAL: Abdomen appears unremarkable.  Nondistended   LYMPHATIC:  No cervical, supraclavicular lymphadenopathy.  SKIN:  Warm.  No rashes.  PSYCHIATRIC:  Normal judgment and insight.  Normal mood and affect.  NEURO: AAOx3, no obvious focal deficits.    RECENT LABS:  Reviewed     ASSESSMENT:   is a pleasant 64 y/o WF with medical history significant for migraines, depression/anxiety, and IBS comes for rectal adenocarcinoma evaluation & management.     # Rectal adenocarcinoma, T3N0M0, Stage IIA:  Diagnosed on a c-scope performed for gastric discomfort in September 2022.   The flex sig noted a an infiltrative nonobstructing mass in the mid rectum.  The mass was partially circumferential involving one third of the lumen circumference. CT c/a/p negative for mets. MRI pelvis most consistent with T3N0 disease, with no evidence of involvement of mesorectal fascia.  Reviewed various management strategies for localized rectal cancer with chemotherapy, radiation and surgical resection.  Patient met with Dr. Amaya, colorectal surgery and is being tentatively planned for low anterior resection after neoadjuvant chemotherapy and radiation.  She received neoadjuvant short course RT by Dr. Freeman, radiation oncology from 10/17- 10/22/2022.   Received neoadjuvant chemotherapy with 8 cycles of FOLFOX between 11/2/2022 - 2/8/2023.  Oxaliplatin dropped with last 2 cycles.  Post treatment flexible sigmoidoscopy and rectal biopsies performed 3/8/2023 showed no evidence of residual disease.  Case discussed with Dr. Amaya and Lydia.  I extensively discussed risks/benefits of surgical resection versus active surveillance.  Informed patient that active surveillance is currently not the standard of care but is an active area of research in patients who have complete response.  Also discussed  surveillance strategies.  Patient expressed understanding of the risks and opted for active surveillance.    3/22/2023: Recovering well after recent chemotherapy.  Discussed plan for active surveillance.  The CEA and guardant reveal circulating tumor DNA negative.  A CT C/A/P showed asymetric rectal thickening.  Plan to repeat MRI pelvis in 3 months and flexible sigmoidoscopy in 6 months time.    A MRI pelvis from June 2023 showed near complete resolution of the rectal mass with minimal irregularity measuring 1.3 x 1.5 cm.  Patient subsequently underwent a flex ex which showed scar in the mid rectum.  Circulating tumor DNA from June 2023 negative as well.    A MRI pelvis from December 2023 raised concern for possible disease recurrence.  PET abnormal as well.  Patient underwent flex sig which only showed old scar.  Continued on surveillance.  Colonoscopy from March 2024 and CT C/A/P from May 2024  11/20/2024: Patient overall asymptomatic.  CEA normal well.  Guardant reveal from today pending.  Reviewed recent CT scan, MRI pelvis and colonoscopy findings which showed no evidence of disease recurrence.  Will continue active surveillance.patient has been scheduled for repeat colonoscopy in September 2024.  Repeat colonoscopy per Dr. Amaya.  Plan to otain repeat CT C/A/P and MRI pelvis and 6 months time.  Patient wishes to get report removed.  Will refer to surgery for port removal.  Advised close f/u with rad-onc & colorectal surgery.   Will see her back in 6 months or sooner if needed    #GERD  Significant epigastric discomfort and reflux with initiation of chemo  Continue Protonix 40 mg nightly    #Diarrhea  Initially began following radiation and was exacerbated with chemotherapy  Utilizing Imodium and Lomotil as needed.  Is planning to follow-up with GI again.    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  On multivitamin supplements    PLAN:   Continue active surveillance.  Guardant reveal from today  pending.  If negative, repeat in 6 months  MRI pelvis and CT chest/abd in 6 months. If negative, repeat in 6 months.   Flex sig/colonoscopy per   Refer to surgery for port removal  Follow-up in 6 months with repeat scans and labs or sooner if needed.  Orders Placed This Encounter   Procedures    CT Chest With Contrast Diagnostic     Standing Status:   Future     Standing Expiration Date:   11/21/2025     Order Specific Question:   Release to patient     Answer:   Routine Release [[id number]]     Order Specific Question:   Reason for Exam:     Answer:   Rectal cancer follow-up    CT Abdomen Pelvis With Contrast     Standing Status:   Future     Standing Expiration Date:   11/21/2025     Order Specific Question:   Will Oral Contrast be needed for this procedure?     Answer:   Yes     Order Specific Question:   Release to patient     Answer:   Routine Release [[id number]]     Order Specific Question:   Reason for Exam:     Answer:   Rectal cancer follow-up    MRI Pelvis With & Without Contrast     Standing Status:   Future     Standing Expiration Date:   11/21/2025     Order Specific Question:   Reason for Exam:     Answer:   Rectal cancer follow-up     Order Specific Question:   Release to patient     Answer:   Routine Release [[id number]]    Comprehensive Metabolic Panel     Standing Status:   Future     Standing Expiration Date:   11/20/2025     Order Specific Question:   Release to patient     Answer:   Routine Release [[id number]]    CEA     Standing Status:   Future     Standing Expiration Date:   11/20/2025     Order Specific Question:   Release to patient     Answer:   Routine Release [[id number]]    Ambulatory Referral to General Surgery     Referral Priority:   Routine     Referral Type:   Consultation     Referral Reason:   Specialty Services Required     Requested Specialty:   General Surgery     Number of Visits Requested:   1    CBC & Differential     Standing Status:   Future     Standing  Expiration Date:   11/20/2025     Order Specific Question:   Manual Differential     Answer:   No     Order Specific Question:   Release to patient     Answer:   Routine Release [[id number]]   Total time spent during this patient encounter is 41 minutes. The total time spent with the patient includes: preparing to see the patient by reviewing of tests, prior notes or other relevant information, performing appropriate independent examination & evaluation, counseling, ordering of medications, tests or procedures, documenting clinic information in the electronic medical records or other health records, independently interpreting results of tests and communicating the results to the patient/family or caregiver.

## 2024-11-21 ENCOUNTER — TELEPHONE (OUTPATIENT)
Dept: SURGERY | Facility: CLINIC | Age: 64
End: 2024-11-21
Payer: COMMERCIAL

## 2024-11-21 NOTE — TELEPHONE ENCOUNTER
Pt referred back to you for Port removal - you placed '22.  She wants to have removed in a hospital setting - not office.  No ASA or bld thinners.    Could you please place orders for the removal and surgery scheduling can call and set up with pt?

## 2024-11-22 ENCOUNTER — PREP FOR SURGERY (OUTPATIENT)
Dept: OTHER | Facility: HOSPITAL | Age: 64
End: 2024-11-22
Payer: COMMERCIAL

## 2024-11-22 DIAGNOSIS — Z85.048 HISTORY OF RECTAL CANCER: Primary | ICD-10-CM

## 2024-11-22 RX ORDER — SODIUM CHLORIDE 0.9 % (FLUSH) 0.9 %
10 SYRINGE (ML) INJECTION EVERY 12 HOURS SCHEDULED
OUTPATIENT
Start: 2024-11-22

## 2024-11-22 RX ORDER — SODIUM CHLORIDE 0.9 % (FLUSH) 0.9 %
10 SYRINGE (ML) INJECTION AS NEEDED
OUTPATIENT
Start: 2024-11-22

## 2024-11-22 RX ORDER — SODIUM CHLORIDE 9 MG/ML
40 INJECTION, SOLUTION INTRAVENOUS AS NEEDED
OUTPATIENT
Start: 2024-11-22

## 2024-11-22 NOTE — TELEPHONE ENCOUNTER
Voicemail left for patient to return call to schedule port removal with Dr. Bridges. Advised 1st available date of 12/6. Patient to return call to schedule

## 2024-12-04 ENCOUNTER — TELEPHONE (OUTPATIENT)
Dept: SURGERY | Facility: CLINIC | Age: 64
End: 2024-12-04
Payer: COMMERCIAL

## 2024-12-04 NOTE — TELEPHONE ENCOUNTER
./OK FOR HUB TO RELAY  Left  message for patient with updated arrival time; patient requested to call back to confirm message reception.  Patient did call back to confirm reception of the arrival time.  She states that she may have a problem with a ride but she will try to work it out.  She will call back if she is unable to get transportation.

## 2024-12-06 ENCOUNTER — ANESTHESIA (OUTPATIENT)
Dept: PERIOP | Facility: HOSPITAL | Age: 64
End: 2024-12-06
Payer: COMMERCIAL

## 2024-12-06 ENCOUNTER — ANESTHESIA EVENT (OUTPATIENT)
Dept: PERIOP | Facility: HOSPITAL | Age: 64
End: 2024-12-06
Payer: COMMERCIAL

## 2024-12-06 ENCOUNTER — HOSPITAL ENCOUNTER (OUTPATIENT)
Facility: HOSPITAL | Age: 64
Setting detail: HOSPITAL OUTPATIENT SURGERY
Discharge: HOME | End: 2024-12-06
Attending: SURGERY | Admitting: SURGERY
Payer: COMMERCIAL

## 2024-12-06 VITALS
OXYGEN SATURATION: 97 % | DIASTOLIC BLOOD PRESSURE: 74 MMHG | RESPIRATION RATE: 18 BRPM | TEMPERATURE: 98.6 F | HEART RATE: 78 BPM | SYSTOLIC BLOOD PRESSURE: 151 MMHG

## 2024-12-06 DIAGNOSIS — Z85.048 HISTORY OF RECTAL CANCER: ICD-10-CM

## 2024-12-06 PROCEDURE — 25010000002 LIDOCAINE PF 2% 2 % SOLUTION: Performed by: NURSE ANESTHETIST, CERTIFIED REGISTERED

## 2024-12-06 PROCEDURE — 25010000002 GLYCOPYRROLATE 0.2 MG/ML SOLUTION: Performed by: NURSE ANESTHETIST, CERTIFIED REGISTERED

## 2024-12-06 PROCEDURE — 25810000003 LACTATED RINGERS PER 1000 ML: Performed by: ANESTHESIOLOGY

## 2024-12-06 PROCEDURE — 36590 REMOVAL TUNNELED CV CATH: CPT | Performed by: SURGERY

## 2024-12-06 PROCEDURE — S0260 H&P FOR SURGERY: HCPCS | Performed by: SURGERY

## 2024-12-06 PROCEDURE — 25010000002 PROPOFOL 10 MG/ML EMULSION: Performed by: NURSE ANESTHETIST, CERTIFIED REGISTERED

## 2024-12-06 RX ORDER — IPRATROPIUM BROMIDE AND ALBUTEROL SULFATE 2.5; .5 MG/3ML; MG/3ML
3 SOLUTION RESPIRATORY (INHALATION) ONCE AS NEEDED
Status: DISCONTINUED | OUTPATIENT
Start: 2024-12-06 | End: 2024-12-09 | Stop reason: HOSPADM

## 2024-12-06 RX ORDER — PROPOFOL 10 MG/ML
VIAL (ML) INTRAVENOUS AS NEEDED
Status: DISCONTINUED | OUTPATIENT
Start: 2024-12-06 | End: 2024-12-06 | Stop reason: SURG

## 2024-12-06 RX ORDER — LIDOCAINE HYDROCHLORIDE 10 MG/ML
0.5 INJECTION, SOLUTION INFILTRATION; PERINEURAL ONCE AS NEEDED
Status: DISCONTINUED | OUTPATIENT
Start: 2024-12-06 | End: 2024-12-06 | Stop reason: HOSPADM

## 2024-12-06 RX ORDER — SODIUM CHLORIDE 0.9 % (FLUSH) 0.9 %
3-10 SYRINGE (ML) INJECTION AS NEEDED
Status: DISCONTINUED | OUTPATIENT
Start: 2024-12-06 | End: 2024-12-06 | Stop reason: HOSPADM

## 2024-12-06 RX ORDER — GLYCOPYRROLATE 0.2 MG/ML
INJECTION INTRAMUSCULAR; INTRAVENOUS AS NEEDED
Status: DISCONTINUED | OUTPATIENT
Start: 2024-12-06 | End: 2024-12-06 | Stop reason: SURG

## 2024-12-06 RX ORDER — HYDROCODONE BITARTRATE AND ACETAMINOPHEN 5; 325 MG/1; MG/1
1 TABLET ORAL ONCE AS NEEDED
Status: DISCONTINUED | OUTPATIENT
Start: 2024-12-06 | End: 2024-12-09 | Stop reason: HOSPADM

## 2024-12-06 RX ORDER — SODIUM CHLORIDE 0.9 % (FLUSH) 0.9 %
10 SYRINGE (ML) INJECTION AS NEEDED
Status: DISCONTINUED | OUTPATIENT
Start: 2024-12-06 | End: 2024-12-06 | Stop reason: HOSPADM

## 2024-12-06 RX ORDER — PROMETHAZINE HYDROCHLORIDE 25 MG/1
25 SUPPOSITORY RECTAL ONCE AS NEEDED
Status: DISCONTINUED | OUTPATIENT
Start: 2024-12-06 | End: 2024-12-09 | Stop reason: HOSPADM

## 2024-12-06 RX ORDER — HYDROMORPHONE HYDROCHLORIDE 1 MG/ML
0.5 INJECTION, SOLUTION INTRAMUSCULAR; INTRAVENOUS; SUBCUTANEOUS
Status: DISCONTINUED | OUTPATIENT
Start: 2024-12-06 | End: 2024-12-09 | Stop reason: HOSPADM

## 2024-12-06 RX ORDER — MAGNESIUM HYDROXIDE 1200 MG/15ML
LIQUID ORAL AS NEEDED
Status: DISCONTINUED | OUTPATIENT
Start: 2024-12-06 | End: 2024-12-06 | Stop reason: HOSPADM

## 2024-12-06 RX ORDER — BUPIVACAINE HYDROCHLORIDE AND EPINEPHRINE 2.5; 5 MG/ML; UG/ML
INJECTION, SOLUTION EPIDURAL; INFILTRATION; INTRACAUDAL; PERINEURAL AS NEEDED
Status: DISCONTINUED | OUTPATIENT
Start: 2024-12-06 | End: 2024-12-06 | Stop reason: HOSPADM

## 2024-12-06 RX ORDER — FLUMAZENIL 0.1 MG/ML
0.2 INJECTION INTRAVENOUS AS NEEDED
Status: DISCONTINUED | OUTPATIENT
Start: 2024-12-06 | End: 2024-12-09 | Stop reason: HOSPADM

## 2024-12-06 RX ORDER — OXYCODONE AND ACETAMINOPHEN 7.5; 325 MG/1; MG/1
1 TABLET ORAL EVERY 4 HOURS PRN
Status: DISCONTINUED | OUTPATIENT
Start: 2024-12-06 | End: 2024-12-09 | Stop reason: HOSPADM

## 2024-12-06 RX ORDER — EPHEDRINE SULFATE 50 MG/ML
5 INJECTION, SOLUTION INTRAVENOUS ONCE AS NEEDED
Status: DISCONTINUED | OUTPATIENT
Start: 2024-12-06 | End: 2024-12-09 | Stop reason: HOSPADM

## 2024-12-06 RX ORDER — SODIUM CHLORIDE 9 MG/ML
40 INJECTION, SOLUTION INTRAVENOUS AS NEEDED
Status: DISCONTINUED | OUTPATIENT
Start: 2024-12-06 | End: 2024-12-06 | Stop reason: HOSPADM

## 2024-12-06 RX ORDER — PROMETHAZINE HYDROCHLORIDE 25 MG/1
25 TABLET ORAL ONCE AS NEEDED
Status: DISCONTINUED | OUTPATIENT
Start: 2024-12-06 | End: 2024-12-09 | Stop reason: HOSPADM

## 2024-12-06 RX ORDER — HYDRALAZINE HYDROCHLORIDE 20 MG/ML
5 INJECTION INTRAMUSCULAR; INTRAVENOUS
Status: DISCONTINUED | OUTPATIENT
Start: 2024-12-06 | End: 2024-12-09 | Stop reason: HOSPADM

## 2024-12-06 RX ORDER — DROPERIDOL 2.5 MG/ML
0.62 INJECTION, SOLUTION INTRAMUSCULAR; INTRAVENOUS
Status: DISCONTINUED | OUTPATIENT
Start: 2024-12-06 | End: 2024-12-09 | Stop reason: HOSPADM

## 2024-12-06 RX ORDER — COLESEVELAM 180 1/1
TABLET ORAL
Status: ON HOLD | COMMUNITY
Start: 2024-11-22 | End: 2024-12-06

## 2024-12-06 RX ORDER — ATROPINE SULFATE 0.4 MG/ML
0.4 INJECTION, SOLUTION INTRAMUSCULAR; INTRAVENOUS; SUBCUTANEOUS ONCE AS NEEDED
Status: DISCONTINUED | OUTPATIENT
Start: 2024-12-06 | End: 2024-12-09 | Stop reason: HOSPADM

## 2024-12-06 RX ORDER — TRAZODONE HYDROCHLORIDE 50 MG/1
TABLET, FILM COATED ORAL
COMMUNITY
Start: 2024-11-14

## 2024-12-06 RX ORDER — FAMOTIDINE 10 MG/ML
20 INJECTION, SOLUTION INTRAVENOUS ONCE
Status: COMPLETED | OUTPATIENT
Start: 2024-12-06 | End: 2024-12-06

## 2024-12-06 RX ORDER — NALOXONE HCL 0.4 MG/ML
0.2 VIAL (ML) INJECTION AS NEEDED
Status: DISCONTINUED | OUTPATIENT
Start: 2024-12-06 | End: 2024-12-09 | Stop reason: HOSPADM

## 2024-12-06 RX ORDER — SODIUM CHLORIDE, SODIUM LACTATE, POTASSIUM CHLORIDE, CALCIUM CHLORIDE 600; 310; 30; 20 MG/100ML; MG/100ML; MG/100ML; MG/100ML
9 INJECTION, SOLUTION INTRAVENOUS CONTINUOUS
Status: DISCONTINUED | OUTPATIENT
Start: 2024-12-06 | End: 2024-12-07 | Stop reason: HOSPADM

## 2024-12-06 RX ORDER — DIPHENHYDRAMINE HYDROCHLORIDE 50 MG/ML
12.5 INJECTION INTRAMUSCULAR; INTRAVENOUS
Status: DISCONTINUED | OUTPATIENT
Start: 2024-12-06 | End: 2024-12-09 | Stop reason: HOSPADM

## 2024-12-06 RX ORDER — LIDOCAINE HYDROCHLORIDE 20 MG/ML
INJECTION, SOLUTION EPIDURAL; INFILTRATION; INTRACAUDAL; PERINEURAL AS NEEDED
Status: DISCONTINUED | OUTPATIENT
Start: 2024-12-06 | End: 2024-12-06 | Stop reason: SURG

## 2024-12-06 RX ORDER — FENTANYL CITRATE 50 UG/ML
50 INJECTION, SOLUTION INTRAMUSCULAR; INTRAVENOUS
Status: DISCONTINUED | OUTPATIENT
Start: 2024-12-06 | End: 2024-12-09 | Stop reason: HOSPADM

## 2024-12-06 RX ORDER — SODIUM CHLORIDE 0.9 % (FLUSH) 0.9 %
10 SYRINGE (ML) INJECTION EVERY 12 HOURS SCHEDULED
Status: DISCONTINUED | OUTPATIENT
Start: 2024-12-06 | End: 2024-12-06 | Stop reason: HOSPADM

## 2024-12-06 RX ORDER — LABETALOL HYDROCHLORIDE 5 MG/ML
5 INJECTION, SOLUTION INTRAVENOUS
Status: DISCONTINUED | OUTPATIENT
Start: 2024-12-06 | End: 2024-12-09 | Stop reason: HOSPADM

## 2024-12-06 RX ORDER — SODIUM CHLORIDE 0.9 % (FLUSH) 0.9 %
3 SYRINGE (ML) INJECTION EVERY 12 HOURS SCHEDULED
Status: DISCONTINUED | OUTPATIENT
Start: 2024-12-06 | End: 2024-12-06 | Stop reason: HOSPADM

## 2024-12-06 RX ORDER — ONDANSETRON 2 MG/ML
4 INJECTION INTRAMUSCULAR; INTRAVENOUS ONCE AS NEEDED
Status: DISCONTINUED | OUTPATIENT
Start: 2024-12-06 | End: 2024-12-09 | Stop reason: HOSPADM

## 2024-12-06 RX ADMIN — PROPOFOL 135 MCG/KG/MIN: 10 INJECTION, EMULSION INTRAVENOUS at 11:27

## 2024-12-06 RX ADMIN — PROPOFOL 40 MG: 10 INJECTION, EMULSION INTRAVENOUS at 11:27

## 2024-12-06 RX ADMIN — PROPOFOL 30 MG: 10 INJECTION, EMULSION INTRAVENOUS at 11:40

## 2024-12-06 RX ADMIN — FAMOTIDINE 20 MG: 10 INJECTION INTRAVENOUS at 10:53

## 2024-12-06 RX ADMIN — LIDOCAINE HYDROCHLORIDE 60 MG: 20 INJECTION, SOLUTION EPIDURAL; INFILTRATION; INTRACAUDAL; PERINEURAL at 11:27

## 2024-12-06 RX ADMIN — SODIUM CHLORIDE, POTASSIUM CHLORIDE, SODIUM LACTATE AND CALCIUM CHLORIDE 9 ML/HR: 600; 310; 30; 20 INJECTION, SOLUTION INTRAVENOUS at 10:56

## 2024-12-06 RX ADMIN — GLYCOPYRROLATE 0.2 MG: 0.2 INJECTION INTRAMUSCULAR; INTRAVENOUS at 11:23

## 2024-12-06 NOTE — DISCHARGE INSTRUCTIONS
FROM DR HOPKINS:    Keep incision dry for 24 hours.  After that may shower.  Allow glue to flake off on its own.  No dressing needed.  Diet as tolerated  Activity as tolerated  Tylenol as needed for pain.

## 2024-12-06 NOTE — OP NOTE
Operative Note :   MD Nba Calibrian RICHTER Jose Francisco  1960    Procedure Date: 12/06/24    Pre-op Diagnosis:  History of rectal cancer [Z85.048]    Post-Op Diagnosis:  Same    Procedure:   Removal of left subclavian vein Cauzii-u-Txle    Surgeon: Leonel Bridges MD      Anesthesia:  MAC (monitored anesthetic care)    EBL:   minimal    Specimens:   none    Indications:  63-year-old female with a history of rectal cancer, currently remission presenting for Qkcssr-l-Fadk removal    Findings:   none    Recommendations:   Routine incisional care    Description of procedure:    After obtaining informed consent, the patient was brought to the operating room and placed under monitored anesthesia care.  Site in question was identified and marked.  I then sterilely prepped and draped the area and then anesthetized it with a lidocaine and Marcaine solution.  Incision was made through the prior scar.  I dissected through the subcutaneous tissues down to the junction of the catheter and port reservoir.  This area was then delivered through the skin and the catheter was removed without difficulty and without resistance in its entirety.  The capsule surrounding the port reservoir was then dissected open and the Prolene sutures holding the port in place were removed.  Next the entirety of the port catheter assembly was removed.  The wound was inspected.  The capsule around the port was excised.  Subcutaneous tissues were reapproximated with 3-0 Vicryl.  Skin was closed with glue.    Leonel Bridges MD  General and Endoscopic Surgery  [facility]    [address], [address]  [place], [place], [address]  P: [phone]  F: [phone]

## 2024-12-06 NOTE — ANESTHESIA PREPROCEDURE EVALUATION
Anesthesia Evaluation     Patient summary reviewed and Nursing notes reviewed   NPO Solid Status: > 8 hours  NPO Liquid Status: > 2 hours           Airway   Mallampati: II  TM distance: >3 FB  Neck ROM: full  No difficulty expected  Dental - normal exam     Pulmonary - normal exam    breath sounds clear to auscultation  Cardiovascular - normal exam  Exercise tolerance: good (4-7 METS)    Rhythm: regular  Rate: normal    (+) hyperlipidemia      Neuro/Psych  (+) headaches, numbness, psychiatric history Depression    ROS Comment: Migraines  GI/Hepatic/Renal/Endo    (+) morbid obesity, hiatal hernia, GERD, GI bleeding     Musculoskeletal     (+) back pain, chronic pain  Abdominal   (+) obese   Substance History      OB/GYN          Other      history of cancer      Other Comment: Hx rectal CA, s/p chemo and radiation only, gets alternating colonoscopies and MRIs every 3 months-for port removal as these have been negative for a while                     Anesthesia Plan    ASA 3     MAC     intravenous induction     Anesthetic plan, risks, benefits, and alternatives have been provided, discussed and informed consent has been obtained with: patient.      CODE STATUS:

## 2024-12-06 NOTE — H&P
Cc: Unneeded Syakrj-i-Iiqe    History of presenting illness: This is a quite nice 63-year-old lady with a history of rectal cancer.  I placed an Jdfcet-h-Cesy in October 2022.  Since then she has completed her treatment and is currently in remission.  She has no need for the port at this point and wishes to have it removed.    Past medical history: Rectal cancer, gastroesophageal reflux disease    Past surgical history: Colonoscopy, low anterior resection, Torhie-s-Hglq placement done by me October 2022    Medications: Reviewed and reconciled in epic    Allergies: Sulfa drugs, levofloxacin, adhesive tape    Social history: She is a non-smoker    Family history: Negative for colorectal cancer    Physical exam:  Body mass index: 35.7  General: Awake and alert, no distress  Head: Normocephalic, atraumatic  Neck: Supple, trachea midline  Eyes: Extraocular movements intact, no icterus  Respiratory: No use of accessory muscles, good bilateral chest expansion  Abdomen: Soft, benign, no hernia felt  Skin: Warm and dry, port site in the left subclavian region is well-healed            Assessment and plan:  -Rectal cancer with history of Nnpumg-n-Pmje, now currently not needed and she wishes to have this removed  -Plan for Mnsdll-b-Incl removal in the operating room today  -Risks including bleeding, pneumothorax, infection, dehiscence of the wound injury to the vena cava discussed, patient agreeable to proceed.    Leonel Bridges MD  General and Endoscopic Surgery  [facility]    [address], [address]  [place], [place], [address]  P: [phone]  F: [phone]

## 2024-12-06 NOTE — ANESTHESIA POSTPROCEDURE EVALUATION
Patient: Babs Felton    Procedure Summary       Date: 12/06/24 Room / Location: [facility] OR [address] [place] [address] MAIN OR    Anesthesia Start: 1121 Anesthesia Stop: 1156    Procedure: REMOVAL VENOUS ACCESS DEVICE Diagnosis:       History of rectal cancer      (History of rectal cancer [Z85.048])    Surgeons: Leonel Bridges MD Provider: Roxy Estrada MD    Anesthesia Type: MAC ASA Status: 3            Anesthesia Type: MAC    Vitals  Vitals Value Taken Time   /74 12/06/24 1215   Temp 37 °C (98.6 °F) 12/06/24 1205   Pulse 78 12/06/24 1215   Resp 18 12/06/24 1215   SpO2 97 % 12/06/24 1215           Post Anesthesia Care and Evaluation    Patient location during evaluation: bedside  Patient participation: complete - patient participated  Level of consciousness: awake  Pain management: adequate    Airway patency: patent  Anesthetic complications: No anesthetic complications    Cardiovascular status: acceptable  Respiratory status: acceptable  Hydration status: acceptable    Comments: /74   Pulse 78   Temp 37 °C (98.6 °F) (Oral)   Resp 18   LMP  (LMP Unknown)   SpO2 97%

## 2024-12-18 ENCOUNTER — TELEPHONE (OUTPATIENT)
Dept: FAMILY MEDICINE CLINIC | Facility: CLINIC | Age: 64
End: 2024-12-18

## 2024-12-18 ENCOUNTER — OFFICE VISIT (OUTPATIENT)
Dept: FAMILY MEDICINE CLINIC | Facility: CLINIC | Age: 64
End: 2024-12-18
Payer: COMMERCIAL

## 2024-12-18 VITALS
HEART RATE: 68 BPM | BODY MASS INDEX: 34.3 KG/M2 | DIASTOLIC BLOOD PRESSURE: 72 MMHG | HEIGHT: 71 IN | SYSTOLIC BLOOD PRESSURE: 130 MMHG | WEIGHT: 245 LBS | OXYGEN SATURATION: 98 %

## 2024-12-18 DIAGNOSIS — R53.83 OTHER FATIGUE: ICD-10-CM

## 2024-12-18 DIAGNOSIS — K52.9 CHRONIC DIARRHEA: ICD-10-CM

## 2024-12-18 DIAGNOSIS — Z00.00 WELL ADULT EXAM: ICD-10-CM

## 2024-12-18 DIAGNOSIS — R15.2 FECAL URGENCY: ICD-10-CM

## 2024-12-18 DIAGNOSIS — R19.7 DIARRHEA, UNSPECIFIED TYPE: ICD-10-CM

## 2024-12-18 DIAGNOSIS — Z12.31 ENCOUNTER FOR SCREENING MAMMOGRAM FOR BREAST CANCER: Primary | ICD-10-CM

## 2024-12-18 DIAGNOSIS — Z13.21 ENCOUNTER FOR VITAMIN DEFICIENCY SCREENING: ICD-10-CM

## 2024-12-18 DIAGNOSIS — Z85.048 HISTORY OF RECTAL CANCER: ICD-10-CM

## 2024-12-18 DIAGNOSIS — Z71.85 IMMUNIZATION COUNSELING: ICD-10-CM

## 2024-12-18 PROCEDURE — 99396 PREV VISIT EST AGE 40-64: CPT | Performed by: INTERNAL MEDICINE

## 2024-12-18 RX ORDER — MUPIROCIN 20 MG/G
1 OINTMENT TOPICAL 2 TIMES DAILY
Qty: 30 G | Refills: 0 | Status: SHIPPED | OUTPATIENT
Start: 2024-12-18

## 2024-12-18 NOTE — TELEPHONE ENCOUNTER
Caller: Babs Felton    Relationship to patient: Self    Best call back number:   Telephone Information:   Mobile [phone]       Patient is needing: PATIENT CALLED HER INSURANCE AND THEY DO NOT COVER THE ZEPBOUND. Araceli Rose MD  ADVISED HER TO CALL BACK IN AND LET THE OFFICE KNOW SO IT COULD BE SENT TO [facility] AND PATIENT COULD PAY OUT OF POCKET.

## 2024-12-18 NOTE — PROGRESS NOTES
"Chief Complaint  Annual Exam (Check port area removed about 2 wks ago)    Subjective        Babs Felton presents to [facility]  History of Present Illness  Here for CPE. H/o rectal cancer followed by Dr. Jennifer Amaya and Dr. Weber.   MRI, CT normal in 11/2024 with  and her CN was negative with Dr. Amaya this fall.  She did 3 mo of no gluten, no red meat and no dairy to see if it improved her gut sx with diarrhea.  She did not see an appreciable difference in diarrhea.   but it did help reduce inflammation in her hips.  Having 1-2 BM loose on normal day and if it goes beyond 3-4 stools a day, she will take lomotil which helps control her sx and urgency.  No blood in stool and hard to say about mucous.   I prescribed welchol a few months ago but she started with a bloody stool, so she never took it.  But currently, no bloody stools.  She tries to avoid lomotil.  Bentyl helps with cramping but doesn't constipate.  Would like to lose weight.  Feels that her elevated BMI affects her physical and mental well being.  C/o hip pain and difficulty with weight bearing activities due to arthralgias and deconditioning.    2 weeks ago she had her port removed from chest wall and there is residual soreness to the area.    Objective   Vital Signs:  /72   Pulse 68   Ht 179.1 cm (70.51\")   Wt 111 kg (245 lb)   SpO2 98%   BMI 34.65 kg/m²   Estimated body mass index is 34.65 kg/m² as calculated from the following:    Height as of this encounter: 179.1 cm (70.51\").    Weight as of this encounter: 111 kg (245 lb).            Physical Exam  Vitals and nursing note reviewed.   Constitutional:       Appearance: Normal appearance. She is well-developed.   HENT:      Head: Normocephalic and atraumatic.      Right Ear: External ear normal.      Left Ear: External ear normal.   Eyes:      Extraocular Movements: Extraocular movements intact.      Conjunctiva/sclera: Conjunctivae normal. "   Neck:      Vascular: No carotid bruit.   Cardiovascular:      Rate and Rhythm: Normal rate and regular rhythm.      Heart sounds: Normal heart sounds.      Comments: No bruits  Pulmonary:      Effort: Pulmonary effort is normal. No respiratory distress.      Breath sounds: Normal breath sounds. No stridor. No wheezing, rhonchi or rales.   Chest:      Chest wall: No tenderness.   Abdominal:      General: Bowel sounds are normal. There is no distension.      Palpations: Abdomen is soft. There is no mass.      Tenderness: There is no abdominal tenderness. There is no guarding or rebound.      Hernia: No hernia is present.   Musculoskeletal:      Cervical back: Neck supple.      Right lower leg: No edema.      Left lower leg: No edema.   Lymphadenopathy:      Cervical: No cervical adenopathy.   Skin:     General: Skin is warm.   Neurological:      General: No focal deficit present.      Mental Status: She is alert and oriented to person, place, and time. Mental status is at baseline.   Psychiatric:         Mood and Affect: Mood normal.         Behavior: Behavior normal.         Thought Content: Thought content normal.         Judgment: Judgment normal.      Result Review :                Assessment and Plan   Diagnoses and all orders for this visit:    1. Encounter for screening mammogram for breast cancer (Primary)  -     Mammo screening digital tomosynthesis bilateral w CAD; Future    2. History of rectal cancer    3. Fecal urgency  -     CBC & Differential; Future  -     Comprehensive Metabolic Panel; Future  -     Lipid Panel With LDL / HDL Ratio; Future  -     T4, Free; Future  -     TSH; Future  -     Urinalysis With Culture If Indicated -; Future  -     Vitamin D,25-Hydroxy; Future  -     Hemoglobin A1c; Future  -     Vitamin B12; Future  -     Iron; Future  -     Ferritin; Future  -     Folate; Future    4. Diarrhea, unspecified type  -     CBC & Differential; Future  -     Comprehensive Metabolic Panel;  Future  -     Lipid Panel With LDL / HDL Ratio; Future  -     T4, Free; Future  -     TSH; Future  -     Urinalysis With Culture If Indicated -; Future  -     Vitamin D,25-Hydroxy; Future  -     Hemoglobin A1c; Future  -     Vitamin B12; Future  -     Iron; Future  -     Ferritin; Future  -     Folate; Future    5. Immunization counseling    6. Chronic diarrhea  -     CBC & Differential; Future  -     Comprehensive Metabolic Panel; Future  -     Lipid Panel With LDL / HDL Ratio; Future  -     T4, Free; Future  -     TSH; Future  -     Urinalysis With Culture If Indicated -; Future  -     Vitamin D,25-Hydroxy; Future  -     Hemoglobin A1c; Future  -     Vitamin B12; Future  -     Iron; Future  -     Ferritin; Future  -     Folate; Future    7. Encounter for vitamin deficiency screening  -     CBC & Differential; Future  -     Comprehensive Metabolic Panel; Future  -     Lipid Panel With LDL / HDL Ratio; Future  -     T4, Free; Future  -     TSH; Future  -     Urinalysis With Culture If Indicated -; Future  -     Vitamin D,25-Hydroxy; Future  -     Hemoglobin A1c; Future  -     Vitamin B12; Future  -     Iron; Future  -     Ferritin; Future  -     Folate; Future    8. Other fatigue  -     CBC & Differential; Future  -     Comprehensive Metabolic Panel; Future  -     Lipid Panel With LDL / HDL Ratio; Future  -     T4, Free; Future  -     TSH; Future  -     Urinalysis With Culture If Indicated -; Future  -     Vitamin D,25-Hydroxy; Future  -     Hemoglobin A1c; Future  -     Vitamin B12; Future  -     Iron; Future  -     Ferritin; Future  -     Folate; Future    9. Well adult exam  -     CBC & Differential; Future  -     Comprehensive Metabolic Panel; Future  -     Lipid Panel With LDL / HDL Ratio; Future  -     T4, Free; Future  -     TSH; Future  -     Urinalysis With Culture If Indicated -; Future  -     Vitamin D,25-Hydroxy; Future  -     Hemoglobin A1c; Future  -     Vitamin B12; Future  -     Iron; Future  -      Ferritin; Future  -     Folate; Future      Needs pap smear and names provided for her to schedule her appt  Discussed zepbound or wegovy-- will prescribe if insurance covers it.  O/w will consider the zepbound direct program through [facility].  She will message me regarding coverage and I can prescribe.  Cautioned on limitations of compounded GLP1 medications.    Mupirocin to the surgical healed incision bid (former port location) and moist heat applied   Fasting labs ordered.  Check vitamin levels per her request due to concerns over malabsorption  She could try welchol qod for a week and then daily for a week to see if it helps with diarrhea and cholesterol.   Oxygenation counseling provided; she will need Prevnar 20 when she turns 64       Follow Up   No follow-ups on file.  Patient was given instructions and counseling regarding her condition or for health maintenance advice. Please see specific information pulled into the AVS if appropriate.

## 2024-12-18 NOTE — TELEPHONE ENCOUNTER
Pt was seen in office today. Pt stated that Dr. Rose had mentioned to get it since it has been a while since the pt had received one. Please advise on next steps.

## 2024-12-18 NOTE — TELEPHONE ENCOUNTER
Hub staff attempted to follow warm transfer process and was unsuccessful     Caller: Babs Felton    Relationship to patient: Self    Best call back number:   Telephone Information:   Mobile [phone]       Patient is needing: PATIENT IS WANTING TO GET A TETANUS SHOT WHEN SHE COMES IN FOR LABS ON 12/23/2024, PLEASE CALL AND ADVISE IF SHE CAN. PATIENT ADVISED TO LEAVE A VOICEMAIL IF SHE CANNOT ANSWER.

## 2024-12-19 DIAGNOSIS — E78.5 HYPERLIPIDEMIA, UNSPECIFIED HYPERLIPIDEMIA TYPE: Primary | ICD-10-CM

## 2024-12-19 RX ORDER — TIRZEPATIDE 2.5 MG/.5ML
2.5 INJECTION, SOLUTION SUBCUTANEOUS WEEKLY
Qty: 2 ML | Refills: 0 | Status: SHIPPED | OUTPATIENT
Start: 2024-12-19

## 2024-12-20 DIAGNOSIS — R15.2 FECAL URGENCY: ICD-10-CM

## 2024-12-20 DIAGNOSIS — R53.83 OTHER FATIGUE: ICD-10-CM

## 2024-12-20 DIAGNOSIS — R19.7 DIARRHEA, UNSPECIFIED TYPE: ICD-10-CM

## 2024-12-20 DIAGNOSIS — K52.9 CHRONIC DIARRHEA: ICD-10-CM

## 2024-12-20 DIAGNOSIS — Z00.00 WELL ADULT EXAM: ICD-10-CM

## 2024-12-20 DIAGNOSIS — Z13.21 ENCOUNTER FOR VITAMIN DEFICIENCY SCREENING: ICD-10-CM

## 2024-12-20 NOTE — TELEPHONE ENCOUNTER
Called LVM with patient informing Td  vaccine scheduled for 12/23 at 10:15 am.... same day as lab apt.

## 2024-12-23 ENCOUNTER — TELEPHONE (OUTPATIENT)
Dept: NEUROLOGY | Facility: CLINIC | Age: 64
End: 2024-12-23

## 2024-12-23 ENCOUNTER — CLINICAL SUPPORT (OUTPATIENT)
Dept: FAMILY MEDICINE CLINIC | Facility: CLINIC | Age: 64
End: 2024-12-23
Payer: COMMERCIAL

## 2024-12-23 DIAGNOSIS — Z23 NEED FOR TD VACCINE: Primary | ICD-10-CM

## 2024-12-23 PROCEDURE — 90471 IMMUNIZATION ADMIN: CPT | Performed by: FAMILY MEDICINE

## 2024-12-23 PROCEDURE — 90714 TD VACC NO PRESV 7 YRS+ IM: CPT | Performed by: FAMILY MEDICINE

## 2024-12-23 NOTE — TELEPHONE ENCOUNTER
Provider: NO KNOWN     Caller: Babs Felton    Relationship to Patient: Self    Phone Number: [phone]    Reason for Call: PT RECEIVED A PHONE CALL STATING THE OFFICE HAS HAS INS CARD.  SHE WILL BE BY TO PICK IT UP ON THURSDAY     THANK YOU

## 2024-12-24 LAB
25(OH)D3+25(OH)D2 SERPL-MCNC: 26.6 NG/ML (ref 30–100)
ALBUMIN SERPL-MCNC: 4.4 G/DL (ref 3.5–5.2)
ALBUMIN/GLOB SERPL: 2 G/DL
ALP SERPL-CCNC: 84 U/L (ref 39–117)
ALT SERPL-CCNC: 16 U/L (ref 1–33)
APPEARANCE UR: CLEAR
AST SERPL-CCNC: 18 U/L (ref 1–32)
BACTERIA #/AREA URNS HPF: NORMAL /[HPF]
BASOPHILS # BLD AUTO: 0.01 10*3/MM3 (ref 0–0.2)
BASOPHILS NFR BLD AUTO: 0.2 % (ref 0–1.5)
BILIRUB SERPL-MCNC: 0.5 MG/DL (ref 0–1.2)
BILIRUB UR QL STRIP: NEGATIVE
BUN SERPL-MCNC: 11 MG/DL (ref 8–23)
BUN/CREAT SERPL: 16.2 (ref 7–25)
CALCIUM SERPL-MCNC: 9.3 MG/DL (ref 8.6–10.5)
CASTS URNS QL MICRO: NORMAL /LPF
CHLORIDE SERPL-SCNC: 104 MMOL/L (ref 98–107)
CHOLEST SERPL-MCNC: 232 MG/DL (ref 0–200)
CO2 SERPL-SCNC: 28.1 MMOL/L (ref 22–29)
COLOR UR: YELLOW
CREAT SERPL-MCNC: 0.68 MG/DL (ref 0.57–1)
EGFRCR SERPLBLD CKD-EPI 2021: 97.4 ML/MIN/1.73
EOSINOPHIL # BLD AUTO: 0.18 10*3/MM3 (ref 0–0.4)
EOSINOPHIL NFR BLD AUTO: 3.3 % (ref 0.3–6.2)
EPI CELLS #/AREA URNS HPF: NORMAL /HPF (ref 0–10)
ERYTHROCYTE [DISTWIDTH] IN BLOOD BY AUTOMATED COUNT: 12.9 % (ref 12.3–15.4)
FERRITIN SERPL-MCNC: 37 NG/ML (ref 13–150)
FOLATE SERPL-MCNC: 6.73 NG/ML (ref 4.78–24.2)
GLOBULIN SER CALC-MCNC: 2.2 GM/DL
GLUCOSE SERPL-MCNC: 91 MG/DL (ref 65–99)
GLUCOSE UR QL STRIP: NEGATIVE
HBA1C MFR BLD: 5 % (ref 4.8–5.6)
HCT VFR BLD AUTO: 37.5 % (ref 34–46.6)
HCV IGG SERPL QL IA: NON REACTIVE
HDLC SERPL-MCNC: 57 MG/DL (ref 40–60)
HGB BLD-MCNC: 12.7 G/DL (ref 12–15.9)
HGB UR QL STRIP: NEGATIVE
IMM GRANULOCYTES # BLD AUTO: 0.02 10*3/MM3 (ref 0–0.05)
IMM GRANULOCYTES NFR BLD AUTO: 0.4 % (ref 0–0.5)
IRON SERPL-MCNC: 85 MCG/DL (ref 37–145)
KETONES UR QL STRIP: NEGATIVE
LDLC SERPL CALC-MCNC: 158 MG/DL (ref 0–100)
LDLC/HDLC SERPL: 2.73 {RATIO}
LEUKOCYTE ESTERASE UR QL STRIP: NEGATIVE
LYMPHOCYTES # BLD AUTO: 1.59 10*3/MM3 (ref 0.7–3.1)
LYMPHOCYTES NFR BLD AUTO: 29.4 % (ref 19.6–45.3)
MCH RBC QN AUTO: 30 PG (ref 26.6–33)
MCHC RBC AUTO-ENTMCNC: 33.9 G/DL (ref 31.5–35.7)
MCV RBC AUTO: 88.7 FL (ref 79–97)
MICRO URNS: ABNORMAL
MICRO URNS: ABNORMAL
MONOCYTES # BLD AUTO: 0.3 10*3/MM3 (ref 0.1–0.9)
MONOCYTES NFR BLD AUTO: 5.5 % (ref 5–12)
NEUTROPHILS # BLD AUTO: 3.31 10*3/MM3 (ref 1.7–7)
NEUTROPHILS NFR BLD AUTO: 61.2 % (ref 42.7–76)
NITRITE UR QL STRIP: NEGATIVE
NRBC BLD AUTO-RTO: 0 /100 WBC (ref 0–0.2)
PH UR STRIP: 7 [PH] (ref 5–7.5)
PLATELET # BLD AUTO: 249 10*3/MM3 (ref 140–450)
POTASSIUM SERPL-SCNC: 4 MMOL/L (ref 3.5–5.2)
PROT SERPL-MCNC: 6.6 G/DL (ref 6–8.5)
PROT UR QL STRIP: NEGATIVE
RBC # BLD AUTO: 4.23 10*6/MM3 (ref 3.77–5.28)
RBC #/AREA URNS HPF: NORMAL /HPF (ref 0–2)
SODIUM SERPL-SCNC: 138 MMOL/L (ref 136–145)
SP GR UR STRIP: <=1.005 (ref 1–1.03)
T4 FREE SERPL-MCNC: 1.1 NG/DL (ref 0.92–1.68)
TRIGL SERPL-MCNC: 97 MG/DL (ref 0–150)
TSH SERPL DL<=0.005 MIU/L-ACNC: 1.86 UIU/ML (ref 0.27–4.2)
URINALYSIS REFLEX: ABNORMAL
UROBILINOGEN UR STRIP-MCNC: 0.2 MG/DL (ref 0.2–1)
VIT B12 SERPL-MCNC: 363 PG/ML (ref 211–946)
VLDLC SERPL CALC-MCNC: 17 MG/DL (ref 5–40)
WBC # BLD AUTO: 5.41 10*3/MM3 (ref 3.4–10.8)
WBC #/AREA URNS HPF: NORMAL /HPF (ref 0–5)

## 2025-01-03 DIAGNOSIS — E78.5 HYPERLIPIDEMIA, UNSPECIFIED HYPERLIPIDEMIA TYPE: ICD-10-CM

## 2025-01-07 ENCOUNTER — TELEPHONE (OUTPATIENT)
Dept: FAMILY MEDICINE CLINIC | Facility: CLINIC | Age: 65
End: 2025-01-07
Payer: COMMERCIAL

## 2025-01-08 ENCOUNTER — TELEPHONE (OUTPATIENT)
Dept: FAMILY MEDICINE CLINIC | Facility: CLINIC | Age: 65
End: 2025-01-08

## 2025-01-08 NOTE — TELEPHONE ENCOUNTER
Hub staff attempted to follow warm transfer process and was unsuccessful     Caller: Babs Felton    Relationship to patient: Self    Best call back number: [phone]     Patient is needing: PATIENT IS RETURNING EMILIE PHONE CALL. PLEASE CALL           Patient remaining free from injury.  Patient ambulating downstairs to get food.  Patient reports back pain which is relieved by Robaxin.  Patient eating % of meals.  Patient BG < 200.  Replaced Magnesium and patient received second dose of solumedrol.  Plan to dc in the am.

## 2025-01-09 NOTE — TELEPHONE ENCOUNTER
Called and spoke to pt and discussed med Zepbound going through [facility]. Pt will check w/ her insurance and see if they will cover at a different pharmacy. Pt received med yesterday and paid for it

## 2025-01-31 ENCOUNTER — TELEPHONE (OUTPATIENT)
Dept: SURGERY | Facility: CLINIC | Age: 65
End: 2025-01-31
Payer: COMMERCIAL

## 2025-01-31 NOTE — TELEPHONE ENCOUNTER
Message was left on patients voicemail to call Ellie at the office.  I was calling to reschedule flexible sigmoidoscopy on 3/26

## 2025-03-10 ENCOUNTER — HOSPITAL ENCOUNTER (OUTPATIENT)
Dept: MAMMOGRAPHY | Facility: HOSPITAL | Age: 65
Discharge: HOME | End: 2025-03-10
Admitting: INTERNAL MEDICINE
Payer: COMMERCIAL

## 2025-03-10 DIAGNOSIS — Z12.31 ENCOUNTER FOR SCREENING MAMMOGRAM FOR BREAST CANCER: ICD-10-CM

## 2025-03-10 PROCEDURE — 77067 SCR MAMMO BI INCL CAD: CPT

## 2025-03-10 PROCEDURE — 77063 BREAST TOMOSYNTHESIS BI: CPT

## 2025-04-09 ENCOUNTER — ANESTHESIA EVENT (OUTPATIENT)
Dept: GASTROENTEROLOGY | Facility: HOSPITAL | Age: 65
End: 2025-04-09
Payer: COMMERCIAL

## 2025-04-09 ENCOUNTER — HOSPITAL ENCOUNTER (OUTPATIENT)
Facility: HOSPITAL | Age: 65
Setting detail: HOSPITAL OUTPATIENT SURGERY
Discharge: HOME | End: 2025-04-09
Attending: COLON & RECTAL SURGERY | Admitting: COLON & RECTAL SURGERY
Payer: COMMERCIAL

## 2025-04-09 ENCOUNTER — ANESTHESIA (OUTPATIENT)
Dept: GASTROENTEROLOGY | Facility: HOSPITAL | Age: 65
End: 2025-04-09
Payer: COMMERCIAL

## 2025-04-09 VITALS
DIASTOLIC BLOOD PRESSURE: 84 MMHG | WEIGHT: 251.1 LBS | HEIGHT: 69 IN | BODY MASS INDEX: 37.19 KG/M2 | OXYGEN SATURATION: 98 % | RESPIRATION RATE: 16 BRPM | SYSTOLIC BLOOD PRESSURE: 134 MMHG | HEART RATE: 69 BPM

## 2025-04-09 PROCEDURE — 25010000002 PROPOFOL 200 MG/20ML EMULSION: Performed by: NURSE ANESTHETIST, CERTIFIED REGISTERED

## 2025-04-09 PROCEDURE — 25010000002 LIDOCAINE 2% SOLUTION: Performed by: NURSE ANESTHETIST, CERTIFIED REGISTERED

## 2025-04-09 PROCEDURE — 25810000003 LACTATED RINGERS PER 1000 ML: Performed by: COLON & RECTAL SURGERY

## 2025-04-09 PROCEDURE — 25010000002 PROPOFOL 1000 MG/100ML EMULSION: Performed by: NURSE ANESTHETIST, CERTIFIED REGISTERED

## 2025-04-09 RX ORDER — PROPOFOL 10 MG/ML
INJECTION, EMULSION INTRAVENOUS CONTINUOUS PRN
Status: DISCONTINUED | OUTPATIENT
Start: 2025-04-09 | End: 2025-04-09 | Stop reason: SURG

## 2025-04-09 RX ORDER — SODIUM CHLORIDE, SODIUM LACTATE, POTASSIUM CHLORIDE, CALCIUM CHLORIDE 600; 310; 30; 20 MG/100ML; MG/100ML; MG/100ML; MG/100ML
20 INJECTION, SOLUTION INTRAVENOUS CONTINUOUS
Status: DISCONTINUED | OUTPATIENT
Start: 2025-04-09 | End: 2025-04-09 | Stop reason: HOSPADM

## 2025-04-09 RX ORDER — LIDOCAINE HYDROCHLORIDE 20 MG/ML
INJECTION, SOLUTION INFILTRATION; PERINEURAL AS NEEDED
Status: DISCONTINUED | OUTPATIENT
Start: 2025-04-09 | End: 2025-04-09 | Stop reason: SURG

## 2025-04-09 RX ORDER — PROPOFOL 10 MG/ML
INJECTION, EMULSION INTRAVENOUS AS NEEDED
Status: DISCONTINUED | OUTPATIENT
Start: 2025-04-09 | End: 2025-04-09

## 2025-04-09 RX ORDER — PROPOFOL 10 MG/ML
INJECTION, EMULSION INTRAVENOUS AS NEEDED
Status: DISCONTINUED | OUTPATIENT
Start: 2025-04-09 | End: 2025-04-09 | Stop reason: SURG

## 2025-04-09 RX ADMIN — SODIUM CHLORIDE, POTASSIUM CHLORIDE, SODIUM LACTATE AND CALCIUM CHLORIDE 20 ML/HR: 600; 310; 30; 20 INJECTION, SOLUTION INTRAVENOUS at 06:36

## 2025-04-09 RX ADMIN — PROPOFOL INJECTABLE EMULSION 140 MCG/KG/MIN: 10 INJECTION, EMULSION INTRAVENOUS at 07:14

## 2025-04-09 RX ADMIN — PROPOFOL 100 MG: 10 INJECTION, EMULSION INTRAVENOUS at 07:14

## 2025-04-09 RX ADMIN — LIDOCAINE HYDROCHLORIDE 100 MG: 20 INJECTION, SOLUTION INFILTRATION; PERINEURAL at 07:14

## 2025-04-09 NOTE — ANESTHESIA POSTPROCEDURE EVALUATION
"Patient: Babs Felton    Procedure Summary       Date: 04/09/25 Room / Location: [facility] ENDOSCOPY 6 /  [facility]    Anesthesia Start: 0702 Anesthesia Stop: 0725    Procedure: FLEXIBLE SIGMOIDOSCOPY to descending colon Diagnosis:       History of rectal cancer      (History of rectal cancer [Z85.048])    Surgeons: Jennifer Amaya MD Provider: Roxy Estrada MD    Anesthesia Type: MAC ASA Status: 3            Anesthesia Type: MAC    Vitals  Vitals Value Taken Time   /84 04/09/25 07:44   Temp     Pulse 69 04/09/25 07:47   Resp 16 04/09/25 07:33   SpO2 95 % 04/09/25 07:47   Vitals shown include unfiled device data.        Post Anesthesia Care and Evaluation    Patient location during evaluation: bedside  Patient participation: complete - patient participated  Level of consciousness: awake  Pain management: adequate    Airway patency: patent  Anesthetic complications: No anesthetic complications    Cardiovascular status: acceptable  Respiratory status: acceptable  Hydration status: acceptable    Comments: /77 (BP Location: Left arm, Patient Position: Lying)   Pulse 64   Resp 16   Ht 175.3 cm (69\")   Wt 114 kg (251 lb 1.6 oz)   LMP  (LMP Unknown)   SpO2 100%   BMI 37.08 kg/m²     "

## 2025-04-09 NOTE — ANESTHESIA PREPROCEDURE EVALUATION
Anesthesia Evaluation     Patient summary reviewed and Nursing notes reviewed   NPO Solid Status: > 8 hours  NPO Liquid Status: > 2 hours           Airway   Mallampati: II  TM distance: >3 FB  Neck ROM: full  No difficulty expected  Dental - normal exam     Pulmonary - normal exam    breath sounds clear to auscultation  Cardiovascular - normal exam  Exercise tolerance: good (4-7 METS)    Rhythm: regular  Rate: normal    (+) hyperlipidemia      Neuro/Psych  (+) headaches, numbness, psychiatric history Depression    ROS Comment: Migraines  GI/Hepatic/Renal/Endo    (+) morbid obesity, hiatal hernia, GERD, GI bleeding     Musculoskeletal     (+) back pain, chronic pain  Abdominal   (+) obese   Substance History      OB/GYN          Other      history of cancer      Other Comment: Hx rectal CA, s/p chemo and radiation only, gets alternating colonoscopies and MRIs every 3 months-for port removal as these have been negative for a while                     Anesthesia Plan    ASA 3     MAC     intravenous induction     Anesthetic plan, risks, benefits, and alternatives have been provided, discussed and informed consent has been obtained with: patient.      CODE STATUS:         
no

## 2025-04-09 NOTE — H&P
Babs Felton is a 64 y.o. female  who is referred by Jennifer Ojeda MD for a colonoscopy. She   has an indications: previous  rectal cancer    She denies any change in bowel function, melena, or hematochezia.    Past Medical History:   Diagnosis Date    Abdominal cramping 07/01/2022    Anemia     Arthritis     Bloating 07/01/2022    Bloody diarrhea 07/01/2022    Chronic back pain     Colon polyps     FOLLOWED BY DR. BEVERLY MENDEZ    COVID 08/15/2023    Cystitis     BURNING WITH URINATION SINCE RADIATION    Depression     Early cataract     Frequent urination     result from radiation    Gastric polyps     FOLLOWED BY DR. BEVERLY MENDEZ    GERD (gastroesophageal reflux disease)     Hearing loss     Hiatal hernia 09/2021    3 CM, FOLLOWED BY DR. BEVERLY MENDEZ    History of chemotherapy     LAST TREATMENT 2/10/2023    History of depression     25 years ago    History of radiation therapy     Hyperlipidemia     NO MEDS    IBS (irritable bowel syndrome)     Migraines     HX    Rectal bleeding 1983    Off and on for years    Rectal cancer 09/2022    INVASIVE MODERATELY DIFFERENTIATED ADENOCARCINOMA ARISING FROM TRADITIONAL SERRATED ADENOMA WITH HGD    Sciatica 09/2015    Tear of medial meniscus of knee 07/2021    RIGHT KNEE    Vitamin D deficiency        Past Surgical History:   Procedure Laterality Date    COLONOSCOPY N/A 1986    COLONOSCOPY N/A 03/27/2024    A HEALTHY SCAR WAS FOUND IN MID RECTUM, FLEX SIG IN 6 MONTHS, DR. JENNIFER OJEDA AT [facility]    COLONOSCOPY N/A 09/25/2024    A HEALTHY SCAR IN RECTUM, OTHERWISE WNL, FLEX SIG IN 6 MONTHS, DR. JENNIFER OJEDA AT [facility]    COLONOSCOPY W/ POLYPECTOMY N/A 09/01/2022    3 MM TUBULAR ADENOMA POLYP IN ASCENDING, 6 MM TUBULAR ADENOMA POLYP IN CECUM, 30 MM MASS IN RECTUM, PATH:INVASIVE MODERATELY DIFFERENTIATED ADENOCARCINOMA ARISING FROM TRADITIONAL SERRATED ADENOMA WITH HGD, HYPERTROPHIED ANAL PAPILLA, DR. BEVERLY MENDEZ AT [facility]    ENDOSCOPY N/A 09/01/2022     "MULTIPLE BENIGN GASTRIC POLYPS, 2 SQUAMOUS PAPILLAS IN ESOPHAGUS, 3 CM HIATAL HERNIA,    EXTERNAL EAR SURGERY  1966    MULTIPLE \"LANCES\", DR. ACE IN [facility]    EYE SURGERY Right 1967    DR. CASTANEDA IN [facility] eye muscle    KNEE ARTHRODESIS Right 07/27/2021    RIGHT KNEE ARTHROCENTESIS, DR. ALBERT GILMAN    KNEE SURGERY Left 1985    DR. PEREZ AT [facility]    SHOULDER MANIPULATION Left 01/07/2013    FOR FROZEN SHOULDER, DR. ALBERT GILMAN AT [facility]    SIGMOIDOSCOPY N/A 09/21/2022    AN INFILTRATIVE NON OBSTRUCTING MASS IN MID RECTUM, AREA TATTOOED, DR. JENNIFER AMAYA AT [facility]    SIGMOIDOSCOPY N/A 03/08/2023    7 MM HEALED ULCERATION WHERE RECTAL CANCER WAS, SCAR TISSUE HEALTHY, BIOPSY SHOWED INFLAMMATION AND SCAR, NO RESIDUAL CANCER, FLEX SIG IN 12 MONTHS, DR. JENNIFER AMAYA AT [facility]    SIGMOIDOSCOPY N/A 07/13/2023    A HEALTHY SCAR WAS IN MID RECTUM, DR. JENNIFER AMAYA AT [facility]    SIGMOIDOSCOPY N/A 11/06/2023    A HEALTHY APPEARING SCAR IN MID RECTUM, COLONOSCOPY IN 4 MONTHS, DR. JENNIFER AMAYA AT [facility]    SIGMOIDOSCOPY N/A 12/22/2023    A HEALTHY SCAR WAS FOUND IN MID RECTUM, BIOPSIES BENIGN, DR. JENNIFER AMAYA AT [facility]    TONSILLECTOMY      TRANSRECTAL ULTRASOUND N/A 09/21/2022    Procedure: ULTRASOUND TRANSRECTAL;  Surgeon: Jennifer Amaya MD;  Location: [facility] ENDOSCOPY;  Service: General;  Laterality: N/A;    TRANSRECTAL ULTRASOUND N/A 12/22/2023    Procedure: ULTRASOUND TRANSRECTAL;  Surgeon: Jennifer Amaya MD;  Location: [facility] ENDOSCOPY;  Service: General;  Laterality: N/A;  pre: abnormal MRI  post: rectal ulceration    VENOUS ACCESS DEVICE (PORT) INSERTION Left 10/26/2022    Procedure: INSERTION OF PORTACATH;  Surgeon: Leonel Bridges MD;  Location: [facility] MAIN OR;  Service: General;  Laterality: Left;    VENOUS ACCESS DEVICE (PORT) REMOVAL N/A 12/6/2024    Procedure: REMOVAL VENOUS ACCESS DEVICE;  Surgeon: Leonel Bridges MD;  Location: [facility] MAIN OR;  Service: General;  Laterality: N/A;    " WISDOM TOOTH EXTRACTION Bilateral        Medications Prior to Admission   Medication Sig Dispense Refill Last Dose/Taking    dicyclomine (BENTYL) 20 MG tablet Take 1 tablet by mouth Every 6 (Six) Hours As Needed (abd cramps). 90 tablet 3 4/8/2025    ELDERBERRY PO Take 2 tablets by mouth Daily.   4/8/2025    traZODone (DESYREL) 50 MG tablet Take 1 tablet by mouth At Night As Needed.   4/6/2025    diphenoxylate-atropine (LOMOTIL) 2.5-0.025 MG per tablet Take 1 tablet by mouth 4 (Four) Times a Day As Needed for Diarrhea. 60 tablet 3 4/6/2025    hydrOXYzine (ATARAX) 10 MG tablet Take 1 tablet by mouth Every 8 (Eight) Hours As Needed for Itching. 90 tablet 0 More than a month    mupirocin (BACTROBAN) 2 % ointment Apply 1 Application topically to the appropriate area as directed 2 (Two) Times a Day. (Patient not taking: Reported on 4/8/2025) 30 g 0 More than a month    ondansetron (ZOFRAN) 8 MG tablet Take 1 tablet by mouth 3 (Three) Times a Day As Needed for Nausea or Vomiting. 60 tablet 5 4/2/2025    Tirzepatide-Weight Management (ZEPBOUND) 2.5 MG/0.5ML solution Inject 0.5 mL under the skin into the appropriate area as directed 1 (One) Time Per Week. (Patient not taking: Reported on 4/8/2025) 2 mL 2 More than a month    traMADol (ULTRAM) 50 MG tablet Take 1 tablet by mouth Every 6 (Six) Hours As Needed for Moderate Pain. (Patient not taking: Reported on 12/18/2024) 30 tablet 0 More than a month       Allergies   Allergen Reactions    Adhesive Tape Rash     Patient has sensitive skin.     Levofloxacin Swelling    Sulfa Antibiotics Rash       Family History   Problem Relation Age of Onset    Colon polyps Mother     Asthma Mother     COPD Mother     Lung disease Mother     Migraines Mother     Cancer Father         liver cancer    Colon polyps Father     Heart disease Father     Diabetes Father     Kidney disease Father     Angina Father     Liver cancer Father     Hepatitis Father     Hearing loss Father     Cancer Sister          Breast cancer    Arthritis Sister     Colon polyps Sister     Breast cancer Sister     Diabetes Sister     Colon polyps Sister     Alcohol abuse Maternal Uncle     Colon cancer Neg Hx     Crohn's disease Neg Hx     Irritable bowel syndrome Neg Hx     Ulcerative colitis Neg Hx     Malig Hyperthermia Neg Hx        Social History     Socioeconomic History    Marital status: Single   Tobacco Use    Smoking status: Never     Passive exposure: Never    Smokeless tobacco: Never   Vaping Use    Vaping status: Never Used   Substance and Sexual Activity    Alcohol use: Not Currently     Comment: yearly  maybe 1    Drug use: Never    Sexual activity: Defer       ROS    Vitals:    04/09/25 0622   BP: 171/83   Pulse: 78   Resp: 16   SpO2: 97%         Physical Exam  Constitutional:       Appearance: She is well-developed.   HENT:      Head: Normocephalic and atraumatic.   Eyes:      Pupils: Pupils are equal, round, and reactive to light.   Cardiovascular:      Rate and Rhythm: Regular rhythm.   Pulmonary:      Effort: Pulmonary effort is normal.   Abdominal:      General: There is no distension.      Palpations: Abdomen is soft.   Musculoskeletal:         General: Normal range of motion.   Skin:     General: Skin is warm and dry.   Neurological:      Mental Status: She is alert and oriented to person, place, and time.   Psychiatric:         Thought Content: Thought content normal.         Judgment: Judgment normal.           Assessment & Plan      indications: previous  rectal cancer       I recommend flex sigmoidoscopy.   I described risks, benefits of the procedure with the patient including but not limited to bleeding, infection, possibility of perforation and possible polypectomy. All of the patient's questions were answered and they would like to proceed with the above recommendations.

## 2025-04-09 NOTE — DISCHARGE INSTRUCTIONS
For Today DO NOT drive, operate machinery, appliances, drink alcohol, make important decisions or sign legal documents.    Start with a light or bland diet if you are feeling sick to your stomach otherwise advance to regular diet as tolerated.    Follow recommendations on procedure report if provided by your doctor.    Call Dr Amaya for problems [phone]    Problems may include but not limited to: large amounts of bleeding, trouble breathing, repeated vomiting, severe unrelieved pain, fever or chills.

## 2025-04-10 ENCOUNTER — TELEPHONE (OUTPATIENT)
Dept: RADIATION ONCOLOGY | Facility: HOSPITAL | Age: 65
End: 2025-04-10
Payer: COMMERCIAL

## 2025-04-10 NOTE — PROGRESS NOTES
[facility], [place], operated by [facility]   Follow Up    Chief Complaint  Rectal cancer        Diagnosis: Adenocarcinoma of the rectum     Overall Stage IIA     cT3: Per rectal MRI  cN0: Per rectal MRI  cM0: Per CT chest abdomen pelvis        Radiation Completion Date: 10/22/2022        Prescription:      Site: Rectum  Laterality: N/A  Total Dose: 2500cGy  Dose per Fraction: 500cGy  Total Fractions: 5  Daily or BID: Daily  Modality: Photon  Technique: IMRT/VMAT/Rapid Arc  Bolus: No      Interval History:    Babs Felton presents for regularly scheduled follow-up approximately 2.5 years after completion of short course radiation therapy which ultimately led to nonoperative approach for management of adenocarcinoma the rectum.  The patient has continued with imaging surveillance, colonoscopy surveillance which has continued to show no evidence of recurrent disease, most recently on 4/9/2025.  The patient reports stable symptoms with regards to her ongoing issues with diarrhea.  She manages this with loperamide.  She has had some intermittent dysuria and frequency, the right now is not having much of this.  She has no other new or concerning symptoms.        Imaging:      CT CAP 11/13/2024    IMPRESSION:  No interval change or evidence for metastatic disease to the  chest, abdomen, or pelvis. Sub-5 mm noncalcified pulmonary nodules  without change. MRI of the pelvis has been completed and will be  reported separately.      MRI Pelvis 11/13/2024    FINDINGS: There is no evidence for a residual rectal mass. The  muscularis propria appears intact and there is no perirectal thickening  or lymphadenopathy. No new abnormality is seen.      Pathology:      No new relevant pathology       Labs:    Lab Results   Component Value Date    CREATININE 0.68 12/23/2024             Problem List:  Patient Active Problem List   Diagnosis    Diarrhea    Bloody diarrhea    Abdominal cramping    Bloating    Gastroesophageal reflux disease without  esophagitis    Rectal cancer    Encounter for fitting and adjustment of vascular catheter    Personal history of malignant neoplasm of rectum, rectosigmoid junction, and anus    Fecal urgency    Fecal smearing    History of rectal cancer    Insomnia due to medical condition          Medications:  Current Outpatient Medications on File Prior to Visit   Medication Sig Dispense Refill    dicyclomine (BENTYL) 20 MG tablet Take 1 tablet by mouth Every 6 (Six) Hours As Needed (abd cramps). 90 tablet 3    diphenoxylate-atropine (LOMOTIL) 2.5-0.025 MG per tablet Take 1 tablet by mouth 4 (Four) Times a Day As Needed for Diarrhea. 60 tablet 3    ELDERBERRY PO Take 2 tablets by mouth Daily.      hydrOXYzine (ATARAX) 10 MG tablet Take 1 tablet by mouth Every 8 (Eight) Hours As Needed for Itching. 90 tablet 0    mupirocin (BACTROBAN) 2 % ointment Apply 1 Application topically to the appropriate area as directed 2 (Two) Times a Day. (Patient not taking: Reported on 4/8/2025) 30 g 0    ondansetron (ZOFRAN) 8 MG tablet Take 1 tablet by mouth 3 (Three) Times a Day As Needed for Nausea or Vomiting. 60 tablet 5    Tirzepatide-Weight Management (ZEPBOUND) 2.5 MG/0.5ML solution Inject 0.5 mL under the skin into the appropriate area as directed 1 (One) Time Per Week. 2 mL 2    traMADol (ULTRAM) 50 MG tablet Take 1 tablet by mouth Every 6 (Six) Hours As Needed for Moderate Pain. (Patient not taking: Reported on 12/18/2024) 30 tablet 0    traZODone (DESYREL) 50 MG tablet Take 1 tablet by mouth At Night As Needed.       No current facility-administered medications on file prior to visit.          Allergies:  Allergies   Allergen Reactions    Adhesive Tape Rash     Patient has sensitive skin.     Levofloxacin Swelling    Sulfa Antibiotics Rash           Vital Signs:  /83   Pulse 76   Resp 19   Wt 114 kg (252 lb)   SpO2 97%   BMI 37.21 kg/m²   Estimated body mass index is 37.21 kg/m² as calculated from the following:    Height as  "of 4/9/25: 175.3 cm (69\").    Weight as of this encounter: 114 kg (252 lb).  Pain Score    04/11/25 0850   PainSc: 0-No pain         ECOG: Fully active, able to carry on all pre-disease performance without restriction = 0    Physical Exam  Vitals reviewed.   Constitutional:       General: She is not in acute distress.     Appearance: Normal appearance.   HENT:      Head: Normocephalic and atraumatic.   Eyes:      Extraocular Movements: Extraocular movements intact.      Pupils: Pupils are equal, round, and reactive to light.   Pulmonary:      Effort: Pulmonary effort is normal.   Abdominal:      General: Abdomen is flat.      Palpations: Abdomen is soft.   Musculoskeletal:      Cervical back: Normal range of motion.   Skin:     General: Skin is warm and dry.   Neurological:      General: No focal deficit present.      Mental Status: She is alert and oriented to person, place, and time.   Psychiatric:         Mood and Affect: Mood normal.         Behavior: Behavior normal.          Result Review :  The following data was reviewed by: Jacques Freeman MD on 04/11/2025:  Labs: Last Creatinine   Data reviewed : Radiologic studies CT CAP, MRI Pelvis             Diagnoses and all orders for this visit:    1. Rectal cancer (Primary)        Assessment:      Babs Felton presents for regularly scheduled follow-up approximately 2.5 years after completion of short course radiation therapy which ultimately led to nonoperative approach for management of adenocarcinoma the rectum.  The patient has continued with imaging surveillance, colonoscopy surveillance which has continued to show no evidence of recurrent disease, most recently on 4/9/2025.  The patient reports stable symptoms with regards to her ongoing issues with diarrhea.  She manages this with loperamide.  She has had some intermittent dysuria and frequency, the right now is not having much of this.  She has no other new or concerning symptoms.    I met with the " patient and reviewed her interval history in detail.  Overall, the patient is doing very well and has recent imaging and scope examinations consistent with no recurrent disease..  Her symptoms are stable.  I offered the patient follow-up with me in 1 year versus as needed.  The patient prefers as needed follow-up.  She has my contact information and can reach out with at any time.      Plan:    -Follow-up as needed       I spent 20 minutes caring for Babs on this date of service. This time includes time spent by me in the following activities:preparing for the visit, reviewing tests, obtaining and/or reviewing a separately obtained history, documenting information in the medical record, independently interpreting results and communicating that information with the patient/family/caregiver, and care coordination  Follow Up   No follow-ups on file.  Patient was given instructions and counseling regarding her condition or for health maintenance advice. Please see specific information pulled into the AVS if appropriate.     Jacques Freeman MD

## 2025-04-11 ENCOUNTER — OFFICE VISIT (OUTPATIENT)
Dept: RADIATION ONCOLOGY | Facility: HOSPITAL | Age: 65
End: 2025-04-11
Payer: COMMERCIAL

## 2025-04-11 VITALS
HEART RATE: 76 BPM | BODY MASS INDEX: 37.21 KG/M2 | OXYGEN SATURATION: 97 % | DIASTOLIC BLOOD PRESSURE: 83 MMHG | RESPIRATION RATE: 19 BRPM | SYSTOLIC BLOOD PRESSURE: 141 MMHG | WEIGHT: 252 LBS

## 2025-04-11 DIAGNOSIS — C20 RECTAL CANCER: Primary | ICD-10-CM

## 2025-04-11 PROCEDURE — G0463 HOSPITAL OUTPT CLINIC VISIT: HCPCS | Performed by: STUDENT IN AN ORGANIZED HEALTH CARE EDUCATION/TRAINING PROGRAM

## 2025-04-21 ENCOUNTER — TELEPHONE (OUTPATIENT)
Dept: SURGERY | Facility: CLINIC | Age: 65
End: 2025-04-21
Payer: COMMERCIAL

## 2025-04-21 NOTE — TELEPHONE ENCOUNTER
Message was left on patients voicemail to call Ellie at the office.  I was calling to schedule a colonoscopy.  Patient was given my office number to call.

## 2025-04-28 ENCOUNTER — TELEPHONE (OUTPATIENT)
Dept: SURGERY | Facility: CLINIC | Age: 65
End: 2025-04-28
Payer: COMMERCIAL

## 2025-04-28 NOTE — TELEPHONE ENCOUNTER
Message was left on patients voicemail to call Ellie at the office.  I was calling to schedule a flexible sigmoidoscopy in October.  Patient was given my office number to call.

## 2025-04-29 ENCOUNTER — PREP FOR SURGERY (OUTPATIENT)
Dept: OTHER | Facility: HOSPITAL | Age: 65
End: 2025-04-29
Payer: COMMERCIAL

## 2025-04-29 DIAGNOSIS — Z85.048 PERSONAL HISTORY OF RECTAL CANCER: Primary | ICD-10-CM

## 2025-05-09 ENCOUNTER — TELEPHONE (OUTPATIENT)
Dept: ONCOLOGY | Facility: CLINIC | Age: 65
End: 2025-05-09
Payer: COMMERCIAL

## 2025-05-09 DIAGNOSIS — C20 RECTAL CANCER: Primary | ICD-10-CM

## 2025-05-09 NOTE — TELEPHONE ENCOUNTER
"  Caller: Babs Felton \"Cait\"    Relationship: Self    Best call back number: [phone]    Who are you requesting to speak with (clinical staff, provider,  specific staff member): SCHEDULING    What was the call regarding: PT WOULD LIKE HER 5/14 LAB APPT MOVED FROM [facility] TO .    "

## 2025-05-14 ENCOUNTER — LAB (OUTPATIENT)
Dept: OTHER | Facility: HOSPITAL | Age: 65
End: 2025-05-14
Payer: COMMERCIAL

## 2025-05-14 DIAGNOSIS — C20 RECTAL CANCER: ICD-10-CM

## 2025-05-14 LAB
ALBUMIN SERPL-MCNC: 4.6 G/DL (ref 3.5–5.2)
ALBUMIN/GLOB SERPL: 1.6 G/DL
ALP SERPL-CCNC: 79 U/L (ref 39–117)
ALT SERPL W P-5'-P-CCNC: 14 U/L (ref 1–33)
ANION GAP SERPL CALCULATED.3IONS-SCNC: 11.6 MMOL/L (ref 5–15)
AST SERPL-CCNC: 19 U/L (ref 1–32)
BASOPHILS # BLD AUTO: 0.02 10*3/MM3 (ref 0–0.2)
BASOPHILS NFR BLD AUTO: 0.3 % (ref 0–1.5)
BILIRUB SERPL-MCNC: 0.5 MG/DL (ref 0–1.2)
BUN SERPL-MCNC: 13 MG/DL (ref 8–23)
BUN/CREAT SERPL: 19.4 (ref 7–25)
CALCIUM SPEC-SCNC: 9.9 MG/DL (ref 8.6–10.5)
CEA SERPL-MCNC: 1.73 NG/ML
CHLORIDE SERPL-SCNC: 103 MMOL/L (ref 98–107)
CO2 SERPL-SCNC: 24.4 MMOL/L (ref 22–29)
CREAT SERPL-MCNC: 0.67 MG/DL (ref 0.57–1)
DEPRECATED RDW RBC AUTO: 41.2 FL (ref 37–54)
EGFRCR SERPLBLD CKD-EPI 2021: 97.1 ML/MIN/1.73
EOSINOPHIL # BLD AUTO: 0.2 10*3/MM3 (ref 0–0.4)
EOSINOPHIL NFR BLD AUTO: 3.4 % (ref 0.3–6.2)
ERYTHROCYTE [DISTWIDTH] IN BLOOD BY AUTOMATED COUNT: 13.1 % (ref 12.3–15.4)
GLOBULIN UR ELPH-MCNC: 2.9 GM/DL
GLUCOSE SERPL-MCNC: 85 MG/DL (ref 65–99)
HCT VFR BLD AUTO: 39.6 % (ref 34–46.6)
HGB BLD-MCNC: 13.6 G/DL (ref 12–15.9)
IMM GRANULOCYTES # BLD AUTO: 0.02 10*3/MM3 (ref 0–0.05)
IMM GRANULOCYTES NFR BLD AUTO: 0.3 % (ref 0–0.5)
LYMPHOCYTES # BLD AUTO: 1.62 10*3/MM3 (ref 0.7–3.1)
LYMPHOCYTES NFR BLD AUTO: 27.6 % (ref 19.6–45.3)
MCH RBC QN AUTO: 29.9 PG (ref 26.6–33)
MCHC RBC AUTO-ENTMCNC: 34.3 G/DL (ref 31.5–35.7)
MCV RBC AUTO: 87 FL (ref 79–97)
MONOCYTES # BLD AUTO: 0.32 10*3/MM3 (ref 0.1–0.9)
MONOCYTES NFR BLD AUTO: 5.5 % (ref 5–12)
NEUTROPHILS NFR BLD AUTO: 3.69 10*3/MM3 (ref 1.7–7)
NEUTROPHILS NFR BLD AUTO: 62.9 % (ref 42.7–76)
NRBC BLD AUTO-RTO: 0 /100 WBC (ref 0–0.2)
PLATELET # BLD AUTO: 257 10*3/MM3 (ref 140–450)
PMV BLD AUTO: 10 FL (ref 6–12)
POTASSIUM SERPL-SCNC: 4 MMOL/L (ref 3.5–5.2)
PROT SERPL-MCNC: 7.5 G/DL (ref 6–8.5)
RBC # BLD AUTO: 4.55 10*6/MM3 (ref 3.77–5.28)
SODIUM SERPL-SCNC: 139 MMOL/L (ref 136–145)
WBC NRBC COR # BLD AUTO: 5.87 10*3/MM3 (ref 3.4–10.8)

## 2025-05-14 PROCEDURE — 80053 COMPREHEN METABOLIC PANEL: CPT | Performed by: INTERNAL MEDICINE

## 2025-05-14 PROCEDURE — 82378 CARCINOEMBRYONIC ANTIGEN: CPT | Performed by: INTERNAL MEDICINE

## 2025-05-14 PROCEDURE — 85025 COMPLETE CBC W/AUTO DIFF WBC: CPT | Performed by: INTERNAL MEDICINE

## 2025-05-14 PROCEDURE — 36415 COLL VENOUS BLD VENIPUNCTURE: CPT

## 2025-05-15 ENCOUNTER — HOSPITAL ENCOUNTER (OUTPATIENT)
Dept: CT IMAGING | Facility: HOSPITAL | Age: 65
Discharge: HOME | End: 2025-05-15
Payer: COMMERCIAL

## 2025-05-15 ENCOUNTER — HOSPITAL ENCOUNTER (OUTPATIENT)
Dept: MRI IMAGING | Facility: HOSPITAL | Age: 65
Discharge: HOME | End: 2025-05-15
Payer: COMMERCIAL

## 2025-05-15 DIAGNOSIS — C20 RECTAL CANCER: ICD-10-CM

## 2025-05-15 PROCEDURE — 25510000001 IOPAMIDOL 61 % SOLUTION: Performed by: INTERNAL MEDICINE

## 2025-05-15 PROCEDURE — A9577 INJ MULTIHANCE: HCPCS | Performed by: INTERNAL MEDICINE

## 2025-05-15 PROCEDURE — 25510000002 DIATRIZOATE MEGLUMINE & SODIUM PER 1 ML: Performed by: INTERNAL MEDICINE

## 2025-05-15 PROCEDURE — 74177 CT ABD & PELVIS W/CONTRAST: CPT

## 2025-05-15 PROCEDURE — 72197 MRI PELVIS W/O & W/DYE: CPT

## 2025-05-15 PROCEDURE — 71260 CT THORAX DX C+: CPT

## 2025-05-15 PROCEDURE — 25510000002 GADOBENATE DIMEGLUMINE 529 MG/ML SOLUTION: Performed by: INTERNAL MEDICINE

## 2025-05-15 RX ORDER — DIATRIZOATE MEGLUMINE AND DIATRIZOATE SODIUM 660; 100 MG/ML; MG/ML
30 SOLUTION ORAL; RECTAL
Status: COMPLETED | OUTPATIENT
Start: 2025-05-15 | End: 2025-05-15

## 2025-05-15 RX ORDER — IOPAMIDOL 612 MG/ML
100 INJECTION, SOLUTION INTRAVASCULAR
Status: COMPLETED | OUTPATIENT
Start: 2025-05-15 | End: 2025-05-15

## 2025-05-15 RX ADMIN — GADOBENATE DIMEGLUMINE 20 ML: 529 INJECTION, SOLUTION INTRAVENOUS at 11:58

## 2025-05-15 RX ADMIN — DIATRIZOATE MEGLUMINE AND DIATRIZOATE SODIUM 30 ML: 660; 100 LIQUID ORAL; RECTAL at 12:04

## 2025-05-15 RX ADMIN — IOPAMIDOL 85 ML: 612 INJECTION, SOLUTION INTRAVENOUS at 13:15

## 2025-05-22 ENCOUNTER — OFFICE VISIT (OUTPATIENT)
Dept: ONCOLOGY | Facility: CLINIC | Age: 65
End: 2025-05-22
Payer: COMMERCIAL

## 2025-05-22 VITALS
BODY MASS INDEX: 37.52 KG/M2 | WEIGHT: 253.3 LBS | OXYGEN SATURATION: 96 % | HEIGHT: 69 IN | SYSTOLIC BLOOD PRESSURE: 135 MMHG | DIASTOLIC BLOOD PRESSURE: 80 MMHG | HEART RATE: 79 BPM | TEMPERATURE: 97.8 F | RESPIRATION RATE: 17 BRPM

## 2025-05-22 DIAGNOSIS — C20 RECTAL CANCER: Primary | ICD-10-CM

## 2025-05-22 RX ORDER — ESOMEPRAZOLE MAGNESIUM 40 MG/1
40 CAPSULE, DELAYED RELEASE ORAL
Qty: 30 CAPSULE | Refills: 0 | Status: SHIPPED | OUTPATIENT
Start: 2025-05-22

## 2025-05-22 NOTE — PROGRESS NOTES
[facility]    CONSULTING IN BLOOD DISORDERS & CANCER    REASON FOR CONSULTATION/CHIEF COMPLAINT:     Evaluation and management for rectal adenocarcinoma                             REQUESTING PHYSICIAN: No ref. provider found  RECORDS OBTAINED:  Records of the patients history including those from the electronic medical record were reviewed and summarized in detail.    HISTORY OF PRESENT ILLNESS:    The patient is a 64 y.o. year old female with medical history significant for migraines, depression/anxiety, and IBS had presented with epigastric discomfort in September 2022.      A colonoscopy performed by SHOAIB Esteves on 9/1/2022 demonstrated a 3 cm mass in the proximal rectum around 13 cm from the anal verge.  The polyp was multilobulated and somewhat fixed to the underlying tissue.  Friable with contact bleeding.  Other smaller polyps were found in the cecum and ascending colon which were removed.  The EGD revealed mucosal nodule in the esophagus and multiple gastric polyps.  No evidence of bleeding.     The pathology from the rectal mass came back as invasive moderately differentiated adenocarcinoma arising out of traditional serrated adenoma with high-grade dysplasia.    Patient was subsequently referred to Dr. Amaya, colorectal surgery who performed a flexible sigmoidoscopy on 9/21/2022, revealing an infiltrative nonobstructing mass in the mid rectum.  The mass was partially circumferential involving one third of the lumen circumference.     9/13/2022: CT C/A/P with contrast with no lizzette or distant mets    9/26/2022: MRI pelvis showed high rectal mass with effacement of the muscularis propria along the right wall and extended beyond the muscularis propria at the posterior wall, T3 lesion. There is no evidence for involvement of the mesorectal fascia. There are no pathologically enlarged nodes.     Patient has been seen by , rad-onc, who recommended short course XRT without chemo, followed by  chemo    Guardant 360: MSI stable.  Low TMB.    10/17- 10/22/2022: Short course of radiotherapy (without chemo) to the rectal mass.    11/2/2022: Cycle 1 FOLFOX  12/14/2022: Cycle 4 FOLFOX  12/28/2022: Cycle 5 FOLFOX  1/7/2022: MRI pelvis shows marked response to treatment  1/11/2023: Cycle 6 5-FU/LV.  Oxaliplatin dropped due to neuropathy.  2/8/2023: Cycle 8 5-FU    3/6/2023: Clara sigmoidoscopy and surface biopsy.  No evidence of residual disease.  Case discussed with Dr. Amaya and Dr. Freeman.  Plan made to defer surgical resection & active surveillance.    March 2023: Circulating DNA negative.    April 2023: CT c/a/p with no evidence of disease recurrence.  6/7/23: MRI pelvis with near complete response to therapy of the rectal mass.  7/13/2023: Flex sigmoidoscopy by Dr. Amaya noted scar in the mid rectum.  No evidence of disease recurrence.  11/6/23: Flex sig showed no evidence of disease recurrence  March and September 2024: Colonoscopy with no evidence of disease recurrence.  November 2024: CT and MRI pelvis showed no evidence of disease recurrence.    INTERIM HISTORY:  Patient returns to the clinic for a follow-up visit.  No new symptoms. Has persistent fatigue, intermittent abdominal cramps & loose stools.  No blood in stool.  She is trying to lose weight.  Also working with a dietitian to watch her diet to minimize nausea and loose stools.  Restarted on Nexium to help with morning nauseous feeling.  The neuropathy and nail changes stable. Appetite and weight has been stable.   Says she is busy at work.  Started playing piano and singing. She does pelvic floor exercises.  She recently made a music dedicating her recovery, while traveling to [facility].  Patient states one of her close friends too passed away from adenocarcinoma.    Past Medical History:   Diagnosis Date    Abdominal cramping 07/01/2022    Anemia     Arthritis     Bloating 07/01/2022    Bloody diarrhea 07/01/2022    Chronic back pain      "Colon polyps     FOLLOWED BY DR. BEVELRY MENDZE    COVID 08/15/2023    Cystitis     BURNING WITH URINATION SINCE RADIATION    Depression     Early cataract     Frequent urination     result from radiation    Gastric polyps     FOLLOWED BY DR. BEVERLY MENDEZ    GERD (gastroesophageal reflux disease)     Hearing loss     Hiatal hernia 09/2021    3 CM, FOLLOWED BY DR. BEVERLY MENDEZ    History of chemotherapy     LAST TREATMENT 2/10/2023    History of depression     25 years ago    History of radiation therapy     Hyperlipidemia     NO MEDS    IBS (irritable bowel syndrome)     Migraines     HX    Rectal bleeding 1983    Off and on for years    Rectal cancer 09/2022    INVASIVE MODERATELY DIFFERENTIATED ADENOCARCINOMA ARISING FROM TRADITIONAL SERRATED ADENOMA WITH HGD    Sciatica 09/2015    Tear of medial meniscus of knee 07/2021    RIGHT KNEE    Vitamin D deficiency      Past Surgical History:   Procedure Laterality Date    COLONOSCOPY N/A 1986    COLONOSCOPY N/A 03/27/2024    A HEALTHY SCAR WAS FOUND IN MID RECTUM, FLEX SIG IN 6 MONTHS, DR. TEMO OJEDA AT [facility]    COLONOSCOPY N/A 09/25/2024    A HEALTHY SCAR IN RECTUM, OTHERWISE WNL, FLEX SIG IN 6 MONTHS, DR. TEMO OJEDA AT [facility]    COLONOSCOPY W/ POLYPECTOMY N/A 09/01/2022    3 MM TUBULAR ADENOMA POLYP IN ASCENDING, 6 MM TUBULAR ADENOMA POLYP IN CECUM, 30 MM MASS IN RECTUM, PATH:INVASIVE MODERATELY DIFFERENTIATED ADENOCARCINOMA ARISING FROM TRADITIONAL SERRATED ADENOMA WITH HGD, HYPERTROPHIED ANAL PAPILLA, DR. BEVERLY MENDEZ AT [facility]    ENDOSCOPY N/A 09/01/2022    MULTIPLE BENIGN GASTRIC POLYPS, 2 SQUAMOUS PAPILLAS IN ESOPHAGUS, 3 CM HIATAL HERNIA,    EXTERNAL EAR SURGERY  1966    MULTIPLE \"LANCES\", DR. ACE IN [facility]    EYE SURGERY Right 1967    DR. CASTANEDA IN [facility] eye muscle    KNEE ARTHRODESIS Right 07/27/2021    RIGHT KNEE ARTHROCENTESIS, DR. PRICE MUKUL    KNEE SURGERY Left 1985    DR. PEREZ AT [facility] " MANIPULATION Left 01/07/2013    FOR FROZEN SHOULDER, DR. ALBERT GILMAN AT [facility]    SIGMOIDOSCOPY N/A 09/21/2022    AN INFILTRATIVE NON OBSTRUCTING MASS IN MID RECTUM, AREA TATTOOED, DR. JENNIFER OJEDA AT [facility]    SIGMOIDOSCOPY N/A 03/08/2023    7 MM HEALED ULCERATION WHERE RECTAL CANCER WAS, SCAR TISSUE HEALTHY, BIOPSY SHOWED INFLAMMATION AND SCAR, NO RESIDUAL CANCER, FLEX SIG IN 12 MONTHS, DR. JENNIFER OJEDA AT [facility]    SIGMOIDOSCOPY N/A 07/13/2023    A HEALTHY SCAR WAS IN MID RECTUM, DR. JENNIFER OJEDA AT [facility]    SIGMOIDOSCOPY N/A 11/06/2023    A HEALTHY APPEARING SCAR IN MID RECTUM, COLONOSCOPY IN 4 MONTHS, DR. JENNIFER OJEDA AT [facility]    SIGMOIDOSCOPY N/A 12/22/2023    A HEALTHY SCAR WAS FOUND IN MID RECTUM, BIOPSIES BENIGN, DR. JENNIFER OJEDA AT [facility]    SIGMOIDOSCOPY N/A 04/09/2025    HEALTHY SCAR FOUND IN RECTUM, DR. JENNIFER OJEDA AT [facility]    TONSILLECTOMY      TRANSRECTAL ULTRASOUND N/A 09/21/2022    Procedure: ULTRASOUND TRANSRECTAL;  Surgeon: Jennifer Ojeda MD;  Location: [facility] ENDOSCOPY;  Service: General;  Laterality: N/A;    TRANSRECTAL ULTRASOUND N/A 12/22/2023    Procedure: ULTRASOUND TRANSRECTAL;  Surgeon: Jennifer Ojeda MD;  Location: [facility] ENDOSCOPY;  Service: General;  Laterality: N/A;  pre: abnormal MRI  post: rectal ulceration    VENOUS ACCESS DEVICE (PORT) INSERTION Left 10/26/2022    Procedure: INSERTION OF PORTACATH;  Surgeon: Leonel Bridges MD;  Location: [facility] OR;  Service: General;  Laterality: Left;    VENOUS ACCESS DEVICE (PORT) REMOVAL N/A 12/06/2024    Procedure: REMOVAL VENOUS ACCESS DEVICE;  Surgeon: Leonel Bridges MD;  Location: [facility] OR;  Service: General;  Laterality: N/A;    WISDOM TOOTH EXTRACTION Bilateral        MEDICATIONS    Current Outpatient Medications:     dicyclomine (BENTYL) 20 MG tablet, Take 1 tablet by mouth Every 6 (Six) Hours As Needed (abd cramps)., Disp: 90 tablet, Rfl: 3    diphenoxylate-atropine (LOMOTIL) 2.5-0.025 MG per tablet, Take 1 tablet by mouth 4  (Four) Times a Day As Needed for Diarrhea., Disp: 60 tablet, Rfl: 3    ELDERBERRY PO, Take 2 tablets by mouth Daily., Disp: , Rfl:     hydrOXYzine (ATARAX) 10 MG tablet, Take 1 tablet by mouth Every 8 (Eight) Hours As Needed for Itching., Disp: 90 tablet, Rfl: 0    ondansetron (ZOFRAN) 8 MG tablet, Take 1 tablet by mouth 3 (Three) Times a Day As Needed for Nausea or Vomiting., Disp: 60 tablet, Rfl: 5    traMADol (ULTRAM) 50 MG tablet, Take 1 tablet by mouth Every 6 (Six) Hours As Needed for Moderate Pain., Disp: 30 tablet, Rfl: 0    traZODone (DESYREL) 50 MG tablet, Take 1 tablet by mouth At Night As Needed., Disp: , Rfl:     esomeprazole (nexIUM) 40 MG capsule, Take 1 capsule by mouth Every Morning Before Breakfast., Disp: 30 capsule, Rfl: 0  All current medications reviewed and updated as appropriate for today's encounter on 05/22/2025.     ALLERGIES:     Allergies   Allergen Reactions    Adhesive Tape Rash     Patient has sensitive skin.     Levofloxacin Swelling    Sulfa Antibiotics Rash       SOCIAL HISTORY:       Social History     Socioeconomic History    Marital status: Single   Tobacco Use    Smoking status: Never     Passive exposure: Never    Smokeless tobacco: Never   Vaping Use    Vaping status: Never Used   Substance and Sexual Activity    Alcohol use: Not Currently     Comment: yearly  maybe 1    Drug use: Never    Sexual activity: Defer         FAMILY HISTORY:  Family History   Problem Relation Age of Onset    Colon polyps Mother     Asthma Mother     COPD Mother     Lung disease Mother     Migraines Mother     Cancer Father         liver cancer    Colon polyps Father     Heart disease Father     Diabetes Father     Kidney disease Father     Angina Father     Liver cancer Father     Hepatitis Father     Hearing loss Father     Cancer Sister         Breast cancer    Arthritis Sister     Colon polyps Sister     Breast cancer Sister     Diabetes Sister     Colon polyps Sister     Alcohol abuse Maternal  "Uncle     Colon cancer Neg Hx     Crohn's disease Neg Hx     Irritable bowel syndrome Neg Hx     Ulcerative colitis Neg Hx     Malig Hyperthermia Neg Hx        REVIEW OF SYSTEMS:  As per HPI         Vitals:    05/22/25 1038   BP: 135/80   Pulse: 79   Resp: 17   Temp: 97.8 °F (36.6 °C)   TempSrc: Oral   SpO2: 96%   Weight: 115 kg (253 lb 4.8 oz)   Height: 175 cm (68.9\")   PainSc: 0-No pain           5/22/2025    10:38 AM   Current Status   ECOG score 0     CONSTITUTIONAL:  Vital signs reviewed.  No distress, looks comfortable.  EYES:  Conjunctiva and lids unremarkable.    EARS,NOSE,MOUTH,THROAT:  Ears and nose appear unremarkable.    RESPIRATORY:  Normal respiratory effort.  Lungs clear to auscultation bilaterally.  CARDIOVASCULAR:  Normal S1, S2.  No murmurs rubs or gallops.  No significant lower extremity edema.  GASTROINTESTINAL: Abdomen appears unremarkable.  Nondistended   LYMPHATIC:  No cervical, supraclavicular lymphadenopathy.  SKIN:  Warm.  No rashes.  PSYCHIATRIC:  Normal judgment and insight.  Normal mood and affect.  NEURO: AAOx3, no obvious focal deficits.    RECENT LABS:  Reviewed     ASSESSMENT:   is a pleasant 62 y/o WF with medical history significant for migraines, depression/anxiety, and IBS comes for rectal adenocarcinoma evaluation & management.     # Rectal adenocarcinoma, T3N0M0, Stage IIA:  Diagnosed on a c-scope performed for gastric discomfort in September 2022.   The flex sig noted a an infiltrative nonobstructing mass in the mid rectum.  The mass was partially circumferential involving one third of the lumen circumference. CT c/a/p negative for mets. MRI pelvis most consistent with T3N0 disease, with no evidence of involvement of mesorectal fascia.   Path: Invasive moderately differentiated adenocarcinoma arising out of a traditional serrated adenoma with high-grade dysplasia.  Reviewed various management strategies for localized rectal cancer with chemotherapy, radiation and " surgical resection.  Patient met with Dr. Amaya, colorectal surgery and is being tentatively planned for low anterior resection after neoadjuvant chemotherapy and radiation.  She received neoadjuvant short course RT by Dr. Freeman, radiation oncology from 10/17- 10/22/2022.   Received neoadjuvant chemotherapy with 8 cycles of FOLFOX between 11/2/2022 - 2/8/2023.  Oxaliplatin dropped with last 2 cycles.  Post treatment flexible sigmoidoscopy and rectal biopsies performed 3/8/2023 showed no evidence of residual disease.  Case discussed with Dr. Amaya and Lydia.  I extensively discussed risks/benefits of surgical resection versus active surveillance.  Informed patient that active surveillance is currently not the standard of care but is an active area of research in patients who have complete response.  Also discussed surveillance strategies.  Patient expressed understanding of the risks and opted for active surveillance.    3/22/2023: Recovering well after recent chemotherapy.  Discussed plan for active surveillance.  The CEA and guardant reveal circulating tumor DNA negative.  A CT C/A/P showed asymetric rectal thickening.  Plan to repeat MRI pelvis in 3 months and flexible sigmoidoscopy in 6 months time.    A MRI pelvis from June 2023 showed near complete resolution of the rectal mass with minimal irregularity measuring 1.3 x 1.5 cm.  Patient subsequently underwent a flex ex which showed scar in the mid rectum.  Circulating tumor DNA from June 2023 negative as well.    A MRI pelvis from December 2023 raised concern for possible disease recurrence.  PET abnormal as well.  Patient underwent flex sig which only showed old scar.  Continued on surveillance.  Colonoscopy from March 2024 and April 2025 negative.  The CT C/A/P, MRI pelvis from May 2024 and May 2025 negative  5/22/2025: Patient overall asymptomatic.  CEA normal well.  Guardant reveal from today pending.  Reviewed recent CT scan, MRI pelvis and sigmoidoscopy  findings which showed no evidence of disease recurrence.  Will continue active surveillance. Patient has been scheduled for repeat colonoscopy in October 2025 per Dr. Amaya.  Plan to otain repeat CT C/A/P and MRI pelvis and 6 months time.  Port removed now.  Advised close f/u with rad-onc & colorectal surgery.   Will see her back in 6 months or sooner if needed.  Plan to follow-up every 6 months at least until 4 years, after that can be moved to once a year.    #GERD  Significant epigastric discomfort and reflux with initiation of chemo  Continue Protonix 40 mg nightly    #Diarrhea  Initially began following radiation and was exacerbated with chemotherapy  Utilizing Imodium and Lomotil as needed.  Is planning to follow-up with GI again.    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  On multivitamin supplements    PLAN:   Continue active surveillance.  Guardant reveal from today pending.  If negative, repeat in 6 months  MRI pelvis and CT chest/abd in 6 months. If negative, repeat in 6 months.   Flex sig/colonoscopy per   Follow-up in 6 months with repeat scans and labs or sooner if needed.  Orders Placed This Encounter   Procedures    MRI Pelvis With & Without Contrast     Standing Status:   Future     Expected Date:   11/14/2025     Expiration Date:   5/23/2026     Reason for Exam::   Rectal cancer follow-up     Release to patient:   Routine Release [[id number]]    CT Abdomen Pelvis With Contrast     Standing Status:   Future     Expected Date:   11/14/2025     Expiration Date:   5/23/2026     Will Oral Contrast be needed for this procedure?:   Yes     Release to patient:   Routine Release [[id number]]     Reason for Exam::   Rectal cancer follow-up    CT Chest With Contrast Diagnostic     Standing Status:   Future     Expected Date:   11/14/2025     Expiration Date:   5/23/2026     Does this exam require Abran Bronch Protocol?:   No     Release to patient:   Routine Release [[id number]]     Reason  for Exam::   Rectal cancer follow-up    Comprehensive Metabolic Panel     Standing Status:   Future     Expected Date:   11/14/2025     Expiration Date:   5/23/2026     Release to patient:   Routine Release [[id number]]    CEA     Standing Status:   Future     Expected Date:   11/14/2025     Expiration Date:   5/23/2026     Release to patient:   Routine Release [[id number]]    Guardant Reveal - Miscellaneous Test     Standing Status:   Future     Expected Date:   11/14/2025     Expiration Date:   5/23/2026     What is the name of the test you wish to perform?:   Guardant Reveal     Release to patient:   Routine Release [[id number]]    CBC & Differential     Standing Status:   Future     Expected Date:   11/14/2025     Expiration Date:   5/23/2026     Manual Differential:   No     Release to patient:   Routine Release [[id number]]   Total time spent during this patient encounter is 42 minutes. The total time spent with the patient includes: preparing to see the patient by reviewing of tests, prior notes or other relevant information, performing appropriate independent examination & evaluation, counseling, ordering of medications, tests or procedures, documenting clinic information in the electronic medical records or other health records, independently interpreting results of tests and communicating the results to the patient/family or caregiver.

## 2025-05-28 LAB — REF LAB TEST RESULTS: NORMAL

## 2025-06-24 ENCOUNTER — OFFICE VISIT (OUTPATIENT)
Dept: FAMILY MEDICINE CLINIC | Facility: CLINIC | Age: 65
End: 2025-06-24
Payer: COMMERCIAL

## 2025-06-24 VITALS
DIASTOLIC BLOOD PRESSURE: 78 MMHG | OXYGEN SATURATION: 97 % | WEIGHT: 240.9 LBS | HEART RATE: 69 BPM | SYSTOLIC BLOOD PRESSURE: 138 MMHG | HEIGHT: 69 IN | BODY MASS INDEX: 35.68 KG/M2

## 2025-06-24 DIAGNOSIS — Z13.820 ENCOUNTER FOR SCREENING FOR OSTEOPOROSIS: ICD-10-CM

## 2025-06-24 DIAGNOSIS — G47.09 OTHER INSOMNIA: ICD-10-CM

## 2025-06-24 DIAGNOSIS — R53.83 OTHER FATIGUE: ICD-10-CM

## 2025-06-24 DIAGNOSIS — E78.5 HYPERLIPIDEMIA, UNSPECIFIED HYPERLIPIDEMIA TYPE: Primary | ICD-10-CM

## 2025-06-24 DIAGNOSIS — E55.9 VITAMIN D DEFICIENCY: ICD-10-CM

## 2025-06-24 DIAGNOSIS — E53.8 B12 DEFICIENCY: ICD-10-CM

## 2025-06-24 DIAGNOSIS — E53.8 FOLATE DEFICIENCY: ICD-10-CM

## 2025-06-24 PROCEDURE — 99214 OFFICE O/P EST MOD 30 MIN: CPT | Performed by: INTERNAL MEDICINE

## 2025-06-24 NOTE — PROGRESS NOTES
Chief Complaint  Follow-up (Bone density), Rectal Cancer, Diarrhea, and Fatigue    Patient or patient representative verbalized consent for the use of Ambient Listening during the visit with  Araceli Rose MD for chart documentation. 6/24/2025  08:29 EDT    Subjective        Babs Felton presents to [facility]    History of Present Illness  The patient is here for a 6-month follow-up on her history of rectal cancer followed by Dr. Jose Amaya and oncology and h/o acid reflux, with episodes of abdominal cramping, bloating, and diarrhea. She currently takes Lomotil as needed for diarrhea. She is no longer taking Nexium. She takes hydroxyzine as needed for anxiety and has Zofran if needed for nausea. She takes trazodone as needed for insomnia but hasn't had great results with it    She has been actively managing her diet and exercise regimen, under the guidance of a nutritionist. Her current focus is on whole foods, with an emphasis on protein intake. She has been documenting her dietary habits since 06/07/2025 and has noticed a significant improvement in her diarrhea symptoms, which she attributes to her new diet. She suspects that potato chips may have been a trigger for her diarrhea, as she experienced an episode after consuming them two weeks ago. Since starting her new diet, she has had only three or four episodes of diarrhea, a marked improvement from daily occurrences. She has also reduced her reliance on Lomotil, with a period from 06/01/2025 to 06/12/2025 where she did not need to take it at all. However, she has recently experienced more frequent cramping and nausea. Overall, she estimates an 80% improvement in her symptoms of diarrhea, bloating, and cramping since starting her new diet. She has also discovered that she can tolerate cooked favian hair cabbage, which was previously a trigger for her symptoms when raw. She is working on losing weight slowly and building good  "habits. She tried Zepbound for 5 weeks but discontinued it due to its high cost and lack of perceived benefit.    She reports chronic fatigue, even though she has reduced her caffeine intake. Her sleep is frequently interrupted, with awakenings every three hours that last for two hours. She does not snore and does not believe she has sleep apnea. She typically wakes up to use the bathroom. She has never had a bone density test and is curious about the appropriate age for this screening. She takes trazodone as needed but finds it ineffective. She has tried amitriptyline in the past without success. Ambien was effective for her, but she is reluctant to use it.    She is currently taking vitamin D, B12, and folic acid supplements two to three days a week.    She also reports joint pain, particularly in her hips and right knee.      Objective   Vital Signs:  /78 (BP Location: Left arm, Patient Position: Sitting, Cuff Size: Adult)   Pulse 69   Ht 175 cm (68.9\")   Wt 109 kg (240 lb 14.4 oz)   SpO2 97%   BMI 35.68 kg/m²   Estimated body mass index is 35.68 kg/m² as calculated from the following:    Height as of this encounter: 175 cm (68.9\").    Weight as of this encounter: 109 kg (240 lb 14.4 oz).          Physical Exam  Vitals and nursing note reviewed.   Constitutional:       Appearance: Normal appearance. She is well-developed.   HENT:      Head: Normocephalic and atraumatic.      Right Ear: External ear normal.      Left Ear: External ear normal.   Eyes:      Extraocular Movements: Extraocular movements intact.      Conjunctiva/sclera: Conjunctivae normal.   Neck:      Vascular: No carotid bruit.   Cardiovascular:      Rate and Rhythm: Normal rate and regular rhythm.      Heart sounds: Normal heart sounds.      Comments: No bruits  Pulmonary:      Effort: Pulmonary effort is normal. No respiratory distress.      Breath sounds: Normal breath sounds. No stridor. No wheezing, rhonchi or rales.   Chest:      " Chest wall: No tenderness.   Abdominal:      General: Bowel sounds are normal. There is no distension.      Palpations: Abdomen is soft. There is no mass.      Tenderness: There is no abdominal tenderness. There is no guarding or rebound.      Hernia: No hernia is present.   Musculoskeletal:      Cervical back: Neck supple.      Right lower leg: No edema.      Left lower leg: No edema.   Lymphadenopathy:      Cervical: No cervical adenopathy.   Skin:     General: Skin is warm.   Neurological:      General: No focal deficit present.      Mental Status: She is alert and oriented to person, place, and time. Mental status is at baseline.   Psychiatric:         Mood and Affect: Mood normal.         Behavior: Behavior normal.         Thought Content: Thought content normal.         Judgment: Judgment normal.        Result Review :                   Assessment and Plan   Diagnoses and all orders for this visit:    1. Hyperlipidemia, unspecified hyperlipidemia type (Primary)  -     Vitamin B12  -     TSH  -     T4, Free  -     Vitamin D,25-Hydroxy  -     Folate  -     Ferritin  -     CBC & Differential  -     Iron  -     Comprehensive Metabolic Panel  -     Lipid Panel With LDL / HDL Ratio    2. Other fatigue  -     Vitamin B12  -     TSH  -     T4, Free  -     Vitamin D,25-Hydroxy  -     Folate  -     Ferritin  -     CBC & Differential  -     Iron  -     Comprehensive Metabolic Panel  -     Lipid Panel With LDL / HDL Ratio    3. Vitamin D deficiency  -     Vitamin D,25-Hydroxy    4. Folate deficiency  -     Folate    5. B12 deficiency  -     Vitamin B12    6. Other insomnia    7. Encounter for screening for osteoporosis  -     DEXA Bone Density Axial; Future             Assessment & Plan  1. H/o Rectal cancer.  - Flexible sigmoidoscopy performed in 04/2025 with Dr. Jennifer Amaya.  - Reports an 80% improvement in diarrhea, bloating, and cramping with new diet.  - Weight loss of 10-13 pounds noted-intentional  - Continue  current dietary regimen and monitor for potential triggers.    2. Gastroesophageal reflux disease (GERD).  - No longer taking Nexium.  - Managing symptoms through dietary changes.    3. Anxiety.  - Takes hydroxyzine as needed.    4. Insomnia.  - Takes trazodone as needed but reports it is not effective.  Avoid benzodiazepines.  Discussed sleep hygiene  - Referral to Dr. Noel, a sleep psychologist, for further management.    5. Chronic fatigue.  - Reports chronic fatigue, potentially related to insomnia.  - Referral to Dr. Noel for sleep management.    6. Joint pain.  - Reports pain in hip joints and right knee.  - Bone density test to be ordered.    7. Health maintenance.  - Blood pressure slightly elevated at 138/78 mmHg.  - Advised to continue weight loss efforts and increase physical activity.  - Bone density test to be ordered.  - Lab tests to be conducted today to check vitamin levels and cholesterol which were deficient and elevated in the past.  She is taking supplements with her smoothies but not on a daily basis.      Follow-up  - Follow up in 12/2025 for a physical.         Follow Up   No follow-ups on file.  Patient was given instructions and counseling regarding her condition or for health maintenance advice. Please see specific information pulled into the AVS if appropriate.           Araceli Rose MD   08:38 EDT   [unfilled]       Answers submitted by the patient for this visit:  Problem not listed (Submitted on 6/22/2025)  Chief Complaint: Other medical problem  Reason for appointment: 6-month f/up per doctor’s request  abdominal pain: No  anorexia: No  joint pain: Yes  change in stool: No  chest pain: No  chills: No  nasal congestion: No  cough: No  diaphoresis: No  fatigue: Yes  fever: No  headaches: No  joint swelling: No  myalgias: Yes  nausea: No  neck pain: No  numbness: No  rash: No  sore throat: No  swollen glands: No  dysuria: No  vertigo: No  visual change: Yes  vomiting: No  weakness:  No  Onset: at an unknown time  Chronicity: chronic  Frequency: constantly

## 2025-06-25 LAB
25(OH)D3+25(OH)D2 SERPL-MCNC: 34.4 NG/ML (ref 30–100)
ALBUMIN SERPL-MCNC: 4.4 G/DL (ref 3.9–4.9)
ALP SERPL-CCNC: 85 IU/L (ref 44–121)
ALT SERPL-CCNC: 16 IU/L (ref 0–32)
AST SERPL-CCNC: 21 IU/L (ref 0–40)
BASOPHILS # BLD AUTO: 0 X10E3/UL (ref 0–0.2)
BASOPHILS NFR BLD AUTO: 0 %
BILIRUB SERPL-MCNC: 0.5 MG/DL (ref 0–1.2)
BUN SERPL-MCNC: 18 MG/DL (ref 8–27)
BUN/CREAT SERPL: 25 (ref 12–28)
CALCIUM SERPL-MCNC: 9.8 MG/DL (ref 8.7–10.3)
CHLORIDE SERPL-SCNC: 106 MMOL/L (ref 96–106)
CHOLEST SERPL-MCNC: 232 MG/DL (ref 100–199)
CO2 SERPL-SCNC: 20 MMOL/L (ref 20–29)
CREAT SERPL-MCNC: 0.73 MG/DL (ref 0.57–1)
EGFRCR SERPLBLD CKD-EPI 2021: 91 ML/MIN/1.73
EOSINOPHIL # BLD AUTO: 0.2 X10E3/UL (ref 0–0.4)
EOSINOPHIL NFR BLD AUTO: 4 %
ERYTHROCYTE [DISTWIDTH] IN BLOOD BY AUTOMATED COUNT: 13.4 % (ref 11.7–15.4)
FERRITIN SERPL-MCNC: 44 NG/ML (ref 15–150)
FOLATE SERPL-MCNC: 10.4 NG/ML
GLOBULIN SER CALC-MCNC: 2.3 G/DL (ref 1.5–4.5)
GLUCOSE SERPL-MCNC: 90 MG/DL (ref 70–99)
HCT VFR BLD AUTO: 42.7 % (ref 34–46.6)
HDLC SERPL-MCNC: 46 MG/DL
HGB BLD-MCNC: 13.9 G/DL (ref 11.1–15.9)
IMM GRANULOCYTES # BLD AUTO: 0 X10E3/UL (ref 0–0.1)
IMM GRANULOCYTES NFR BLD AUTO: 0 %
IRON SERPL-MCNC: 71 UG/DL (ref 27–139)
LDLC SERPL CALC-MCNC: 166 MG/DL (ref 0–99)
LDLC/HDLC SERPL: 3.6 RATIO (ref 0–3.2)
LYMPHOCYTES # BLD AUTO: 1.6 X10E3/UL (ref 0.7–3.1)
LYMPHOCYTES NFR BLD AUTO: 31 %
MCH RBC QN AUTO: 29.5 PG (ref 26.6–33)
MCHC RBC AUTO-ENTMCNC: 32.6 G/DL (ref 31.5–35.7)
MCV RBC AUTO: 91 FL (ref 79–97)
MONOCYTES # BLD AUTO: 0.3 X10E3/UL (ref 0.1–0.9)
MONOCYTES NFR BLD AUTO: 6 %
NEUTROPHILS # BLD AUTO: 2.9 X10E3/UL (ref 1.4–7)
NEUTROPHILS NFR BLD AUTO: 59 %
PLATELET # BLD AUTO: 228 X10E3/UL (ref 150–450)
POTASSIUM SERPL-SCNC: 4.2 MMOL/L (ref 3.5–5.2)
PROT SERPL-MCNC: 6.7 G/DL (ref 6–8.5)
RBC # BLD AUTO: 4.71 X10E6/UL (ref 3.77–5.28)
SODIUM SERPL-SCNC: 142 MMOL/L (ref 134–144)
T4 FREE SERPL-MCNC: 1.13 NG/DL (ref 0.82–1.77)
TRIGL SERPL-MCNC: 111 MG/DL (ref 0–149)
TSH SERPL DL<=0.005 MIU/L-ACNC: 2.26 UIU/ML (ref 0.45–4.5)
VIT B12 SERPL-MCNC: 542 PG/ML (ref 232–1245)
VLDLC SERPL CALC-MCNC: 20 MG/DL (ref 5–40)
WBC # BLD AUTO: 4.9 X10E3/UL (ref 3.4–10.8)

## 2025-07-02 PROBLEM — E55.9 VITAMIN D DEFICIENCY: Status: ACTIVE | Noted: 2025-07-02

## 2025-07-02 PROBLEM — E53.8 B12 DEFICIENCY: Status: ACTIVE | Noted: 2025-07-02

## 2025-07-02 PROBLEM — L29.9 ITCHING: Status: ACTIVE | Noted: 2025-07-02

## 2025-07-02 PROBLEM — E78.5 HYPERLIPIDEMIA: Status: ACTIVE | Noted: 2025-07-02

## 2025-07-03 ENCOUNTER — HOSPITAL ENCOUNTER (OUTPATIENT)
Dept: BONE DENSITY | Facility: HOSPITAL | Age: 65
Discharge: HOME | End: 2025-07-03
Admitting: INTERNAL MEDICINE
Payer: COMMERCIAL

## 2025-07-03 DIAGNOSIS — Z13.820 ENCOUNTER FOR SCREENING FOR OSTEOPOROSIS: ICD-10-CM

## 2025-07-03 PROCEDURE — 77080 DXA BONE DENSITY AXIAL: CPT

## (undated) DEVICE — STRIP,CLOSURE,WOUND,MEDI-STRIP,1/2X4: Brand: MEDLINE

## (undated) DEVICE — SENSR O2 OXIMAX FNGR A/ 18IN NONSTR

## (undated) DEVICE — TUBING, SUCTION, 1/4" X 10', STRAIGHT: Brand: MEDLINE

## (undated) DEVICE — CANN O2 ETCO2 FITS ALL CONN CO2 SMPL A/ 7IN DISP LF

## (undated) DEVICE — DECANTER BAG 9": Brand: MEDLINE INDUSTRIES, INC.

## (undated) DEVICE — SINGLE-USE BIOPSY FORCEPS: Brand: RADIAL JAW 4

## (undated) DEVICE — PATIENT RETURN ELECTRODE, SINGLE-USE, CONTACT QUALITY MONITORING, ADULT, WITH 9FT CORD, FOR PATIENTS WEIGING OVER 33LBS. (15KG): Brand: MEGADYNE

## (undated) DEVICE — Device: Brand: SPOT EX ENDOSCOPIC TATTOO

## (undated) DEVICE — KT ORCA ORCAPOD DISP STRL

## (undated) DEVICE — LN SMPL CO2 SHTRM SD STREAM W/M LUER

## (undated) DEVICE — SIGMOIDOSCOPIC SUCTION INSTRUMENT 18 FR W/WINGED CAP CONTROL AND 6 FOOT (1.8M) TUBING: Brand: SIGMOIDOSCOPIC

## (undated) DEVICE — THE CARR-LOCKE INJECTION NEEDLE IS A SINGLE USE, DISPOSABLE, FLEXIBLE SHEATH INJECTION NEEDLE USED FOR THE INJECTION OF VARIOUS TYPES OF MEDIA THROUGH FLEXIBLE ENDOSCOPES.

## (undated) DEVICE — SYR LL TP 10ML STRL

## (undated) DEVICE — NDL HYPO PRECISIONGLIDE REG 25G 1 1/2

## (undated) DEVICE — ADAPT CLN BIOGUARD AIR/H2O DISP

## (undated) DEVICE — GOWN SURG AERO CHROME XL

## (undated) DEVICE — Device

## (undated) DEVICE — DRSNG SURESITE WNDW 2.38X2.75

## (undated) DEVICE — GOWN,SIRUS,NON REINFRCD,LARGE,SET IN SL: Brand: MEDLINE

## (undated) DEVICE — ST EXT IV SMARTSITE 2PC M LL 2.8ML 20IN

## (undated) DEVICE — GOWN ,SIRUS,NONREINFORCED 3XL: Brand: MEDLINE

## (undated) DEVICE — INTENDED FOR TISSUE SEPARATION, AND OTHER PROCEDURES THAT REQUIRE A SHARP SURGICAL BLADE TO PUNCTURE OR CUT.: Brand: BARD-PARKER ® CARBON RIB-BACK BLADES

## (undated) DEVICE — CANN NASL CO2 TRULINK W/O2 A/

## (undated) DEVICE — GOWN ISOL W/THUMB UNIV BLU BX/15

## (undated) DEVICE — LOU MINOR PROCEDURE: Brand: MEDLINE INDUSTRIES, INC.

## (undated) DEVICE — LASSO POLYPECTOMY SNARE: Brand: LASSO

## (undated) DEVICE — GLV SURG BIOGEL LTX PF 8 1/2

## (undated) DEVICE — BITEBLOCK OMNI BLOC

## (undated) DEVICE — TRAP FLD MINIVAC MEGADYNE 100ML

## (undated) DEVICE — DRP C/ARM 41X74IN

## (undated) DEVICE — ANTIBACTERIAL UNDYED BRAIDED (POLYGLACTIN 910), SYNTHETIC ABSORBABLE SUTURE: Brand: COATED VICRYL

## (undated) DEVICE — SOL NS 500ML

## (undated) DEVICE — ADHS SKIN SURG TISS VISC PREMIERPRO EXOFIN HI/VISC FAST/DRY

## (undated) DEVICE — TBG PENCL TELESCP MEGADYNE SMOKE EVAC 10FT

## (undated) DEVICE — MSK ENDO PORT O2 POM ELITE CURAPLEX A/

## (undated) DEVICE — VIAL FORMLN CAP 10PCT 20ML

## (undated) DEVICE — FLEX ADVANTAGE 1500CC: Brand: FLEX ADVANTAGE

## (undated) DEVICE — SUT PROLN 3/0 SH D/A 36IN 8522H